# Patient Record
Sex: MALE | Race: WHITE | NOT HISPANIC OR LATINO | URBAN - METROPOLITAN AREA
[De-identification: names, ages, dates, MRNs, and addresses within clinical notes are randomized per-mention and may not be internally consistent; named-entity substitution may affect disease eponyms.]

---

## 2017-03-14 ENCOUNTER — INPATIENT HOSPITAL (AMBULATORY)
Dept: URBAN - METROPOLITAN AREA HOSPITAL 107 | Facility: HOSPITAL | Age: 57
End: 2017-03-14
Payer: COMMERCIAL

## 2017-03-14 DIAGNOSIS — R10.9 UNSPECIFIED ABDOMINAL PAIN: ICD-10-CM

## 2017-03-14 DIAGNOSIS — R19.7 DIARRHEA, UNSPECIFIED: ICD-10-CM

## 2017-03-14 DIAGNOSIS — K76.89 OTHER SPECIFIED DISEASES OF LIVER: ICD-10-CM

## 2017-03-14 DIAGNOSIS — R11.2 NAUSEA WITH VOMITING, UNSPECIFIED: ICD-10-CM

## 2017-03-14 DIAGNOSIS — K56.7 ILEUS, UNSPECIFIED: ICD-10-CM

## 2017-03-14 DIAGNOSIS — K52.9 NONINFECTIVE GASTROENTERITIS AND COLITIS, UNSPECIFIED: ICD-10-CM

## 2017-03-14 DIAGNOSIS — K57.30 DIVERTICULOSIS OF LARGE INTESTINE WITHOUT PERFORATION OR ABS: ICD-10-CM

## 2017-03-14 PROCEDURE — 99255 IP/OBS CONSLTJ NEW/EST HI 80: CPT | Performed by: INTERNAL MEDICINE

## 2017-05-08 ENCOUNTER — OFFICE (AMBULATORY)
Dept: URBAN - METROPOLITAN AREA CLINIC 75 | Facility: CLINIC | Age: 57
End: 2017-05-08
Payer: COMMERCIAL

## 2017-05-08 VITALS
HEIGHT: 70 IN | SYSTOLIC BLOOD PRESSURE: 120 MMHG | HEART RATE: 60 BPM | HEART RATE: 60 BPM | HEIGHT: 70 IN | WEIGHT: 125 LBS | DIASTOLIC BLOOD PRESSURE: 60 MMHG | HEART RATE: 60 BPM | HEIGHT: 70 IN | SYSTOLIC BLOOD PRESSURE: 120 MMHG | SYSTOLIC BLOOD PRESSURE: 120 MMHG | DIASTOLIC BLOOD PRESSURE: 60 MMHG | WEIGHT: 125 LBS | WEIGHT: 125 LBS | DIASTOLIC BLOOD PRESSURE: 60 MMHG

## 2017-05-08 DIAGNOSIS — K59.00 CONSTIPATION, UNSPECIFIED: ICD-10-CM

## 2017-05-08 DIAGNOSIS — R63.4 ABNORMAL WEIGHT LOSS: ICD-10-CM

## 2017-05-08 DIAGNOSIS — R13.10 DYSPHAGIA, UNSPECIFIED: ICD-10-CM

## 2017-05-08 DIAGNOSIS — R63.0 ANOREXIA: ICD-10-CM

## 2017-05-08 DIAGNOSIS — R10.84 GENERALIZED ABDOMINAL PAIN: ICD-10-CM

## 2017-05-08 DIAGNOSIS — R11.2 NAUSEA WITH VOMITING, UNSPECIFIED: ICD-10-CM

## 2017-05-08 PROCEDURE — 99205 OFFICE O/P NEW HI 60 MIN: CPT | Performed by: INTERNAL MEDICINE

## 2017-05-08 PROCEDURE — 99215 OFFICE O/P EST HI 40 MIN: CPT | Performed by: INTERNAL MEDICINE

## 2017-05-08 RX ORDER — LINACLOTIDE 145 UG/1
145 CAPSULE, GELATIN COATED ORAL
Qty: 30 | Refills: 0 | Status: COMPLETED
Start: 2017-05-08 | End: 2017-05-11

## 2017-05-08 NOTE — SERVICEHPINOTES
Thank you for allowing me to participate in the care of this patient. Patient presents with multiple GI complaints to include trouble swallowing and coughing food back up. Even water is hard to go down. He also has constipation where Magnesium Citrate is the only thing that helped. He feels like his food just "sits there" and doesn't digest or move through his system. His symptoms started 1 year ago and are worsening. He has lost 30lbs over the last several years. He has had multiple visits to the ED for this and his COPD with no definitive diagnosis. He had a laryngoscopy at [facility] which was normal. He had a Barium swallow last month at [facility] last month which he also reports is normal. He is a former smoker for 40 years and quit 3 months ago. No blood in stool.  He has had multiple CT of the A/P in March, April, and May which were largely normal. The earlier CTs showed some suggestion of enteritis with fluid and mild dilation of the small bowel but these seem resolved on more recent CTs of the A/P. He has also had CT of the chest and CXR which also were nonrevealing for upper GI complaints. He does have emphysema and diverticulosis found of these CTs.

## 2017-05-08 NOTE — SERVICEHPINOTES
Thank you for allowing me to participate in the care of this patient. Patient presents with multiple GI complaints to include trouble swallowing and coughing food back up. Even water is hard to go down. He also has constipation where Magnesium Citrate is the only thing that helped. He feels like his food just "sits there" and doesn't digest or move through his system. His symptoms started 1 year ago and are worsening. He has lost 30lbs over the last several years. He has had multiple visits to the ED for this and his COPD with no definitive diagnosis. He had a laryngoscopy at [facility] which was normal. He had a Barium swallow last month at [facility] last month which he also reports is normal. He is a former smoker for 40 years and quit 3 months ago. No blood in stool.  He has had multiple CT of the A/P in March, April, and May which were largely normal. The earlier CTs showed some suggestion of enteritis with fluid and mild dilation of the small bowel but these seem resolved on more recent CTs of the A/P. He has also had CT of the chest and CXR which also were nonrevealing for upper GI complaints. He does have emphysema and diverticulosis found of these CTs.

## 2017-05-08 NOTE — SERVICENOTES
The above note was scribed by Bree Lopez PA-C. Dr. Hennessy saw this patient and examined this patient while in the office.

## 2017-05-08 NOTE — SERVICEHPINOTES
Thank you for allowing me to participate in the care of this patient. Patient presents with multiple GI complaints to include trouble swallowing and coughing food back up. Even water is hard to go down. He also has constipation where Magnesium Citrate is the only thing that helped. He feels like his food just "sits there" and doesn't digest or move through his system. His symptoms started 1 year ago and are worsening. He has lost 30lbs over the last several years. He has had multiple visits to the ED for this and his COPD with no definitive diagnosis. He had a laryngoscopy at [facility] which was normal. He had a Barium swallow last month at [facility] last month which he also reports is normal. He is a former smoker for 40 years and quit 3 months ago. No blood in stool.  He has had multiple CT of the A/P in March, April, and May which were largely normal. The earlier CTs showed some suggestion of enteritis with fluid and mild dilation of the small bowel but these seem resolved on more recent CTs of the A/P. He has also had CT of the chest and CXR which also were nonrevealing for upper GI complaints. He does have emphysema and diverticulosis found of these CTs.

## 2017-05-10 VITALS
RESPIRATION RATE: 15 BRPM | TEMPERATURE: 96 F | SYSTOLIC BLOOD PRESSURE: 117 MMHG | SYSTOLIC BLOOD PRESSURE: 79 MMHG | HEART RATE: 50 BPM | DIASTOLIC BLOOD PRESSURE: 68 MMHG | DIASTOLIC BLOOD PRESSURE: 56 MMHG | HEIGHT: 70 IN | RESPIRATION RATE: 16 BRPM | SYSTOLIC BLOOD PRESSURE: 123 MMHG | HEART RATE: 56 BPM | RESPIRATION RATE: 13 BRPM | TEMPERATURE: 97.4 F | DIASTOLIC BLOOD PRESSURE: 51 MMHG | HEART RATE: 40 BPM | DIASTOLIC BLOOD PRESSURE: 48 MMHG | HEART RATE: 71 BPM | HEART RATE: 44 BPM | WEIGHT: 95 LBS | HEART RATE: 81 BPM | RESPIRATION RATE: 17 BRPM | SYSTOLIC BLOOD PRESSURE: 101 MMHG | SYSTOLIC BLOOD PRESSURE: 116 MMHG | DIASTOLIC BLOOD PRESSURE: 50 MMHG | RESPIRATION RATE: 18 BRPM | SYSTOLIC BLOOD PRESSURE: 72 MMHG | SYSTOLIC BLOOD PRESSURE: 96 MMHG | DIASTOLIC BLOOD PRESSURE: 78 MMHG | SYSTOLIC BLOOD PRESSURE: 107 MMHG | DIASTOLIC BLOOD PRESSURE: 35 MMHG | HEART RATE: 39 BPM | HEART RATE: 46 BPM | OXYGEN SATURATION: 100 % | DIASTOLIC BLOOD PRESSURE: 72 MMHG

## 2017-05-11 ENCOUNTER — OFFICE (AMBULATORY)
Dept: URBAN - METROPOLITAN AREA CLINIC 64 | Facility: CLINIC | Age: 57
End: 2017-05-11
Payer: COMMERCIAL

## 2017-05-11 ENCOUNTER — AMBULATORY SURGICAL CENTER (AMBULATORY)
Dept: URBAN - METROPOLITAN AREA SURGERY 17 | Facility: SURGERY | Age: 57
End: 2017-05-11
Payer: COMMERCIAL

## 2017-05-11 DIAGNOSIS — R11.2 NAUSEA WITH VOMITING, UNSPECIFIED: ICD-10-CM

## 2017-05-11 DIAGNOSIS — R63.0 ANOREXIA: ICD-10-CM

## 2017-05-11 DIAGNOSIS — R13.10 DYSPHAGIA, UNSPECIFIED: ICD-10-CM

## 2017-05-11 DIAGNOSIS — R10.84 GENERALIZED ABDOMINAL PAIN: ICD-10-CM

## 2017-05-11 DIAGNOSIS — R63.4 ABNORMAL WEIGHT LOSS: ICD-10-CM

## 2017-05-11 LAB
GI HISTOLOGY: A. UNSPECIFIED: (no result)
GI HISTOLOGY: PDF REPORT: (no result)

## 2017-05-11 PROCEDURE — 88305 TISSUE EXAM BY PATHOLOGIST: CPT | Performed by: INTERNAL MEDICINE

## 2017-05-11 PROCEDURE — 43239 EGD BIOPSY SINGLE/MULTIPLE: CPT | Performed by: INTERNAL MEDICINE

## 2017-05-11 PROCEDURE — 43450 DILATE ESOPHAGUS 1/MULT PASS: CPT | Performed by: INTERNAL MEDICINE

## 2017-05-11 RX ADMIN — PROPOFOL 50 MG: 10 INJECTION, EMULSION INTRAVENOUS at 09:15

## 2017-05-11 RX ADMIN — PROPOFOL 100 MG: 10 INJECTION, EMULSION INTRAVENOUS at 09:05

## 2017-05-11 RX ADMIN — LIDOCAINE HYDROCHLORIDE 25 MG: 10 INJECTION, SOLUTION EPIDURAL; INFILTRATION; INTRACAUDAL; PERINEURAL at 09:05

## 2017-05-11 RX ADMIN — PROPOFOL 50 MG: 10 INJECTION, EMULSION INTRAVENOUS at 09:08

## 2017-05-15 ENCOUNTER — HOSPITAL ENCOUNTER (EMERGENCY)
Facility: HOSPITAL | Age: 57
Discharge: LEFT BEFORE DISCHARGE | End: 2017-05-15
Attending: EMERGENCY MEDICINE | Admitting: EMERGENCY MEDICINE

## 2017-05-15 VITALS
DIASTOLIC BLOOD PRESSURE: 84 MMHG | SYSTOLIC BLOOD PRESSURE: 129 MMHG | OXYGEN SATURATION: 96 % | BODY MASS INDEX: 17.9 KG/M2 | TEMPERATURE: 99 F | RESPIRATION RATE: 18 BRPM | HEART RATE: 70 BPM | HEIGHT: 70 IN | WEIGHT: 125 LBS

## 2017-05-15 DIAGNOSIS — F32.A DEPRESSION, UNSPECIFIED DEPRESSION TYPE: Primary | ICD-10-CM

## 2017-05-15 LAB
ALBUMIN SERPL-MCNC: 4 G/DL (ref 3.5–5.2)
ALBUMIN/GLOB SERPL: 1.9 G/DL
ALP SERPL-CCNC: 60 U/L (ref 39–117)
ALT SERPL W P-5'-P-CCNC: 9 U/L (ref 1–41)
AMPHET+METHAMPHET UR QL: NEGATIVE
ANION GAP SERPL CALCULATED.3IONS-SCNC: 10.8 MMOL/L
AST SERPL-CCNC: 14 U/L (ref 1–40)
BACTERIA UR QL AUTO: ABNORMAL /HPF
BARBITURATES UR QL SCN: NEGATIVE
BASOPHILS # BLD AUTO: 0.11 10*3/MM3 (ref 0–0.2)
BASOPHILS NFR BLD AUTO: 1.5 % (ref 0–1.5)
BENZODIAZ UR QL SCN: NEGATIVE
BILIRUB SERPL-MCNC: 0.4 MG/DL (ref 0.1–1.2)
BILIRUB UR QL STRIP: NEGATIVE
BUN BLD-MCNC: 12 MG/DL (ref 6–20)
BUN/CREAT SERPL: 14.5 (ref 7–25)
CALCIUM SPEC-SCNC: 9.3 MG/DL (ref 8.6–10.5)
CANNABINOIDS SERPL QL: NEGATIVE
CHLORIDE SERPL-SCNC: 105 MMOL/L (ref 98–107)
CLARITY UR: ABNORMAL
CO2 SERPL-SCNC: 31.2 MMOL/L (ref 22–29)
COCAINE UR QL: NEGATIVE
COLOR UR: YELLOW
CREAT BLD-MCNC: 0.83 MG/DL (ref 0.76–1.27)
DEPRECATED RDW RBC AUTO: 46.8 FL (ref 37–54)
EOSINOPHIL # BLD AUTO: 0.41 10*3/MM3 (ref 0–0.7)
EOSINOPHIL NFR BLD AUTO: 5.6 % (ref 0.3–6.2)
ERYTHROCYTE [DISTWIDTH] IN BLOOD BY AUTOMATED COUNT: 13.6 % (ref 11.5–14.5)
ETHANOL BLD-MCNC: <10 MG/DL (ref 0–10)
ETHANOL UR QL: <0.01 %
GFR SERPL CREATININE-BSD FRML MDRD: 97 ML/MIN/1.73
GLOBULIN UR ELPH-MCNC: 2.1 GM/DL
GLUCOSE BLD-MCNC: 117 MG/DL (ref 65–99)
GLUCOSE UR STRIP-MCNC: NEGATIVE MG/DL
HCT VFR BLD AUTO: 39.3 % (ref 40.4–52.2)
HGB BLD-MCNC: 12.7 G/DL (ref 13.7–17.6)
HGB UR QL STRIP.AUTO: ABNORMAL
HOLD SPECIMEN: NORMAL
HOLD SPECIMEN: NORMAL
HYALINE CASTS UR QL AUTO: ABNORMAL /LPF
IMM GRANULOCYTES # BLD: 0 10*3/MM3 (ref 0–0.03)
IMM GRANULOCYTES NFR BLD: 0 % (ref 0–0.5)
KETONES UR QL STRIP: NEGATIVE
LEUKOCYTE ESTERASE UR QL STRIP.AUTO: NEGATIVE
LYMPHOCYTES # BLD AUTO: 2.71 10*3/MM3 (ref 0.9–4.8)
LYMPHOCYTES NFR BLD AUTO: 37.3 % (ref 19.6–45.3)
MAGNESIUM SERPL-MCNC: 2.5 MG/DL (ref 1.6–2.6)
MCH RBC QN AUTO: 30.6 PG (ref 27–32.7)
MCHC RBC AUTO-ENTMCNC: 32.3 G/DL (ref 32.6–36.4)
MCV RBC AUTO: 94.7 FL (ref 79.8–96.2)
METHADONE UR QL SCN: NEGATIVE
MONOCYTES # BLD AUTO: 0.72 10*3/MM3 (ref 0.2–1.2)
MONOCYTES NFR BLD AUTO: 9.9 % (ref 5–12)
NEUTROPHILS # BLD AUTO: 3.31 10*3/MM3 (ref 1.9–8.1)
NEUTROPHILS NFR BLD AUTO: 45.7 % (ref 42.7–76)
NITRITE UR QL STRIP: NEGATIVE
OPIATES UR QL: NEGATIVE
OXYCODONE UR QL SCN: POSITIVE
PH UR STRIP.AUTO: 7.5 [PH] (ref 5–8)
PLATELET # BLD AUTO: 180 10*3/MM3 (ref 140–500)
PMV BLD AUTO: 10.3 FL (ref 6–12)
POTASSIUM BLD-SCNC: 3.6 MMOL/L (ref 3.5–5.2)
PROT SERPL-MCNC: 6.1 G/DL (ref 6–8.5)
PROT UR QL STRIP: NEGATIVE
RBC # BLD AUTO: 4.15 10*6/MM3 (ref 4.6–6)
RBC # UR: ABNORMAL /HPF
REF LAB TEST METHOD: ABNORMAL
SODIUM BLD-SCNC: 147 MMOL/L (ref 136–145)
SP GR UR STRIP: 1.01 (ref 1–1.03)
SQUAMOUS #/AREA URNS HPF: ABNORMAL /HPF
UROBILINOGEN UR QL STRIP: ABNORMAL
WBC NRBC COR # BLD: 7.26 10*3/MM3 (ref 4.5–10.7)
WBC UR QL AUTO: ABNORMAL /HPF
WHOLE BLOOD HOLD SPECIMEN: NORMAL

## 2017-05-16 ENCOUNTER — APPOINTMENT (OUTPATIENT)
Dept: GENERAL RADIOLOGY | Facility: HOSPITAL | Age: 57
End: 2017-05-16

## 2017-05-16 ENCOUNTER — APPOINTMENT (OUTPATIENT)
Dept: CT IMAGING | Facility: HOSPITAL | Age: 57
End: 2017-05-16

## 2017-05-16 ENCOUNTER — HOSPITAL ENCOUNTER (INPATIENT)
Facility: HOSPITAL | Age: 57
LOS: 3 days | Discharge: PSYCHIATRIC OR SUBSTANCE USE FACILITY | End: 2017-05-19
Attending: SPECIALIST | Admitting: SPECIALIST

## 2017-05-16 ENCOUNTER — APPOINTMENT (OUTPATIENT)
Dept: MRI IMAGING | Facility: HOSPITAL | Age: 57
End: 2017-05-16
Attending: EMERGENCY MEDICINE

## 2017-05-16 ENCOUNTER — HOSPITAL ENCOUNTER (EMERGENCY)
Facility: HOSPITAL | Age: 57
End: 2017-05-16
Attending: EMERGENCY MEDICINE | Admitting: EMERGENCY MEDICINE

## 2017-05-16 VITALS
TEMPERATURE: 97.6 F | HEART RATE: 63 BPM | OXYGEN SATURATION: 98 % | DIASTOLIC BLOOD PRESSURE: 82 MMHG | SYSTOLIC BLOOD PRESSURE: 158 MMHG | RESPIRATION RATE: 15 BRPM
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
## 2017-05-16 DIAGNOSIS — F29 PSYCHOSIS, UNSPECIFIED PSYCHOSIS TYPE (HCC): ICD-10-CM

## 2017-05-16 DIAGNOSIS — Z76.5 MALINGERING: ICD-10-CM

## 2017-05-16 DIAGNOSIS — R29.898 LEG WEAKNESS, BILATERAL: ICD-10-CM

## 2017-05-16 LAB
T4 FREE SERPL-MCNC: 1.08 NG/DL (ref 0.93–1.7)
TSH SERPL DL<=0.05 MIU/L-ACNC: 0.79 MIU/ML (ref 0.27–4.2)

## 2017-05-16 PROCEDURE — 84443 ASSAY THYROID STIM HORMONE: CPT | Performed by: SPECIALIST

## 2017-05-16 PROCEDURE — 84439 ASSAY OF FREE THYROXINE: CPT | Performed by: SPECIALIST

## 2017-05-16 RX ORDER — LORAZEPAM 2 MG/ML
1 INJECTION INTRAMUSCULAR ONCE
Status: COMPLETED | OUTPATIENT
Start: 2017-05-16 | End: 2017-05-16

## 2017-05-16 RX ORDER — ZIPRASIDONE MESYLATE 20 MG/ML
20 INJECTION, POWDER, LYOPHILIZED, FOR SOLUTION INTRAMUSCULAR ONCE
Status: COMPLETED | OUTPATIENT
Start: 2017-05-16 | End: 2017-05-16

## 2017-05-16 RX ORDER — ACETAMINOPHEN 325 MG/1
650 TABLET ORAL EVERY 4 HOURS PRN
Status: DISCONTINUED | OUTPATIENT
Start: 2017-05-16 | End: 2017-05-19 | Stop reason: HOSPADM

## 2017-05-16 RX ORDER — MAGNESIUM HYDROXIDE/ALUMINUM HYDROXICE/SIMETHICONE 120; 1200; 1200 MG/30ML; MG/30ML; MG/30ML
30 SUSPENSION ORAL EVERY 6 HOURS PRN
Status: DISCONTINUED | OUTPATIENT
Start: 2017-05-16 | End: 2017-05-19 | Stop reason: HOSPADM

## 2017-05-16 RX ADMIN — LORAZEPAM 1 MG: 2 INJECTION, SOLUTION INTRAMUSCULAR; INTRAVENOUS at 14:25

## 2017-05-16 RX ADMIN — LORAZEPAM 1 MG: 2 INJECTION, SOLUTION INTRAMUSCULAR; INTRAVENOUS at 14:17

## 2017-05-16 RX ADMIN — ZIPRASIDONE MESYLATE 20 MG: 20 INJECTION, POWDER, LYOPHILIZED, FOR SOLUTION INTRAMUSCULAR at 14:18

## 2017-05-16 RX ADMIN — ACETAMINOPHEN 650 MG: 325 TABLET ORAL at 21:13

## 2017-05-17 PROCEDURE — 99254 IP/OBS CNSLTJ NEW/EST MOD 60: CPT | Performed by: ORTHOPAEDIC SURGERY

## 2017-05-17 RX ORDER — NAPROXEN 250 MG/1
250 TABLET ORAL 2 TIMES DAILY PRN
Status: DISCONTINUED | OUTPATIENT
Start: 2017-05-17 | End: 2017-05-19 | Stop reason: HOSPADM

## 2017-05-17 RX ORDER — ALBUTEROL SULFATE 2.5 MG/3ML
2.5 SOLUTION RESPIRATORY (INHALATION) EVERY 6 HOURS PRN
Status: DISCONTINUED | OUTPATIENT
Start: 2017-05-17 | End: 2017-05-19 | Stop reason: HOSPADM

## 2017-05-17 RX ORDER — CITALOPRAM 20 MG/1
20 TABLET ORAL DAILY
Status: DISCONTINUED | OUTPATIENT
Start: 2017-05-17 | End: 2017-05-19 | Stop reason: HOSPADM

## 2017-05-17 RX ADMIN — ACETAMINOPHEN 650 MG: 325 TABLET ORAL at 05:45

## 2017-05-17 RX ADMIN — CITALOPRAM 20 MG: 20 TABLET, FILM COATED ORAL at 10:28

## 2017-05-18 PROCEDURE — 99232 SBSQ HOSP IP/OBS MODERATE 35: CPT | Performed by: ORTHOPAEDIC SURGERY

## 2017-05-18 PROCEDURE — 76377 3D RENDER W/INTRP POSTPROCES: CPT

## 2017-05-18 PROCEDURE — 73700 CT LOWER EXTREMITY W/O DYE: CPT

## 2017-05-19 VITALS
WEIGHT: 121.6 LBS | OXYGEN SATURATION: 95 % | BODY MASS INDEX: 17.41 KG/M2 | HEIGHT: 70 IN | TEMPERATURE: 98.4 F | DIASTOLIC BLOOD PRESSURE: 64 MMHG | RESPIRATION RATE: 16 BRPM | SYSTOLIC BLOOD PRESSURE: 97 MMHG | HEART RATE: 66 BPM

## 2017-05-19 PROCEDURE — 99232 SBSQ HOSP IP/OBS MODERATE 35: CPT | Performed by: ORTHOPAEDIC SURGERY

## 2021-10-07 ENCOUNTER — OFFICE (AMBULATORY)
Dept: URBAN - METROPOLITAN AREA CLINIC 75 | Facility: CLINIC | Age: 61
End: 2021-10-07
Payer: COMMERCIAL

## 2021-10-07 VITALS
RESPIRATION RATE: 18 BRPM | DIASTOLIC BLOOD PRESSURE: 66 MMHG | WEIGHT: 132 LBS | OXYGEN SATURATION: 98 % | HEIGHT: 70 IN | SYSTOLIC BLOOD PRESSURE: 140 MMHG | HEART RATE: 74 BPM

## 2021-10-07 DIAGNOSIS — R13.10 DYSPHAGIA, UNSPECIFIED: ICD-10-CM

## 2021-10-07 DIAGNOSIS — K59.00 CONSTIPATION, UNSPECIFIED: ICD-10-CM

## 2021-10-07 DIAGNOSIS — R10.9 UNSPECIFIED ABDOMINAL PAIN: ICD-10-CM

## 2021-10-07 DIAGNOSIS — R11.2 NAUSEA WITH VOMITING, UNSPECIFIED: ICD-10-CM

## 2021-10-07 PROCEDURE — 99204 OFFICE O/P NEW MOD 45 MIN: CPT | Performed by: INTERNAL MEDICINE

## 2021-11-22 VITALS
HEART RATE: 65 BPM | OXYGEN SATURATION: 95 % | HEART RATE: 54 BPM | HEART RATE: 68 BPM | SYSTOLIC BLOOD PRESSURE: 106 MMHG | TEMPERATURE: 97.1 F | SYSTOLIC BLOOD PRESSURE: 116 MMHG | SYSTOLIC BLOOD PRESSURE: 101 MMHG | DIASTOLIC BLOOD PRESSURE: 79 MMHG | HEART RATE: 58 BPM | DIASTOLIC BLOOD PRESSURE: 87 MMHG | DIASTOLIC BLOOD PRESSURE: 62 MMHG | SYSTOLIC BLOOD PRESSURE: 144 MMHG | SYSTOLIC BLOOD PRESSURE: 103 MMHG | RESPIRATION RATE: 13 BRPM | RESPIRATION RATE: 19 BRPM | HEIGHT: 70 IN | HEART RATE: 67 BPM | RESPIRATION RATE: 16 BRPM | DIASTOLIC BLOOD PRESSURE: 101 MMHG | OXYGEN SATURATION: 100 % | RESPIRATION RATE: 8 BRPM | RESPIRATION RATE: 21 BRPM | SYSTOLIC BLOOD PRESSURE: 149 MMHG | WEIGHT: 124 LBS | SYSTOLIC BLOOD PRESSURE: 119 MMHG | SYSTOLIC BLOOD PRESSURE: 110 MMHG | HEART RATE: 75 BPM | DIASTOLIC BLOOD PRESSURE: 77 MMHG | RESPIRATION RATE: 18 BRPM | HEART RATE: 69 BPM | DIASTOLIC BLOOD PRESSURE: 74 MMHG | DIASTOLIC BLOOD PRESSURE: 108 MMHG | HEART RATE: 62 BPM | RESPIRATION RATE: 7 BRPM | SYSTOLIC BLOOD PRESSURE: 146 MMHG | DIASTOLIC BLOOD PRESSURE: 76 MMHG | DIASTOLIC BLOOD PRESSURE: 55 MMHG | HEART RATE: 63 BPM | OXYGEN SATURATION: 99 % | SYSTOLIC BLOOD PRESSURE: 81 MMHG | SYSTOLIC BLOOD PRESSURE: 88 MMHG | SYSTOLIC BLOOD PRESSURE: 95 MMHG | RESPIRATION RATE: 22 BRPM | SYSTOLIC BLOOD PRESSURE: 125 MMHG | DIASTOLIC BLOOD PRESSURE: 70 MMHG | HEART RATE: 64 BPM | DIASTOLIC BLOOD PRESSURE: 69 MMHG | TEMPERATURE: 97.2 F | DIASTOLIC BLOOD PRESSURE: 106 MMHG | DIASTOLIC BLOOD PRESSURE: 58 MMHG | RESPIRATION RATE: 5 BRPM | DIASTOLIC BLOOD PRESSURE: 46 MMHG | DIASTOLIC BLOOD PRESSURE: 48 MMHG | RESPIRATION RATE: 10 BRPM

## 2021-11-22 PROBLEM — Z12.11 SCREENING FOR COLONIC NEOPLASIA: Status: ACTIVE | Noted: 2021-11-23

## 2021-11-23 ENCOUNTER — OFFICE (AMBULATORY)
Dept: URBAN - METROPOLITAN AREA PATHOLOGY 4 | Facility: PATHOLOGY | Age: 61
End: 2021-11-23
Payer: COMMERCIAL

## 2021-11-23 ENCOUNTER — AMBULATORY SURGICAL CENTER (AMBULATORY)
Dept: URBAN - METROPOLITAN AREA SURGERY 17 | Facility: SURGERY | Age: 61
End: 2021-11-23
Payer: COMMERCIAL

## 2021-11-23 DIAGNOSIS — R63.0 ANOREXIA: ICD-10-CM

## 2021-11-23 DIAGNOSIS — K31.89 OTHER DISEASES OF STOMACH AND DUODENUM: ICD-10-CM

## 2021-11-23 DIAGNOSIS — Z12.11 ENCOUNTER FOR SCREENING FOR MALIGNANT NEOPLASM OF COLON: ICD-10-CM

## 2021-11-23 DIAGNOSIS — K57.30 DIVERTICULOSIS OF LARGE INTESTINE WITHOUT PERFORATION OR ABS: ICD-10-CM

## 2021-11-23 DIAGNOSIS — R13.10 DYSPHAGIA, UNSPECIFIED: ICD-10-CM

## 2021-11-23 DIAGNOSIS — D12.4 BENIGN NEOPLASM OF DESCENDING COLON: ICD-10-CM

## 2021-11-23 DIAGNOSIS — K64.8 OTHER HEMORRHOIDS: ICD-10-CM

## 2021-11-23 DIAGNOSIS — K29.70 GASTRITIS, UNSPECIFIED, WITHOUT BLEEDING: ICD-10-CM

## 2021-11-23 DIAGNOSIS — D12.5 BENIGN NEOPLASM OF SIGMOID COLON: ICD-10-CM

## 2021-11-23 DIAGNOSIS — K90.0 CELIAC DISEASE: ICD-10-CM

## 2021-11-23 DIAGNOSIS — K20.80 OTHER ESOPHAGITIS WITHOUT BLEEDING: ICD-10-CM

## 2021-11-23 DIAGNOSIS — R11.2 NAUSEA WITH VOMITING, UNSPECIFIED: ICD-10-CM

## 2021-11-23 DIAGNOSIS — B96.81 HELICOBACTER PYLORI [H. PYLORI] AS THE CAUSE OF DISEASES CLA: ICD-10-CM

## 2021-11-23 PROBLEM — K63.5 POLYP OF COLON: Status: ACTIVE | Noted: 2021-11-23

## 2021-11-23 PROBLEM — K22.89 OTHER SPECIFIED DISEASE OF ESOPHAGUS: Status: ACTIVE | Noted: 2021-11-23

## 2021-11-23 LAB
GI HISTOLOGY: A. SELECT: (no result)
GI HISTOLOGY: B. SELECT: (no result)
GI HISTOLOGY: C. SELECT: (no result)
GI HISTOLOGY: D. UNSPECIFIED: (no result)
GI HISTOLOGY: E. UNSPECIFIED: (no result)
GI HISTOLOGY: PDF REPORT: (no result)

## 2021-11-23 PROCEDURE — 88342 IMHCHEM/IMCYTCHM 1ST ANTB: CPT | Performed by: INTERNAL MEDICINE

## 2021-11-23 PROCEDURE — 88305 TISSUE EXAM BY PATHOLOGIST: CPT | Performed by: INTERNAL MEDICINE

## 2021-11-23 PROCEDURE — 45385 COLONOSCOPY W/LESION REMOVAL: CPT | Mod: 33 | Performed by: INTERNAL MEDICINE

## 2021-11-23 PROCEDURE — 43249 ESOPH EGD DILATION <30 MM: CPT | Performed by: INTERNAL MEDICINE

## 2021-11-23 PROCEDURE — 43239 EGD BIOPSY SINGLE/MULTIPLE: CPT | Mod: 59 | Performed by: INTERNAL MEDICINE

## 2021-11-23 NOTE — SERVICEHPINOTES
Mister Hammonds is being seen today due to complaints of nausea and vomiting as well as abdominal pain, he's had symptoms for a month or more.  He is thin and he says he has lost 8 pounds.  He also says he has trouble swallowing, when he swallows food or saliva it just "gurgles" and won't go down.  He has been to the ER, CT scan was unremarkable.  Lab work was unremarkable in terms of CBC or hepatic function panel.  There is no melena or hematemesis.  He quit smoking 6 months ago.  He is not a drinker.  He does not use nonsteroidals.  There is no history of ulcers.  Family history is negative.  Apparently had an upper endoscopy for years ago which reportedly was unremarkable.He also has constipation.  He has to strain.  He's having trouble going to the bathroom now on it started about a month ago as well.  He is never had a colonoscopy.  Again there is no blood in the bowels.  He had a negative colon were 2 years ago per the patient.  There is no family history of polyps colitis or colon cancer.  He is in no acute distress but does look chronically ill.

## 2022-02-21 ENCOUNTER — HOSPITAL ENCOUNTER (EMERGENCY)
Facility: HOSPITAL | Age: 62
Discharge: HOME | End: 2022-02-21
Attending: EMERGENCY MEDICINE | Admitting: EMERGENCY MEDICINE

## 2022-02-21 ENCOUNTER — APPOINTMENT (OUTPATIENT)
Dept: CT IMAGING | Facility: HOSPITAL | Age: 62
End: 2022-02-21

## 2022-02-21 ENCOUNTER — APPOINTMENT (OUTPATIENT)
Dept: GENERAL RADIOLOGY | Facility: HOSPITAL | Age: 62
End: 2022-02-21

## 2022-02-21 VITALS
SYSTOLIC BLOOD PRESSURE: 129 MMHG | OXYGEN SATURATION: 99 % | HEIGHT: 70 IN | HEART RATE: 63 BPM | WEIGHT: 121 LBS | TEMPERATURE: 98.3 F | BODY MASS INDEX: 17.32 KG/M2 | DIASTOLIC BLOOD PRESSURE: 82 MMHG | RESPIRATION RATE: 16 BRPM

## 2022-02-21 DIAGNOSIS — R11.2 NAUSEA AND VOMITING, INTRACTABILITY OF VOMITING NOT SPECIFIED, UNSPECIFIED VOMITING TYPE: ICD-10-CM

## 2022-02-21 DIAGNOSIS — J01.11 ACUTE RECURRENT FRONTAL SINUSITIS: Primary | ICD-10-CM

## 2022-02-21 LAB
ALBUMIN SERPL-MCNC: 4.1 G/DL (ref 3.5–5.2)
ALBUMIN/GLOB SERPL: 1.8 G/DL
ALP SERPL-CCNC: 65 U/L (ref 39–117)
ALT SERPL W P-5'-P-CCNC: 9 U/L (ref 1–41)
ANION GAP SERPL CALCULATED.3IONS-SCNC: 9 MMOL/L (ref 5–15)
AST SERPL-CCNC: 15 U/L (ref 1–40)
B PARAPERT DNA SPEC QL NAA+PROBE: NOT DETECTED
B PERT DNA SPEC QL NAA+PROBE: NOT DETECTED
BASOPHILS # BLD AUTO: 0.09 10*3/MM3 (ref 0–0.2)
BASOPHILS NFR BLD AUTO: 1 % (ref 0–1.5)
BILIRUB SERPL-MCNC: 0.4 MG/DL (ref 0–1.2)
BUN SERPL-MCNC: 20 MG/DL (ref 6–20)
BUN/CREAT SERPL: 18 (ref 7–25)
C PNEUM DNA NPH QL NAA+NON-PROBE: NOT DETECTED
CALCIUM SPEC-SCNC: 8.9 MG/DL (ref 8.6–10.5)
CHLORIDE SERPL-SCNC: 108 MMOL/L (ref 98–107)
CO2 SERPL-SCNC: 27 MMOL/L (ref 22–29)
CREAT SERPL-MCNC: 1.11 MG/DL (ref 0.76–1.27)
DEPRECATED RDW RBC AUTO: 46.1 FL (ref 37–54)
EOSINOPHIL # BLD AUTO: 0.09 10*3/MM3 (ref 0–0.4)
EOSINOPHIL NFR BLD AUTO: 1 % (ref 0.3–6.2)
ERYTHROCYTE [DISTWIDTH] IN BLOOD BY AUTOMATED COUNT: 13.6 % (ref 12.3–15.4)
FLUAV SUBTYP SPEC NAA+PROBE: NOT DETECTED
FLUBV RNA ISLT QL NAA+PROBE: NOT DETECTED
GFR SERPL CREATININE-BSD FRML MDRD: 68 ML/MIN/1.73
GLOBULIN UR ELPH-MCNC: 2.3 GM/DL
GLUCOSE SERPL-MCNC: 94 MG/DL (ref 65–99)
HADV DNA SPEC NAA+PROBE: NOT DETECTED
HCOV 229E RNA SPEC QL NAA+PROBE: NOT DETECTED
HCOV HKU1 RNA SPEC QL NAA+PROBE: NOT DETECTED
HCOV NL63 RNA SPEC QL NAA+PROBE: NOT DETECTED
HCOV OC43 RNA SPEC QL NAA+PROBE: NOT DETECTED
HCT VFR BLD AUTO: 40.7 % (ref 37.5–51)
HGB BLD-MCNC: 13.3 G/DL (ref 13–17.7)
HMPV RNA NPH QL NAA+NON-PROBE: NOT DETECTED
HPIV1 RNA ISLT QL NAA+PROBE: NOT DETECTED
HPIV2 RNA SPEC QL NAA+PROBE: NOT DETECTED
HPIV3 RNA NPH QL NAA+PROBE: NOT DETECTED
HPIV4 P GENE NPH QL NAA+PROBE: NOT DETECTED
IMM GRANULOCYTES # BLD AUTO: 0.04 10*3/MM3 (ref 0–0.05)
IMM GRANULOCYTES NFR BLD AUTO: 0.4 % (ref 0–0.5)
LIPASE SERPL-CCNC: 31 U/L (ref 13–60)
LYMPHOCYTES # BLD AUTO: 1.76 10*3/MM3 (ref 0.7–3.1)
LYMPHOCYTES NFR BLD AUTO: 18.9 % (ref 19.6–45.3)
M PNEUMO IGG SER IA-ACNC: NOT DETECTED
MCH RBC QN AUTO: 30.5 PG (ref 26.6–33)
MCHC RBC AUTO-ENTMCNC: 32.7 G/DL (ref 31.5–35.7)
MCV RBC AUTO: 93.3 FL (ref 79–97)
MONOCYTES # BLD AUTO: 0.55 10*3/MM3 (ref 0.1–0.9)
MONOCYTES NFR BLD AUTO: 5.9 % (ref 5–12)
NEUTROPHILS NFR BLD AUTO: 6.8 10*3/MM3 (ref 1.7–7)
NEUTROPHILS NFR BLD AUTO: 72.8 % (ref 42.7–76)
NRBC BLD AUTO-RTO: 0 /100 WBC (ref 0–0.2)
PLATELET # BLD AUTO: 241 10*3/MM3 (ref 140–450)
PMV BLD AUTO: 10 FL (ref 6–12)
POTASSIUM SERPL-SCNC: 4 MMOL/L (ref 3.5–5.2)
PROT SERPL-MCNC: 6.4 G/DL (ref 6–8.5)
RBC # BLD AUTO: 4.36 10*6/MM3 (ref 4.14–5.8)
RHINOVIRUS RNA SPEC NAA+PROBE: NOT DETECTED
RSV RNA NPH QL NAA+NON-PROBE: NOT DETECTED
SARS-COV-2 RNA NPH QL NAA+NON-PROBE: NOT DETECTED
SODIUM SERPL-SCNC: 144 MMOL/L (ref 136–145)
WBC NRBC COR # BLD: 9.33 10*3/MM3 (ref 3.4–10.8)

## 2022-02-21 PROCEDURE — 0202U NFCT DS 22 TRGT SARS-COV-2: CPT | Performed by: EMERGENCY MEDICINE

## 2022-02-21 PROCEDURE — 25010000002 MORPHINE PER 10 MG: Performed by: EMERGENCY MEDICINE

## 2022-02-21 PROCEDURE — 25010000002 ONDANSETRON PER 1 MG: Performed by: EMERGENCY MEDICINE

## 2022-02-21 PROCEDURE — 70450 CT HEAD/BRAIN W/O DYE: CPT

## 2022-02-21 PROCEDURE — 74176 CT ABD & PELVIS W/O CONTRAST: CPT

## 2022-02-21 PROCEDURE — 83690 ASSAY OF LIPASE: CPT | Performed by: EMERGENCY MEDICINE

## 2022-02-21 PROCEDURE — 96374 THER/PROPH/DIAG INJ IV PUSH: CPT

## 2022-02-21 PROCEDURE — 85025 COMPLETE CBC W/AUTO DIFF WBC: CPT | Performed by: EMERGENCY MEDICINE

## 2022-02-21 PROCEDURE — 99284 EMERGENCY DEPT VISIT MOD MDM: CPT

## 2022-02-21 PROCEDURE — 25010000002 HYDROMORPHONE PER 4 MG: Performed by: EMERGENCY MEDICINE

## 2022-02-21 PROCEDURE — 80053 COMPREHEN METABOLIC PANEL: CPT | Performed by: EMERGENCY MEDICINE

## 2022-02-21 PROCEDURE — 71045 X-RAY EXAM CHEST 1 VIEW: CPT

## 2022-02-21 PROCEDURE — 96375 TX/PRO/DX INJ NEW DRUG ADDON: CPT

## 2022-02-21 RX ORDER — HYDROMORPHONE HYDROCHLORIDE 1 MG/ML
0.5 INJECTION, SOLUTION INTRAMUSCULAR; INTRAVENOUS; SUBCUTANEOUS ONCE
Status: COMPLETED | OUTPATIENT
Start: 2022-02-21 | End: 2022-02-21

## 2022-02-21 RX ORDER — ONDANSETRON 2 MG/ML
4 INJECTION INTRAMUSCULAR; INTRAVENOUS ONCE
Status: COMPLETED | OUTPATIENT
Start: 2022-02-21 | End: 2022-02-21

## 2022-02-21 RX ORDER — ONDANSETRON 4 MG/1
4 TABLET, ORALLY DISINTEGRATING ORAL 4 TIMES DAILY PRN
Qty: 15 TABLET | Refills: 0 | Status: SHIPPED | OUTPATIENT
Start: 2022-02-21 | End: 2022-04-21

## 2022-02-21 RX ORDER — MORPHINE SULFATE 2 MG/ML
4 INJECTION, SOLUTION INTRAMUSCULAR; INTRAVENOUS ONCE
Status: COMPLETED | OUTPATIENT
Start: 2022-02-21 | End: 2022-02-21

## 2022-02-21 RX ORDER — HYDROCODONE BITARTRATE AND ACETAMINOPHEN 5; 325 MG/1; MG/1
1 TABLET ORAL EVERY 6 HOURS PRN
Qty: 10 TABLET | Refills: 0 | Status: SHIPPED | OUTPATIENT
Start: 2022-02-21 | End: 2022-04-21

## 2022-02-21 RX ORDER — AZITHROMYCIN 250 MG/1
250 TABLET, FILM COATED ORAL DAILY
Qty: 5 TABLET | Refills: 0 | Status: SHIPPED | OUTPATIENT
Start: 2022-02-21 | End: 2022-04-21

## 2022-02-21 RX ADMIN — SODIUM CHLORIDE 1000 ML: 9 INJECTION, SOLUTION INTRAVENOUS at 16:10

## 2022-02-21 RX ADMIN — ONDANSETRON 4 MG: 2 INJECTION INTRAMUSCULAR; INTRAVENOUS at 16:10

## 2022-02-21 RX ADMIN — HYDROMORPHONE HYDROCHLORIDE 0.5 MG: 1 INJECTION, SOLUTION INTRAMUSCULAR; INTRAVENOUS; SUBCUTANEOUS at 17:39

## 2022-02-21 RX ADMIN — MORPHINE SULFATE 4 MG: 2 INJECTION, SOLUTION INTRAMUSCULAR; INTRAVENOUS at 16:12

## 2022-02-21 NOTE — ED TRIAGE NOTES
Pt to ED via EMS from home. Pt is having nasal sinus pressure and severe pain behind eyes/in sinuses x 1 week. Pt also c/o sore throat and trouble swallowing x 1 day. Pt denies CP and denies SOA. Pt had negative COVID test 2-3 days. Pt alert and oriented.

## 2022-02-21 NOTE — ED PROVIDER NOTES
EMERGENCY DEPARTMENT ENCOUNTER    Room Number:  13/13  PCP: System, Provider Not In  Historian: Patient  History Limited By: Nothing      HPI  Chief Complaint: Headache sore throat  Context: Donny Mallory is a 61 y.o. male who presents to the ED c/o headache and sore throat.  Patient states symptoms started a couple days ago.  States pain is in the front of his head behind both eyes and into his teeth.  Patient is had sore throat.  Has had dry heaves.  Has had some constipation.  Has had some mild abdominal pain.  Patient states he had negative Covid test a week ago.  Patient states symptoms not getting better.  No posterior headache or neck pain.      Location: Periorbital  Radiation: Teeth  Character: Aching  Duration: 3 days  Severity: Moderate  Progression: Gradual in onset  Aggravating Factors: Nothing  Alleviating Factors: Nothing        MEDICAL RECORD REVIEW    Patient was diagnosed with sinusitis February 15.  Patient also diagnosed with constipation on the 18th.  Has been seen for the symptoms on multiple occasions          PAST MEDICAL HISTORY  Active Ambulatory Problems     Diagnosis Date Noted   • Psychosis ([facility]) 05/16/2017     Resolved Ambulatory Problems     Diagnosis Date Noted   • No Resolved Ambulatory Problems     Past Medical History:   Diagnosis Date   • Bell's palsy    • COPD (chronic obstructive pulmonary disease) ([facility])    • Dysphagia    • Hypertension          PAST SURGICAL HISTORY  Past Surgical History:   Procedure Laterality Date   • HERNIA REPAIR           FAMILY HISTORY  Family History   Family history unknown: Yes         SOCIAL HISTORY  Social History     Socioeconomic History   • Marital status: Single   Tobacco Use   • Smoking status: Former Smoker   Substance and Sexual Activity   • Alcohol use: No   • Drug use: No         ALLERGIES  Patient has no known allergies.        REVIEW OF SYSTEMS  Review of Systems   Constitutional: Negative for activity change, appetite change and fever.    HENT: Positive for congestion, sinus pressure and sore throat.    Eyes: Negative.    Respiratory: Negative for cough and shortness of breath.    Cardiovascular: Negative for chest pain and leg swelling.   Gastrointestinal: Positive for nausea and vomiting. Negative for abdominal pain and diarrhea.   Endocrine: Negative.    Genitourinary: Negative for decreased urine volume and dysuria.   Musculoskeletal: Negative for neck pain.   Skin: Negative for rash and wound.   Allergic/Immunologic: Negative.    Neurological: Positive for headaches. Negative for weakness and numbness.   Hematological: Negative.    Psychiatric/Behavioral: Negative.    All other systems reviewed and are negative.           PHYSICAL EXAM  ED Triage Vitals [02/21/22 1525]   Temp Heart Rate Resp BP SpO2   98.3 °F (36.8 °C) 88 16 134/80 100 %      Temp src Heart Rate Source Patient Position BP Location FiO2 (%)   Oral Monitor -- -- --       Physical Exam  Vitals and nursing note reviewed.   Constitutional:       General: He is not in acute distress.  HENT:      Head: Normocephalic and atraumatic.      Nose: Congestion present.      Mouth/Throat:      Mouth: Mucous membranes are moist.      Tonsils: No tonsillar exudate or tonsillar abscesses.   Eyes:      Pupils: Pupils are equal, round, and reactive to light.   Cardiovascular:      Rate and Rhythm: Normal rate and regular rhythm.      Heart sounds: Normal heart sounds.   Pulmonary:      Effort: Pulmonary effort is normal. No respiratory distress.      Breath sounds: Normal breath sounds.   Abdominal:      Palpations: Abdomen is soft.      Tenderness: There is no abdominal tenderness. There is no guarding or rebound.   Musculoskeletal:         General: Normal range of motion.      Cervical back: Normal range of motion and neck supple.   Skin:     General: Skin is warm and dry.   Neurological:      Mental Status: He is alert and oriented to person, place, and time.      Sensory: Sensation is intact.    Psychiatric:         Mood and Affect: Mood and affect normal.       Patient was wearing a face mask when I entered the room and they continued to wear a mask throughout their stay in the ED.  I wore PPE, including  gloves, face mask with shield or face mask with goggles whenever I was in the room with patient.       LAB RESULTS  Recent Results (from the past 24 hour(s))   Comprehensive Metabolic Panel    Collection Time: 02/21/22  4:14 PM    Specimen: Blood   Result Value Ref Range    Glucose 94 65 - 99 mg/dL    BUN 20 6 - 20 mg/dL    Creatinine 1.11 0.76 - 1.27 mg/dL    Sodium 144 136 - 145 mmol/L    Potassium 4.0 3.5 - 5.2 mmol/L    Chloride 108 (H) 98 - 107 mmol/L    CO2 27.0 22.0 - 29.0 mmol/L    Calcium 8.9 8.6 - 10.5 mg/dL    Total Protein 6.4 6.0 - 8.5 g/dL    Albumin 4.10 3.50 - 5.20 g/dL    ALT (SGPT) 9 1 - 41 U/L    AST (SGOT) 15 1 - 40 U/L    Alkaline Phosphatase 65 39 - 117 U/L    Total Bilirubin 0.4 0.0 - 1.2 mg/dL    eGFR Non African Amer 68 >60 mL/min/1.73    Globulin 2.3 gm/dL    A/G Ratio 1.8 g/dL    BUN/Creatinine Ratio 18.0 7.0 - 25.0    Anion Gap 9.0 5.0 - 15.0 mmol/L   Lipase    Collection Time: 02/21/22  4:14 PM    Specimen: Blood   Result Value Ref Range    Lipase 31 13 - 60 U/L   CBC Auto Differential    Collection Time: 02/21/22  4:14 PM    Specimen: Blood   Result Value Ref Range    WBC 9.33 3.40 - 10.80 10*3/mm3    RBC 4.36 4.14 - 5.80 10*6/mm3    Hemoglobin 13.3 13.0 - 17.7 g/dL    Hematocrit 40.7 37.5 - 51.0 %    MCV 93.3 79.0 - 97.0 fL    MCH 30.5 26.6 - 33.0 pg    MCHC 32.7 31.5 - 35.7 g/dL    RDW 13.6 12.3 - 15.4 %    RDW-SD 46.1 37.0 - 54.0 fl    MPV 10.0 6.0 - 12.0 fL    Platelets 241 140 - 450 10*3/mm3    Neutrophil % 72.8 42.7 - 76.0 %    Lymphocyte % 18.9 (L) 19.6 - 45.3 %    Monocyte % 5.9 5.0 - 12.0 %    Eosinophil % 1.0 0.3 - 6.2 %    Basophil % 1.0 0.0 - 1.5 %    Immature Grans % 0.4 0.0 - 0.5 %    Neutrophils, Absolute 6.80 1.70 - 7.00 10*3/mm3    Lymphocytes, Absolute  1.76 0.70 - 3.10 10*3/mm3    Monocytes, Absolute 0.55 0.10 - 0.90 10*3/mm3    Eosinophils, Absolute 0.09 0.00 - 0.40 10*3/mm3    Basophils, Absolute 0.09 0.00 - 0.20 10*3/mm3    Immature Grans, Absolute 0.04 0.00 - 0.05 10*3/mm3    nRBC 0.0 0.0 - 0.2 /100 WBC   Respiratory Panel PCR w/COVID-19(SARS-CoV-2) [facility]ESS/TAMY/HORTENCIA/PAD/COR/MAD/GRETEL In-House, NP Swab in UTM/VTM, 3-4 HR TAT - Swab, Nasopharynx    Collection Time: 02/21/22  4:27 PM    Specimen: Nasopharynx; Swab   Result Value Ref Range    ADENOVIRUS, PCR Not Detected Not Detected    Coronavirus 229E Not Detected Not Detected    Coronavirus HKU1 Not Detected Not Detected    Coronavirus NL63 Not Detected Not Detected    Coronavirus OC43 Not Detected Not Detected    COVID19 Not Detected Not Detected - Ref. Range    Human Metapneumovirus Not Detected Not Detected    Human Rhinovirus/Enterovirus Not Detected Not Detected    Influenza A PCR Not Detected Not Detected    Influenza B PCR Not Detected Not Detected    Parainfluenza Virus 1 Not Detected Not Detected    Parainfluenza Virus 2 Not Detected Not Detected    Parainfluenza Virus 3 Not Detected Not Detected    Parainfluenza Virus 4 Not Detected Not Detected    RSV, PCR Not Detected Not Detected    Bordetella pertussis pcr Not Detected Not Detected    Bordetella parapertussis PCR Not Detected Not Detected    Chlamydophila pneumoniae PCR Not Detected Not Detected    Mycoplasma pneumo by PCR Not Detected Not Detected       Ordered the above labs and reviewed the results.        RADIOLOGY  XR Chest 1 View   Final Result   No evidence for acute pulmonary process. Follow-up as   clinical indications persist.       This report was finalized on 2/21/2022 5:45 PM by Dr. Abdiaziz Kumar M.D.          CT Head Without Contrast   Final Result       No acute intracranial hemorrhage or hydrocephalus. If there is further   clinical concern, MRI could be considered for further evaluation.       This report was finalized on  2/21/2022 5:57 PM by Dr. Abdiaziz Kumar M.D.          CT Abdomen Pelvis Without Contrast   Preliminary Result   .    1. There is very little abdominal fat which crowds the abdominal   viscera.   2. Multiple low-density liver lesions compatible with hepatic cysts.   3. Minimal cholelithiasis.   4. Bilateral low-density renal lesions may all represent benign cysts   though one of these on the left measures up to 5.8 cm and appears   somewhat complex. These could be further evaluated with a renal mass   protocol CT scan of the abdomen with pre and postcontrast images if   clinically indicated.   5. Moderate amount of stool in the colon.   6. Mild prostate gland enlargement.       Radiation dose reduction techniques were utilized, including automated   exposure control and exposure modulation based on body size.                   Ordered the above noted radiological studies. Reviewed by me in PACS.  Discussed with Dr. Hughes (radiologist) regarding CT/MRI scan results.          PROCEDURES  Procedures            MEDICATIONS GIVEN IN ER  Medications   sodium chloride 0.9 % bolus 1,000 mL (0 mL Intravenous Stopped 2/21/22 1741)   ondansetron (ZOFRAN) injection 4 mg (4 mg Intravenous Given 2/21/22 1610)   morphine injection 4 mg (4 mg Intravenous Given 2/21/22 1612)   HYDROmorphone (DILAUDID) injection 0.5 mg (0.5 mg Intravenous Given 2/21/22 1739)             PROGRESS AND CONSULTS  ED Course as of 02/21/22 2140   Mon Feb 21, 2022   1907 19:07 EST  Patient has been seen multiple times for similar symptoms.  Patient states this started a few days ago however has been going on for several months.  Has seen ENT.  Patient CT head shows no evidence of obstruction.  He has no global headache to suggest subarachnoid hemorrhage.  Patient has chronic abdominal pain as well.  His CT scan is negative.  His Covid test is negative.  Patient's blood work is normal here.  Patient will be discharged home.  Instructed to follow-up  with ENT.  Will be given antibiotic as this is persistent in nature.  Will also be given small amount of pain medication. [SL]      ED Course User Index  [SL] Jermaine Montaño MD           MEDICAL DECISION MAKING      MDM  Number of Diagnoses or Management Options     Amount and/or Complexity of Data Reviewed  Clinical lab tests: reviewed and ordered (Respiratory viral panel negative)  Tests in the radiology section of CPT®: reviewed and ordered (CT head and abdomen negative)               DIAGNOSIS  Final diagnoses:   Acute recurrent frontal sinusitis   Nausea and vomiting, intractability of vomiting not specified, unspecified vomiting type           DISPOSITION  DISCHARGE    Patient discharged in stable condition.    Reviewed implications of results, diagnosis, meds, responsibility to follow up, warning signs and symptoms of possible worsening, potential complications and reasons to return to ER, including worsening symptoms.    Patient/Family voiced understanding of above instructions.    Discussed plan for discharge, as there is no emergent indication for admission. Patient referred to primary care provider for BP management due to today's BP. Pt/family is agreeable and understands need for follow up and repeat testing.  Pt is aware that discharge does not mean that nothing is wrong but it indicates no emergency is present that requires admission and they must continue care with follow-up as given below or physician of their choice.     FOLLOW-UP  Haider Trevino MD  [address] [address]  [phone]    Schedule an appointment as soon as possible for a visit           Medication List      New Prescriptions    azithromycin 250 MG tablet  Commonly known as: ZITHROMAX  Take 1 tablet by mouth Daily.     HYDROcodone-acetaminophen 5-325 MG per tablet  Commonly known as: NORCO  Take 1 tablet by mouth Every 6 (Six) Hours As Needed for Moderate Pain .     ondansetron ODT 4 MG  disintegrating tablet  Commonly known as: ZOFRAN-ODT  Place 1 tablet on the tongue 4 (Four) Times a Day As Needed for Nausea or Vomiting.           Where to Get Your Medications      You can get these medications from any pharmacy    Bring a paper prescription for each of these medications  · azithromycin 250 MG tablet  · HYDROcodone-acetaminophen 5-325 MG per tablet  · ondansetron ODT 4 MG disintegrating tablet             Latest Documented Vital Signs:  As of 21:40 EST  BP- 129/82 HR- 63 Temp- 98.3 °F (36.8 °C) (Oral) O2 sat- 99%                       Jermaine Montaño MD  02/21/22 8102

## 2022-02-22 NOTE — ED NOTES
This RN wore gloves, mask, eye protection and all other necessary PPE while performing pt care.       Supriya Reed, RN  02/21/22 1917

## 2022-04-21 ENCOUNTER — APPOINTMENT (OUTPATIENT)
Dept: GENERAL RADIOLOGY | Facility: HOSPITAL | Age: 62
End: 2022-04-21

## 2022-04-21 ENCOUNTER — APPOINTMENT (OUTPATIENT)
Dept: CT IMAGING | Facility: HOSPITAL | Age: 62
End: 2022-04-21

## 2022-04-21 ENCOUNTER — HOSPITAL ENCOUNTER (INPATIENT)
Facility: HOSPITAL | Age: 62
LOS: 9 days | Discharge: HOME | End: 2022-04-30
Attending: EMERGENCY MEDICINE | Admitting: INTERNAL MEDICINE

## 2022-04-21 DIAGNOSIS — R06.02 SHORTNESS OF BREATH: ICD-10-CM

## 2022-04-21 DIAGNOSIS — K85.10 ACUTE BILIARY PANCREATITIS WITHOUT INFECTION OR NECROSIS: ICD-10-CM

## 2022-04-21 DIAGNOSIS — K85.90 ACUTE PANCREATITIS, UNSPECIFIED COMPLICATION STATUS, UNSPECIFIED PANCREATITIS TYPE: Primary | ICD-10-CM

## 2022-04-21 LAB
ALBUMIN SERPL-MCNC: 4.5 G/DL (ref 3.5–5.2)
ALBUMIN/GLOB SERPL: 1.8 G/DL
ALP SERPL-CCNC: 69 U/L (ref 39–117)
ALT SERPL W P-5'-P-CCNC: 18 U/L (ref 1–41)
ANION GAP SERPL CALCULATED.3IONS-SCNC: 12.7 MMOL/L (ref 5–15)
AST SERPL-CCNC: 20 U/L (ref 1–40)
BACTERIA UR QL AUTO: ABNORMAL /HPF
BASOPHILS # BLD AUTO: 0.09 10*3/MM3 (ref 0–0.2)
BASOPHILS NFR BLD AUTO: 0.9 % (ref 0–1.5)
BILIRUB SERPL-MCNC: 0.4 MG/DL (ref 0–1.2)
BILIRUB UR QL STRIP: NEGATIVE
BUN SERPL-MCNC: 31 MG/DL (ref 6–20)
BUN/CREAT SERPL: 26.3 (ref 7–25)
CALCIUM SPEC-SCNC: 9.6 MG/DL (ref 8.6–10.5)
CHLORIDE SERPL-SCNC: 108 MMOL/L (ref 98–107)
CLARITY UR: CLEAR
CO2 SERPL-SCNC: 23.3 MMOL/L (ref 22–29)
COLOR UR: YELLOW
CREAT SERPL-MCNC: 1.18 MG/DL (ref 0.76–1.27)
DEPRECATED RDW RBC AUTO: 46.2 FL (ref 37–54)
EGFRCR SERPLBLD CKD-EPI 2021: 71.1 ML/MIN/1.73
EOSINOPHIL # BLD AUTO: 0.15 10*3/MM3 (ref 0–0.4)
EOSINOPHIL NFR BLD AUTO: 1.5 % (ref 0.3–6.2)
ERYTHROCYTE [DISTWIDTH] IN BLOOD BY AUTOMATED COUNT: 13.9 % (ref 12.3–15.4)
ETHANOL BLD-MCNC: <10 MG/DL (ref 0–10)
ETHANOL UR QL: <0.01 %
GLOBULIN UR ELPH-MCNC: 2.5 GM/DL
GLUCOSE SERPL-MCNC: 92 MG/DL (ref 65–99)
GLUCOSE UR STRIP-MCNC: NEGATIVE MG/DL
HCT VFR BLD AUTO: 40.2 % (ref 37.5–51)
HGB BLD-MCNC: 13.5 G/DL (ref 13–17.7)
HGB UR QL STRIP.AUTO: ABNORMAL
HYALINE CASTS UR QL AUTO: ABNORMAL /LPF
IMM GRANULOCYTES # BLD AUTO: 0.03 10*3/MM3 (ref 0–0.05)
IMM GRANULOCYTES NFR BLD AUTO: 0.3 % (ref 0–0.5)
KETONES UR QL STRIP: NEGATIVE
LEUKOCYTE ESTERASE UR QL STRIP.AUTO: NEGATIVE
LIPASE SERPL-CCNC: 425 U/L (ref 13–60)
LYMPHOCYTES # BLD AUTO: 2.61 10*3/MM3 (ref 0.7–3.1)
LYMPHOCYTES NFR BLD AUTO: 26.5 % (ref 19.6–45.3)
MCH RBC QN AUTO: 30.7 PG (ref 26.6–33)
MCHC RBC AUTO-ENTMCNC: 33.6 G/DL (ref 31.5–35.7)
MCV RBC AUTO: 91.4 FL (ref 79–97)
MONOCYTES # BLD AUTO: 0.86 10*3/MM3 (ref 0.1–0.9)
MONOCYTES NFR BLD AUTO: 8.7 % (ref 5–12)
NEUTROPHILS NFR BLD AUTO: 6.11 10*3/MM3 (ref 1.7–7)
NEUTROPHILS NFR BLD AUTO: 62.1 % (ref 42.7–76)
NITRITE UR QL STRIP: NEGATIVE
NRBC BLD AUTO-RTO: 0 /100 WBC (ref 0–0.2)
NT-PROBNP SERPL-MCNC: 186 PG/ML (ref 0–900)
PH UR STRIP.AUTO: 5.5 [PH] (ref 5–8)
PLATELET # BLD AUTO: 262 10*3/MM3 (ref 140–450)
PMV BLD AUTO: 9.5 FL (ref 6–12)
POTASSIUM SERPL-SCNC: 3.9 MMOL/L (ref 3.5–5.2)
PROT SERPL-MCNC: 7 G/DL (ref 6–8.5)
PROT UR QL STRIP: ABNORMAL
QT INTERVAL: 385 MS
RBC # BLD AUTO: 4.4 10*6/MM3 (ref 4.14–5.8)
RBC # UR STRIP: ABNORMAL /HPF
REF LAB TEST METHOD: ABNORMAL
SARS-COV-2 RNA RESP QL NAA+PROBE: NOT DETECTED
SODIUM SERPL-SCNC: 144 MMOL/L (ref 136–145)
SP GR UR STRIP: 1.02 (ref 1–1.03)
SQUAMOUS #/AREA URNS HPF: ABNORMAL /HPF
TROPONIN T SERPL-MCNC: <0.01 NG/ML (ref 0–0.03)
UROBILINOGEN UR QL STRIP: ABNORMAL
WBC # UR STRIP: ABNORMAL /HPF
WBC NRBC COR # BLD: 9.85 10*3/MM3 (ref 3.4–10.8)

## 2022-04-21 PROCEDURE — 25010000002 ONDANSETRON PER 1 MG: Performed by: INTERNAL MEDICINE

## 2022-04-21 PROCEDURE — 70450 CT HEAD/BRAIN W/O DYE: CPT

## 2022-04-21 PROCEDURE — 85025 COMPLETE CBC W/AUTO DIFF WBC: CPT | Performed by: EMERGENCY MEDICINE

## 2022-04-21 PROCEDURE — 81001 URINALYSIS AUTO W/SCOPE: CPT | Performed by: EMERGENCY MEDICINE

## 2022-04-21 PROCEDURE — 83880 ASSAY OF NATRIURETIC PEPTIDE: CPT | Performed by: EMERGENCY MEDICINE

## 2022-04-21 PROCEDURE — 80053 COMPREHEN METABOLIC PANEL: CPT | Performed by: EMERGENCY MEDICINE

## 2022-04-21 PROCEDURE — 82077 ASSAY SPEC XCP UR&BREATH IA: CPT | Performed by: EMERGENCY MEDICINE

## 2022-04-21 PROCEDURE — 71045 X-RAY EXAM CHEST 1 VIEW: CPT

## 2022-04-21 PROCEDURE — 25010000002 HYDROMORPHONE 1 MG/ML SOLUTION: Performed by: INTERNAL MEDICINE

## 2022-04-21 PROCEDURE — 93005 ELECTROCARDIOGRAM TRACING: CPT | Performed by: INTERNAL MEDICINE

## 2022-04-21 PROCEDURE — G0378 HOSPITAL OBSERVATION PER HR: HCPCS

## 2022-04-21 PROCEDURE — U0003 INFECTIOUS AGENT DETECTION BY NUCLEIC ACID (DNA OR RNA); SEVERE ACUTE RESPIRATORY SYNDROME CORONAVIRUS 2 (SARS-COV-2) (CORONAVIRUS DISEASE [COVID-19]), AMPLIFIED PROBE TECHNIQUE, MAKING USE OF HIGH THROUGHPUT TECHNOLOGIES AS DESCRIBED BY CMS-2020-01-R: HCPCS | Performed by: EMERGENCY MEDICINE

## 2022-04-21 PROCEDURE — 25010000002 ONDANSETRON PER 1 MG: Performed by: EMERGENCY MEDICINE

## 2022-04-21 PROCEDURE — 93005 ELECTROCARDIOGRAM TRACING: CPT | Performed by: EMERGENCY MEDICINE

## 2022-04-21 PROCEDURE — 71275 CT ANGIOGRAPHY CHEST: CPT

## 2022-04-21 PROCEDURE — 74177 CT ABD & PELVIS W/CONTRAST: CPT

## 2022-04-21 PROCEDURE — 93010 ELECTROCARDIOGRAM REPORT: CPT | Performed by: INTERNAL MEDICINE

## 2022-04-21 PROCEDURE — 25010000002 HYDROMORPHONE PER 4 MG: Performed by: SURGERY

## 2022-04-21 PROCEDURE — 83690 ASSAY OF LIPASE: CPT | Performed by: EMERGENCY MEDICINE

## 2022-04-21 PROCEDURE — 0 IOPAMIDOL PER 1 ML: Performed by: EMERGENCY MEDICINE

## 2022-04-21 PROCEDURE — 84484 ASSAY OF TROPONIN QUANT: CPT | Performed by: EMERGENCY MEDICINE

## 2022-04-21 PROCEDURE — 99284 EMERGENCY DEPT VISIT MOD MDM: CPT

## 2022-04-21 PROCEDURE — 25010000002 HYDROMORPHONE PER 4 MG: Performed by: EMERGENCY MEDICINE

## 2022-04-21 PROCEDURE — 25010000002 HYDROMORPHONE PER 4 MG: Performed by: INTERNAL MEDICINE

## 2022-04-21 RX ORDER — PANTOPRAZOLE SODIUM 40 MG/1
40 TABLET, DELAYED RELEASE ORAL EVERY MORNING
Status: DISCONTINUED | OUTPATIENT
Start: 2022-04-22 | End: 2022-04-30 | Stop reason: HOSPADM

## 2022-04-21 RX ORDER — SODIUM CHLORIDE 0.9 % (FLUSH) 0.9 %
10 SYRINGE (ML) INJECTION AS NEEDED
Status: DISCONTINUED | OUTPATIENT
Start: 2022-04-21 | End: 2022-04-30 | Stop reason: HOSPADM

## 2022-04-21 RX ORDER — OMEPRAZOLE 20 MG/1
20 CAPSULE, DELAYED RELEASE ORAL DAILY
COMMUNITY
End: 2022-06-11 | Stop reason: SDUPTHER

## 2022-04-21 RX ORDER — HYDROMORPHONE HYDROCHLORIDE 1 MG/ML
0.5 INJECTION, SOLUTION INTRAMUSCULAR; INTRAVENOUS; SUBCUTANEOUS ONCE
Status: COMPLETED | OUTPATIENT
Start: 2022-04-21 | End: 2022-04-21

## 2022-04-21 RX ORDER — UREA 10 %
3 LOTION (ML) TOPICAL NIGHTLY PRN
Status: DISCONTINUED | OUTPATIENT
Start: 2022-04-21 | End: 2022-04-30 | Stop reason: HOSPADM

## 2022-04-21 RX ORDER — NITROGLYCERIN 0.4 MG/1
0.4 TABLET SUBLINGUAL
Status: DISCONTINUED | OUTPATIENT
Start: 2022-04-21 | End: 2022-04-30 | Stop reason: HOSPADM

## 2022-04-21 RX ORDER — IPRATROPIUM BROMIDE AND ALBUTEROL SULFATE 2.5; .5 MG/3ML; MG/3ML
3 SOLUTION RESPIRATORY (INHALATION) EVERY 4 HOURS PRN
COMMUNITY

## 2022-04-21 RX ORDER — SODIUM CHLORIDE, SODIUM LACTATE, POTASSIUM CHLORIDE, CALCIUM CHLORIDE 600; 310; 30; 20 MG/100ML; MG/100ML; MG/100ML; MG/100ML
100 INJECTION, SOLUTION INTRAVENOUS CONTINUOUS
Status: DISCONTINUED | OUTPATIENT
Start: 2022-04-21 | End: 2022-04-28

## 2022-04-21 RX ORDER — LISINOPRIL 20 MG/1
20 TABLET ORAL DAILY
Status: DISCONTINUED | OUTPATIENT
Start: 2022-04-22 | End: 2022-04-30 | Stop reason: HOSPADM

## 2022-04-21 RX ORDER — IPRATROPIUM BROMIDE AND ALBUTEROL SULFATE 2.5; .5 MG/3ML; MG/3ML
3 SOLUTION RESPIRATORY (INHALATION) EVERY 4 HOURS PRN
Status: DISCONTINUED | OUTPATIENT
Start: 2022-04-21 | End: 2022-04-30 | Stop reason: HOSPADM

## 2022-04-21 RX ORDER — ONDANSETRON 2 MG/ML
4 INJECTION INTRAMUSCULAR; INTRAVENOUS ONCE
Status: COMPLETED | OUTPATIENT
Start: 2022-04-21 | End: 2022-04-21

## 2022-04-21 RX ORDER — ONDANSETRON 4 MG/1
4 TABLET, FILM COATED ORAL EVERY 6 HOURS PRN
Status: DISCONTINUED | OUTPATIENT
Start: 2022-04-21 | End: 2022-04-30 | Stop reason: HOSPADM

## 2022-04-21 RX ORDER — LISINOPRIL 20 MG/1
20 TABLET ORAL DAILY
COMMUNITY

## 2022-04-21 RX ORDER — BUDESONIDE AND FORMOTEROL FUMARATE DIHYDRATE 160; 4.5 UG/1; UG/1
2 AEROSOL RESPIRATORY (INHALATION)
Status: DISCONTINUED | OUTPATIENT
Start: 2022-04-21 | End: 2022-04-30 | Stop reason: HOSPADM

## 2022-04-21 RX ORDER — ACETAMINOPHEN 325 MG/1
650 TABLET ORAL EVERY 4 HOURS PRN
Status: DISCONTINUED | OUTPATIENT
Start: 2022-04-21 | End: 2022-04-30 | Stop reason: HOSPADM

## 2022-04-21 RX ORDER — HYDROMORPHONE HYDROCHLORIDE 1 MG/ML
0.5 INJECTION, SOLUTION INTRAMUSCULAR; INTRAVENOUS; SUBCUTANEOUS
Status: DISCONTINUED | OUTPATIENT
Start: 2022-04-21 | End: 2022-04-28

## 2022-04-21 RX ORDER — ONDANSETRON 2 MG/ML
4 INJECTION INTRAMUSCULAR; INTRAVENOUS EVERY 6 HOURS PRN
Status: DISCONTINUED | OUTPATIENT
Start: 2022-04-21 | End: 2022-04-30 | Stop reason: HOSPADM

## 2022-04-21 RX ADMIN — SODIUM CHLORIDE, POTASSIUM CHLORIDE, SODIUM LACTATE AND CALCIUM CHLORIDE 1000 ML: 600; 310; 30; 20 INJECTION, SOLUTION INTRAVENOUS at 10:12

## 2022-04-21 RX ADMIN — SODIUM CHLORIDE, POTASSIUM CHLORIDE, SODIUM LACTATE AND CALCIUM CHLORIDE 100 ML/HR: 600; 310; 30; 20 INJECTION, SOLUTION INTRAVENOUS at 22:40

## 2022-04-21 RX ADMIN — HYDROMORPHONE HYDROCHLORIDE 0.5 MG: 1 INJECTION, SOLUTION INTRAMUSCULAR; INTRAVENOUS; SUBCUTANEOUS at 18:41

## 2022-04-21 RX ADMIN — ONDANSETRON 4 MG: 2 INJECTION INTRAMUSCULAR; INTRAVENOUS at 10:12

## 2022-04-21 RX ADMIN — HYDROMORPHONE HYDROCHLORIDE 0.5 MG: 1 INJECTION, SOLUTION INTRAMUSCULAR; INTRAVENOUS; SUBCUTANEOUS at 12:26

## 2022-04-21 RX ADMIN — ONDANSETRON HYDROCHLORIDE 4 MG: 2 SOLUTION INTRAMUSCULAR; INTRAVENOUS at 15:18

## 2022-04-21 RX ADMIN — IOPAMIDOL 100 ML: 755 INJECTION, SOLUTION INTRAVENOUS at 11:05

## 2022-04-21 RX ADMIN — SODIUM CHLORIDE, POTASSIUM CHLORIDE, SODIUM LACTATE AND CALCIUM CHLORIDE 100 ML/HR: 600; 310; 30; 20 INJECTION, SOLUTION INTRAVENOUS at 18:41

## 2022-04-21 RX ADMIN — HYDROMORPHONE HYDROCHLORIDE 0.5 MG: 10 INJECTION INTRAMUSCULAR; INTRAVENOUS; SUBCUTANEOUS at 15:18

## 2022-04-21 RX ADMIN — HYDROMORPHONE HYDROCHLORIDE 0.5 MG: 1 INJECTION, SOLUTION INTRAMUSCULAR; INTRAVENOUS; SUBCUTANEOUS at 22:39

## 2022-04-22 ENCOUNTER — APPOINTMENT (OUTPATIENT)
Dept: GENERAL RADIOLOGY | Facility: HOSPITAL | Age: 62
End: 2022-04-22

## 2022-04-22 ENCOUNTER — APPOINTMENT (OUTPATIENT)
Dept: ULTRASOUND IMAGING | Facility: HOSPITAL | Age: 62
End: 2022-04-22

## 2022-04-22 LAB
ANION GAP SERPL CALCULATED.3IONS-SCNC: 6.3 MMOL/L (ref 5–15)
BUN SERPL-MCNC: 18 MG/DL (ref 6–20)
BUN/CREAT SERPL: 21.4 (ref 7–25)
CALCIUM SPEC-SCNC: 8.7 MG/DL (ref 8.6–10.5)
CHLORIDE SERPL-SCNC: 106 MMOL/L (ref 98–107)
CHOLEST SERPL-MCNC: 210 MG/DL (ref 0–200)
CO2 SERPL-SCNC: 27.7 MMOL/L (ref 22–29)
CREAT SERPL-MCNC: 0.84 MG/DL (ref 0.76–1.27)
DEPRECATED RDW RBC AUTO: 43.2 FL (ref 37–54)
EGFRCR SERPLBLD CKD-EPI 2021: 100.5 ML/MIN/1.73
ERYTHROCYTE [DISTWIDTH] IN BLOOD BY AUTOMATED COUNT: 13.6 % (ref 12.3–15.4)
GLUCOSE SERPL-MCNC: 88 MG/DL (ref 65–99)
HCT VFR BLD AUTO: 34.4 % (ref 37.5–51)
HDLC SERPL-MCNC: 53 MG/DL (ref 40–60)
HGB BLD-MCNC: 11.8 G/DL (ref 13–17.7)
LDLC SERPL CALC-MCNC: 139 MG/DL (ref 0–100)
LDLC/HDLC SERPL: 2.58 {RATIO}
LIPASE SERPL-CCNC: 80 U/L (ref 13–60)
MCH RBC QN AUTO: 30.6 PG (ref 26.6–33)
MCHC RBC AUTO-ENTMCNC: 34.3 G/DL (ref 31.5–35.7)
MCV RBC AUTO: 89.1 FL (ref 79–97)
PLATELET # BLD AUTO: 209 10*3/MM3 (ref 140–450)
PMV BLD AUTO: 9.8 FL (ref 6–12)
POTASSIUM SERPL-SCNC: 3.6 MMOL/L (ref 3.5–5.2)
QT INTERVAL: 449 MS
RBC # BLD AUTO: 3.86 10*6/MM3 (ref 4.14–5.8)
SODIUM SERPL-SCNC: 140 MMOL/L (ref 136–145)
TRIGL SERPL-MCNC: 100 MG/DL (ref 0–150)
TSH SERPL DL<=0.05 MIU/L-ACNC: 1.64 UIU/ML (ref 0.27–4.2)
VLDLC SERPL-MCNC: 18 MG/DL (ref 5–40)
WBC NRBC COR # BLD: 6.26 10*3/MM3 (ref 3.4–10.8)

## 2022-04-22 PROCEDURE — 94640 AIRWAY INHALATION TREATMENT: CPT

## 2022-04-22 PROCEDURE — 84443 ASSAY THYROID STIM HORMONE: CPT | Performed by: INTERNAL MEDICINE

## 2022-04-22 PROCEDURE — 94799 UNLISTED PULMONARY SVC/PX: CPT

## 2022-04-22 PROCEDURE — 94761 N-INVAS EAR/PLS OXIMETRY MLT: CPT

## 2022-04-22 PROCEDURE — 92610 EVALUATE SWALLOWING FUNCTION: CPT | Performed by: SPEECH-LANGUAGE PATHOLOGIST

## 2022-04-22 PROCEDURE — 99204 OFFICE O/P NEW MOD 45 MIN: CPT | Performed by: INTERNAL MEDICINE

## 2022-04-22 PROCEDURE — 83690 ASSAY OF LIPASE: CPT | Performed by: INTERNAL MEDICINE

## 2022-04-22 PROCEDURE — 76705 ECHO EXAM OF ABDOMEN: CPT

## 2022-04-22 PROCEDURE — 94664 DEMO&/EVAL PT USE INHALER: CPT

## 2022-04-22 PROCEDURE — 70220 X-RAY EXAM OF SINUSES: CPT

## 2022-04-22 PROCEDURE — 25010000002 ENOXAPARIN PER 10 MG: Performed by: STUDENT IN AN ORGANIZED HEALTH CARE EDUCATION/TRAINING PROGRAM

## 2022-04-22 PROCEDURE — 99254 IP/OBS CNSLTJ NEW/EST MOD 60: CPT | Performed by: INTERNAL MEDICINE

## 2022-04-22 PROCEDURE — 80061 LIPID PANEL: CPT | Performed by: INTERNAL MEDICINE

## 2022-04-22 PROCEDURE — G0378 HOSPITAL OBSERVATION PER HR: HCPCS

## 2022-04-22 PROCEDURE — 25010000002 HYDROMORPHONE PER 4 MG: Performed by: SURGERY

## 2022-04-22 PROCEDURE — 25010000002 HYDROMORPHONE PER 4 MG: Performed by: INTERNAL MEDICINE

## 2022-04-22 PROCEDURE — 80048 BASIC METABOLIC PNL TOTAL CA: CPT | Performed by: INTERNAL MEDICINE

## 2022-04-22 PROCEDURE — 85027 COMPLETE CBC AUTOMATED: CPT | Performed by: INTERNAL MEDICINE

## 2022-04-22 RX ORDER — LUBIPROSTONE 8 UG/1
8 CAPSULE ORAL 2 TIMES DAILY WITH MEALS
Status: DISCONTINUED | OUTPATIENT
Start: 2022-04-22 | End: 2022-04-23

## 2022-04-22 RX ORDER — POLYETHYLENE GLYCOL 3350 17 G/17G
17 POWDER, FOR SOLUTION ORAL DAILY
Status: DISCONTINUED | OUTPATIENT
Start: 2022-04-22 | End: 2022-04-27

## 2022-04-22 RX ORDER — OXYCODONE HYDROCHLORIDE AND ACETAMINOPHEN 5; 325 MG/1; MG/1
1 TABLET ORAL EVERY 6 HOURS PRN
Status: DISPENSED | OUTPATIENT
Start: 2022-04-22 | End: 2022-04-29

## 2022-04-22 RX ORDER — DOCUSATE SODIUM 100 MG/1
100 CAPSULE, LIQUID FILLED ORAL 2 TIMES DAILY
Status: DISCONTINUED | OUTPATIENT
Start: 2022-04-22 | End: 2022-04-30 | Stop reason: HOSPADM

## 2022-04-22 RX ADMIN — Medication 10 ML: at 08:22

## 2022-04-22 RX ADMIN — HYDROMORPHONE HYDROCHLORIDE 0.5 MG: 1 INJECTION, SOLUTION INTRAMUSCULAR; INTRAVENOUS; SUBCUTANEOUS at 04:58

## 2022-04-22 RX ADMIN — TIOTROPIUM BROMIDE INHALATION SPRAY 1 PUFF: 3.12 SPRAY, METERED RESPIRATORY (INHALATION) at 08:57

## 2022-04-22 RX ADMIN — OXYCODONE HYDROCHLORIDE AND ACETAMINOPHEN 1 TABLET: 5; 325 TABLET ORAL at 16:53

## 2022-04-22 RX ADMIN — Medication 3 MG: at 22:58

## 2022-04-22 RX ADMIN — LUBIPROSTONE 8 MCG: 8 CAPSULE, GELATIN COATED ORAL at 17:06

## 2022-04-22 RX ADMIN — LISINOPRIL 20 MG: 20 TABLET ORAL at 08:21

## 2022-04-22 RX ADMIN — ENOXAPARIN SODIUM 40 MG: 100 INJECTION SUBCUTANEOUS at 22:57

## 2022-04-22 RX ADMIN — BUDESONIDE AND FORMOTEROL FUMARATE DIHYDRATE 2 PUFF: 160; 4.5 AEROSOL RESPIRATORY (INHALATION) at 20:15

## 2022-04-22 RX ADMIN — SODIUM CHLORIDE, POTASSIUM CHLORIDE, SODIUM LACTATE AND CALCIUM CHLORIDE 100 ML/HR: 600; 310; 30; 20 INJECTION, SOLUTION INTRAVENOUS at 16:47

## 2022-04-22 RX ADMIN — POLYETHYLENE GLYCOL 3350 17 G: 17 POWDER, FOR SOLUTION ORAL at 16:53

## 2022-04-22 RX ADMIN — HYDROMORPHONE HYDROCHLORIDE 0.5 MG: 1 INJECTION, SOLUTION INTRAMUSCULAR; INTRAVENOUS; SUBCUTANEOUS at 14:30

## 2022-04-22 RX ADMIN — HYDROMORPHONE HYDROCHLORIDE 0.5 MG: 1 INJECTION, SOLUTION INTRAMUSCULAR; INTRAVENOUS; SUBCUTANEOUS at 10:03

## 2022-04-22 RX ADMIN — DOCUSATE SODIUM 100 MG: 100 CAPSULE, LIQUID FILLED ORAL at 22:58

## 2022-04-22 RX ADMIN — BUDESONIDE AND FORMOTEROL FUMARATE DIHYDRATE 2 PUFF: 160; 4.5 AEROSOL RESPIRATORY (INHALATION) at 08:58

## 2022-04-22 RX ADMIN — PANTOPRAZOLE SODIUM 40 MG: 40 TABLET, DELAYED RELEASE ORAL at 05:56

## 2022-04-22 RX ADMIN — OXYCODONE HYDROCHLORIDE AND ACETAMINOPHEN 1 TABLET: 5; 325 TABLET ORAL at 22:58

## 2022-04-23 ENCOUNTER — APPOINTMENT (OUTPATIENT)
Dept: CARDIOLOGY | Facility: HOSPITAL | Age: 62
End: 2022-04-23

## 2022-04-23 LAB
ALBUMIN SERPL-MCNC: 3.8 G/DL (ref 3.5–5.2)
ALBUMIN/GLOB SERPL: 1.8 G/DL
ALP SERPL-CCNC: 51 U/L (ref 39–117)
ALT SERPL W P-5'-P-CCNC: 10 U/L (ref 1–41)
ANION GAP SERPL CALCULATED.3IONS-SCNC: 7.9 MMOL/L (ref 5–15)
AST SERPL-CCNC: 14 U/L (ref 1–40)
BASOPHILS # BLD AUTO: 0.06 10*3/MM3 (ref 0–0.2)
BASOPHILS NFR BLD AUTO: 1 % (ref 0–1.5)
BH CV ECHO MEAS - ACS: 2.07 CM
BH CV ECHO MEAS - AO MAX PG: 9.3 MMHG
BH CV ECHO MEAS - AO MEAN PG: 5.8 MMHG
BH CV ECHO MEAS - AO ROOT DIAM: 2.8 CM
BH CV ECHO MEAS - AO V2 MAX: 152.3 CM/SEC
BH CV ECHO MEAS - AO V2 VTI: 32 CM
BH CV ECHO MEAS - AVA(I,D): 3.1 CM2
BH CV ECHO MEAS - EDV(CUBED): 102.6 ML
BH CV ECHO MEAS - EDV(MOD-SP2): 65 ML
BH CV ECHO MEAS - EDV(MOD-SP4): 69 ML
BH CV ECHO MEAS - EF(MOD-BP): 62.3 %
BH CV ECHO MEAS - EF(MOD-SP2): 63.1 %
BH CV ECHO MEAS - EF(MOD-SP4): 65.2 %
BH CV ECHO MEAS - ESV(CUBED): 26 ML
BH CV ECHO MEAS - ESV(MOD-SP2): 24 ML
BH CV ECHO MEAS - ESV(MOD-SP4): 24 ML
BH CV ECHO MEAS - FS: 36.7 %
BH CV ECHO MEAS - IVS/LVPW: 0.98 CM
BH CV ECHO MEAS - IVSD: 0.94 CM
BH CV ECHO MEAS - LA DIMENSION: 2.32 CM
BH CV ECHO MEAS - LAT PEAK E' VEL: 10.6 CM/SEC
BH CV ECHO MEAS - LV DIASTOLIC VOL/BSA (35-75): 40.2 CM2
BH CV ECHO MEAS - LV MASS(C)D: 151.7 GRAMS
BH CV ECHO MEAS - LV MAX PG: 5.4 MMHG
BH CV ECHO MEAS - LV MEAN PG: 2.8 MMHG
BH CV ECHO MEAS - LV SYSTOLIC VOL/BSA (12-30): 14 CM2
BH CV ECHO MEAS - LV V1 MAX: 115.7 CM/SEC
BH CV ECHO MEAS - LV V1 VTI: 23.6 CM
BH CV ECHO MEAS - LVIDD: 4.7 CM
BH CV ECHO MEAS - LVIDS: 3 CM
BH CV ECHO MEAS - LVOT AREA: 4.2 CM2
BH CV ECHO MEAS - LVOT DIAM: 2.32 CM
BH CV ECHO MEAS - LVPWD: 0.96 CM
BH CV ECHO MEAS - MED PEAK E' VEL: 9.2 CM/SEC
BH CV ECHO MEAS - MR MAX PG: 100.9 MMHG
BH CV ECHO MEAS - MR MAX VEL: 502.2 CM/SEC
BH CV ECHO MEAS - MV A MAX VEL: 95.1 CM/SEC
BH CV ECHO MEAS - MV DEC SLOPE: 355.8 CM/SEC2
BH CV ECHO MEAS - MV DEC TIME: 0.23 MSEC
BH CV ECHO MEAS - MV E MAX VEL: 82.7 CM/SEC
BH CV ECHO MEAS - MV E/A: 0.87
BH CV ECHO MEAS - MV MAX PG: 4.8 MMHG
BH CV ECHO MEAS - MV MEAN PG: 1.75 MMHG
BH CV ECHO MEAS - MV V2 VTI: 43.6 CM
BH CV ECHO MEAS - MVA(VTI): 2.29 CM2
BH CV ECHO MEAS - PA ACC TIME: 0.16 SEC
BH CV ECHO MEAS - PA PR(ACCEL): 5.3 MMHG
BH CV ECHO MEAS - PA V2 MAX: 97.7 CM/SEC
BH CV ECHO MEAS - PULM A REVS DUR: 0.22 SEC
BH CV ECHO MEAS - PULM A REVS VEL: 29.1 CM/SEC
BH CV ECHO MEAS - PULM DIAS VEL: 70.3 CM/SEC
BH CV ECHO MEAS - PULM SYS VEL: 75 CM/SEC
BH CV ECHO MEAS - RAP SYSTOLE: 3 MMHG
BH CV ECHO MEAS - RV MAX PG: 1.34 MMHG
BH CV ECHO MEAS - RV V1 MAX: 57.8 CM/SEC
BH CV ECHO MEAS - RV V1 VTI: 12.8 CM
BH CV ECHO MEAS - RVOT DIAM: 2.7 CM
BH CV ECHO MEAS - RVSP: 24 MMHG
BH CV ECHO MEAS - SI(MOD-SP2): 23.9 ML/M2
BH CV ECHO MEAS - SI(MOD-SP4): 26.2 ML/M2
BH CV ECHO MEAS - SV(LVOT): 99.9 ML
BH CV ECHO MEAS - SV(MOD-SP2): 41 ML
BH CV ECHO MEAS - SV(MOD-SP4): 45 ML
BH CV ECHO MEAS - SV(RVOT): 71.4 ML
BH CV ECHO MEAS - TAPSE (>1.6): 1.83 CM
BH CV ECHO MEAS - TR MAX PG: 21 MMHG
BH CV ECHO MEAS - TR MAX VEL: 229.2 CM/SEC
BH CV ECHO MEASUREMENTS AVERAGE E/E' RATIO: 8.35
BH CV XLRA - RV BASE: 2.7 CM
BH CV XLRA - RV LENGTH: 7.9 CM
BH CV XLRA - RV MID: 2.9 CM
BH CV XLRA - TDI S': 17.7 CM/SEC
BILIRUB SERPL-MCNC: 0.7 MG/DL (ref 0–1.2)
BUN SERPL-MCNC: 13 MG/DL (ref 6–20)
BUN/CREAT SERPL: 14.8 (ref 7–25)
CALCIUM SPEC-SCNC: 9.3 MG/DL (ref 8.6–10.5)
CHLORIDE SERPL-SCNC: 104 MMOL/L (ref 98–107)
CO2 SERPL-SCNC: 29.1 MMOL/L (ref 22–29)
CREAT SERPL-MCNC: 0.88 MG/DL (ref 0.76–1.27)
DEPRECATED RDW RBC AUTO: 43.8 FL (ref 37–54)
EGFRCR SERPLBLD CKD-EPI 2021: 99.1 ML/MIN/1.73
EOSINOPHIL # BLD AUTO: 0.23 10*3/MM3 (ref 0–0.4)
EOSINOPHIL NFR BLD AUTO: 4 % (ref 0.3–6.2)
ERYTHROCYTE [DISTWIDTH] IN BLOOD BY AUTOMATED COUNT: 13.4 % (ref 12.3–15.4)
GLOBULIN UR ELPH-MCNC: 2.1 GM/DL
GLUCOSE BLDC GLUCOMTR-MCNC: 85 MG/DL (ref 70–130)
GLUCOSE SERPL-MCNC: 81 MG/DL (ref 65–99)
HCT VFR BLD AUTO: 38.2 % (ref 37.5–51)
HGB BLD-MCNC: 13 G/DL (ref 13–17.7)
IMM GRANULOCYTES # BLD AUTO: 0.02 10*3/MM3 (ref 0–0.05)
IMM GRANULOCYTES NFR BLD AUTO: 0.3 % (ref 0–0.5)
LEFT ATRIUM VOLUME INDEX: 24.8 ML/M2
LYMPHOCYTES # BLD AUTO: 2.19 10*3/MM3 (ref 0.7–3.1)
LYMPHOCYTES NFR BLD AUTO: 37.9 % (ref 19.6–45.3)
MAGNESIUM SERPL-MCNC: 1.8 MG/DL (ref 1.6–2.6)
MAXIMAL PREDICTED HEART RATE: 161 BPM
MCH RBC QN AUTO: 30.7 PG (ref 26.6–33)
MCHC RBC AUTO-ENTMCNC: 34 G/DL (ref 31.5–35.7)
MCV RBC AUTO: 90.1 FL (ref 79–97)
MONOCYTES # BLD AUTO: 0.33 10*3/MM3 (ref 0.1–0.9)
MONOCYTES NFR BLD AUTO: 5.7 % (ref 5–12)
NEUTROPHILS NFR BLD AUTO: 2.95 10*3/MM3 (ref 1.7–7)
NEUTROPHILS NFR BLD AUTO: 51.1 % (ref 42.7–76)
NRBC BLD AUTO-RTO: 0 /100 WBC (ref 0–0.2)
PHOSPHATE SERPL-MCNC: 3.5 MG/DL (ref 2.5–4.5)
PLATELET # BLD AUTO: 212 10*3/MM3 (ref 140–450)
PMV BLD AUTO: 10.3 FL (ref 6–12)
POTASSIUM SERPL-SCNC: 3.8 MMOL/L (ref 3.5–5.2)
PROT SERPL-MCNC: 5.9 G/DL (ref 6–8.5)
RBC # BLD AUTO: 4.24 10*6/MM3 (ref 4.14–5.8)
SINUS: 2.6 CM
SODIUM SERPL-SCNC: 141 MMOL/L (ref 136–145)
STJ: 2.7 CM
STRESS TARGET HR: 137 BPM
WBC NRBC COR # BLD: 5.78 10*3/MM3 (ref 3.4–10.8)

## 2022-04-23 PROCEDURE — 99232 SBSQ HOSP IP/OBS MODERATE 35: CPT | Performed by: NURSE PRACTITIONER

## 2022-04-23 PROCEDURE — 84100 ASSAY OF PHOSPHORUS: CPT | Performed by: STUDENT IN AN ORGANIZED HEALTH CARE EDUCATION/TRAINING PROGRAM

## 2022-04-23 PROCEDURE — 25010000002 HYDROMORPHONE PER 4 MG: Performed by: INTERNAL MEDICINE

## 2022-04-23 PROCEDURE — G0378 HOSPITAL OBSERVATION PER HR: HCPCS

## 2022-04-23 PROCEDURE — 25010000002 LORAZEPAM PER 2 MG: Performed by: STUDENT IN AN ORGANIZED HEALTH CARE EDUCATION/TRAINING PROGRAM

## 2022-04-23 PROCEDURE — 82962 GLUCOSE BLOOD TEST: CPT

## 2022-04-23 PROCEDURE — 94799 UNLISTED PULMONARY SVC/PX: CPT

## 2022-04-23 PROCEDURE — 94761 N-INVAS EAR/PLS OXIMETRY MLT: CPT

## 2022-04-23 PROCEDURE — 85025 COMPLETE CBC W/AUTO DIFF WBC: CPT | Performed by: STUDENT IN AN ORGANIZED HEALTH CARE EDUCATION/TRAINING PROGRAM

## 2022-04-23 PROCEDURE — 99221 1ST HOSP IP/OBS SF/LOW 40: CPT | Performed by: PSYCHIATRY & NEUROLOGY

## 2022-04-23 PROCEDURE — 25010000002 HYDROMORPHONE PER 4 MG: Performed by: SURGERY

## 2022-04-23 PROCEDURE — 93306 TTE W/DOPPLER COMPLETE: CPT | Performed by: INTERNAL MEDICINE

## 2022-04-23 PROCEDURE — 99214 OFFICE O/P EST MOD 30 MIN: CPT | Performed by: NURSE PRACTITIONER

## 2022-04-23 PROCEDURE — 93306 TTE W/DOPPLER COMPLETE: CPT

## 2022-04-23 PROCEDURE — 25010000002 ENOXAPARIN PER 10 MG: Performed by: STUDENT IN AN ORGANIZED HEALTH CARE EDUCATION/TRAINING PROGRAM

## 2022-04-23 PROCEDURE — 83735 ASSAY OF MAGNESIUM: CPT | Performed by: STUDENT IN AN ORGANIZED HEALTH CARE EDUCATION/TRAINING PROGRAM

## 2022-04-23 PROCEDURE — 80053 COMPREHEN METABOLIC PANEL: CPT | Performed by: STUDENT IN AN ORGANIZED HEALTH CARE EDUCATION/TRAINING PROGRAM

## 2022-04-23 PROCEDURE — 99222 1ST HOSP IP/OBS MODERATE 55: CPT | Performed by: SURGERY

## 2022-04-23 RX ORDER — LUBIPROSTONE 24 UG/1
24 CAPSULE ORAL 2 TIMES DAILY WITH MEALS
Status: DISCONTINUED | OUTPATIENT
Start: 2022-04-23 | End: 2022-04-28

## 2022-04-23 RX ORDER — LORAZEPAM 2 MG/ML
1 INJECTION INTRAMUSCULAR ONCE
Status: DISCONTINUED | OUTPATIENT
Start: 2022-04-23 | End: 2022-04-23

## 2022-04-23 RX ORDER — LACTULOSE 10 G/15ML
20 SOLUTION ORAL 2 TIMES DAILY
Status: DISCONTINUED | OUTPATIENT
Start: 2022-04-23 | End: 2022-04-30 | Stop reason: HOSPADM

## 2022-04-23 RX ORDER — LORAZEPAM 1 MG/1
1 TABLET ORAL EVERY 6 HOURS PRN
Status: DISPENSED | OUTPATIENT
Start: 2022-04-23 | End: 2022-04-30

## 2022-04-23 RX ORDER — LORAZEPAM 2 MG/ML
1 INJECTION INTRAMUSCULAR ONCE
Status: COMPLETED | OUTPATIENT
Start: 2022-04-23 | End: 2022-04-23

## 2022-04-23 RX ORDER — AMOXICILLIN 250 MG
2 CAPSULE ORAL 2 TIMES DAILY
Status: DISCONTINUED | OUTPATIENT
Start: 2022-04-23 | End: 2022-04-30 | Stop reason: HOSPADM

## 2022-04-23 RX ADMIN — Medication 3 MG: at 20:49

## 2022-04-23 RX ADMIN — DOCUSATE SODIUM 50MG AND SENNOSIDES 8.6MG 2 TABLET: 8.6; 5 TABLET, FILM COATED ORAL at 20:53

## 2022-04-23 RX ADMIN — DOCUSATE SODIUM 100 MG: 100 CAPSULE, LIQUID FILLED ORAL at 20:49

## 2022-04-23 RX ADMIN — LORAZEPAM 1 MG: 1 TABLET ORAL at 20:49

## 2022-04-23 RX ADMIN — DOCUSATE SODIUM 50MG AND SENNOSIDES 8.6MG 2 TABLET: 8.6; 5 TABLET, FILM COATED ORAL at 15:40

## 2022-04-23 RX ADMIN — DOCUSATE SODIUM 100 MG: 100 CAPSULE, LIQUID FILLED ORAL at 08:27

## 2022-04-23 RX ADMIN — PANTOPRAZOLE SODIUM 40 MG: 40 TABLET, DELAYED RELEASE ORAL at 07:36

## 2022-04-23 RX ADMIN — LUBIPROSTONE 8 MCG: 8 CAPSULE, GELATIN COATED ORAL at 08:27

## 2022-04-23 RX ADMIN — HYDROMORPHONE HYDROCHLORIDE 0.5 MG: 1 INJECTION, SOLUTION INTRAMUSCULAR; INTRAVENOUS; SUBCUTANEOUS at 17:35

## 2022-04-23 RX ADMIN — OXYCODONE HYDROCHLORIDE AND ACETAMINOPHEN 1 TABLET: 5; 325 TABLET ORAL at 20:49

## 2022-04-23 RX ADMIN — BUDESONIDE AND FORMOTEROL FUMARATE DIHYDRATE 2 PUFF: 160; 4.5 AEROSOL RESPIRATORY (INHALATION) at 19:15

## 2022-04-23 RX ADMIN — OXYCODONE HYDROCHLORIDE AND ACETAMINOPHEN 1 TABLET: 5; 325 TABLET ORAL at 07:36

## 2022-04-23 RX ADMIN — OXYCODONE HYDROCHLORIDE AND ACETAMINOPHEN 1 TABLET: 5; 325 TABLET ORAL at 14:59

## 2022-04-23 RX ADMIN — LISINOPRIL 20 MG: 20 TABLET ORAL at 08:27

## 2022-04-23 RX ADMIN — BUDESONIDE AND FORMOTEROL FUMARATE DIHYDRATE 2 PUFF: 160; 4.5 AEROSOL RESPIRATORY (INHALATION) at 07:20

## 2022-04-23 RX ADMIN — SODIUM CHLORIDE, POTASSIUM CHLORIDE, SODIUM LACTATE AND CALCIUM CHLORIDE 100 ML/HR: 600; 310; 30; 20 INJECTION, SOLUTION INTRAVENOUS at 15:40

## 2022-04-23 RX ADMIN — TIOTROPIUM BROMIDE INHALATION SPRAY 1 PUFF: 3.12 SPRAY, METERED RESPIRATORY (INHALATION) at 07:21

## 2022-04-23 RX ADMIN — LUBIPROSTONE 24 MCG: 24 CAPSULE, GELATIN COATED ORAL at 17:31

## 2022-04-23 RX ADMIN — ENOXAPARIN SODIUM 40 MG: 100 INJECTION SUBCUTANEOUS at 20:50

## 2022-04-23 RX ADMIN — SODIUM CHLORIDE, POTASSIUM CHLORIDE, SODIUM LACTATE AND CALCIUM CHLORIDE 100 ML/HR: 600; 310; 30; 20 INJECTION, SOLUTION INTRAVENOUS at 02:38

## 2022-04-23 RX ADMIN — NICOTINE 1 PATCH: 7 PATCH, EXTENDED RELEASE TRANSDERMAL at 10:10

## 2022-04-23 RX ADMIN — LORAZEPAM 1 MG: 2 INJECTION, SOLUTION INTRAMUSCULAR; INTRAVENOUS at 09:09

## 2022-04-23 RX ADMIN — HYDROMORPHONE HYDROCHLORIDE 0.5 MG: 1 INJECTION, SOLUTION INTRAMUSCULAR; INTRAVENOUS; SUBCUTANEOUS at 10:10

## 2022-04-23 RX ADMIN — POLYETHYLENE GLYCOL 3350 17 G: 17 POWDER, FOR SOLUTION ORAL at 08:27

## 2022-04-23 RX ADMIN — HYDROMORPHONE HYDROCHLORIDE 0.5 MG: 1 INJECTION, SOLUTION INTRAMUSCULAR; INTRAVENOUS; SUBCUTANEOUS at 02:36

## 2022-04-23 RX ADMIN — LACTULOSE 20 G: 20 SOLUTION ORAL at 20:48

## 2022-04-24 LAB
BASOPHILS # BLD AUTO: 0.05 10*3/MM3 (ref 0–0.2)
BASOPHILS NFR BLD AUTO: 0.8 % (ref 0–1.5)
DEPRECATED RDW RBC AUTO: 47.7 FL (ref 37–54)
EOSINOPHIL # BLD AUTO: 0.29 10*3/MM3 (ref 0–0.4)
EOSINOPHIL NFR BLD AUTO: 4.7 % (ref 0.3–6.2)
ERYTHROCYTE [DISTWIDTH] IN BLOOD BY AUTOMATED COUNT: 13.8 % (ref 12.3–15.4)
HCT VFR BLD AUTO: 38.7 % (ref 37.5–51)
HGB BLD-MCNC: 12.6 G/DL (ref 13–17.7)
IMM GRANULOCYTES # BLD AUTO: 0.01 10*3/MM3 (ref 0–0.05)
IMM GRANULOCYTES NFR BLD AUTO: 0.2 % (ref 0–0.5)
LYMPHOCYTES # BLD AUTO: 1.87 10*3/MM3 (ref 0.7–3.1)
LYMPHOCYTES NFR BLD AUTO: 30.1 % (ref 19.6–45.3)
MAGNESIUM SERPL-MCNC: 1.8 MG/DL (ref 1.6–2.6)
MCH RBC QN AUTO: 30.7 PG (ref 26.6–33)
MCHC RBC AUTO-ENTMCNC: 32.6 G/DL (ref 31.5–35.7)
MCV RBC AUTO: 94.2 FL (ref 79–97)
MONOCYTES # BLD AUTO: 0.4 10*3/MM3 (ref 0.1–0.9)
MONOCYTES NFR BLD AUTO: 6.4 % (ref 5–12)
NEUTROPHILS NFR BLD AUTO: 3.6 10*3/MM3 (ref 1.7–7)
NEUTROPHILS NFR BLD AUTO: 57.8 % (ref 42.7–76)
NRBC BLD AUTO-RTO: 0 /100 WBC (ref 0–0.2)
PHOSPHATE SERPL-MCNC: 3.5 MG/DL (ref 2.5–4.5)
PLATELET # BLD AUTO: 237 10*3/MM3 (ref 140–450)
PMV BLD AUTO: 10 FL (ref 6–12)
RBC # BLD AUTO: 4.11 10*6/MM3 (ref 4.14–5.8)
SARS-COV-2 RNA RESP QL NAA+PROBE: NOT DETECTED
WBC NRBC COR # BLD: 6.22 10*3/MM3 (ref 3.4–10.8)

## 2022-04-24 PROCEDURE — 99232 SBSQ HOSP IP/OBS MODERATE 35: CPT | Performed by: SURGERY

## 2022-04-24 PROCEDURE — 99214 OFFICE O/P EST MOD 30 MIN: CPT | Performed by: INTERNAL MEDICINE

## 2022-04-24 PROCEDURE — 85025 COMPLETE CBC W/AUTO DIFF WBC: CPT | Performed by: STUDENT IN AN ORGANIZED HEALTH CARE EDUCATION/TRAINING PROGRAM

## 2022-04-24 PROCEDURE — 94799 UNLISTED PULMONARY SVC/PX: CPT

## 2022-04-24 PROCEDURE — G0378 HOSPITAL OBSERVATION PER HR: HCPCS

## 2022-04-24 PROCEDURE — U0003 INFECTIOUS AGENT DETECTION BY NUCLEIC ACID (DNA OR RNA); SEVERE ACUTE RESPIRATORY SYNDROME CORONAVIRUS 2 (SARS-COV-2) (CORONAVIRUS DISEASE [COVID-19]), AMPLIFIED PROBE TECHNIQUE, MAKING USE OF HIGH THROUGHPUT TECHNOLOGIES AS DESCRIBED BY CMS-2020-01-R: HCPCS | Performed by: SURGERY

## 2022-04-24 PROCEDURE — 25010000002 HYDROMORPHONE PER 4 MG: Performed by: SURGERY

## 2022-04-24 PROCEDURE — 99232 SBSQ HOSP IP/OBS MODERATE 35: CPT | Performed by: NURSE PRACTITIONER

## 2022-04-24 PROCEDURE — 25010000002 HYDROMORPHONE PER 4 MG: Performed by: INTERNAL MEDICINE

## 2022-04-24 PROCEDURE — 83735 ASSAY OF MAGNESIUM: CPT | Performed by: STUDENT IN AN ORGANIZED HEALTH CARE EDUCATION/TRAINING PROGRAM

## 2022-04-24 PROCEDURE — 94664 DEMO&/EVAL PT USE INHALER: CPT

## 2022-04-24 PROCEDURE — 84100 ASSAY OF PHOSPHORUS: CPT | Performed by: STUDENT IN AN ORGANIZED HEALTH CARE EDUCATION/TRAINING PROGRAM

## 2022-04-24 PROCEDURE — 25010000002 ENOXAPARIN PER 10 MG: Performed by: STUDENT IN AN ORGANIZED HEALTH CARE EDUCATION/TRAINING PROGRAM

## 2022-04-24 RX ORDER — MAGNESIUM CARB/ALUMINUM HYDROX 105-160MG
296 TABLET,CHEWABLE ORAL ONCE
Status: COMPLETED | OUTPATIENT
Start: 2022-04-24 | End: 2022-04-24

## 2022-04-24 RX ADMIN — DOCUSATE SODIUM 50MG AND SENNOSIDES 8.6MG 2 TABLET: 8.6; 5 TABLET, FILM COATED ORAL at 08:28

## 2022-04-24 RX ADMIN — NICOTINE 1 PATCH: 7 PATCH, EXTENDED RELEASE TRANSDERMAL at 08:28

## 2022-04-24 RX ADMIN — TIOTROPIUM BROMIDE INHALATION SPRAY 1 PUFF: 3.12 SPRAY, METERED RESPIRATORY (INHALATION) at 07:11

## 2022-04-24 RX ADMIN — DOCUSATE SODIUM 100 MG: 100 CAPSULE, LIQUID FILLED ORAL at 20:28

## 2022-04-24 RX ADMIN — LORAZEPAM 1 MG: 1 TABLET ORAL at 09:57

## 2022-04-24 RX ADMIN — PANTOPRAZOLE SODIUM 40 MG: 40 TABLET, DELAYED RELEASE ORAL at 06:56

## 2022-04-24 RX ADMIN — LORAZEPAM 1 MG: 1 TABLET ORAL at 16:18

## 2022-04-24 RX ADMIN — LACTULOSE 20 G: 20 SOLUTION ORAL at 20:28

## 2022-04-24 RX ADMIN — ACETAMINOPHEN 650 MG: 325 TABLET ORAL at 06:48

## 2022-04-24 RX ADMIN — LORAZEPAM 1 MG: 1 TABLET ORAL at 22:08

## 2022-04-24 RX ADMIN — LISINOPRIL 20 MG: 20 TABLET ORAL at 08:28

## 2022-04-24 RX ADMIN — POLYETHYLENE GLYCOL 3350 17 G: 17 POWDER, FOR SOLUTION ORAL at 08:29

## 2022-04-24 RX ADMIN — OXYCODONE HYDROCHLORIDE AND ACETAMINOPHEN 1 TABLET: 5; 325 TABLET ORAL at 16:18

## 2022-04-24 RX ADMIN — LUBIPROSTONE 24 MCG: 24 CAPSULE, GELATIN COATED ORAL at 18:12

## 2022-04-24 RX ADMIN — OXYCODONE HYDROCHLORIDE AND ACETAMINOPHEN 1 TABLET: 5; 325 TABLET ORAL at 22:08

## 2022-04-24 RX ADMIN — LACTULOSE 20 G: 20 SOLUTION ORAL at 08:29

## 2022-04-24 RX ADMIN — SODIUM CHLORIDE, POTASSIUM CHLORIDE, SODIUM LACTATE AND CALCIUM CHLORIDE 100 ML/HR: 600; 310; 30; 20 INJECTION, SOLUTION INTRAVENOUS at 12:54

## 2022-04-24 RX ADMIN — OXYCODONE HYDROCHLORIDE AND ACETAMINOPHEN 1 TABLET: 5; 325 TABLET ORAL at 09:57

## 2022-04-24 RX ADMIN — BUDESONIDE AND FORMOTEROL FUMARATE DIHYDRATE 2 PUFF: 160; 4.5 AEROSOL RESPIRATORY (INHALATION) at 19:23

## 2022-04-24 RX ADMIN — DOCUSATE SODIUM 50MG AND SENNOSIDES 8.6MG 2 TABLET: 8.6; 5 TABLET, FILM COATED ORAL at 20:27

## 2022-04-24 RX ADMIN — HYDROMORPHONE HYDROCHLORIDE 0.5 MG: 1 INJECTION, SOLUTION INTRAMUSCULAR; INTRAVENOUS; SUBCUTANEOUS at 00:38

## 2022-04-24 RX ADMIN — DOCUSATE SODIUM 100 MG: 100 CAPSULE, LIQUID FILLED ORAL at 08:28

## 2022-04-24 RX ADMIN — LORAZEPAM 1 MG: 1 TABLET ORAL at 03:09

## 2022-04-24 RX ADMIN — ENOXAPARIN SODIUM 40 MG: 100 INJECTION SUBCUTANEOUS at 20:28

## 2022-04-24 RX ADMIN — BUDESONIDE AND FORMOTEROL FUMARATE DIHYDRATE 2 PUFF: 160; 4.5 AEROSOL RESPIRATORY (INHALATION) at 07:10

## 2022-04-24 RX ADMIN — HYDROMORPHONE HYDROCHLORIDE 0.5 MG: 1 INJECTION, SOLUTION INTRAMUSCULAR; INTRAVENOUS; SUBCUTANEOUS at 12:54

## 2022-04-24 RX ADMIN — ACETAMINOPHEN,PHENIRAMINE MALEATE, PHENYLEPHRINE HYDROCHLORIDE 296 ML: 650; 20; 10 POWDER, FOR SUSPENSION ORAL at 16:19

## 2022-04-24 RX ADMIN — OXYCODONE HYDROCHLORIDE AND ACETAMINOPHEN 1 TABLET: 5; 325 TABLET ORAL at 03:09

## 2022-04-24 RX ADMIN — SODIUM CHLORIDE, POTASSIUM CHLORIDE, SODIUM LACTATE AND CALCIUM CHLORIDE 100 ML/HR: 600; 310; 30; 20 INJECTION, SOLUTION INTRAVENOUS at 00:41

## 2022-04-24 RX ADMIN — LUBIPROSTONE 24 MCG: 24 CAPSULE, GELATIN COATED ORAL at 08:28

## 2022-04-25 ENCOUNTER — APPOINTMENT (OUTPATIENT)
Dept: GENERAL RADIOLOGY | Facility: HOSPITAL | Age: 62
End: 2022-04-25

## 2022-04-25 ENCOUNTER — ANESTHESIA EVENT (OUTPATIENT)
Dept: PERIOP | Facility: HOSPITAL | Age: 62
End: 2022-04-25

## 2022-04-25 ENCOUNTER — ANESTHESIA (OUTPATIENT)
Dept: PERIOP | Facility: HOSPITAL | Age: 62
End: 2022-04-25

## 2022-04-25 PROBLEM — K80.20 CHOLELITHIASIS: Status: ACTIVE | Noted: 2022-04-25

## 2022-04-25 LAB
ALBUMIN SERPL-MCNC: 3.9 G/DL (ref 3.5–5.2)
ALBUMIN/GLOB SERPL: 2.1 G/DL
ALP SERPL-CCNC: 52 U/L (ref 39–117)
ALT SERPL W P-5'-P-CCNC: 14 U/L (ref 1–41)
ANION GAP SERPL CALCULATED.3IONS-SCNC: 9.2 MMOL/L (ref 5–15)
AST SERPL-CCNC: 17 U/L (ref 1–40)
BASOPHILS # BLD AUTO: 0.09 10*3/MM3 (ref 0–0.2)
BASOPHILS NFR BLD AUTO: 1.5 % (ref 0–1.5)
BILIRUB SERPL-MCNC: 0.4 MG/DL (ref 0–1.2)
BUN SERPL-MCNC: 11 MG/DL (ref 6–20)
BUN/CREAT SERPL: 11.1 (ref 7–25)
CALCIUM SPEC-SCNC: 9 MG/DL (ref 8.6–10.5)
CHLORIDE SERPL-SCNC: 106 MMOL/L (ref 98–107)
CO2 SERPL-SCNC: 25.8 MMOL/L (ref 22–29)
CREAT SERPL-MCNC: 0.99 MG/DL (ref 0.76–1.27)
DEPRECATED RDW RBC AUTO: 46 FL (ref 37–54)
EGFRCR SERPLBLD CKD-EPI 2021: 87.8 ML/MIN/1.73
EOSINOPHIL # BLD AUTO: 0.26 10*3/MM3 (ref 0–0.4)
EOSINOPHIL NFR BLD AUTO: 4.3 % (ref 0.3–6.2)
ERYTHROCYTE [DISTWIDTH] IN BLOOD BY AUTOMATED COUNT: 13.4 % (ref 12.3–15.4)
GLOBULIN UR ELPH-MCNC: 1.9 GM/DL
GLUCOSE SERPL-MCNC: 81 MG/DL (ref 65–99)
HCT VFR BLD AUTO: 36.7 % (ref 37.5–51)
HGB BLD-MCNC: 11.9 G/DL (ref 13–17.7)
IMM GRANULOCYTES # BLD AUTO: 0.02 10*3/MM3 (ref 0–0.05)
IMM GRANULOCYTES NFR BLD AUTO: 0.3 % (ref 0–0.5)
LYMPHOCYTES # BLD AUTO: 1.33 10*3/MM3 (ref 0.7–3.1)
LYMPHOCYTES NFR BLD AUTO: 22 % (ref 19.6–45.3)
MAGNESIUM SERPL-MCNC: 2 MG/DL (ref 1.6–2.6)
MCH RBC QN AUTO: 30.4 PG (ref 26.6–33)
MCHC RBC AUTO-ENTMCNC: 32.4 G/DL (ref 31.5–35.7)
MCV RBC AUTO: 93.9 FL (ref 79–97)
MONOCYTES # BLD AUTO: 0.46 10*3/MM3 (ref 0.1–0.9)
MONOCYTES NFR BLD AUTO: 7.6 % (ref 5–12)
NEUTROPHILS NFR BLD AUTO: 3.88 10*3/MM3 (ref 1.7–7)
NEUTROPHILS NFR BLD AUTO: 64.3 % (ref 42.7–76)
NRBC BLD AUTO-RTO: 0 /100 WBC (ref 0–0.2)
PHOSPHATE SERPL-MCNC: 2.6 MG/DL (ref 2.5–4.5)
PLATELET # BLD AUTO: 210 10*3/MM3 (ref 140–450)
PMV BLD AUTO: 10.1 FL (ref 6–12)
POTASSIUM SERPL-SCNC: 4.2 MMOL/L (ref 3.5–5.2)
PROT SERPL-MCNC: 5.8 G/DL (ref 6–8.5)
RBC # BLD AUTO: 3.91 10*6/MM3 (ref 4.14–5.8)
SODIUM SERPL-SCNC: 141 MMOL/L (ref 136–145)
WBC NRBC COR # BLD: 6.04 10*3/MM3 (ref 3.4–10.8)

## 2022-04-25 PROCEDURE — 25010000002 ONDANSETRON PER 1 MG: Performed by: SURGERY

## 2022-04-25 PROCEDURE — 84100 ASSAY OF PHOSPHORUS: CPT | Performed by: STUDENT IN AN ORGANIZED HEALTH CARE EDUCATION/TRAINING PROGRAM

## 2022-04-25 PROCEDURE — BF131ZZ FLUOROSCOPY OF GALLBLADDER AND BILE DUCTS USING LOW OSMOLAR CONTRAST: ICD-10-PCS | Performed by: SURGERY

## 2022-04-25 PROCEDURE — 25010000002 NEOSTIGMINE 5 MG/10ML SOLUTION: Performed by: NURSE ANESTHETIST, CERTIFIED REGISTERED

## 2022-04-25 PROCEDURE — 25010000002 HYDROMORPHONE PER 4 MG: Performed by: INTERNAL MEDICINE

## 2022-04-25 PROCEDURE — 85025 COMPLETE CBC W/AUTO DIFF WBC: CPT | Performed by: STUDENT IN AN ORGANIZED HEALTH CARE EDUCATION/TRAINING PROGRAM

## 2022-04-25 PROCEDURE — 94761 N-INVAS EAR/PLS OXIMETRY MLT: CPT

## 2022-04-25 PROCEDURE — 80053 COMPREHEN METABOLIC PANEL: CPT | Performed by: INTERNAL MEDICINE

## 2022-04-25 PROCEDURE — 94799 UNLISTED PULMONARY SVC/PX: CPT

## 2022-04-25 PROCEDURE — 25010000002 FENTANYL CITRATE (PF) 50 MCG/ML SOLUTION: Performed by: ANESTHESIOLOGY

## 2022-04-25 PROCEDURE — 0 IOTHALAMATE 60 % SOLUTION: Performed by: SURGERY

## 2022-04-25 PROCEDURE — C1889 IMPLANT/INSERT DEVICE, NOC: HCPCS | Performed by: SURGERY

## 2022-04-25 PROCEDURE — 25010000002 HYDROMORPHONE PER 4 MG: Performed by: NURSE ANESTHETIST, CERTIFIED REGISTERED

## 2022-04-25 PROCEDURE — 83735 ASSAY OF MAGNESIUM: CPT | Performed by: STUDENT IN AN ORGANIZED HEALTH CARE EDUCATION/TRAINING PROGRAM

## 2022-04-25 PROCEDURE — 74300 X-RAY BILE DUCTS/PANCREAS: CPT

## 2022-04-25 PROCEDURE — 99231 SBSQ HOSP IP/OBS SF/LOW 25: CPT | Performed by: NURSE PRACTITIONER

## 2022-04-25 PROCEDURE — 25010000002 FENTANYL CITRATE (PF) 50 MCG/ML SOLUTION: Performed by: NURSE ANESTHETIST, CERTIFIED REGISTERED

## 2022-04-25 PROCEDURE — 47563 LAPARO CHOLECYSTECTOMY/GRAPH: CPT | Performed by: SURGERY

## 2022-04-25 PROCEDURE — 0FT44ZZ RESECTION OF GALLBLADDER, PERCUTANEOUS ENDOSCOPIC APPROACH: ICD-10-PCS | Performed by: SURGERY

## 2022-04-25 PROCEDURE — 25010000002 CEFAZOLIN IN DEXTROSE 2-4 GM/100ML-% SOLUTION: Performed by: SURGERY

## 2022-04-25 PROCEDURE — 94664 DEMO&/EVAL PT USE INHALER: CPT

## 2022-04-25 PROCEDURE — 25010000002 PROPOFOL 10 MG/ML EMULSION: Performed by: NURSE ANESTHETIST, CERTIFIED REGISTERED

## 2022-04-25 PROCEDURE — 25010000002 HYDROMORPHONE PER 4 MG: Performed by: SURGERY

## 2022-04-25 PROCEDURE — 88304 TISSUE EXAM BY PATHOLOGIST: CPT | Performed by: SURGERY

## 2022-04-25 PROCEDURE — 25010000002 ONDANSETRON PER 1 MG: Performed by: NURSE ANESTHETIST, CERTIFIED REGISTERED

## 2022-04-25 PROCEDURE — 25010000002 MIDAZOLAM PER 1 MG: Performed by: ANESTHESIOLOGY

## 2022-04-25 DEVICE — LIGAMAX 5 MM ENDOSCOPIC MULTIPLE CLIP APPLIER
Type: IMPLANTABLE DEVICE | Site: ABDOMEN | Status: FUNCTIONAL
Brand: LIGAMAX

## 2022-04-25 RX ORDER — EPHEDRINE SULFATE 50 MG/ML
5 INJECTION, SOLUTION INTRAVENOUS ONCE AS NEEDED
Status: DISCONTINUED | OUTPATIENT
Start: 2022-04-25 | End: 2022-04-25 | Stop reason: HOSPADM

## 2022-04-25 RX ORDER — MIDAZOLAM HYDROCHLORIDE 1 MG/ML
1 INJECTION INTRAMUSCULAR; INTRAVENOUS
Status: DISCONTINUED | OUTPATIENT
Start: 2022-04-25 | End: 2022-04-25 | Stop reason: HOSPADM

## 2022-04-25 RX ORDER — FENTANYL CITRATE 50 UG/ML
50 INJECTION, SOLUTION INTRAMUSCULAR; INTRAVENOUS
Status: DISCONTINUED | OUTPATIENT
Start: 2022-04-25 | End: 2022-04-25 | Stop reason: HOSPADM

## 2022-04-25 RX ORDER — FAMOTIDINE 10 MG/ML
20 INJECTION, SOLUTION INTRAVENOUS ONCE
Status: COMPLETED | OUTPATIENT
Start: 2022-04-25 | End: 2022-04-25

## 2022-04-25 RX ORDER — NALOXONE HCL 0.4 MG/ML
0.2 VIAL (ML) INJECTION AS NEEDED
Status: DISCONTINUED | OUTPATIENT
Start: 2022-04-25 | End: 2022-04-25 | Stop reason: HOSPADM

## 2022-04-25 RX ORDER — ROCURONIUM BROMIDE 10 MG/ML
INJECTION, SOLUTION INTRAVENOUS AS NEEDED
Status: DISCONTINUED | OUTPATIENT
Start: 2022-04-25 | End: 2022-04-25 | Stop reason: SURG

## 2022-04-25 RX ORDER — LABETALOL HYDROCHLORIDE 5 MG/ML
5 INJECTION, SOLUTION INTRAVENOUS
Status: DISCONTINUED | OUTPATIENT
Start: 2022-04-25 | End: 2022-04-25 | Stop reason: HOSPADM

## 2022-04-25 RX ORDER — IBUPROFEN 600 MG/1
600 TABLET ORAL ONCE AS NEEDED
Status: DISCONTINUED | OUTPATIENT
Start: 2022-04-25 | End: 2022-04-25 | Stop reason: HOSPADM

## 2022-04-25 RX ORDER — CEFAZOLIN SODIUM 2 G/100ML
2 INJECTION, SOLUTION INTRAVENOUS ONCE
Status: COMPLETED | OUTPATIENT
Start: 2022-04-25 | End: 2022-04-25

## 2022-04-25 RX ORDER — PROMETHAZINE HYDROCHLORIDE 25 MG/1
25 TABLET ORAL ONCE AS NEEDED
Status: DISCONTINUED | OUTPATIENT
Start: 2022-04-25 | End: 2022-04-25 | Stop reason: HOSPADM

## 2022-04-25 RX ORDER — ONDANSETRON 2 MG/ML
4 INJECTION INTRAMUSCULAR; INTRAVENOUS ONCE AS NEEDED
Status: DISCONTINUED | OUTPATIENT
Start: 2022-04-25 | End: 2022-04-25 | Stop reason: HOSPADM

## 2022-04-25 RX ORDER — BUPIVACAINE HYDROCHLORIDE 2.5 MG/ML
INJECTION, SOLUTION EPIDURAL; INFILTRATION; INTRACAUDAL AS NEEDED
Status: DISCONTINUED | OUTPATIENT
Start: 2022-04-25 | End: 2022-04-25 | Stop reason: HOSPADM

## 2022-04-25 RX ORDER — SODIUM CHLORIDE, SODIUM LACTATE, POTASSIUM CHLORIDE, CALCIUM CHLORIDE 600; 310; 30; 20 MG/100ML; MG/100ML; MG/100ML; MG/100ML
9 INJECTION, SOLUTION INTRAVENOUS CONTINUOUS
Status: DISCONTINUED | OUTPATIENT
Start: 2022-04-25 | End: 2022-04-25

## 2022-04-25 RX ORDER — HYDROCODONE BITARTRATE AND ACETAMINOPHEN 7.5; 325 MG/1; MG/1
1 TABLET ORAL ONCE AS NEEDED
Status: DISCONTINUED | OUTPATIENT
Start: 2022-04-25 | End: 2022-04-25 | Stop reason: HOSPADM

## 2022-04-25 RX ORDER — PROMETHAZINE HYDROCHLORIDE 25 MG/1
25 SUPPOSITORY RECTAL ONCE AS NEEDED
Status: DISCONTINUED | OUTPATIENT
Start: 2022-04-25 | End: 2022-04-25 | Stop reason: HOSPADM

## 2022-04-25 RX ORDER — FLUMAZENIL 0.1 MG/ML
0.2 INJECTION INTRAVENOUS AS NEEDED
Status: DISCONTINUED | OUTPATIENT
Start: 2022-04-25 | End: 2022-04-25 | Stop reason: HOSPADM

## 2022-04-25 RX ORDER — GLYCOPYRROLATE 0.2 MG/ML
INJECTION INTRAMUSCULAR; INTRAVENOUS AS NEEDED
Status: DISCONTINUED | OUTPATIENT
Start: 2022-04-25 | End: 2022-04-25 | Stop reason: SURG

## 2022-04-25 RX ORDER — SODIUM CHLORIDE 0.9 % (FLUSH) 0.9 %
3-10 SYRINGE (ML) INJECTION AS NEEDED
Status: DISCONTINUED | OUTPATIENT
Start: 2022-04-25 | End: 2022-04-25 | Stop reason: HOSPADM

## 2022-04-25 RX ORDER — ONDANSETRON 2 MG/ML
INJECTION INTRAMUSCULAR; INTRAVENOUS AS NEEDED
Status: DISCONTINUED | OUTPATIENT
Start: 2022-04-25 | End: 2022-04-25 | Stop reason: SURG

## 2022-04-25 RX ORDER — FENTANYL CITRATE 50 UG/ML
INJECTION, SOLUTION INTRAMUSCULAR; INTRAVENOUS AS NEEDED
Status: DISCONTINUED | OUTPATIENT
Start: 2022-04-25 | End: 2022-04-25 | Stop reason: SURG

## 2022-04-25 RX ORDER — LIDOCAINE HYDROCHLORIDE 10 MG/ML
0.5 INJECTION, SOLUTION EPIDURAL; INFILTRATION; INTRACAUDAL; PERINEURAL ONCE AS NEEDED
Status: DISCONTINUED | OUTPATIENT
Start: 2022-04-25 | End: 2022-04-25 | Stop reason: HOSPADM

## 2022-04-25 RX ORDER — PROPOFOL 10 MG/ML
VIAL (ML) INTRAVENOUS AS NEEDED
Status: DISCONTINUED | OUTPATIENT
Start: 2022-04-25 | End: 2022-04-25 | Stop reason: SURG

## 2022-04-25 RX ORDER — HYDROMORPHONE HCL 110MG/55ML
PATIENT CONTROLLED ANALGESIA SYRINGE INTRAVENOUS AS NEEDED
Status: DISCONTINUED | OUTPATIENT
Start: 2022-04-25 | End: 2022-04-25 | Stop reason: SURG

## 2022-04-25 RX ORDER — HYDRALAZINE HYDROCHLORIDE 20 MG/ML
5 INJECTION INTRAMUSCULAR; INTRAVENOUS
Status: DISCONTINUED | OUTPATIENT
Start: 2022-04-25 | End: 2022-04-25 | Stop reason: HOSPADM

## 2022-04-25 RX ORDER — SODIUM CHLORIDE 0.9 % (FLUSH) 0.9 %
3 SYRINGE (ML) INJECTION EVERY 12 HOURS SCHEDULED
Status: DISCONTINUED | OUTPATIENT
Start: 2022-04-25 | End: 2022-04-25 | Stop reason: HOSPADM

## 2022-04-25 RX ORDER — LIDOCAINE HYDROCHLORIDE 20 MG/ML
INJECTION, SOLUTION INFILTRATION; PERINEURAL AS NEEDED
Status: DISCONTINUED | OUTPATIENT
Start: 2022-04-25 | End: 2022-04-25 | Stop reason: SURG

## 2022-04-25 RX ORDER — NEOSTIGMINE METHYLSULFATE 0.5 MG/ML
INJECTION, SOLUTION INTRAVENOUS AS NEEDED
Status: DISCONTINUED | OUTPATIENT
Start: 2022-04-25 | End: 2022-04-25 | Stop reason: SURG

## 2022-04-25 RX ORDER — MAGNESIUM HYDROXIDE 1200 MG/15ML
LIQUID ORAL AS NEEDED
Status: DISCONTINUED | OUTPATIENT
Start: 2022-04-25 | End: 2022-04-25 | Stop reason: HOSPADM

## 2022-04-25 RX ORDER — HYDROMORPHONE HYDROCHLORIDE 1 MG/ML
0.5 INJECTION, SOLUTION INTRAMUSCULAR; INTRAVENOUS; SUBCUTANEOUS
Status: DISCONTINUED | OUTPATIENT
Start: 2022-04-25 | End: 2022-04-25 | Stop reason: HOSPADM

## 2022-04-25 RX ORDER — HYDROCODONE BITARTRATE AND ACETAMINOPHEN 5; 325 MG/1; MG/1
1 TABLET ORAL EVERY 6 HOURS PRN
Status: DISCONTINUED | OUTPATIENT
Start: 2022-04-25 | End: 2022-04-30 | Stop reason: HOSPADM

## 2022-04-25 RX ORDER — DIPHENHYDRAMINE HYDROCHLORIDE 50 MG/ML
12.5 INJECTION INTRAMUSCULAR; INTRAVENOUS
Status: DISCONTINUED | OUTPATIENT
Start: 2022-04-25 | End: 2022-04-25 | Stop reason: HOSPADM

## 2022-04-25 RX ORDER — DIPHENHYDRAMINE HCL 25 MG
25 CAPSULE ORAL
Status: DISCONTINUED | OUTPATIENT
Start: 2022-04-25 | End: 2022-04-25 | Stop reason: HOSPADM

## 2022-04-25 RX ORDER — OXYCODONE AND ACETAMINOPHEN 7.5; 325 MG/1; MG/1
1 TABLET ORAL EVERY 4 HOURS PRN
Status: DISCONTINUED | OUTPATIENT
Start: 2022-04-25 | End: 2022-04-25 | Stop reason: HOSPADM

## 2022-04-25 RX ORDER — SODIUM CHLORIDE 9 MG/ML
INJECTION, SOLUTION INTRAVENOUS AS NEEDED
Status: DISCONTINUED | OUTPATIENT
Start: 2022-04-25 | End: 2022-04-25 | Stop reason: HOSPADM

## 2022-04-25 RX ADMIN — BUDESONIDE AND FORMOTEROL FUMARATE DIHYDRATE 2 PUFF: 160; 4.5 AEROSOL RESPIRATORY (INHALATION) at 07:35

## 2022-04-25 RX ADMIN — LACTULOSE 20 G: 20 SOLUTION ORAL at 21:19

## 2022-04-25 RX ADMIN — MIDAZOLAM 1 MG: 1 INJECTION INTRAMUSCULAR; INTRAVENOUS at 12:22

## 2022-04-25 RX ADMIN — DOCUSATE SODIUM 50MG AND SENNOSIDES 8.6MG 2 TABLET: 8.6; 5 TABLET, FILM COATED ORAL at 21:19

## 2022-04-25 RX ADMIN — HYDROMORPHONE HYDROCHLORIDE 0.5 MG: 1 INJECTION, SOLUTION INTRAMUSCULAR; INTRAVENOUS; SUBCUTANEOUS at 02:00

## 2022-04-25 RX ADMIN — SODIUM CHLORIDE, POTASSIUM CHLORIDE, SODIUM LACTATE AND CALCIUM CHLORIDE 100 ML/HR: 600; 310; 30; 20 INJECTION, SOLUTION INTRAVENOUS at 15:42

## 2022-04-25 RX ADMIN — ONDANSETRON 4 MG: 2 INJECTION INTRAMUSCULAR; INTRAVENOUS at 17:09

## 2022-04-25 RX ADMIN — SODIUM CHLORIDE, POTASSIUM CHLORIDE, SODIUM LACTATE AND CALCIUM CHLORIDE 9 ML/HR: 600; 310; 30; 20 INJECTION, SOLUTION INTRAVENOUS at 12:22

## 2022-04-25 RX ADMIN — FENTANYL CITRATE 50 MCG: 0.05 INJECTION, SOLUTION INTRAMUSCULAR; INTRAVENOUS at 12:36

## 2022-04-25 RX ADMIN — SODIUM CHLORIDE, POTASSIUM CHLORIDE, SODIUM LACTATE AND CALCIUM CHLORIDE 100 ML/HR: 600; 310; 30; 20 INJECTION, SOLUTION INTRAVENOUS at 00:17

## 2022-04-25 RX ADMIN — LORAZEPAM 1 MG: 1 TABLET ORAL at 21:19

## 2022-04-25 RX ADMIN — ROCURONIUM BROMIDE 50 MG: 50 INJECTION INTRAVENOUS at 12:36

## 2022-04-25 RX ADMIN — HYDROMORPHONE HYDROCHLORIDE 0.5 MG: 1 INJECTION, SOLUTION INTRAMUSCULAR; INTRAVENOUS; SUBCUTANEOUS at 14:36

## 2022-04-25 RX ADMIN — TIOTROPIUM BROMIDE INHALATION SPRAY 1 PUFF: 3.12 SPRAY, METERED RESPIRATORY (INHALATION) at 07:35

## 2022-04-25 RX ADMIN — FENTANYL CITRATE 50 MCG: 50 INJECTION, SOLUTION INTRAMUSCULAR; INTRAVENOUS at 14:17

## 2022-04-25 RX ADMIN — ONDANSETRON 4 MG: 2 INJECTION INTRAMUSCULAR; INTRAVENOUS at 13:35

## 2022-04-25 RX ADMIN — FENTANYL CITRATE 50 MCG: 50 INJECTION, SOLUTION INTRAMUSCULAR; INTRAVENOUS at 14:01

## 2022-04-25 RX ADMIN — HYDROMORPHONE HYDROCHLORIDE 0.5 MG: 1 INJECTION, SOLUTION INTRAMUSCULAR; INTRAVENOUS; SUBCUTANEOUS at 18:27

## 2022-04-25 RX ADMIN — PROPOFOL 150 MG: 10 INJECTION, EMULSION INTRAVENOUS at 12:36

## 2022-04-25 RX ADMIN — NICOTINE 1 PATCH: 7 PATCH, EXTENDED RELEASE TRANSDERMAL at 18:27

## 2022-04-25 RX ADMIN — FAMOTIDINE 20 MG: 10 INJECTION INTRAVENOUS at 12:22

## 2022-04-25 RX ADMIN — FENTANYL CITRATE 50 MCG: 0.05 INJECTION, SOLUTION INTRAMUSCULAR; INTRAVENOUS at 12:22

## 2022-04-25 RX ADMIN — LUBIPROSTONE 24 MCG: 24 CAPSULE, GELATIN COATED ORAL at 18:27

## 2022-04-25 RX ADMIN — LISINOPRIL 20 MG: 20 TABLET ORAL at 18:27

## 2022-04-25 RX ADMIN — DOCUSATE SODIUM 100 MG: 100 CAPSULE, LIQUID FILLED ORAL at 21:19

## 2022-04-25 RX ADMIN — GLYCOPYRROLATE 0.4 MG: 0.2 INJECTION INTRAMUSCULAR; INTRAVENOUS at 13:35

## 2022-04-25 RX ADMIN — LORAZEPAM 1 MG: 1 TABLET ORAL at 06:10

## 2022-04-25 RX ADMIN — BUDESONIDE AND FORMOTEROL FUMARATE DIHYDRATE 2 PUFF: 160; 4.5 AEROSOL RESPIRATORY (INHALATION) at 21:40

## 2022-04-25 RX ADMIN — CEFAZOLIN SODIUM 2 G: 2 INJECTION, SOLUTION INTRAVENOUS at 12:22

## 2022-04-25 RX ADMIN — HYDROMORPHONE HYDROCHLORIDE 0.5 MG: 2 INJECTION, SOLUTION INTRAMUSCULAR; INTRAVENOUS; SUBCUTANEOUS at 13:43

## 2022-04-25 RX ADMIN — LIDOCAINE HYDROCHLORIDE 60 MG: 20 INJECTION, SOLUTION INFILTRATION; PERINEURAL at 12:36

## 2022-04-25 RX ADMIN — OXYCODONE HYDROCHLORIDE AND ACETAMINOPHEN 1 TABLET: 5; 325 TABLET ORAL at 06:10

## 2022-04-25 RX ADMIN — NEOSTIGMINE METHYLSULFATE 3 MG: 0.5 INJECTION INTRAVENOUS at 13:35

## 2022-04-25 RX ADMIN — HYDROMORPHONE HYDROCHLORIDE 0.5 MG: 1 INJECTION, SOLUTION INTRAMUSCULAR; INTRAVENOUS; SUBCUTANEOUS at 21:19

## 2022-04-25 NOTE — ANESTHESIA POSTPROCEDURE EVALUATION
"Patient: Donny Mallory    Procedure Summary     Date: 04/25/22 Room / Location: [facility] OR  / [facility] MAIN OR    Anesthesia Start: 1228 Anesthesia Stop: 1353    Procedure: Laparoscopic cholecystectomy with intraoperative cholangiogram, possible open (N/A Abdomen) Diagnosis:       Acute biliary pancreatitis without infection or necrosis      (Acute biliary pancreatitis without infection or necrosis [K85.10])    Surgeons: Khari Schofield MD Provider: Krysten Hay MD    Anesthesia Type: general ASA Status: 3          Anesthesia Type: general    Vitals  Vitals Value Taken Time   /82 04/25/22 1456   Temp 36.4 °C (97.6 °F) 04/25/22 1354   Pulse 68 04/25/22 1506   Resp 16 04/25/22 1455   SpO2 95 % 04/25/22 1507   Vitals shown include unvalidated device data.        Post Anesthesia Care and Evaluation    Patient location during evaluation: PACU  Patient participation: complete - patient participated  Level of consciousness: awake and alert  Pain management: adequate  Airway patency: patent  Anesthetic complications: No anesthetic complications    Cardiovascular status: acceptable  Respiratory status: acceptable  Hydration status: acceptable    Comments: /85 (BP Location: Right arm, Patient Position: Lying)   Pulse 61   Temp 36.4 °C (97.6 °F) (Oral)   Resp 16   Ht 177.8 cm (70\")   Wt 59.5 kg (131 lb 2.8 oz)   SpO2 97%   BMI 18.82 kg/m²         "

## 2022-04-25 NOTE — ANESTHESIA PREPROCEDURE EVALUATION
Anesthesia Evaluation     Patient summary reviewed and Nursing notes reviewed                Airway   Mallampati: II  TM distance: >3 FB  Dental    (+) poor dentition        Pulmonary    (+) COPD,   Cardiovascular     ECG reviewed  Rhythm: irregular    (+) hypertension, dysrhythmias PAC, Bradycardia,       Neuro/Psych- negative ROS  GI/Hepatic/Renal/Endo - negative ROS     Musculoskeletal (-) negative ROS    Abdominal    Substance History - negative use     OB/GYN negative ob/gyn ROS         Other                        Anesthesia Plan    ASA 3     general   (I have reviewed the patient's history with the patient and the chart, including all pertinent laboratory results and imaging. I have explained the risks of anesthesia including but not limited to dental damage, corneal abrasion, nerve injury, MI, stroke, and death. Questions asked and answered. Anesthetic plan discussed with patient and team as indicated. Patient expressed understanding of the above.    Poor dentition  )  intravenous induction     Anesthetic plan, all risks, benefits, and alternatives have been provided, discussed and informed consent has been obtained with: patient.    Plan discussed with CRNA.        CODE STATUS:    Code Status (Patient has no pulse and is not breathing): CPR (Attempt to Resuscitate)  Medical Interventions (Patient has pulse or is breathing): Full

## 2022-04-25 NOTE — ANESTHESIA PROCEDURE NOTES
Airway  Urgency: elective    Date/Time: 4/25/2022 12:37 PM  Difficult airway    General Information and Staff    Patient location during procedure: OR  Anesthesiologist: Krysten Hay MD  CRNA: Ezequiel San CRNA    Indications and Patient Condition  Indications for airway management: airway protection    Preoxygenated: yes  MILS maintained throughout  Mask difficulty assessment: 2 - vent by mask + OA or adjuvant +/- NMBA    Final Airway Details  Final airway type: endotracheal airway      Successful airway: ETT  Cuffed: yes   Successful intubation technique: direct laryngoscopy  Facilitating devices/methods: anterior pressure/BURP and Bougie  Blade: Mehreen  Blade size: 4  ETT size (mm): 7.5  Cormack-Lehane Classification: grade IIb - view of arytenoids or posterior of glottis only  Placement verified by: chest auscultation and capnometry   Measured from: teeth  ETT/EBT  to teeth (cm): 22  Number of attempts at approach: 1  Assessment: lips, teeth, and gum same as pre-op and atraumatic intubation

## 2022-04-26 ENCOUNTER — APPOINTMENT (OUTPATIENT)
Dept: GENERAL RADIOLOGY | Facility: HOSPITAL | Age: 62
End: 2022-04-26

## 2022-04-26 LAB
ALBUMIN SERPL-MCNC: 4 G/DL (ref 3.5–5.2)
ALBUMIN/GLOB SERPL: 1.6 G/DL
ALP SERPL-CCNC: 66 U/L (ref 39–117)
ALT SERPL W P-5'-P-CCNC: 38 U/L (ref 1–41)
ANION GAP SERPL CALCULATED.3IONS-SCNC: 15.4 MMOL/L (ref 5–15)
AST SERPL-CCNC: 51 U/L (ref 1–40)
BASOPHILS # BLD AUTO: 0.07 10*3/MM3 (ref 0–0.2)
BASOPHILS NFR BLD AUTO: 0.6 % (ref 0–1.5)
BILIRUB SERPL-MCNC: 0.5 MG/DL (ref 0–1.2)
BUN SERPL-MCNC: 11 MG/DL (ref 6–20)
BUN/CREAT SERPL: 10 (ref 7–25)
CALCIUM SPEC-SCNC: 9.3 MG/DL (ref 8.6–10.5)
CHLORIDE SERPL-SCNC: 104 MMOL/L (ref 98–107)
CO2 SERPL-SCNC: 21.6 MMOL/L (ref 22–29)
CREAT SERPL-MCNC: 1.1 MG/DL (ref 0.76–1.27)
DEPRECATED RDW RBC AUTO: 45 FL (ref 37–54)
EGFRCR SERPLBLD CKD-EPI 2021: 77.3 ML/MIN/1.73
EOSINOPHIL # BLD AUTO: 0.18 10*3/MM3 (ref 0–0.4)
EOSINOPHIL NFR BLD AUTO: 1.6 % (ref 0.3–6.2)
ERYTHROCYTE [DISTWIDTH] IN BLOOD BY AUTOMATED COUNT: 13.2 % (ref 12.3–15.4)
GLOBULIN UR ELPH-MCNC: 2.5 GM/DL
GLUCOSE SERPL-MCNC: 91 MG/DL (ref 65–99)
HCT VFR BLD AUTO: 41.9 % (ref 37.5–51)
HGB BLD-MCNC: 13.7 G/DL (ref 13–17.7)
IMM GRANULOCYTES # BLD AUTO: 0.03 10*3/MM3 (ref 0–0.05)
IMM GRANULOCYTES NFR BLD AUTO: 0.3 % (ref 0–0.5)
LAB AP CASE REPORT: NORMAL
LYMPHOCYTES # BLD AUTO: 1.97 10*3/MM3 (ref 0.7–3.1)
LYMPHOCYTES NFR BLD AUTO: 17.9 % (ref 19.6–45.3)
MAGNESIUM SERPL-MCNC: 2.2 MG/DL (ref 1.6–2.6)
MCH RBC QN AUTO: 30.6 PG (ref 26.6–33)
MCHC RBC AUTO-ENTMCNC: 32.7 G/DL (ref 31.5–35.7)
MCV RBC AUTO: 93.7 FL (ref 79–97)
MONOCYTES # BLD AUTO: 0.86 10*3/MM3 (ref 0.1–0.9)
MONOCYTES NFR BLD AUTO: 7.8 % (ref 5–12)
NEUTROPHILS NFR BLD AUTO: 7.89 10*3/MM3 (ref 1.7–7)
NEUTROPHILS NFR BLD AUTO: 71.8 % (ref 42.7–76)
NRBC BLD AUTO-RTO: 0 /100 WBC (ref 0–0.2)
PATH REPORT.FINAL DX SPEC: NORMAL
PATH REPORT.GROSS SPEC: NORMAL
PHOSPHATE SERPL-MCNC: 3.2 MG/DL (ref 2.5–4.5)
PLATELET # BLD AUTO: 232 10*3/MM3 (ref 140–450)
PMV BLD AUTO: 10 FL (ref 6–12)
POTASSIUM SERPL-SCNC: 4 MMOL/L (ref 3.5–5.2)
PROT SERPL-MCNC: 6.5 G/DL (ref 6–8.5)
RBC # BLD AUTO: 4.47 10*6/MM3 (ref 4.14–5.8)
SODIUM SERPL-SCNC: 141 MMOL/L (ref 136–145)
WBC NRBC COR # BLD: 11 10*3/MM3 (ref 3.4–10.8)

## 2022-04-26 PROCEDURE — 92611 MOTION FLUOROSCOPY/SWALLOW: CPT

## 2022-04-26 PROCEDURE — 85025 COMPLETE CBC W/AUTO DIFF WBC: CPT | Performed by: SURGERY

## 2022-04-26 PROCEDURE — 94761 N-INVAS EAR/PLS OXIMETRY MLT: CPT

## 2022-04-26 PROCEDURE — 94799 UNLISTED PULMONARY SVC/PX: CPT

## 2022-04-26 PROCEDURE — 94664 DEMO&/EVAL PT USE INHALER: CPT

## 2022-04-26 PROCEDURE — 84100 ASSAY OF PHOSPHORUS: CPT | Performed by: SURGERY

## 2022-04-26 PROCEDURE — 74230 X-RAY XM SWLNG FUNCJ C+: CPT

## 2022-04-26 PROCEDURE — 80053 COMPREHEN METABOLIC PANEL: CPT | Performed by: INTERNAL MEDICINE

## 2022-04-26 PROCEDURE — 83735 ASSAY OF MAGNESIUM: CPT | Performed by: SURGERY

## 2022-04-26 PROCEDURE — 99024 POSTOP FOLLOW-UP VISIT: CPT | Performed by: SURGERY

## 2022-04-26 PROCEDURE — 25010000002 HYDROMORPHONE PER 4 MG: Performed by: SURGERY

## 2022-04-26 PROCEDURE — 25010000002 ONDANSETRON PER 1 MG: Performed by: SURGERY

## 2022-04-26 RX ADMIN — HYDROMORPHONE HYDROCHLORIDE 0.5 MG: 1 INJECTION, SOLUTION INTRAMUSCULAR; INTRAVENOUS; SUBCUTANEOUS at 01:57

## 2022-04-26 RX ADMIN — OXYCODONE HYDROCHLORIDE AND ACETAMINOPHEN 1 TABLET: 5; 325 TABLET ORAL at 12:30

## 2022-04-26 RX ADMIN — LORAZEPAM 1 MG: 1 TABLET ORAL at 08:02

## 2022-04-26 RX ADMIN — DOCUSATE SODIUM 100 MG: 100 CAPSULE, LIQUID FILLED ORAL at 07:46

## 2022-04-26 RX ADMIN — SODIUM CHLORIDE, POTASSIUM CHLORIDE, SODIUM LACTATE AND CALCIUM CHLORIDE 100 ML/HR: 600; 310; 30; 20 INJECTION, SOLUTION INTRAVENOUS at 01:57

## 2022-04-26 RX ADMIN — LUBIPROSTONE 24 MCG: 24 CAPSULE, GELATIN COATED ORAL at 18:30

## 2022-04-26 RX ADMIN — BARIUM SULFATE 183 ML: 960 POWDER, FOR SUSPENSION ORAL at 08:46

## 2022-04-26 RX ADMIN — BUDESONIDE AND FORMOTEROL FUMARATE DIHYDRATE 2 PUFF: 160; 4.5 AEROSOL RESPIRATORY (INHALATION) at 07:34

## 2022-04-26 RX ADMIN — BARIUM SULFATE 50 ML: 400 SUSPENSION ORAL at 08:46

## 2022-04-26 RX ADMIN — HYDROMORPHONE HYDROCHLORIDE 0.5 MG: 1 INJECTION, SOLUTION INTRAMUSCULAR; INTRAVENOUS; SUBCUTANEOUS at 08:02

## 2022-04-26 RX ADMIN — NICOTINE 1 PATCH: 7 PATCH, EXTENDED RELEASE TRANSDERMAL at 08:02

## 2022-04-26 RX ADMIN — POLYETHYLENE GLYCOL 3350 17 G: 17 POWDER, FOR SOLUTION ORAL at 07:47

## 2022-04-26 RX ADMIN — LISINOPRIL 20 MG: 20 TABLET ORAL at 07:46

## 2022-04-26 RX ADMIN — BUDESONIDE AND FORMOTEROL FUMARATE DIHYDRATE 2 PUFF: 160; 4.5 AEROSOL RESPIRATORY (INHALATION) at 20:51

## 2022-04-26 RX ADMIN — SODIUM CHLORIDE, POTASSIUM CHLORIDE, SODIUM LACTATE AND CALCIUM CHLORIDE 100 ML/HR: 600; 310; 30; 20 INJECTION, SOLUTION INTRAVENOUS at 12:32

## 2022-04-26 RX ADMIN — LACTULOSE 20 G: 20 SOLUTION ORAL at 20:53

## 2022-04-26 RX ADMIN — HYDROMORPHONE HYDROCHLORIDE 0.5 MG: 1 INJECTION, SOLUTION INTRAMUSCULAR; INTRAVENOUS; SUBCUTANEOUS at 15:58

## 2022-04-26 RX ADMIN — LACTULOSE 20 G: 20 SOLUTION ORAL at 07:46

## 2022-04-26 RX ADMIN — TIOTROPIUM BROMIDE INHALATION SPRAY 1 PUFF: 3.12 SPRAY, METERED RESPIRATORY (INHALATION) at 07:34

## 2022-04-26 RX ADMIN — DOCUSATE SODIUM 50MG AND SENNOSIDES 8.6MG 2 TABLET: 8.6; 5 TABLET, FILM COATED ORAL at 07:47

## 2022-04-26 RX ADMIN — OXYCODONE HYDROCHLORIDE AND ACETAMINOPHEN 1 TABLET: 5; 325 TABLET ORAL at 18:30

## 2022-04-26 RX ADMIN — HYDROMORPHONE HYDROCHLORIDE 0.5 MG: 1 INJECTION, SOLUTION INTRAMUSCULAR; INTRAVENOUS; SUBCUTANEOUS at 23:45

## 2022-04-26 RX ADMIN — OXYCODONE HYDROCHLORIDE AND ACETAMINOPHEN 1 TABLET: 5; 325 TABLET ORAL at 06:22

## 2022-04-26 RX ADMIN — DOCUSATE SODIUM 100 MG: 100 CAPSULE, LIQUID FILLED ORAL at 20:53

## 2022-04-26 RX ADMIN — ONDANSETRON 4 MG: 2 INJECTION INTRAMUSCULAR; INTRAVENOUS at 23:45

## 2022-04-26 RX ADMIN — LUBIPROSTONE 24 MCG: 24 CAPSULE, GELATIN COATED ORAL at 07:45

## 2022-04-26 RX ADMIN — LORAZEPAM 1 MG: 1 TABLET ORAL at 18:30

## 2022-04-26 RX ADMIN — PANTOPRAZOLE SODIUM 40 MG: 40 TABLET, DELAYED RELEASE ORAL at 06:21

## 2022-04-26 RX ADMIN — Medication 10 ML: at 20:53

## 2022-04-26 RX ADMIN — DOCUSATE SODIUM 50MG AND SENNOSIDES 8.6MG 2 TABLET: 8.6; 5 TABLET, FILM COATED ORAL at 20:53

## 2022-04-26 RX ADMIN — BARIUM SULFATE 1 TEASPOON(S): 0.6 CREAM ORAL at 08:46

## 2022-04-27 LAB
ALBUMIN SERPL-MCNC: 3.7 G/DL (ref 3.5–5.2)
ALBUMIN/GLOB SERPL: 1.5 G/DL
ALP SERPL-CCNC: 67 U/L (ref 39–117)
ALT SERPL W P-5'-P-CCNC: 36 U/L (ref 1–41)
ANION GAP SERPL CALCULATED.3IONS-SCNC: 10.3 MMOL/L (ref 5–15)
AST SERPL-CCNC: 41 U/L (ref 1–40)
BASOPHILS # BLD AUTO: 0.1 10*3/MM3 (ref 0–0.2)
BASOPHILS NFR BLD AUTO: 1.4 % (ref 0–1.5)
BILIRUB SERPL-MCNC: 0.3 MG/DL (ref 0–1.2)
BUN SERPL-MCNC: 9 MG/DL (ref 6–20)
BUN/CREAT SERPL: 9.9 (ref 7–25)
CALCIUM SPEC-SCNC: 9.3 MG/DL (ref 8.6–10.5)
CHLORIDE SERPL-SCNC: 104 MMOL/L (ref 98–107)
CO2 SERPL-SCNC: 24.7 MMOL/L (ref 22–29)
CREAT SERPL-MCNC: 0.91 MG/DL (ref 0.76–1.27)
DEPRECATED RDW RBC AUTO: 48.7 FL (ref 37–54)
EGFRCR SERPLBLD CKD-EPI 2021: 97.1 ML/MIN/1.73
EOSINOPHIL # BLD AUTO: 0.29 10*3/MM3 (ref 0–0.4)
EOSINOPHIL NFR BLD AUTO: 4 % (ref 0.3–6.2)
ERYTHROCYTE [DISTWIDTH] IN BLOOD BY AUTOMATED COUNT: 13.5 % (ref 12.3–15.4)
GLOBULIN UR ELPH-MCNC: 2.5 GM/DL
GLUCOSE SERPL-MCNC: 82 MG/DL (ref 65–99)
HCT VFR BLD AUTO: 39.4 % (ref 37.5–51)
HGB BLD-MCNC: 12.5 G/DL (ref 13–17.7)
IMM GRANULOCYTES # BLD AUTO: 0.02 10*3/MM3 (ref 0–0.05)
IMM GRANULOCYTES NFR BLD AUTO: 0.3 % (ref 0–0.5)
LYMPHOCYTES # BLD AUTO: 2.13 10*3/MM3 (ref 0.7–3.1)
LYMPHOCYTES NFR BLD AUTO: 29.3 % (ref 19.6–45.3)
MAGNESIUM SERPL-MCNC: 2 MG/DL (ref 1.6–2.6)
MCH RBC QN AUTO: 31.1 PG (ref 26.6–33)
MCHC RBC AUTO-ENTMCNC: 31.7 G/DL (ref 31.5–35.7)
MCV RBC AUTO: 98 FL (ref 79–97)
MONOCYTES # BLD AUTO: 0.61 10*3/MM3 (ref 0.1–0.9)
MONOCYTES NFR BLD AUTO: 8.4 % (ref 5–12)
NEUTROPHILS NFR BLD AUTO: 4.12 10*3/MM3 (ref 1.7–7)
NEUTROPHILS NFR BLD AUTO: 56.6 % (ref 42.7–76)
NRBC BLD AUTO-RTO: 0 /100 WBC (ref 0–0.2)
PHOSPHATE SERPL-MCNC: 3.3 MG/DL (ref 2.5–4.5)
PLATELET # BLD AUTO: 216 10*3/MM3 (ref 140–450)
PMV BLD AUTO: 10.1 FL (ref 6–12)
POTASSIUM SERPL-SCNC: 4.1 MMOL/L (ref 3.5–5.2)
PROT SERPL-MCNC: 6.2 G/DL (ref 6–8.5)
RBC # BLD AUTO: 4.02 10*6/MM3 (ref 4.14–5.8)
SODIUM SERPL-SCNC: 139 MMOL/L (ref 136–145)
WBC NRBC COR # BLD: 7.27 10*3/MM3 (ref 3.4–10.8)

## 2022-04-27 PROCEDURE — 80053 COMPREHEN METABOLIC PANEL: CPT | Performed by: INTERNAL MEDICINE

## 2022-04-27 PROCEDURE — 84100 ASSAY OF PHOSPHORUS: CPT | Performed by: SURGERY

## 2022-04-27 PROCEDURE — 94799 UNLISTED PULMONARY SVC/PX: CPT

## 2022-04-27 PROCEDURE — 25010000002 HYDROMORPHONE PER 4 MG: Performed by: SURGERY

## 2022-04-27 PROCEDURE — 83735 ASSAY OF MAGNESIUM: CPT | Performed by: SURGERY

## 2022-04-27 PROCEDURE — 94761 N-INVAS EAR/PLS OXIMETRY MLT: CPT

## 2022-04-27 PROCEDURE — 94664 DEMO&/EVAL PT USE INHALER: CPT

## 2022-04-27 PROCEDURE — 85025 COMPLETE CBC W/AUTO DIFF WBC: CPT | Performed by: SURGERY

## 2022-04-27 PROCEDURE — 99024 POSTOP FOLLOW-UP VISIT: CPT | Performed by: SURGERY

## 2022-04-27 RX ORDER — BISACODYL 10 MG
10 SUPPOSITORY, RECTAL RECTAL DAILY PRN
Status: DISCONTINUED | OUTPATIENT
Start: 2022-04-27 | End: 2022-04-30 | Stop reason: HOSPADM

## 2022-04-27 RX ORDER — POLYETHYLENE GLYCOL 3350 17 G/17G
17 POWDER, FOR SOLUTION ORAL 2 TIMES DAILY
Status: DISCONTINUED | OUTPATIENT
Start: 2022-04-27 | End: 2022-04-30 | Stop reason: HOSPADM

## 2022-04-27 RX ADMIN — HYDROCODONE BITARTRATE AND ACETAMINOPHEN 1 TABLET: 5; 325 TABLET ORAL at 11:02

## 2022-04-27 RX ADMIN — LUBIPROSTONE 24 MCG: 24 CAPSULE, GELATIN COATED ORAL at 18:09

## 2022-04-27 RX ADMIN — Medication 10 ML: at 22:05

## 2022-04-27 RX ADMIN — LORAZEPAM 1 MG: 1 TABLET ORAL at 15:36

## 2022-04-27 RX ADMIN — DOCUSATE SODIUM 100 MG: 100 CAPSULE, LIQUID FILLED ORAL at 22:05

## 2022-04-27 RX ADMIN — OXYCODONE HYDROCHLORIDE AND ACETAMINOPHEN 1 TABLET: 5; 325 TABLET ORAL at 22:04

## 2022-04-27 RX ADMIN — DOCUSATE SODIUM 50MG AND SENNOSIDES 8.6MG 2 TABLET: 8.6; 5 TABLET, FILM COATED ORAL at 08:49

## 2022-04-27 RX ADMIN — DOCUSATE SODIUM 100 MG: 100 CAPSULE, LIQUID FILLED ORAL at 08:49

## 2022-04-27 RX ADMIN — BUDESONIDE AND FORMOTEROL FUMARATE DIHYDRATE 2 PUFF: 160; 4.5 AEROSOL RESPIRATORY (INHALATION) at 10:39

## 2022-04-27 RX ADMIN — PANTOPRAZOLE SODIUM 40 MG: 40 TABLET, DELAYED RELEASE ORAL at 06:29

## 2022-04-27 RX ADMIN — BISACODYL 10 MG: 10 SUPPOSITORY RECTAL at 11:02

## 2022-04-27 RX ADMIN — LISINOPRIL 20 MG: 20 TABLET ORAL at 08:49

## 2022-04-27 RX ADMIN — LORAZEPAM 1 MG: 1 TABLET ORAL at 08:49

## 2022-04-27 RX ADMIN — BUDESONIDE AND FORMOTEROL FUMARATE DIHYDRATE 2 PUFF: 160; 4.5 AEROSOL RESPIRATORY (INHALATION) at 20:03

## 2022-04-27 RX ADMIN — POLYETHYLENE GLYCOL 3350 17 G: 17 POWDER, FOR SOLUTION ORAL at 22:05

## 2022-04-27 RX ADMIN — POLYETHYLENE GLYCOL 3350 17 G: 17 POWDER, FOR SOLUTION ORAL at 08:49

## 2022-04-27 RX ADMIN — SODIUM CHLORIDE, POTASSIUM CHLORIDE, SODIUM LACTATE AND CALCIUM CHLORIDE 100 ML/HR: 600; 310; 30; 20 INJECTION, SOLUTION INTRAVENOUS at 08:49

## 2022-04-27 RX ADMIN — OXYCODONE HYDROCHLORIDE AND ACETAMINOPHEN 1 TABLET: 5; 325 TABLET ORAL at 15:37

## 2022-04-27 RX ADMIN — TIOTROPIUM BROMIDE INHALATION SPRAY 1 PUFF: 3.12 SPRAY, METERED RESPIRATORY (INHALATION) at 10:39

## 2022-04-27 RX ADMIN — HYDROMORPHONE HYDROCHLORIDE 0.5 MG: 1 INJECTION, SOLUTION INTRAMUSCULAR; INTRAVENOUS; SUBCUTANEOUS at 08:48

## 2022-04-27 RX ADMIN — NICOTINE 1 PATCH: 7 PATCH, EXTENDED RELEASE TRANSDERMAL at 08:50

## 2022-04-27 RX ADMIN — LACTULOSE 20 G: 20 SOLUTION ORAL at 22:05

## 2022-04-27 RX ADMIN — LACTULOSE 20 G: 20 SOLUTION ORAL at 08:49

## 2022-04-27 RX ADMIN — DOCUSATE SODIUM 50MG AND SENNOSIDES 8.6MG 2 TABLET: 8.6; 5 TABLET, FILM COATED ORAL at 22:04

## 2022-04-27 RX ADMIN — LUBIPROSTONE 24 MCG: 24 CAPSULE, GELATIN COATED ORAL at 08:49

## 2022-04-28 ENCOUNTER — APPOINTMENT (OUTPATIENT)
Dept: CT IMAGING | Facility: HOSPITAL | Age: 62
End: 2022-04-28

## 2022-04-28 PROBLEM — E43 SEVERE MALNUTRITION (HCC): Status: ACTIVE | Noted: 2022-04-28

## 2022-04-28 LAB
ALBUMIN SERPL-MCNC: 3.7 G/DL (ref 3.5–5.2)
ALBUMIN/GLOB SERPL: 1.6 G/DL
ALP SERPL-CCNC: 59 U/L (ref 39–117)
ALT SERPL W P-5'-P-CCNC: 30 U/L (ref 1–41)
ANION GAP SERPL CALCULATED.3IONS-SCNC: 9.7 MMOL/L (ref 5–15)
AST SERPL-CCNC: 25 U/L (ref 1–40)
BASOPHILS # BLD AUTO: 0.11 10*3/MM3 (ref 0–0.2)
BASOPHILS NFR BLD AUTO: 1.3 % (ref 0–1.5)
BILIRUB SERPL-MCNC: 0.4 MG/DL (ref 0–1.2)
BUN SERPL-MCNC: 14 MG/DL (ref 6–20)
BUN/CREAT SERPL: 14.3 (ref 7–25)
CALCIUM SPEC-SCNC: 9.3 MG/DL (ref 8.6–10.5)
CHLORIDE SERPL-SCNC: 105 MMOL/L (ref 98–107)
CO2 SERPL-SCNC: 22.3 MMOL/L (ref 22–29)
CREAT SERPL-MCNC: 0.98 MG/DL (ref 0.76–1.27)
DEPRECATED RDW RBC AUTO: 47 FL (ref 37–54)
EGFRCR SERPLBLD CKD-EPI 2021: 88.8 ML/MIN/1.73
EOSINOPHIL # BLD AUTO: 0.27 10*3/MM3 (ref 0–0.4)
EOSINOPHIL NFR BLD AUTO: 3.3 % (ref 0.3–6.2)
ERYTHROCYTE [DISTWIDTH] IN BLOOD BY AUTOMATED COUNT: 13.8 % (ref 12.3–15.4)
GLOBULIN UR ELPH-MCNC: 2.3 GM/DL
GLUCOSE SERPL-MCNC: 84 MG/DL (ref 65–99)
HCT VFR BLD AUTO: 36.6 % (ref 37.5–51)
HGB BLD-MCNC: 12.4 G/DL (ref 13–17.7)
IMM GRANULOCYTES # BLD AUTO: 0.03 10*3/MM3 (ref 0–0.05)
IMM GRANULOCYTES NFR BLD AUTO: 0.4 % (ref 0–0.5)
LYMPHOCYTES # BLD AUTO: 1.94 10*3/MM3 (ref 0.7–3.1)
LYMPHOCYTES NFR BLD AUTO: 23.6 % (ref 19.6–45.3)
MAGNESIUM SERPL-MCNC: 2.1 MG/DL (ref 1.6–2.6)
MCH RBC QN AUTO: 31 PG (ref 26.6–33)
MCHC RBC AUTO-ENTMCNC: 33.9 G/DL (ref 31.5–35.7)
MCV RBC AUTO: 91.5 FL (ref 79–97)
MONOCYTES # BLD AUTO: 0.94 10*3/MM3 (ref 0.1–0.9)
MONOCYTES NFR BLD AUTO: 11.4 % (ref 5–12)
NEUTROPHILS NFR BLD AUTO: 4.93 10*3/MM3 (ref 1.7–7)
NEUTROPHILS NFR BLD AUTO: 60 % (ref 42.7–76)
NRBC BLD AUTO-RTO: 0.1 /100 WBC (ref 0–0.2)
PHOSPHATE SERPL-MCNC: 4 MG/DL (ref 2.5–4.5)
PLATELET # BLD AUTO: 204 10*3/MM3 (ref 140–450)
PMV BLD AUTO: 10.7 FL (ref 6–12)
POTASSIUM SERPL-SCNC: 4.3 MMOL/L (ref 3.5–5.2)
PROT SERPL-MCNC: 6 G/DL (ref 6–8.5)
RBC # BLD AUTO: 4 10*6/MM3 (ref 4.14–5.8)
SODIUM SERPL-SCNC: 137 MMOL/L (ref 136–145)
WBC NRBC COR # BLD: 8.22 10*3/MM3 (ref 3.4–10.8)

## 2022-04-28 PROCEDURE — 83735 ASSAY OF MAGNESIUM: CPT | Performed by: SURGERY

## 2022-04-28 PROCEDURE — 94799 UNLISTED PULMONARY SVC/PX: CPT

## 2022-04-28 PROCEDURE — 80053 COMPREHEN METABOLIC PANEL: CPT | Performed by: INTERNAL MEDICINE

## 2022-04-28 PROCEDURE — 84100 ASSAY OF PHOSPHORUS: CPT | Performed by: SURGERY

## 2022-04-28 PROCEDURE — 94761 N-INVAS EAR/PLS OXIMETRY MLT: CPT

## 2022-04-28 PROCEDURE — 99232 SBSQ HOSP IP/OBS MODERATE 35: CPT | Performed by: PHYSICIAN ASSISTANT

## 2022-04-28 PROCEDURE — 74176 CT ABD & PELVIS W/O CONTRAST: CPT

## 2022-04-28 PROCEDURE — 99024 POSTOP FOLLOW-UP VISIT: CPT | Performed by: SURGERY

## 2022-04-28 PROCEDURE — 85025 COMPLETE CBC W/AUTO DIFF WBC: CPT | Performed by: SURGERY

## 2022-04-28 PROCEDURE — 94664 DEMO&/EVAL PT USE INHALER: CPT

## 2022-04-28 PROCEDURE — 25010000002 ONDANSETRON PER 1 MG: Performed by: SURGERY

## 2022-04-28 RX ADMIN — BUDESONIDE AND FORMOTEROL FUMARATE DIHYDRATE 2 PUFF: 160; 4.5 AEROSOL RESPIRATORY (INHALATION) at 19:35

## 2022-04-28 RX ADMIN — ACETAMINOPHEN 650 MG: 325 TABLET ORAL at 02:28

## 2022-04-28 RX ADMIN — HYDROCODONE BITARTRATE AND ACETAMINOPHEN 1 TABLET: 5; 325 TABLET ORAL at 08:45

## 2022-04-28 RX ADMIN — POLYETHYLENE GLYCOL 3350 17 G: 17 POWDER, FOR SOLUTION ORAL at 22:14

## 2022-04-28 RX ADMIN — DOCUSATE SODIUM 50MG AND SENNOSIDES 8.6MG 2 TABLET: 8.6; 5 TABLET, FILM COATED ORAL at 22:14

## 2022-04-28 RX ADMIN — TIOTROPIUM BROMIDE INHALATION SPRAY 1 PUFF: 3.12 SPRAY, METERED RESPIRATORY (INHALATION) at 08:37

## 2022-04-28 RX ADMIN — BUDESONIDE AND FORMOTEROL FUMARATE DIHYDRATE 2 PUFF: 160; 4.5 AEROSOL RESPIRATORY (INHALATION) at 08:35

## 2022-04-28 RX ADMIN — DOCUSATE SODIUM 100 MG: 100 CAPSULE, LIQUID FILLED ORAL at 22:14

## 2022-04-28 RX ADMIN — LISINOPRIL 20 MG: 20 TABLET ORAL at 08:40

## 2022-04-28 RX ADMIN — LORAZEPAM 1 MG: 1 TABLET ORAL at 08:45

## 2022-04-28 RX ADMIN — LORAZEPAM 1 MG: 1 TABLET ORAL at 22:14

## 2022-04-28 RX ADMIN — LUBIPROSTONE 24 MCG: 24 CAPSULE, GELATIN COATED ORAL at 08:40

## 2022-04-28 RX ADMIN — ONDANSETRON 4 MG: 2 INJECTION INTRAMUSCULAR; INTRAVENOUS at 14:13

## 2022-04-28 RX ADMIN — DOCUSATE SODIUM 100 MG: 100 CAPSULE, LIQUID FILLED ORAL at 08:40

## 2022-04-28 RX ADMIN — NICOTINE 1 PATCH: 7 PATCH, EXTENDED RELEASE TRANSDERMAL at 08:45

## 2022-04-28 RX ADMIN — OXYCODONE HYDROCHLORIDE AND ACETAMINOPHEN 1 TABLET: 5; 325 TABLET ORAL at 14:13

## 2022-04-28 RX ADMIN — DOCUSATE SODIUM 50MG AND SENNOSIDES 8.6MG 2 TABLET: 8.6; 5 TABLET, FILM COATED ORAL at 08:40

## 2022-04-28 RX ADMIN — LACTULOSE 20 G: 20 SOLUTION ORAL at 08:40

## 2022-04-28 RX ADMIN — POLYETHYLENE GLYCOL 3350 17 G: 17 POWDER, FOR SOLUTION ORAL at 08:40

## 2022-04-28 RX ADMIN — NALOXEGOL OXALATE 25 MG: 25 TABLET, FILM COATED ORAL at 22:18

## 2022-04-28 RX ADMIN — LACTULOSE 20 G: 20 SOLUTION ORAL at 22:14

## 2022-04-28 RX ADMIN — HYDROCODONE BITARTRATE AND ACETAMINOPHEN 1 TABLET: 5; 325 TABLET ORAL at 18:30

## 2022-04-28 RX ADMIN — OXYCODONE HYDROCHLORIDE AND ACETAMINOPHEN 1 TABLET: 5; 325 TABLET ORAL at 04:04

## 2022-04-28 RX ADMIN — PANTOPRAZOLE SODIUM 40 MG: 40 TABLET, DELAYED RELEASE ORAL at 06:17

## 2022-04-29 LAB
ALBUMIN SERPL-MCNC: 3.8 G/DL (ref 3.5–5.2)
ALBUMIN/GLOB SERPL: 1.5 G/DL
ALP SERPL-CCNC: 64 U/L (ref 39–117)
ALT SERPL W P-5'-P-CCNC: 26 U/L (ref 1–41)
ANION GAP SERPL CALCULATED.3IONS-SCNC: 10.1 MMOL/L (ref 5–15)
AST SERPL-CCNC: 19 U/L (ref 1–40)
BASOPHILS # BLD AUTO: 0.06 10*3/MM3 (ref 0–0.2)
BASOPHILS NFR BLD AUTO: 0.6 % (ref 0–1.5)
BILIRUB SERPL-MCNC: 0.4 MG/DL (ref 0–1.2)
BUN SERPL-MCNC: 18 MG/DL (ref 6–20)
BUN/CREAT SERPL: 16.4 (ref 7–25)
CALCIUM SPEC-SCNC: 9.7 MG/DL (ref 8.6–10.5)
CHLORIDE SERPL-SCNC: 104 MMOL/L (ref 98–107)
CO2 SERPL-SCNC: 24.9 MMOL/L (ref 22–29)
CREAT SERPL-MCNC: 1.1 MG/DL (ref 0.76–1.27)
DEPRECATED RDW RBC AUTO: 44.6 FL (ref 37–54)
EGFRCR SERPLBLD CKD-EPI 2021: 77.3 ML/MIN/1.73
EOSINOPHIL # BLD AUTO: 0.35 10*3/MM3 (ref 0–0.4)
EOSINOPHIL NFR BLD AUTO: 3.7 % (ref 0.3–6.2)
ERYTHROCYTE [DISTWIDTH] IN BLOOD BY AUTOMATED COUNT: 13.5 % (ref 12.3–15.4)
GLOBULIN UR ELPH-MCNC: 2.6 GM/DL
GLUCOSE SERPL-MCNC: 108 MG/DL (ref 65–99)
HCT VFR BLD AUTO: 39.1 % (ref 37.5–51)
HGB BLD-MCNC: 13.3 G/DL (ref 13–17.7)
IMM GRANULOCYTES # BLD AUTO: 0.04 10*3/MM3 (ref 0–0.05)
IMM GRANULOCYTES NFR BLD AUTO: 0.4 % (ref 0–0.5)
LYMPHOCYTES # BLD AUTO: 2.15 10*3/MM3 (ref 0.7–3.1)
LYMPHOCYTES NFR BLD AUTO: 22.6 % (ref 19.6–45.3)
MAGNESIUM SERPL-MCNC: 1.9 MG/DL (ref 1.6–2.6)
MCH RBC QN AUTO: 31.1 PG (ref 26.6–33)
MCHC RBC AUTO-ENTMCNC: 34 G/DL (ref 31.5–35.7)
MCV RBC AUTO: 91.6 FL (ref 79–97)
MONOCYTES # BLD AUTO: 1 10*3/MM3 (ref 0.1–0.9)
MONOCYTES NFR BLD AUTO: 10.5 % (ref 5–12)
NEUTROPHILS NFR BLD AUTO: 5.91 10*3/MM3 (ref 1.7–7)
NEUTROPHILS NFR BLD AUTO: 62.2 % (ref 42.7–76)
NRBC BLD AUTO-RTO: 0 /100 WBC (ref 0–0.2)
PHOSPHATE SERPL-MCNC: 3.9 MG/DL (ref 2.5–4.5)
PLATELET # BLD AUTO: 241 10*3/MM3 (ref 140–450)
PMV BLD AUTO: 9.8 FL (ref 6–12)
POTASSIUM SERPL-SCNC: 4.3 MMOL/L (ref 3.5–5.2)
PROT SERPL-MCNC: 6.4 G/DL (ref 6–8.5)
RBC # BLD AUTO: 4.27 10*6/MM3 (ref 4.14–5.8)
SODIUM SERPL-SCNC: 139 MMOL/L (ref 136–145)
WBC NRBC COR # BLD: 9.51 10*3/MM3 (ref 3.4–10.8)

## 2022-04-29 PROCEDURE — 99024 POSTOP FOLLOW-UP VISIT: CPT | Performed by: SURGERY

## 2022-04-29 PROCEDURE — 85025 COMPLETE CBC W/AUTO DIFF WBC: CPT | Performed by: SURGERY

## 2022-04-29 PROCEDURE — 94799 UNLISTED PULMONARY SVC/PX: CPT

## 2022-04-29 PROCEDURE — 80053 COMPREHEN METABOLIC PANEL: CPT | Performed by: INTERNAL MEDICINE

## 2022-04-29 PROCEDURE — 94761 N-INVAS EAR/PLS OXIMETRY MLT: CPT

## 2022-04-29 PROCEDURE — 83735 ASSAY OF MAGNESIUM: CPT | Performed by: SURGERY

## 2022-04-29 PROCEDURE — 99232 SBSQ HOSP IP/OBS MODERATE 35: CPT | Performed by: PHYSICIAN ASSISTANT

## 2022-04-29 PROCEDURE — 84100 ASSAY OF PHOSPHORUS: CPT | Performed by: SURGERY

## 2022-04-29 RX ADMIN — PANTOPRAZOLE SODIUM 40 MG: 40 TABLET, DELAYED RELEASE ORAL at 06:10

## 2022-04-29 RX ADMIN — HYDROCODONE BITARTRATE AND ACETAMINOPHEN 1 TABLET: 5; 325 TABLET ORAL at 20:44

## 2022-04-29 RX ADMIN — HYDROCODONE BITARTRATE AND ACETAMINOPHEN 1 TABLET: 5; 325 TABLET ORAL at 08:39

## 2022-04-29 RX ADMIN — LISINOPRIL 20 MG: 20 TABLET ORAL at 08:40

## 2022-04-29 RX ADMIN — LORAZEPAM 1 MG: 1 TABLET ORAL at 22:28

## 2022-04-29 RX ADMIN — LORAZEPAM 1 MG: 1 TABLET ORAL at 08:39

## 2022-04-29 RX ADMIN — Medication 10 ML: at 08:43

## 2022-04-29 RX ADMIN — POLYETHYLENE GLYCOL 3350 17 G: 17 POWDER, FOR SOLUTION ORAL at 08:43

## 2022-04-29 RX ADMIN — TIOTROPIUM BROMIDE INHALATION SPRAY 1 PUFF: 3.12 SPRAY, METERED RESPIRATORY (INHALATION) at 08:06

## 2022-04-29 RX ADMIN — OXYCODONE HYDROCHLORIDE AND ACETAMINOPHEN 1 TABLET: 5; 325 TABLET ORAL at 03:03

## 2022-04-29 RX ADMIN — HYDROCODONE BITARTRATE AND ACETAMINOPHEN 1 TABLET: 5; 325 TABLET ORAL at 14:16

## 2022-04-29 RX ADMIN — Medication 3 MG: at 20:44

## 2022-04-29 RX ADMIN — DOCUSATE SODIUM 50MG AND SENNOSIDES 8.6MG 2 TABLET: 8.6; 5 TABLET, FILM COATED ORAL at 08:39

## 2022-04-29 RX ADMIN — LORAZEPAM 1 MG: 1 TABLET ORAL at 16:24

## 2022-04-29 RX ADMIN — BUDESONIDE AND FORMOTEROL FUMARATE DIHYDRATE 2 PUFF: 160; 4.5 AEROSOL RESPIRATORY (INHALATION) at 20:40

## 2022-04-29 RX ADMIN — LACTULOSE 20 G: 20 SOLUTION ORAL at 20:37

## 2022-04-29 RX ADMIN — DOCUSATE SODIUM 50MG AND SENNOSIDES 8.6MG 2 TABLET: 8.6; 5 TABLET, FILM COATED ORAL at 20:37

## 2022-04-29 RX ADMIN — BUDESONIDE AND FORMOTEROL FUMARATE DIHYDRATE 2 PUFF: 160; 4.5 AEROSOL RESPIRATORY (INHALATION) at 08:06

## 2022-04-29 RX ADMIN — DOCUSATE SODIUM 100 MG: 100 CAPSULE, LIQUID FILLED ORAL at 08:40

## 2022-04-29 RX ADMIN — LACTULOSE 20 G: 20 SOLUTION ORAL at 08:39

## 2022-04-29 RX ADMIN — NALOXEGOL OXALATE 25 MG: 25 TABLET, FILM COATED ORAL at 06:10

## 2022-04-29 RX ADMIN — NICOTINE 1 PATCH: 7 PATCH, EXTENDED RELEASE TRANSDERMAL at 08:41

## 2022-04-29 RX ADMIN — DOCUSATE SODIUM 100 MG: 100 CAPSULE, LIQUID FILLED ORAL at 20:37

## 2022-04-29 RX ADMIN — POLYETHYLENE GLYCOL 3350 17 G: 17 POWDER, FOR SOLUTION ORAL at 20:37

## 2022-04-30 ENCOUNTER — READMISSION MANAGEMENT (OUTPATIENT)
Dept: CALL CENTER | Facility: HOSPITAL | Age: 62
End: 2022-04-30

## 2022-04-30 ENCOUNTER — APPOINTMENT (OUTPATIENT)
Dept: CARDIOLOGY | Facility: HOSPITAL | Age: 62
End: 2022-04-30

## 2022-04-30 VITALS
TEMPERATURE: 97.6 F | BODY MASS INDEX: 17.61 KG/M2 | RESPIRATION RATE: 18 BRPM | WEIGHT: 123 LBS | HEART RATE: 70 BPM | DIASTOLIC BLOOD PRESSURE: 90 MMHG | SYSTOLIC BLOOD PRESSURE: 117 MMHG | HEIGHT: 70 IN | OXYGEN SATURATION: 100 %

## 2022-04-30 LAB
ALBUMIN SERPL-MCNC: 4 G/DL (ref 3.5–5.2)
ALBUMIN/GLOB SERPL: 1.5 G/DL
ALP SERPL-CCNC: 60 U/L (ref 39–117)
ALT SERPL W P-5'-P-CCNC: 27 U/L (ref 1–41)
ANION GAP SERPL CALCULATED.3IONS-SCNC: 12.3 MMOL/L (ref 5–15)
AST SERPL-CCNC: 19 U/L (ref 1–40)
BASOPHILS # BLD AUTO: 0.07 10*3/MM3 (ref 0–0.2)
BASOPHILS NFR BLD AUTO: 0.9 % (ref 0–1.5)
BILIRUB SERPL-MCNC: 0.3 MG/DL (ref 0–1.2)
BUN SERPL-MCNC: 24 MG/DL (ref 6–20)
BUN/CREAT SERPL: 20.2 (ref 7–25)
CALCIUM SPEC-SCNC: 9.5 MG/DL (ref 8.6–10.5)
CHLORIDE SERPL-SCNC: 102 MMOL/L (ref 98–107)
CO2 SERPL-SCNC: 22.7 MMOL/L (ref 22–29)
CREAT SERPL-MCNC: 1.19 MG/DL (ref 0.76–1.27)
DEPRECATED RDW RBC AUTO: 44.1 FL (ref 37–54)
EGFRCR SERPLBLD CKD-EPI 2021: 70.4 ML/MIN/1.73
EOSINOPHIL # BLD AUTO: 0.38 10*3/MM3 (ref 0–0.4)
EOSINOPHIL NFR BLD AUTO: 4.7 % (ref 0.3–6.2)
ERYTHROCYTE [DISTWIDTH] IN BLOOD BY AUTOMATED COUNT: 13.4 % (ref 12.3–15.4)
GLOBULIN UR ELPH-MCNC: 2.7 GM/DL
GLUCOSE SERPL-MCNC: 99 MG/DL (ref 65–99)
HCT VFR BLD AUTO: 39.8 % (ref 37.5–51)
HGB BLD-MCNC: 13.7 G/DL (ref 13–17.7)
IMM GRANULOCYTES # BLD AUTO: 0.02 10*3/MM3 (ref 0–0.05)
IMM GRANULOCYTES NFR BLD AUTO: 0.2 % (ref 0–0.5)
LYMPHOCYTES # BLD AUTO: 2.25 10*3/MM3 (ref 0.7–3.1)
LYMPHOCYTES NFR BLD AUTO: 27.8 % (ref 19.6–45.3)
MAGNESIUM SERPL-MCNC: 2.1 MG/DL (ref 1.6–2.6)
MAXIMAL PREDICTED HEART RATE: 161 BPM
MCH RBC QN AUTO: 31.1 PG (ref 26.6–33)
MCHC RBC AUTO-ENTMCNC: 34.4 G/DL (ref 31.5–35.7)
MCV RBC AUTO: 90.5 FL (ref 79–97)
MONOCYTES # BLD AUTO: 0.96 10*3/MM3 (ref 0.1–0.9)
MONOCYTES NFR BLD AUTO: 11.9 % (ref 5–12)
NEUTROPHILS NFR BLD AUTO: 4.41 10*3/MM3 (ref 1.7–7)
NEUTROPHILS NFR BLD AUTO: 54.5 % (ref 42.7–76)
NRBC BLD AUTO-RTO: 0 /100 WBC (ref 0–0.2)
PHOSPHATE SERPL-MCNC: 4.1 MG/DL (ref 2.5–4.5)
PLATELET # BLD AUTO: 240 10*3/MM3 (ref 140–450)
PMV BLD AUTO: 9.5 FL (ref 6–12)
POTASSIUM SERPL-SCNC: 4.4 MMOL/L (ref 3.5–5.2)
PROT SERPL-MCNC: 6.7 G/DL (ref 6–8.5)
RBC # BLD AUTO: 4.4 10*6/MM3 (ref 4.14–5.8)
SODIUM SERPL-SCNC: 137 MMOL/L (ref 136–145)
STRESS TARGET HR: 137 BPM
WBC NRBC COR # BLD: 8.09 10*3/MM3 (ref 3.4–10.8)

## 2022-04-30 PROCEDURE — 99232 SBSQ HOSP IP/OBS MODERATE 35: CPT | Performed by: INTERNAL MEDICINE

## 2022-04-30 PROCEDURE — 80053 COMPREHEN METABOLIC PANEL: CPT | Performed by: INTERNAL MEDICINE

## 2022-04-30 PROCEDURE — 94761 N-INVAS EAR/PLS OXIMETRY MLT: CPT

## 2022-04-30 PROCEDURE — 94799 UNLISTED PULMONARY SVC/PX: CPT

## 2022-04-30 PROCEDURE — 84100 ASSAY OF PHOSPHORUS: CPT | Performed by: SURGERY

## 2022-04-30 PROCEDURE — 83735 ASSAY OF MAGNESIUM: CPT | Performed by: SURGERY

## 2022-04-30 PROCEDURE — 85025 COMPLETE CBC W/AUTO DIFF WBC: CPT | Performed by: SURGERY

## 2022-04-30 PROCEDURE — 94664 DEMO&/EVAL PT USE INHALER: CPT

## 2022-04-30 PROCEDURE — 93246 EXT ECG>7D<15D RECORDING: CPT

## 2022-04-30 RX ORDER — PSEUDOEPHEDRINE HCL 30 MG
100 TABLET ORAL 2 TIMES DAILY
Qty: 60 CAPSULE | Refills: 0 | Status: SHIPPED | OUTPATIENT
Start: 2022-04-30

## 2022-04-30 RX ADMIN — LISINOPRIL 20 MG: 20 TABLET ORAL at 08:01

## 2022-04-30 RX ADMIN — POLYETHYLENE GLYCOL 3350 17 G: 17 POWDER, FOR SOLUTION ORAL at 08:01

## 2022-04-30 RX ADMIN — DOCUSATE SODIUM 100 MG: 100 CAPSULE, LIQUID FILLED ORAL at 08:01

## 2022-04-30 RX ADMIN — BUDESONIDE AND FORMOTEROL FUMARATE DIHYDRATE 2 PUFF: 160; 4.5 AEROSOL RESPIRATORY (INHALATION) at 07:46

## 2022-04-30 RX ADMIN — HYDROCODONE BITARTRATE AND ACETAMINOPHEN 1 TABLET: 5; 325 TABLET ORAL at 06:57

## 2022-04-30 RX ADMIN — LORAZEPAM 1 MG: 1 TABLET ORAL at 08:01

## 2022-04-30 RX ADMIN — NICOTINE 1 PATCH: 7 PATCH, EXTENDED RELEASE TRANSDERMAL at 08:01

## 2022-04-30 RX ADMIN — PANTOPRAZOLE SODIUM 40 MG: 40 TABLET, DELAYED RELEASE ORAL at 06:57

## 2022-04-30 RX ADMIN — LACTULOSE 20 G: 20 SOLUTION ORAL at 08:01

## 2022-04-30 RX ADMIN — TIOTROPIUM BROMIDE INHALATION SPRAY 1 PUFF: 3.12 SPRAY, METERED RESPIRATORY (INHALATION) at 07:46

## 2022-04-30 RX ADMIN — NALOXEGOL OXALATE 25 MG: 25 TABLET, FILM COATED ORAL at 06:57

## 2022-04-30 RX ADMIN — DOCUSATE SODIUM 50MG AND SENNOSIDES 8.6MG 2 TABLET: 8.6; 5 TABLET, FILM COATED ORAL at 08:01

## 2022-05-02 ENCOUNTER — APPOINTMENT (OUTPATIENT)
Dept: CT IMAGING | Facility: HOSPITAL | Age: 62
End: 2022-05-02

## 2022-05-02 ENCOUNTER — HOSPITAL ENCOUNTER (EMERGENCY)
Facility: HOSPITAL | Age: 62
Discharge: HOME | End: 2022-05-02
Attending: EMERGENCY MEDICINE | Admitting: EMERGENCY MEDICINE

## 2022-05-02 ENCOUNTER — APPOINTMENT (OUTPATIENT)
Dept: GENERAL RADIOLOGY | Facility: HOSPITAL | Age: 62
End: 2022-05-02

## 2022-05-02 VITALS
DIASTOLIC BLOOD PRESSURE: 96 MMHG | WEIGHT: 123 LBS | SYSTOLIC BLOOD PRESSURE: 156 MMHG | HEIGHT: 70 IN | BODY MASS INDEX: 17.61 KG/M2 | RESPIRATION RATE: 16 BRPM | TEMPERATURE: 99.2 F | OXYGEN SATURATION: 99 % | HEART RATE: 65 BPM

## 2022-05-02 DIAGNOSIS — R06.02 SHORTNESS OF BREATH: ICD-10-CM

## 2022-05-02 DIAGNOSIS — R10.9 POSTOPERATIVE ABDOMINAL PAIN: Primary | ICD-10-CM

## 2022-05-02 DIAGNOSIS — J44.9 CHRONIC OBSTRUCTIVE PULMONARY DISEASE, UNSPECIFIED COPD TYPE: ICD-10-CM

## 2022-05-02 DIAGNOSIS — G89.18 POSTOPERATIVE ABDOMINAL PAIN: Primary | ICD-10-CM

## 2022-05-02 LAB
ALBUMIN SERPL-MCNC: 4.1 G/DL (ref 3.5–5.2)
ALBUMIN/GLOB SERPL: 1.6 G/DL
ALP SERPL-CCNC: 61 U/L (ref 39–117)
ALT SERPL W P-5'-P-CCNC: 15 U/L (ref 1–41)
ANION GAP SERPL CALCULATED.3IONS-SCNC: 10.6 MMOL/L (ref 5–15)
AST SERPL-CCNC: 14 U/L (ref 1–40)
BASOPHILS # BLD AUTO: 0.09 10*3/MM3 (ref 0–0.2)
BASOPHILS NFR BLD AUTO: 1 % (ref 0–1.5)
BILIRUB SERPL-MCNC: 0.4 MG/DL (ref 0–1.2)
BUN SERPL-MCNC: 25 MG/DL (ref 6–20)
BUN/CREAT SERPL: 20 (ref 7–25)
CALCIUM SPEC-SCNC: 9.1 MG/DL (ref 8.6–10.5)
CHLORIDE SERPL-SCNC: 108 MMOL/L (ref 98–107)
CO2 SERPL-SCNC: 23.4 MMOL/L (ref 22–29)
CREAT SERPL-MCNC: 1.25 MG/DL (ref 0.76–1.27)
D DIMER PPP FEU-MCNC: 0.99 MCGFEU/ML (ref 0–0.49)
D-LACTATE SERPL-SCNC: 0.8 MMOL/L (ref 0.5–2)
DEPRECATED RDW RBC AUTO: 44.3 FL (ref 37–54)
EGFRCR SERPLBLD CKD-EPI 2021: 66.3 ML/MIN/1.73
EOSINOPHIL # BLD AUTO: 0.2 10*3/MM3 (ref 0–0.4)
EOSINOPHIL NFR BLD AUTO: 2.3 % (ref 0.3–6.2)
ERYTHROCYTE [DISTWIDTH] IN BLOOD BY AUTOMATED COUNT: 13.3 % (ref 12.3–15.4)
GLOBULIN UR ELPH-MCNC: 2.5 GM/DL
GLUCOSE SERPL-MCNC: 105 MG/DL (ref 65–99)
HCT VFR BLD AUTO: 39.1 % (ref 37.5–51)
HGB BLD-MCNC: 13.3 G/DL (ref 13–17.7)
IMM GRANULOCYTES # BLD AUTO: 0.02 10*3/MM3 (ref 0–0.05)
IMM GRANULOCYTES NFR BLD AUTO: 0.2 % (ref 0–0.5)
LIPASE SERPL-CCNC: 23 U/L (ref 13–60)
LYMPHOCYTES # BLD AUTO: 1.74 10*3/MM3 (ref 0.7–3.1)
LYMPHOCYTES NFR BLD AUTO: 19.7 % (ref 19.6–45.3)
MCH RBC QN AUTO: 30.9 PG (ref 26.6–33)
MCHC RBC AUTO-ENTMCNC: 34 G/DL (ref 31.5–35.7)
MCV RBC AUTO: 90.7 FL (ref 79–97)
MONOCYTES # BLD AUTO: 0.76 10*3/MM3 (ref 0.1–0.9)
MONOCYTES NFR BLD AUTO: 8.6 % (ref 5–12)
NEUTROPHILS NFR BLD AUTO: 6.02 10*3/MM3 (ref 1.7–7)
NEUTROPHILS NFR BLD AUTO: 68.2 % (ref 42.7–76)
NRBC BLD AUTO-RTO: 0 /100 WBC (ref 0–0.2)
NT-PROBNP SERPL-MCNC: 49 PG/ML (ref 0–900)
PLATELET # BLD AUTO: 251 10*3/MM3 (ref 140–450)
PMV BLD AUTO: 9.8 FL (ref 6–12)
POTASSIUM SERPL-SCNC: 4.1 MMOL/L (ref 3.5–5.2)
PROCALCITONIN SERPL-MCNC: 0.11 NG/ML (ref 0–0.25)
PROT SERPL-MCNC: 6.6 G/DL (ref 6–8.5)
RBC # BLD AUTO: 4.31 10*6/MM3 (ref 4.14–5.8)
SODIUM SERPL-SCNC: 142 MMOL/L (ref 136–145)
TROPONIN T SERPL-MCNC: <0.01 NG/ML (ref 0–0.03)
WBC NRBC COR # BLD: 8.83 10*3/MM3 (ref 3.4–10.8)

## 2022-05-02 PROCEDURE — 96375 TX/PRO/DX INJ NEW DRUG ADDON: CPT

## 2022-05-02 PROCEDURE — 36415 COLL VENOUS BLD VENIPUNCTURE: CPT

## 2022-05-02 PROCEDURE — 83880 ASSAY OF NATRIURETIC PEPTIDE: CPT | Performed by: EMERGENCY MEDICINE

## 2022-05-02 PROCEDURE — 93005 ELECTROCARDIOGRAM TRACING: CPT | Performed by: EMERGENCY MEDICINE

## 2022-05-02 PROCEDURE — 85379 FIBRIN DEGRADATION QUANT: CPT | Performed by: EMERGENCY MEDICINE

## 2022-05-02 PROCEDURE — 85025 COMPLETE CBC W/AUTO DIFF WBC: CPT | Performed by: EMERGENCY MEDICINE

## 2022-05-02 PROCEDURE — 25010000002 DROPERIDOL PER 5 MG: Performed by: EMERGENCY MEDICINE

## 2022-05-02 PROCEDURE — 80053 COMPREHEN METABOLIC PANEL: CPT | Performed by: EMERGENCY MEDICINE

## 2022-05-02 PROCEDURE — 83690 ASSAY OF LIPASE: CPT | Performed by: EMERGENCY MEDICINE

## 2022-05-02 PROCEDURE — 25010000002 HYDROMORPHONE PER 4 MG: Performed by: EMERGENCY MEDICINE

## 2022-05-02 PROCEDURE — 87040 BLOOD CULTURE FOR BACTERIA: CPT | Performed by: EMERGENCY MEDICINE

## 2022-05-02 PROCEDURE — 0 IOPAMIDOL PER 1 ML: Performed by: EMERGENCY MEDICINE

## 2022-05-02 PROCEDURE — 84145 PROCALCITONIN (PCT): CPT | Performed by: EMERGENCY MEDICINE

## 2022-05-02 PROCEDURE — 84484 ASSAY OF TROPONIN QUANT: CPT | Performed by: EMERGENCY MEDICINE

## 2022-05-02 PROCEDURE — 71275 CT ANGIOGRAPHY CHEST: CPT

## 2022-05-02 PROCEDURE — 93010 ELECTROCARDIOGRAM REPORT: CPT | Performed by: INTERNAL MEDICINE

## 2022-05-02 PROCEDURE — 71045 X-RAY EXAM CHEST 1 VIEW: CPT

## 2022-05-02 PROCEDURE — 96374 THER/PROPH/DIAG INJ IV PUSH: CPT

## 2022-05-02 PROCEDURE — 93005 ELECTROCARDIOGRAM TRACING: CPT

## 2022-05-02 PROCEDURE — 99283 EMERGENCY DEPT VISIT LOW MDM: CPT

## 2022-05-02 PROCEDURE — 83605 ASSAY OF LACTIC ACID: CPT | Performed by: EMERGENCY MEDICINE

## 2022-05-02 PROCEDURE — 74177 CT ABD & PELVIS W/CONTRAST: CPT

## 2022-05-02 RX ORDER — HYDROMORPHONE HYDROCHLORIDE 1 MG/ML
0.5 INJECTION, SOLUTION INTRAMUSCULAR; INTRAVENOUS; SUBCUTANEOUS ONCE
Status: COMPLETED | OUTPATIENT
Start: 2022-05-02 | End: 2022-05-02

## 2022-05-02 RX ORDER — DROPERIDOL 2.5 MG/ML
2.5 INJECTION, SOLUTION INTRAMUSCULAR; INTRAVENOUS ONCE
Status: COMPLETED | OUTPATIENT
Start: 2022-05-02 | End: 2022-05-02

## 2022-05-02 RX ORDER — PROMETHAZINE HYDROCHLORIDE 25 MG/1
25 TABLET ORAL EVERY 6 HOURS PRN
Qty: 10 TABLET | Refills: 0 | Status: SHIPPED | OUTPATIENT
Start: 2022-05-02

## 2022-05-02 RX ADMIN — IOPAMIDOL 95 ML: 755 INJECTION, SOLUTION INTRAVENOUS at 21:54

## 2022-05-02 RX ADMIN — SODIUM CHLORIDE 500 ML: 9 INJECTION, SOLUTION INTRAVENOUS at 17:13

## 2022-05-02 RX ADMIN — HYDROMORPHONE HYDROCHLORIDE 0.5 MG: 1 INJECTION, SOLUTION INTRAMUSCULAR; INTRAVENOUS; SUBCUTANEOUS at 16:56

## 2022-05-02 RX ADMIN — DROPERIDOL 2.5 MG: 2.5 INJECTION, SOLUTION INTRAMUSCULAR; INTRAVENOUS at 16:56

## 2022-05-02 NOTE — PAYOR COMM NOTE
"Meron Mallory (61 y.o. Male)     PLEASE SEE ATTACHED DC SUMMARY    REF#[id number]    THANK YOU    SAJAN SPRAGUE LPN CCP            Date of Birth   1960    Social Security Number       Address   [address] [place], [place] [address]    Home Phone   [phone]    MRN   [id number]       Orthodoxy   None    Marital Status   Single                            Admission Date   4/21/22    Admission Type   Emergency    Admitting Provider   Mary Jama MD    Attending Provider       Department, Room/Bed   [address], S521/1       Discharge Date   4/30/2022    Discharge Disposition   Home or Self Care    Discharge Destination                               Attending Provider: (none)   Allergies: Prochlorperazine    Isolation: None   Infection: None   Code Status: Prior   Advance Care Planning Activity    Ht: 177.8 cm (70\")   Wt: 55.8 kg (123 lb)    Admission Cmt: None   Principal Problem: None                Active Insurance as of 4/21/2022     Primary Coverage     Payor Plan Insurance Group Employer/Plan Group    [facility] BY BERNY [id number]     Payor Plan Address Payor Plan Phone Number Payor Plan Fax Number Effective Dates    [address]   1/1/2021 - None Entered    [place] [phone]       Subscriber Name Subscriber Birth Date Member ID       MERON MALLORY 1960 [id number]                 Emergency Contacts      (Rel.) Home Phone Work Phone Mobile Phone    Tali Mallory (Sister) [phone] -- [phone]               Discharge Summary      Salvatore Rae MD at 04/30/22 [address] [place] [address] HOSPITALIST               [facility]OC[facility]    Date of Discharge:  4/30/2022    PCP: Janelle Lara MD    Discharge Diagnosis:   Active Hospital Problems    Diagnosis  POA   • Severe malnutrition (HCC) [E43]  Yes   • Cholelithiasis [K80.20]  Yes   • COPD (chronic obstructive pulmonary disease) (HCC) [J44.9]  " Unknown   • Hypertension [I10]  Unknown   • Dysphagia [R13.10]  Unknown   • Constipation [K59.00]  Unknown   • Acute pancreatitis [K85.90]  Yes      Resolved Hospital Problems   No resolved problems to display.     Procedure(s):  Laparoscopic cholecystectomy with intraoperative cholangiogram, possible open     Consults     Date and Time Order Name Status Description    4/28/2022 10:29 AM Inpatient Gastroenterology Consult      4/23/2022  8:49 AM Inpatient Neurology Consult General Completed     4/23/2022  7:51 AM Inpatient General Surgery Consult Completed     4/21/2022  9:11 PM Inpatient Cardiology Consult Completed     4/21/2022  8:57 PM Inpatient Gastroenterology Consult Completed     4/21/2022 12:42 PM [facility] (on-call MD unless specified) Details          Hospital Course  61 y.o. male with past medical history significant for hypertension, moderate protein calorie malnutrition, seen in the hospital, he was diagnosed to have acute pancreatitis.   Ultrasound showed gallstones.  General surgery was consulted, abdominal pain likely multifactorial as patient also has significant constipation on aggressive bowel regimen.  Patient is status post cholecystectomy, he is doing well postop.  He did have constipation, aggressive bowel regimen was used, he has finally had bowel movement yesterday and today.  He otherwise is appearing clinically stable at the time of discharge.  I have discussed with nursing staff, patient is clinically stable to be discharged.  Patient does have history of syncope, appears to be vasovagal, Zio patch recommended at the time of discharge.  Patient also has history of hypertension, he is on lisinopril, blood pressure under fair control.  Patient does have moderate protein calorie malnutrition, he would be encouraged nutritional intake when possible.  I have seen and examined patient at bedside, total time spent on discharge management is more than 30 minutes.      Condition on Discharge: Improved.      Temp:  [97.6 °F (36.4 °C)-98.5 °F (36.9 °C)] 97.6 °F (36.4 °C)  Heart Rate:  [62-84] 70  Resp:  [16-18] 18  BP: ()/(64-90) 117/90  Body mass index is 17.65 kg/m².    Physical Exam     General, awake and alert.  Head and ENT, normocephalic and atraumatic.  Lungs, symmetric expansion, equal air entry bilaterally.  Heart, regular rate and rhythm.  Abdomen, soft, generalized tenderness noted  Extremities, no clubbing or cyanosis.  Neuro, no focal deficits.  Skin: Warm and no rash.  Psych, normal mood and affect.  Musculoskeletal, joint examination is grossly normal.    Disposition: Home or Self Care       Discharge Medications      New Medications      Instructions Start Date   docusate sodium 100 MG capsule   100 mg, Oral, 2 Times Daily         Continue These Medications      Instructions Start Date   ipratropium-albuterol 0.5-2.5 mg/3 ml nebulizer  Commonly known as: DUO-NEB   3 mL, Nebulization, Every 4 Hours PRN      lisinopril 20 MG tablet  Commonly known as: PRINIVIL,ZESTRIL   20 mg, Oral, Daily      omeprazole 20 MG capsule  Commonly known as: priLOSEC   20 mg, Oral, Daily      Trelegy Ellipta 100-62.5-25 MCG/INH inhaler  Generic drug: Fluticasone-Umeclidin-Vilant   1 puff, Inhalation, Daily - RT              Additional Instructions for the Follow-ups that You Need to Schedule     Discharge Follow-up with PCP   As directed       Currently Documented PCP:    Janelle Lara MD    PCP Phone Number:    [phone]     Follow Up Details: Follow-up with PCP in 7 days.         Discharge Follow-up with Specialty: Follow-up with gastroenterology in 2 to 3 weeks.   As directed      Specialty: Follow-up with gastroenterology in 2 to 3 weeks.            Follow-up Information     Janelle Lara MD .    Specialty: Family Medicine  Why: Follow-up with PCP in 7 days.  Contact information:  [address] [facility] [address]  [phone]                           Salvatore Rae MD  04/30/22  11:25  EDT    Discharge time spent greater than 30 minutes.    Electronically signed by Salvatore Alvarez MD at 04/30/22 1127       Discharge Order (From admission, onward)     Start     Ordered    04/30/22 1125  Discharge patient  Once        Expected Discharge Date: 04/30/22    Discharge Disposition: Home or Self Care    Physician of Record for Attribution - Please select from Treatment Team: SALVATORE ALVAREZ [[id number]]    Review needed by CMO to determine Physician of Record: No       Question Answer Comment   Physician of Record for Attribution - Please select from Treatment Team SALVATORE ALVAREZ    Review needed by CMO to determine Physician of Record No        04/30/22 1124

## 2022-05-02 NOTE — ED NOTES
This tech was attempting to obtain second set of blood cultures and told to hold off per Dr Valera

## 2022-05-02 NOTE — ED PROVIDER NOTES
EMERGENCY DEPARTMENT ENCOUNTER    Room Number:  37/37  Date of encounter:  5/2/2022  PCP: Janelle Lara MD  Historian: Patient     I used full protective equipment while examining this patient.  This includes face mask, gloves and protective eyewear.  I washed my hands before entering the room and immediately upon leaving the room      HPI:  Chief Complaint: Nausea and vomiting, abdominal pain  A complete HPI/ROS/PMH/PSH/SH/FH are unobtainable due to: None    Context: Donny Mallory is a 61 y.o. male who presents to the ED c/o, nausea and vomiting and abdominal pain.  Patient was recently seen here and discharged several days ago with pancreatitis.  He did undergo laparoscopic cholecystectomy.  Over the last several days he has had recurrence of abdominal pain with nausea and vomiting.  Abdominal pain is generalized and is achy.  Is worsened by nothing, improved by nothing.  It is severe at its worst.  He has had worsening vomiting over the last several days.  Does report low-grade fever of around 100.  Does report shortness of breath.  Denies chest pain.  Denies dysuria.  Denies blood in stool.      MEDICAL RECORD REVIEW  I reviewed prior medical records note the patient was recently hospitalized here with acute pancreatitis.  He was discharged on 4/30.  Patient's pancreatitis was felt to be exacerbated by constipation.  He was also found to have moderate to severe malnutrition.  She did undergo laparoscopic cholecystectomy.    PAST MEDICAL HISTORY  Active Ambulatory Problems     Diagnosis Date Noted   • Psychosis ([facility]) 05/16/2017   • Acute pancreatitis 04/21/2022   • COPD (chronic obstructive pulmonary disease) ([facility])    • Hypertension    • Dysphagia    • Constipation    • Cholelithiasis 04/25/2022   • Severe malnutrition ([facility]) 04/28/2022     Resolved Ambulatory Problems     Diagnosis Date Noted   • No Resolved Ambulatory Problems     Past Medical History:   Diagnosis Date   • Bell's palsy          PAST  SURGICAL HISTORY  Past Surgical History:   Procedure Laterality Date   • CHOLECYSTECTOMY     • CHOLECYSTECTOMY WITH INTRAOPERATIVE CHOLANGIOGRAM N/A 04/25/2022    Procedure: Laparoscopic cholecystectomy with intraoperative cholangiogram, possible open;  Surgeon: Khari Schofield MD;  Location: [facility] OR;  Service: General;  Laterality: N/A;   • HERNIA REPAIR           FAMILY HISTORY  Family History   Family history unknown: Yes         SOCIAL HISTORY  Social History     Socioeconomic History   • Marital status: Single   Tobacco Use   • Smoking status: Current Some Day Smoker   • Smokeless tobacco: Never Used   Substance and Sexual Activity   • Alcohol use: No   • Drug use: No         ALLERGIES  Prochlorperazine       REVIEW OF SYSTEMS  Review of Systems   Constitutional: Positive for fatigue and fever.   HENT: Negative.  Negative for sore throat.    Eyes: Negative.    Respiratory: Positive for shortness of breath. Negative for cough.    Cardiovascular: Negative.  Negative for chest pain.   Gastrointestinal: Positive for abdominal pain, nausea and vomiting.   Genitourinary: Negative.  Negative for dysuria.   Musculoskeletal: Negative.  Negative for back pain.   Skin: Negative.  Negative for rash.   Neurological: Negative.  Negative for headaches.   All other systems reviewed and are negative.          PHYSICAL EXAM    I have reviewed the triage vital signs and nursing notes.    ED Triage Vitals   Temp Heart Rate Resp BP SpO2   05/02/22 1612 05/02/22 1612 05/02/22 1612 05/02/22 1612 05/02/22 1616   100.3 °F (37.9 °C) 101 20 132/98 99 %      Temp src Heart Rate Source Patient Position BP Location FiO2 (%)   05/02/22 1612 05/02/22 1612 -- -- --   Oral Monitor          Physical Exam  GENERAL: Alert male in mild distress.  Triage temperature 100.3.  Heart rate 107.  O2 sats 99% on room air  HENT: nares patent  EYES: no scleral icterus  CV: regular rhythm, regular rate-no murmur  RESPIRATORY: Slightly decreased  breath sounds bilaterally with mild expiratory wheezes.  O2 saturations upper 90s on room air  ABDOMEN: soft, mild diffuse tenderness palpation without rebound or guarding  MUSCULOSKELETAL: no deformity-no segment swelling or tenderness to palpation  NEURO: Strength sensation and coordination are grossly intact.  Speech and mentation are unremarkable  SKIN: warm, dry-no unusual rashes are noted      LAB RESULTS  Recent Results (from the past 24 hour(s))   ECG 12 Lead    Collection Time: 05/02/22  4:21 PM   Result Value Ref Range    QT Interval 333 ms   Comprehensive Metabolic Panel    Collection Time: 05/02/22  5:06 PM    Specimen: Blood   Result Value Ref Range    Glucose 105 (H) 65 - 99 mg/dL    BUN 25 (H) 6 - 20 mg/dL    Creatinine 1.25 0.76 - 1.27 mg/dL    Sodium 142 136 - 145 mmol/L    Potassium 4.1 3.5 - 5.2 mmol/L    Chloride 108 (H) 98 - 107 mmol/L    CO2 23.4 22.0 - 29.0 mmol/L    Calcium 9.1 8.6 - 10.5 mg/dL    Total Protein 6.6 6.0 - 8.5 g/dL    Albumin 4.10 3.50 - 5.20 g/dL    ALT (SGPT) 15 1 - 41 U/L    AST (SGOT) 14 1 - 40 U/L    Alkaline Phosphatase 61 39 - 117 U/L    Total Bilirubin 0.4 0.0 - 1.2 mg/dL    Globulin 2.5 gm/dL    A/G Ratio 1.6 g/dL    BUN/Creatinine Ratio 20.0 7.0 - 25.0    Anion Gap 10.6 5.0 - 15.0 mmol/L    eGFR 66.3 >60.0 mL/min/1.73   Lipase    Collection Time: 05/02/22  5:06 PM    Specimen: Blood   Result Value Ref Range    Lipase 23 13 - 60 U/L   D-dimer, Quantitative    Collection Time: 05/02/22  5:06 PM    Specimen: Blood   Result Value Ref Range    D-Dimer, Quantitative 0.99 (H) 0.00 - 0.49 MCGFEU/mL   Troponin    Collection Time: 05/02/22  5:06 PM    Specimen: Blood   Result Value Ref Range    Troponin T <0.010 0.000 - 0.030 ng/mL   BNP    Collection Time: 05/02/22  5:06 PM    Specimen: Blood   Result Value Ref Range    proBNP 49.0 0.0 - 900.0 pg/mL   CBC Auto Differential    Collection Time: 05/02/22  5:06 PM    Specimen: Blood   Result Value Ref Range    WBC 8.83 3.40 -  10.80 10*3/mm3    RBC 4.31 4.14 - 5.80 10*6/mm3    Hemoglobin 13.3 13.0 - 17.7 g/dL    Hematocrit 39.1 37.5 - 51.0 %    MCV 90.7 79.0 - 97.0 fL    MCH 30.9 26.6 - 33.0 pg    MCHC 34.0 31.5 - 35.7 g/dL    RDW 13.3 12.3 - 15.4 %    RDW-SD 44.3 37.0 - 54.0 fl    MPV 9.8 6.0 - 12.0 fL    Platelets 251 140 - 450 10*3/mm3    Neutrophil % 68.2 42.7 - 76.0 %    Lymphocyte % 19.7 19.6 - 45.3 %    Monocyte % 8.6 5.0 - 12.0 %    Eosinophil % 2.3 0.3 - 6.2 %    Basophil % 1.0 0.0 - 1.5 %    Immature Grans % 0.2 0.0 - 0.5 %    Neutrophils, Absolute 6.02 1.70 - 7.00 10*3/mm3    Lymphocytes, Absolute 1.74 0.70 - 3.10 10*3/mm3    Monocytes, Absolute 0.76 0.10 - 0.90 10*3/mm3    Eosinophils, Absolute 0.20 0.00 - 0.40 10*3/mm3    Basophils, Absolute 0.09 0.00 - 0.20 10*3/mm3    Immature Grans, Absolute 0.02 0.00 - 0.05 10*3/mm3    nRBC 0.0 0.0 - 0.2 /100 WBC   Lactic Acid, Plasma    Collection Time: 05/02/22  5:06 PM    Specimen: Blood   Result Value Ref Range    Lactate 0.8 0.5 - 2.0 mmol/L   Procalcitonin    Collection Time: 05/02/22  5:06 PM    Specimen: Blood   Result Value Ref Range    Procalcitonin 0.11 0.00 - 0.25 ng/mL       Ordered the above labs and independently reviewed the results.      RADIOLOGY  XR Chest 1 View    Result Date: 5/2/2022  XR CHEST 1 VW-  05/02/2022  HISTORY: Shortness of breath.  Heart size is within normal limits. Lungs are mildly hyperexpanded but appear clear. Small-caliber electronic device overlies the left hemithorax. Bones and soft tissues are otherwise unremarkable.      1. No acute process. Lungs are slightly hyperinflated.  This report was finalized on 5/2/2022 5:29 PM by Dr. Kaiser Hughes M.D.      CT Angiogram Chest, CT Abdomen Pelvis With Contrast    Result Date: 5/2/2022  CT ANGIOGRAM OF CHEST; CT OF THE ABDOMEN AND PELVIS WITH CONTRAST  HISTORY: Fever and shortness of breath following surgery  COMPARISON: 04/21/2022 and other prior imaging  TECHNIQUE: Axial CT imaging was obtained  through the thorax. IV contrast was administered. Three-D reformatted images were obtained. Axial CT imaging was obtained through the abdomen and pelvis. IV contrast was administered.  FINDINGS: CT CHEST: Images are significantly degraded by motion artifact. The central pulmonary thromboembolus is not seen, although more peripheral vessels are poorly assessed. The thoracic aorta is normal in caliber. No obvious dissection is identified. The thyroid gland, trachea, and esophagus appear unremarkable. There are advanced background emphysematous changes. There is biapical scarring. No acute infiltrates are seen. No acute osseous abnormalities are seen. There are stable compression deformities which are noted at L1, T5, and T4.  CT OF THE ABDOMEN AND PELVIS: Again, images are severely degraded by motion artifact. There are hepatic cysts. Adrenal glands and spleen appear normal. Gallbladder is surgically absent. No intra or extrahepatic biliary dilatation is seen. I don't see any definite fluid collections within the gallbladder fossa. Previously identified pneumoperitoneum has significantly decreased, although a small amount may still be present. The pancreas is grossly unremarkable on these very limited images. The kidneys appear to enhance symmetrically. There is no hydronephrosis. There are multiple bilateral renal cysts, some of which appear complex. Full assessment of these lesions on a nonemergent basis with renal protocol CT or MRI is recommended. There is extensive atherosclerotic involvement of the abdominal aorta. There is no definite evidence of bowel obstruction. Urinary bladder is mildly distended. Prostate gland is enlarged. There is diverticulosis. The appendix is normal. No acute osseous abnormalities are seen. Again, there is stable compression deformity at L1.       1. This examination is severely compromised by motion artifact. 2. No obvious pulmonary thromboembolus seen. 3. Changes of cholecystectomy.  No definite fluid collection within the gallbladder fossa is not seen, although a small collection would be impossible to exclude on the basis of this study. Pancreas is grossly unremarkable on these images.  Radiation dose reduction techniques were utilized, including automated exposure control and exposure modulation based on body size.  This report was finalized on 5/2/2022 10:44 PM by Dr. Kathya Trent M.D.        I ordered the above noted radiological studies. Reviewed by me and discussed with radiologist.  See dictation for official radiology interpretation.      PROCEDURES  Procedures      MEDICATIONS GIVEN IN ER    Medications   sodium chloride 0.9 % bolus 500 mL (0 mL Intravenous Stopped 5/2/22 2255)   droperidol (INAPSINE) injection 2.5 mg (2.5 mg Intravenous Given 5/2/22 1656)   HYDROmorphone (DILAUDID) injection 0.5 mg (0.5 mg Intravenous Given 5/2/22 1656)   iopamidol (ISOVUE-370) 76 % injection 100 mL (95 mL Intravenous Given 5/2/22 3354)         PROGRESS, DATA ANALYSIS, CONSULTS, AND MEDICAL DECISION MAKING    All labs have been independently reviewed by me.  All radiology studies have been reviewed by me and discussed with radiologist dictating the report.   EKG's independently viewed and interpreted by me.  Discussion below represents my analysis of pertinent findings related to patient's condition, differential diagnosis, treatment plan and final disposition.      ED Course as of 05/02/22 2313   Mon May 02, 2022   1635 EKG          EKG time: 1621  Rhythm/Rate: Sinus 95  P waves and MD: Normal P waves, normal MD interval  QRS, axis: Normal axis, normal QRS  ST and T waves: Unremarkable ST and T wave    Interpreted Contemporaneously by me, independently viewed  Heart rate is faster when compared to 4/21/2022   [DB]   1636 UIA-62-ywbu-old male with recent admission for pancreatitis and cholelithiasis presents with nausea vomiting abdominal pain.  He does have low-grade fever.  On exam he has mild  diffuse tenderness palpation.  He is short of breath but has benign O2 saturations.    We will go ahead and give some IV fluids as well as IV Inapsine and Dilaudid to help with pain and nausea.    Differential diagnosis would include but is not limited to the following:    Postoperative infection  Pancreatitis  Gastroenteritis  Gastritis  Pneumonia  Postoperative ileus [DB]   1736 Chest x-ray reviewed with reading radiologist shows no active disease within the chest. [DB]   1736 CBC is unremarkable with normal white blood count and red blood count. [DB]   1736 BNP falls within normal limits and would go against congestive heart failure. [DB]   1736 There is a very mildly elevated D-dimer of 0.99.  I suspect this is elevated related to likely recent surgery.  I have a low index of suspicion for pulmonary embolism.  Patient is not having pleuritic chest pain and is having benign O2 saturations.  His predominant complaint is nausea and vomiting with abdominal pain. [DB]   1753 Lactic acid and procalcitonin fall within normal limits which would go against significant infection.  Lipase is normal which would go against acute pancreatitis which patient just had.  At this point we are still waiting on chemistries.  We will likely want a get CT scan of the abdomen. [DB]   1800 Chemistries are reviewed and are fairly unremarkable.  We will go ahead and get CT scan of the abdomen and CT of the chest for further assessment. [DB]   2029 I did review patient's prior visits as he has had numerous vague complaints while here in the ED including trouble swallowing, shortness of breath, abdominal pain, headache, etc.  Patient has had multiple ED visits with frequent vague complaints.  While he certainly could have some renal pathology there may be a degree of somatization. [DB]   2041 CT scan has been delayed as patient's IV is infiltrated and we have had difficulty obtaining a new IV access. [DB]   2249 CT scan of the chest with IV  contrast reviewed with reading radiologist shows no obvious pulmonary thromboembolism.  No obvious cardiac or pulmonary pathology noted on the CT of the chest.    CT scan of the abdomen does show postoperative gallbladder changes but no obvious infection or obstruction.  CT exam is fairly unremarkable.    At this point labs are benign with normal white count, procalcitonin and lactic acid.  Chemistries are benign.  Vitals are also reassuring with exception of low-grade temp on arrival of 100.3.  Will recheck temperature and likely discharge home.  We will give as needed antiemetics and have patient follow-up with general surgery if symptoms persist. [DB]   2250 On repeat exam patient has benign abdomen and is really had no significant vomiting while here in the ED. [DB]      ED Course User Index  [DB] Sean Valera MD       AS OF 23:13 EDT VITALS:    BP - 156/96  HR - 65  TEMP - 99.2 °F (37.3 °C) (Oral)  O2 SATS - 99%      DIAGNOSIS  Final diagnoses:   Postoperative abdominal pain   Shortness of breath   Chronic obstructive pulmonary disease, unspecified COPD type (HCC)         DISPOSITION  DISCHARGE    Patient discharged in stable condition.    Reviewed implications of results, diagnosis, meds, responsibility to follow up, warning signs and symptoms of possible worsening, potential complications and reasons to return to ER, including increased pain, fever greater than 102 or as needed.    Patient/Family voiced understanding of above instructions.    Discussed plan for discharge, as there is no emergent indication for admission. Patient referred to primary care provider for BP management due to today's BP. Pt/family is agreeable and understands need for follow up and repeat testing.  Pt is aware that discharge does not mean that nothing is wrong but it indicates no emergency is present that requires admission and they must continue care with follow-up as given below or physician of their choice.      FOLLOW-UP  Khari Schofield MD  [address]  [facility] [address]  [place] [id number] [phone]    In 2 days  If Not Better         Medication List      New Prescriptions    promethazine 25 MG tablet  Commonly known as: PHENERGAN  Take 1 tablet by mouth Every 6 (Six) Hours As Needed for Nausea or Vomiting.           Where to Get Your Medications      These medications were sent to [facility] - [place], [place] - [address] AT [address] - [phone] [facility] [phone]   [address], [facility] [address]    Phone: [phone]   · promethazine 25 MG tablet                Sean Valera MD  05/02/22 7486

## 2022-05-03 LAB — QT INTERVAL: 333 MS

## 2022-05-03 NOTE — DISCHARGE INSTRUCTIONS
OrCAT scans of the chest and abdomen were unremarkable without evidence of blood clot or infection.

## 2022-05-04 ENCOUNTER — READMISSION MANAGEMENT (OUTPATIENT)
Dept: CALL CENTER | Facility: HOSPITAL | Age: 62
End: 2022-05-04

## 2022-05-04 NOTE — OUTREACH NOTE
General Surgery Week 1 Survey    Flowsheet Row Responses   [facility] patient discharged from? [place]   Does the patient have one of the following disease processes/diagnoses(primary or secondary)? General Surgery   Week 1 attempt successful? Yes   Call start time 1202   Call end time 1205   Discharge diagnosis Laparoscopic cholecystectomy with intraoperative cholangiogram, possible openCholelithiasis   Meds reviewed with patient/caregiver? Yes   Is the patient having any side effects they believe may be caused by any medication additions or changes? No   Does the patient have all medications related to this admission filled (includes all antibiotics, pain medications, etc.) Yes   Is the patient taking all medications as directed (includes completed medication regime)? Yes   Does the patient have a follow up appointment scheduled with their surgeon? Yes   Has the patient kept scheduled appointments due by today? N/A   Comments Has a followup on Monday with surgeon 5/9/2022   Psychosocial issues? No   Did the patient receive a copy of their discharge instructions? Yes   Nursing interventions Reviewed instructions with patient   What is the patient's perception of their health status since discharge? Improving   Nursing interventions Nurse provided patient education   Is the patient /caregiver able to teach back basic post-op care? Keep incision areas clean,dry and protected   Is the patient/caregiver able to teach back signs and symptoms of incisional infection? Fever, Increased redness, swelling or pain at the incisonal site   Is the patient/caregiver able to teach back steps to recovery at home? Set small, achievable goals for return to baseline health, Make a list of questions for surgeon's appointment   If the patient is a current smoker, are they able to teach back resources for cessation? Smoking cessation medications   Is the patient/caregiver able to teach back the hierarchy of who to call/visit for  symptoms/problems? PCP, Specialist, Home health nurse, Urgent Care, ED, 911 Yes   Week 1 call completed? Yes   Wrap up additional comments Patient reports he is doing well. No needs or questions.           REBECCA DUFF - Registered Nurse

## 2022-05-07 LAB
BACTERIA SPEC AEROBE CULT: NORMAL
BACTERIA SPEC AEROBE CULT: NORMAL

## 2022-05-12 ENCOUNTER — READMISSION MANAGEMENT (OUTPATIENT)
Dept: CALL CENTER | Facility: HOSPITAL | Age: 62
End: 2022-05-12

## 2022-05-12 NOTE — OUTREACH NOTE
General Surgery Week 2 Survey    Flowsheet Row Responses   [facility] patient discharged from? [place]   Does the patient have one of the following disease processes/diagnoses(primary or secondary)? General Surgery   Week 2 attempt successful? No   Unsuccessful attempts Attempt 1          CHERISE López Registered Nurse

## 2022-05-16 ENCOUNTER — APPOINTMENT (OUTPATIENT)
Dept: GENERAL RADIOLOGY | Facility: HOSPITAL | Age: 62
End: 2022-05-16

## 2022-05-16 ENCOUNTER — HOSPITAL ENCOUNTER (EMERGENCY)
Facility: HOSPITAL | Age: 62
Discharge: HOME | End: 2022-05-16
Attending: EMERGENCY MEDICINE | Admitting: EMERGENCY MEDICINE

## 2022-05-16 ENCOUNTER — APPOINTMENT (OUTPATIENT)
Dept: CT IMAGING | Facility: HOSPITAL | Age: 62
End: 2022-05-16

## 2022-05-16 VITALS
OXYGEN SATURATION: 99 % | HEART RATE: 52 BPM | SYSTOLIC BLOOD PRESSURE: 127 MMHG | DIASTOLIC BLOOD PRESSURE: 69 MMHG | RESPIRATION RATE: 17 BRPM | TEMPERATURE: 99.7 F

## 2022-05-16 DIAGNOSIS — E86.0 MILD DEHYDRATION: ICD-10-CM

## 2022-05-16 DIAGNOSIS — R52 BODY ACHES: ICD-10-CM

## 2022-05-16 DIAGNOSIS — R11.2 NAUSEA AND VOMITING, UNSPECIFIED VOMITING TYPE: ICD-10-CM

## 2022-05-16 DIAGNOSIS — R05.9 COUGH: ICD-10-CM

## 2022-05-16 DIAGNOSIS — R09.81 NASAL CONGESTION: ICD-10-CM

## 2022-05-16 DIAGNOSIS — B34.9 VIRAL SYNDROME: Primary | ICD-10-CM

## 2022-05-16 LAB
ALBUMIN SERPL-MCNC: 4.3 G/DL (ref 3.5–5.2)
ALBUMIN/GLOB SERPL: 2.3 G/DL
ALP SERPL-CCNC: 56 U/L (ref 39–117)
ALT SERPL W P-5'-P-CCNC: 11 U/L (ref 1–41)
ANION GAP SERPL CALCULATED.3IONS-SCNC: 14.5 MMOL/L (ref 5–15)
AST SERPL-CCNC: 18 U/L (ref 1–40)
BACTERIA UR QL AUTO: ABNORMAL /HPF
BASOPHILS # BLD AUTO: 0.08 10*3/MM3 (ref 0–0.2)
BASOPHILS NFR BLD AUTO: 1 % (ref 0–1.5)
BILIRUB SERPL-MCNC: 0.6 MG/DL (ref 0–1.2)
BILIRUB UR QL STRIP: NEGATIVE
BUN SERPL-MCNC: 19 MG/DL (ref 6–20)
BUN/CREAT SERPL: 17.4 (ref 7–25)
CALCIUM SPEC-SCNC: 9 MG/DL (ref 8.6–10.5)
CHLORIDE SERPL-SCNC: 109 MMOL/L (ref 98–107)
CLARITY UR: CLEAR
CO2 SERPL-SCNC: 21.5 MMOL/L (ref 22–29)
COLOR UR: YELLOW
CREAT SERPL-MCNC: 1.09 MG/DL (ref 0.76–1.27)
DEPRECATED RDW RBC AUTO: 42.1 FL (ref 37–54)
EGFRCR SERPLBLD CKD-EPI 2021: 78.2 ML/MIN/1.73
EOSINOPHIL # BLD AUTO: 0.15 10*3/MM3 (ref 0–0.4)
EOSINOPHIL NFR BLD AUTO: 1.8 % (ref 0.3–6.2)
ERYTHROCYTE [DISTWIDTH] IN BLOOD BY AUTOMATED COUNT: 12.8 % (ref 12.3–15.4)
FLUAV SUBTYP SPEC NAA+PROBE: NOT DETECTED
FLUBV RNA ISLT QL NAA+PROBE: NOT DETECTED
GLOBULIN UR ELPH-MCNC: 1.9 GM/DL
GLUCOSE SERPL-MCNC: 93 MG/DL (ref 65–99)
GLUCOSE UR STRIP-MCNC: NEGATIVE MG/DL
HCT VFR BLD AUTO: 38.7 % (ref 37.5–51)
HGB BLD-MCNC: 13.5 G/DL (ref 13–17.7)
HGB UR QL STRIP.AUTO: ABNORMAL
HYALINE CASTS UR QL AUTO: ABNORMAL /LPF
IMM GRANULOCYTES # BLD AUTO: 0.01 10*3/MM3 (ref 0–0.05)
IMM GRANULOCYTES NFR BLD AUTO: 0.1 % (ref 0–0.5)
KETONES UR QL STRIP: ABNORMAL
LEUKOCYTE ESTERASE UR QL STRIP.AUTO: NEGATIVE
LIPASE SERPL-CCNC: 25 U/L (ref 13–60)
LYMPHOCYTES # BLD AUTO: 1.26 10*3/MM3 (ref 0.7–3.1)
LYMPHOCYTES NFR BLD AUTO: 15.4 % (ref 19.6–45.3)
MAGNESIUM SERPL-MCNC: 1.9 MG/DL (ref 1.6–2.6)
MCH RBC QN AUTO: 31.8 PG (ref 26.6–33)
MCHC RBC AUTO-ENTMCNC: 34.9 G/DL (ref 31.5–35.7)
MCV RBC AUTO: 91.1 FL (ref 79–97)
MONOCYTES # BLD AUTO: 0.41 10*3/MM3 (ref 0.1–0.9)
MONOCYTES NFR BLD AUTO: 5 % (ref 5–12)
NEUTROPHILS NFR BLD AUTO: 6.28 10*3/MM3 (ref 1.7–7)
NEUTROPHILS NFR BLD AUTO: 76.7 % (ref 42.7–76)
NITRITE UR QL STRIP: NEGATIVE
NRBC BLD AUTO-RTO: 0 /100 WBC (ref 0–0.2)
NT-PROBNP SERPL-MCNC: 61.8 PG/ML (ref 0–900)
PH UR STRIP.AUTO: 6 [PH] (ref 5–8)
PLATELET # BLD AUTO: 250 10*3/MM3 (ref 140–450)
PMV BLD AUTO: 9.8 FL (ref 6–12)
POTASSIUM SERPL-SCNC: 3.9 MMOL/L (ref 3.5–5.2)
PROCALCITONIN SERPL-MCNC: 0.04 NG/ML (ref 0–0.25)
PROT SERPL-MCNC: 6.2 G/DL (ref 6–8.5)
PROT UR QL STRIP: ABNORMAL
QT INTERVAL: 382 MS
RBC # BLD AUTO: 4.25 10*6/MM3 (ref 4.14–5.8)
RBC # UR STRIP: ABNORMAL /HPF
REF LAB TEST METHOD: ABNORMAL
SARS-COV-2 RNA PNL SPEC NAA+PROBE: NOT DETECTED
SODIUM SERPL-SCNC: 145 MMOL/L (ref 136–145)
SP GR UR STRIP: >=1.03 (ref 1–1.03)
SQUAMOUS #/AREA URNS HPF: ABNORMAL /HPF
TROPONIN T SERPL-MCNC: <0.01 NG/ML (ref 0–0.03)
UROBILINOGEN UR QL STRIP: ABNORMAL
WBC # UR STRIP: ABNORMAL /HPF
WBC NRBC COR # BLD: 8.19 10*3/MM3 (ref 3.4–10.8)

## 2022-05-16 PROCEDURE — 99284 EMERGENCY DEPT VISIT MOD MDM: CPT

## 2022-05-16 PROCEDURE — 83880 ASSAY OF NATRIURETIC PEPTIDE: CPT | Performed by: EMERGENCY MEDICINE

## 2022-05-16 PROCEDURE — 25010000002 MORPHINE PER 10 MG: Performed by: EMERGENCY MEDICINE

## 2022-05-16 PROCEDURE — 96374 THER/PROPH/DIAG INJ IV PUSH: CPT

## 2022-05-16 PROCEDURE — 36415 COLL VENOUS BLD VENIPUNCTURE: CPT

## 2022-05-16 PROCEDURE — 80053 COMPREHEN METABOLIC PANEL: CPT | Performed by: EMERGENCY MEDICINE

## 2022-05-16 PROCEDURE — 83735 ASSAY OF MAGNESIUM: CPT | Performed by: EMERGENCY MEDICINE

## 2022-05-16 PROCEDURE — 84484 ASSAY OF TROPONIN QUANT: CPT | Performed by: EMERGENCY MEDICINE

## 2022-05-16 PROCEDURE — 93005 ELECTROCARDIOGRAM TRACING: CPT

## 2022-05-16 PROCEDURE — 85025 COMPLETE CBC W/AUTO DIFF WBC: CPT | Performed by: EMERGENCY MEDICINE

## 2022-05-16 PROCEDURE — 96375 TX/PRO/DX INJ NEW DRUG ADDON: CPT

## 2022-05-16 PROCEDURE — 93005 ELECTROCARDIOGRAM TRACING: CPT | Performed by: EMERGENCY MEDICINE

## 2022-05-16 PROCEDURE — 25010000002 IOPAMIDOL 61 % SOLUTION: Performed by: EMERGENCY MEDICINE

## 2022-05-16 PROCEDURE — 93010 ELECTROCARDIOGRAM REPORT: CPT | Performed by: INTERNAL MEDICINE

## 2022-05-16 PROCEDURE — 71045 X-RAY EXAM CHEST 1 VIEW: CPT

## 2022-05-16 PROCEDURE — 81001 URINALYSIS AUTO W/SCOPE: CPT | Performed by: EMERGENCY MEDICINE

## 2022-05-16 PROCEDURE — 83690 ASSAY OF LIPASE: CPT | Performed by: EMERGENCY MEDICINE

## 2022-05-16 PROCEDURE — 87636 SARSCOV2 & INF A&B AMP PRB: CPT | Performed by: EMERGENCY MEDICINE

## 2022-05-16 PROCEDURE — 25010000002 ONDANSETRON PER 1 MG: Performed by: EMERGENCY MEDICINE

## 2022-05-16 PROCEDURE — 74177 CT ABD & PELVIS W/CONTRAST: CPT

## 2022-05-16 PROCEDURE — 84145 PROCALCITONIN (PCT): CPT | Performed by: EMERGENCY MEDICINE

## 2022-05-16 RX ORDER — ONDANSETRON 4 MG/1
4 TABLET, ORALLY DISINTEGRATING ORAL EVERY 8 HOURS PRN
Qty: 12 TABLET | Refills: 0 | Status: SHIPPED | OUTPATIENT
Start: 2022-05-16 | End: 2022-05-19

## 2022-05-16 RX ORDER — MORPHINE SULFATE 2 MG/ML
4 INJECTION, SOLUTION INTRAMUSCULAR; INTRAVENOUS ONCE
Status: COMPLETED | OUTPATIENT
Start: 2022-05-16 | End: 2022-05-16

## 2022-05-16 RX ORDER — ONDANSETRON 2 MG/ML
4 INJECTION INTRAMUSCULAR; INTRAVENOUS ONCE
Status: COMPLETED | OUTPATIENT
Start: 2022-05-16 | End: 2022-05-16

## 2022-05-16 RX ADMIN — IOPAMIDOL 95 ML: 612 INJECTION, SOLUTION INTRAVENOUS at 12:56

## 2022-05-16 RX ADMIN — ONDANSETRON 4 MG: 2 INJECTION INTRAMUSCULAR; INTRAVENOUS at 12:18

## 2022-05-16 RX ADMIN — SODIUM CHLORIDE 1000 ML: 9 INJECTION, SOLUTION INTRAVENOUS at 12:16

## 2022-05-16 RX ADMIN — MORPHINE SULFATE 4 MG: 2 INJECTION, SOLUTION INTRAMUSCULAR; INTRAVENOUS at 12:18

## 2022-05-18 ENCOUNTER — READMISSION MANAGEMENT (OUTPATIENT)
Dept: CALL CENTER | Facility: HOSPITAL | Age: 62
End: 2022-05-18

## 2022-05-18 NOTE — OUTREACH NOTE
General Surgery Week 3 Survey    Flowsheet Row Responses   [facility] patient discharged from? [place]   Does the patient have one of the following disease processes/diagnoses(primary or secondary)? General Surgery   Week 3 attempt successful? No   Unsuccessful attempts Attempt 1   Discharge diagnosis Laparoscopic cholecystectomy with intraoperative cholangiogram, possible openCholelithiasis          ELIECER T - Registered Nurse

## 2022-06-03 ENCOUNTER — HOSPITAL ENCOUNTER (EMERGENCY)
Facility: HOSPITAL | Age: 62
Discharge: HOME | End: 2022-06-03
Attending: EMERGENCY MEDICINE | Admitting: EMERGENCY MEDICINE

## 2022-06-03 ENCOUNTER — APPOINTMENT (OUTPATIENT)
Dept: GENERAL RADIOLOGY | Facility: HOSPITAL | Age: 62
End: 2022-06-03

## 2022-06-03 ENCOUNTER — APPOINTMENT (OUTPATIENT)
Dept: CT IMAGING | Facility: HOSPITAL | Age: 62
End: 2022-06-03

## 2022-06-03 VITALS
OXYGEN SATURATION: 97 % | BODY MASS INDEX: 17.18 KG/M2 | HEART RATE: 64 BPM | SYSTOLIC BLOOD PRESSURE: 143 MMHG | WEIGHT: 120 LBS | DIASTOLIC BLOOD PRESSURE: 89 MMHG | HEIGHT: 70 IN | RESPIRATION RATE: 16 BRPM | TEMPERATURE: 98.4 F

## 2022-06-03 DIAGNOSIS — R51.9 FACE PAIN: ICD-10-CM

## 2022-06-03 DIAGNOSIS — R11.2 NAUSEA AND VOMITING, UNSPECIFIED VOMITING TYPE: Primary | ICD-10-CM

## 2022-06-03 LAB
ALBUMIN SERPL-MCNC: 4 G/DL (ref 3.5–5.2)
ALBUMIN/GLOB SERPL: 1.7 G/DL
ALP SERPL-CCNC: 58 U/L (ref 39–117)
ALT SERPL W P-5'-P-CCNC: 14 U/L (ref 1–41)
ANION GAP SERPL CALCULATED.3IONS-SCNC: 12.4 MMOL/L (ref 5–15)
AST SERPL-CCNC: 24 U/L (ref 1–40)
BACTERIA UR QL AUTO: ABNORMAL /HPF
BASOPHILS # BLD AUTO: 0.1 10*3/MM3 (ref 0–0.2)
BASOPHILS NFR BLD AUTO: 1.4 % (ref 0–1.5)
BILIRUB SERPL-MCNC: 0.6 MG/DL (ref 0–1.2)
BILIRUB UR QL STRIP: NEGATIVE
BUN SERPL-MCNC: 20 MG/DL (ref 6–20)
BUN/CREAT SERPL: 19.6 (ref 7–25)
CALCIUM SPEC-SCNC: 8.8 MG/DL (ref 8.6–10.5)
CHLORIDE SERPL-SCNC: 110 MMOL/L (ref 98–107)
CLARITY UR: CLEAR
CO2 SERPL-SCNC: 22.6 MMOL/L (ref 22–29)
COLOR UR: YELLOW
CREAT SERPL-MCNC: 1.02 MG/DL (ref 0.76–1.27)
DEPRECATED RDW RBC AUTO: 41.7 FL (ref 37–54)
EGFRCR SERPLBLD CKD-EPI 2021: 84.7 ML/MIN/1.73
EOSINOPHIL # BLD AUTO: 0.17 10*3/MM3 (ref 0–0.4)
EOSINOPHIL NFR BLD AUTO: 2.4 % (ref 0.3–6.2)
ERYTHROCYTE [DISTWIDTH] IN BLOOD BY AUTOMATED COUNT: 12.7 % (ref 12.3–15.4)
GLOBULIN UR ELPH-MCNC: 2.3 GM/DL
GLUCOSE SERPL-MCNC: 139 MG/DL (ref 65–99)
GLUCOSE UR STRIP-MCNC: NEGATIVE MG/DL
HCT VFR BLD AUTO: 39.7 % (ref 37.5–51)
HGB BLD-MCNC: 13.4 G/DL (ref 13–17.7)
HGB UR QL STRIP.AUTO: ABNORMAL
HYALINE CASTS UR QL AUTO: ABNORMAL /LPF
IMM GRANULOCYTES # BLD AUTO: 0.02 10*3/MM3 (ref 0–0.05)
IMM GRANULOCYTES NFR BLD AUTO: 0.3 % (ref 0–0.5)
KETONES UR QL STRIP: ABNORMAL
LEUKOCYTE ESTERASE UR QL STRIP.AUTO: NEGATIVE
LIPASE SERPL-CCNC: 26 U/L (ref 13–60)
LYMPHOCYTES # BLD AUTO: 1.54 10*3/MM3 (ref 0.7–3.1)
LYMPHOCYTES NFR BLD AUTO: 21.9 % (ref 19.6–45.3)
MCH RBC QN AUTO: 30.7 PG (ref 26.6–33)
MCHC RBC AUTO-ENTMCNC: 33.8 G/DL (ref 31.5–35.7)
MCV RBC AUTO: 91.1 FL (ref 79–97)
MONOCYTES # BLD AUTO: 0.4 10*3/MM3 (ref 0.1–0.9)
MONOCYTES NFR BLD AUTO: 5.7 % (ref 5–12)
MUCOUS THREADS URNS QL MICRO: ABNORMAL /HPF
NEUTROPHILS NFR BLD AUTO: 4.81 10*3/MM3 (ref 1.7–7)
NEUTROPHILS NFR BLD AUTO: 68.3 % (ref 42.7–76)
NITRITE UR QL STRIP: NEGATIVE
NRBC BLD AUTO-RTO: 0 /100 WBC (ref 0–0.2)
PH UR STRIP.AUTO: 6 [PH] (ref 5–8)
PLATELET # BLD AUTO: 227 10*3/MM3 (ref 140–450)
PMV BLD AUTO: 9.9 FL (ref 6–12)
POTASSIUM SERPL-SCNC: 3.9 MMOL/L (ref 3.5–5.2)
PROT SERPL-MCNC: 6.3 G/DL (ref 6–8.5)
PROT UR QL STRIP: ABNORMAL
QT INTERVAL: 405 MS
RBC # BLD AUTO: 4.36 10*6/MM3 (ref 4.14–5.8)
RBC # UR STRIP: ABNORMAL /HPF
REF LAB TEST METHOD: ABNORMAL
SARS-COV-2 RNA RESP QL NAA+PROBE: NOT DETECTED
SODIUM SERPL-SCNC: 145 MMOL/L (ref 136–145)
SP GR UR STRIP: 1.02 (ref 1–1.03)
SQUAMOUS #/AREA URNS HPF: ABNORMAL /HPF
TROPONIN T SERPL-MCNC: <0.01 NG/ML (ref 0–0.03)
UROBILINOGEN UR QL STRIP: ABNORMAL
WBC # UR STRIP: ABNORMAL /HPF
WBC NRBC COR # BLD: 7.04 10*3/MM3 (ref 3.4–10.8)

## 2022-06-03 PROCEDURE — 83690 ASSAY OF LIPASE: CPT | Performed by: EMERGENCY MEDICINE

## 2022-06-03 PROCEDURE — 99284 EMERGENCY DEPT VISIT MOD MDM: CPT

## 2022-06-03 PROCEDURE — 96375 TX/PRO/DX INJ NEW DRUG ADDON: CPT

## 2022-06-03 PROCEDURE — 84484 ASSAY OF TROPONIN QUANT: CPT | Performed by: EMERGENCY MEDICINE

## 2022-06-03 PROCEDURE — 81001 URINALYSIS AUTO W/SCOPE: CPT | Performed by: EMERGENCY MEDICINE

## 2022-06-03 PROCEDURE — 93010 ELECTROCARDIOGRAM REPORT: CPT | Performed by: INTERNAL MEDICINE

## 2022-06-03 PROCEDURE — 93005 ELECTROCARDIOGRAM TRACING: CPT | Performed by: EMERGENCY MEDICINE

## 2022-06-03 PROCEDURE — U0003 INFECTIOUS AGENT DETECTION BY NUCLEIC ACID (DNA OR RNA); SEVERE ACUTE RESPIRATORY SYNDROME CORONAVIRUS 2 (SARS-COV-2) (CORONAVIRUS DISEASE [COVID-19]), AMPLIFIED PROBE TECHNIQUE, MAKING USE OF HIGH THROUGHPUT TECHNOLOGIES AS DESCRIBED BY CMS-2020-01-R: HCPCS | Performed by: EMERGENCY MEDICINE

## 2022-06-03 PROCEDURE — 71045 X-RAY EXAM CHEST 1 VIEW: CPT

## 2022-06-03 PROCEDURE — 25010000002 HYDROMORPHONE PER 4 MG: Performed by: EMERGENCY MEDICINE

## 2022-06-03 PROCEDURE — 25010000002 ONDANSETRON PER 1 MG: Performed by: EMERGENCY MEDICINE

## 2022-06-03 PROCEDURE — 96374 THER/PROPH/DIAG INJ IV PUSH: CPT

## 2022-06-03 PROCEDURE — 85025 COMPLETE CBC W/AUTO DIFF WBC: CPT | Performed by: EMERGENCY MEDICINE

## 2022-06-03 PROCEDURE — 80053 COMPREHEN METABOLIC PANEL: CPT | Performed by: EMERGENCY MEDICINE

## 2022-06-03 PROCEDURE — 74176 CT ABD & PELVIS W/O CONTRAST: CPT

## 2022-06-03 PROCEDURE — 70486 CT MAXILLOFACIAL W/O DYE: CPT

## 2022-06-03 RX ORDER — ONDANSETRON 4 MG/1
4 TABLET, ORALLY DISINTEGRATING ORAL 4 TIMES DAILY PRN
Qty: 15 TABLET | Refills: 0 | Status: SHIPPED | OUTPATIENT
Start: 2022-06-03

## 2022-06-03 RX ORDER — HYDROMORPHONE HYDROCHLORIDE 1 MG/ML
0.5 INJECTION, SOLUTION INTRAMUSCULAR; INTRAVENOUS; SUBCUTANEOUS ONCE
Status: COMPLETED | OUTPATIENT
Start: 2022-06-03 | End: 2022-06-03

## 2022-06-03 RX ORDER — AMOXICILLIN AND CLAVULANATE POTASSIUM 875; 125 MG/1; MG/1
1 TABLET, FILM COATED ORAL 2 TIMES DAILY
Qty: 14 TABLET | Refills: 0 | OUTPATIENT
Start: 2022-06-03 | End: 2022-06-11

## 2022-06-03 RX ORDER — ONDANSETRON 2 MG/ML
4 INJECTION INTRAMUSCULAR; INTRAVENOUS ONCE
Status: COMPLETED | OUTPATIENT
Start: 2022-06-03 | End: 2022-06-03

## 2022-06-03 RX ADMIN — ONDANSETRON 4 MG: 2 INJECTION INTRAMUSCULAR; INTRAVENOUS at 10:04

## 2022-06-03 RX ADMIN — HYDROMORPHONE HYDROCHLORIDE 0.5 MG: 1 INJECTION, SOLUTION INTRAMUSCULAR; INTRAVENOUS; SUBCUTANEOUS at 12:44

## 2022-06-03 RX ADMIN — SODIUM CHLORIDE 1000 ML: 9 INJECTION, SOLUTION INTRAVENOUS at 10:04

## 2022-06-03 NOTE — ED TRIAGE NOTES
Pt arrived via ems from home with complaints of abd pain, NV that started about 3 days ago.     Pt was wearing a mask during assessment.  This RN wore appropriate PPE

## 2022-06-03 NOTE — ED PROVIDER NOTES
EMERGENCY DEPARTMENT ENCOUNTER    Room Number:  36/36  PCP: Janelle Lara MD  Historian: Patient  History Limited By: Nothing      HPI  Chief Complaint: Nausea and vomiting  Context: Donny Mallory is a 61 y.o. male who presents to the ED c/o nausea and vomiting.  Patient presents with multiple complaints.  Was recently seen at outside hospital on May 26 with similar symptoms.  Patient states he has been having face pain nasal congestion.  Cough.  States now he has nausea and vomiting.  States he is unable to keep down any food or fluid.  Patient is had no diarrhea.  Has had increasing abdominal pain.  Patient had recent gallbladder removed April 25.  Patient was seen at outside hospital had CT of his chest and abdomen that were negative.  Patient states the symptoms persist.  Patient denies recent smoking.  States abdominal pain is diffuse and started 2 days ago      Location: Generalized  Radiation: None  Character: Aching  Duration: Several days  Severity: Moderate  Progression: Waxes and wanes  Aggravating Factors: Nothing  Alleviating Factors: Nothing        MEDICAL RECORD REVIEW    Patient seen at outside hospital May 26 for similar symptoms          PAST MEDICAL HISTORY  Active Ambulatory Problems     Diagnosis Date Noted   • Psychosis (HCC) 05/16/2017   • Acute pancreatitis 04/21/2022   • COPD (chronic obstructive pulmonary disease) ([facility])    • Hypertension    • Dysphagia    • Constipation    • Cholelithiasis 04/25/2022   • Severe malnutrition (HCC) 04/28/2022     Resolved Ambulatory Problems     Diagnosis Date Noted   • No Resolved Ambulatory Problems     Past Medical History:   Diagnosis Date   • Bell's palsy          PAST SURGICAL HISTORY  Past Surgical History:   Procedure Laterality Date   • CHOLECYSTECTOMY     • CHOLECYSTECTOMY WITH INTRAOPERATIVE CHOLANGIOGRAM N/A 04/25/2022    Procedure: Laparoscopic cholecystectomy with intraoperative cholangiogram, possible open;  Surgeon: Khari Schofield,  MD;  Location: [facility] MAIN OR;  Service: General;  Laterality: N/A;   • HERNIA REPAIR           FAMILY HISTORY  Family History   Family history unknown: Yes         SOCIAL HISTORY  Social History     Socioeconomic History   • Marital status: Single   Tobacco Use   • Smoking status: Current Some Day Smoker   • Smokeless tobacco: Never Used   Substance and Sexual Activity   • Alcohol use: No   • Drug use: No         ALLERGIES  Prochlorperazine        REVIEW OF SYSTEMS  Review of Systems   Constitutional: Negative for activity change, appetite change and fever.   HENT: Positive for congestion and sore throat.    Eyes: Negative.    Respiratory: Negative for cough and shortness of breath.    Cardiovascular: Negative for chest pain and leg swelling.   Gastrointestinal: Positive for abdominal pain, nausea and vomiting. Negative for diarrhea.   Endocrine: Negative.    Genitourinary: Negative for decreased urine volume and dysuria.   Musculoskeletal: Negative for neck pain.   Skin: Negative for rash and wound.   Allergic/Immunologic: Negative.    Neurological: Negative for weakness, numbness and headaches.   Hematological: Negative.    Psychiatric/Behavioral: Negative.    All other systems reviewed and are negative.           PHYSICAL EXAM  ED Triage Vitals   Temp Heart Rate Resp BP SpO2   06/03/22 0935 06/03/22 0935 06/03/22 0936 06/03/22 0935 06/03/22 0935   98.4 °F (36.9 °C) 100 16 155/100 100 %      Temp src Heart Rate Source Patient Position BP Location FiO2 (%)   06/03/22 0935 -- -- -- --   Tympanic           Physical Exam  Vitals and nursing note reviewed.   Constitutional:       General: He is not in acute distress.     Comments: Cachectic   HENT:      Head: Normocephalic and atraumatic.   Eyes:      Pupils: Pupils are equal, round, and reactive to light.   Cardiovascular:      Rate and Rhythm: Normal rate and regular rhythm.      Heart sounds: Normal heart sounds.   Pulmonary:      Effort: Pulmonary effort is  normal. No respiratory distress.      Breath sounds: Normal breath sounds.   Abdominal:      Palpations: Abdomen is soft.      Tenderness: There is generalized abdominal tenderness. There is no guarding or rebound.   Musculoskeletal:         General: Normal range of motion.      Cervical back: Normal range of motion and neck supple.   Skin:     General: Skin is warm and dry.   Neurological:      Mental Status: He is alert and oriented to person, place, and time.      Sensory: Sensation is intact.      Motor: No weakness.   Psychiatric:         Mood and Affect: Mood and affect normal.       Patient was wearing a face mask when I entered the room and they continued to wear a mask throughout their stay in the ED.  I wore PPE, including  gloves, face mask with shield or face mask with goggles whenever I was in the room with patient.       LAB RESULTS  Recent Results (from the past 24 hour(s))   Comprehensive Metabolic Panel    Collection Time: 06/03/22 10:04 AM    Specimen: Blood   Result Value Ref Range    Glucose 139 (H) 65 - 99 mg/dL    BUN 20 6 - 20 mg/dL    Creatinine 1.02 0.76 - 1.27 mg/dL    Sodium 145 136 - 145 mmol/L    Potassium 3.9 3.5 - 5.2 mmol/L    Chloride 110 (H) 98 - 107 mmol/L    CO2 22.6 22.0 - 29.0 mmol/L    Calcium 8.8 8.6 - 10.5 mg/dL    Total Protein 6.3 6.0 - 8.5 g/dL    Albumin 4.00 3.50 - 5.20 g/dL    ALT (SGPT) 14 1 - 41 U/L    AST (SGOT) 24 1 - 40 U/L    Alkaline Phosphatase 58 39 - 117 U/L    Total Bilirubin 0.6 0.0 - 1.2 mg/dL    Globulin 2.3 gm/dL    A/G Ratio 1.7 g/dL    BUN/Creatinine Ratio 19.6 7.0 - 25.0    Anion Gap 12.4 5.0 - 15.0 mmol/L    eGFR 84.7 >60.0 mL/min/1.73   Lipase    Collection Time: 06/03/22 10:04 AM    Specimen: Blood   Result Value Ref Range    Lipase 26 13 - 60 U/L   Troponin    Collection Time: 06/03/22 10:04 AM    Specimen: Blood   Result Value Ref Range    Troponin T <0.010 0.000 - 0.030 ng/mL   CBC Auto Differential    Collection Time: 06/03/22 10:04 AM     Specimen: Blood   Result Value Ref Range    WBC 7.04 3.40 - 10.80 10*3/mm3    RBC 4.36 4.14 - 5.80 10*6/mm3    Hemoglobin 13.4 13.0 - 17.7 g/dL    Hematocrit 39.7 37.5 - 51.0 %    MCV 91.1 79.0 - 97.0 fL    MCH 30.7 26.6 - 33.0 pg    MCHC 33.8 31.5 - 35.7 g/dL    RDW 12.7 12.3 - 15.4 %    RDW-SD 41.7 37.0 - 54.0 fl    MPV 9.9 6.0 - 12.0 fL    Platelets 227 140 - 450 10*3/mm3    Neutrophil % 68.3 42.7 - 76.0 %    Lymphocyte % 21.9 19.6 - 45.3 %    Monocyte % 5.7 5.0 - 12.0 %    Eosinophil % 2.4 0.3 - 6.2 %    Basophil % 1.4 0.0 - 1.5 %    Immature Grans % 0.3 0.0 - 0.5 %    Neutrophils, Absolute 4.81 1.70 - 7.00 10*3/mm3    Lymphocytes, Absolute 1.54 0.70 - 3.10 10*3/mm3    Monocytes, Absolute 0.40 0.10 - 0.90 10*3/mm3    Eosinophils, Absolute 0.17 0.00 - 0.40 10*3/mm3    Basophils, Absolute 0.10 0.00 - 0.20 10*3/mm3    Immature Grans, Absolute 0.02 0.00 - 0.05 10*3/mm3    nRBC 0.0 0.0 - 0.2 /100 WBC   COVID-19, [facility]SE CEPHEID/RICKEY NP SWAB IN TRANSPORT MEDIA 8-12 HR TAT - Swab, Nasopharynx    Collection Time: 06/03/22 10:06 AM    Specimen: Nasopharynx; Swab   Result Value Ref Range    COVID19 Not Detected Not Detected - Ref. Range   ECG 12 Lead    Collection Time: 06/03/22 10:21 AM   Result Value Ref Range    QT Interval 405 ms   Urinalysis With Microscopic If Indicated (No Culture) - Urine, Clean Catch    Collection Time: 06/03/22 11:44 AM    Specimen: Urine, Clean Catch   Result Value Ref Range    Color, UA Yellow Yellow, Straw    Appearance, UA Clear Clear    pH, UA 6.0 5.0 - 8.0    Specific Gravity, UA 1.022 1.005 - 1.030    Glucose, UA Negative Negative    Ketones, UA Trace (A) Negative    Bilirubin, UA Negative Negative    Blood, UA Moderate (2+) (A) Negative    Protein,  mg/dL (2+) (A) Negative    Leuk Esterase, UA Negative Negative    Nitrite, UA Negative Negative    Urobilinogen, UA 1.0 E.U./dL 0.2 - 1.0 E.U./dL   Urinalysis, Microscopic Only - Urine, Clean Catch    Collection Time: 06/03/22  11:44 AM    Specimen: Urine, Clean Catch   Result Value Ref Range    RBC, UA 13-20 (A) None Seen, 0-2 /HPF    WBC, UA None Seen None Seen, 0-2 /HPF    Bacteria, UA Trace (A) None Seen /HPF    Squamous Epithelial Cells, UA 0-2 None Seen, 0-2 /HPF    Hyaline Casts, UA 7-12 None Seen /LPF    Mucus, UA Small/1+ (A) None Seen, Trace /HPF    Methodology Manual Light Microscopy        Ordered the above labs and reviewed the results.        RADIOLOGY  CT Sinus Without Contrast   Preliminary Result   Paranasal sinus disease as described with no evidence of   acute sinusitis.               Radiation dose reduction techniques were utilized, including automated   exposure control and exposure modulation based on body size.              CT Abdomen Pelvis Without Contrast   Preliminary Result   The exam is limited in the absence of contrast.   1. There is trace amount of fluid within the pelvis or stranding   present. Suspected gastritis.   2. Bilateral renal lesions that are unchanged from prior, however, not   appropriately characterize by this limited study.   3. Additional findings as above.       Radiation dose reduction techniques were utilized, including automated   exposure control and exposure modulation based on body size.              XR Chest 1 View   Final Result   1. No acute cardiopulmonary process.   2. Mild biapical pleural thickening. Please see additional dictation for   the CT of the chest from 04/21/2022.       This report was finalized on 6/3/2022 10:38 AM by Dr. Kaiser Hughes M.D.               Ordered the above noted radiological studies. Reviewed by me in PACS.        PROCEDURES  Procedures      EKG:          EKG time: 1021  Rhythm/Rate: Normal sinus rhythm 64  P waves and MO: Normal P waves  QRS, axis: Normal QRS  ST and T waves: Normal ST-T waves    Interpreted Contemporaneously by me, independently viewed  Unchanged compared to prior 5/16/2022        MEDICATIONS GIVEN IN ER  Medications   sodium  chloride 0.9 % bolus 1,000 mL (0 mL Intravenous Stopped 6/3/22 1142)   ondansetron (ZOFRAN) injection 4 mg (4 mg Intravenous Given 6/3/22 1004)   HYDROmorphone (DILAUDID) injection 0.5 mg (0.5 mg Intravenous Given 6/3/22 1244)             PROGRESS AND CONSULTS  ED Course as of 06/03/22 1452   Fri Jun 03, 2022   1325 13:25 EDT  Patient here for multiple complaints.  Patient here for nausea and vomiting.  Does not appear significantly dehydrated CT scan is negative.  Patient has been given fluids and nausea medicine here.  Patient also complaining of face pain.  CT of the sinuses does show sinusitis so we will give antibiotics for this.  Patient instructed to follow-up with primary doctor and return here for worsening symptoms. [SL]      ED Course User Index  [SL] Jermaine Montaño MD           MEDICAL DECISION MAKING      MDM  Number of Diagnoses or Management Options     Amount and/or Complexity of Data Reviewed  Clinical lab tests: reviewed and ordered (White blood cell count 7)  Tests in the radiology section of CPT®: reviewed and ordered (CT abdomen negative, CT sinuses with sinusitis)  Tests in the medicine section of CPT®: reviewed               DIAGNOSIS  Final diagnoses:   Nausea and vomiting, unspecified vomiting type   Face pain           DISPOSITION  DISCHARGE    Patient discharged in stable condition.    Reviewed implications of results, diagnosis, meds, responsibility to follow up, warning signs and symptoms of possible worsening, potential complications and reasons to return to ER, including worsening symptoms.    Patient/Family voiced understanding of above instructions.    Discussed plan for discharge, as there is no emergent indication for admission. Patient referred to primary care provider for BP management due to today's BP. Pt/family is agreeable and understands need for follow up and repeat testing.  Pt is aware that discharge does not mean that nothing is wrong but it indicates no emergency is  present that requires admission and they must continue care with follow-up as given below or physician of their choice.     FOLLOW-UP  Janelle Lara MD  [address] [place] [id number] [phone]    Schedule an appointment as soon as possible for a visit            Medication List      New Prescriptions    amoxicillin-clavulanate 875-125 MG per tablet  Commonly known as: AUGMENTIN  Take 1 tablet by mouth 2 (Two) Times a Day.     ondansetron ODT 4 MG disintegrating tablet  Commonly known as: ZOFRAN-ODT  Place 1 tablet on the tongue 4 (Four) Times a Day As Needed for Nausea or Vomiting.           Where to Get Your Medications      [facility] [place], [place] - [address] - [phone] [facility] [phone]   [address] [place] [id number]    Phone: [phone]   · amoxicillin-clavulanate 875-125 MG per tablet  · ondansetron ODT 4 MG disintegrating tablet             Latest Documented Vital Signs:  As of 14:52 EDT  BP- 143/89 HR- 64 Temp- 98.4 °F (36.9 °C) (Tympanic) O2 sat- 97%                         Jermaine Montaño MD  06/03/22 6400

## 2022-06-11 ENCOUNTER — HOSPITAL ENCOUNTER (EMERGENCY)
Facility: HOSPITAL | Age: 62
Discharge: HOME | End: 2022-06-11
Attending: EMERGENCY MEDICINE | Admitting: EMERGENCY MEDICINE

## 2022-06-11 ENCOUNTER — APPOINTMENT (OUTPATIENT)
Dept: GENERAL RADIOLOGY | Facility: HOSPITAL | Age: 62
End: 2022-06-11

## 2022-06-11 VITALS
DIASTOLIC BLOOD PRESSURE: 103 MMHG | TEMPERATURE: 98.6 F | SYSTOLIC BLOOD PRESSURE: 157 MMHG | OXYGEN SATURATION: 98 % | HEART RATE: 62 BPM | RESPIRATION RATE: 18 BRPM

## 2022-06-11 DIAGNOSIS — R10.84 GENERALIZED ABDOMINAL PAIN: ICD-10-CM

## 2022-06-11 DIAGNOSIS — I10 PRIMARY HYPERTENSION: ICD-10-CM

## 2022-06-11 DIAGNOSIS — E86.0 DEHYDRATION: ICD-10-CM

## 2022-06-11 DIAGNOSIS — K29.00 ACUTE GASTRITIS WITHOUT HEMORRHAGE, UNSPECIFIED GASTRITIS TYPE: Primary | ICD-10-CM

## 2022-06-11 LAB
ALBUMIN SERPL-MCNC: 4.2 G/DL (ref 3.5–5.2)
ALBUMIN/GLOB SERPL: 1.6 G/DL
ALP SERPL-CCNC: 68 U/L (ref 39–117)
ALT SERPL W P-5'-P-CCNC: 13 U/L (ref 1–41)
AMPHET+METHAMPHET UR QL: NEGATIVE
ANION GAP SERPL CALCULATED.3IONS-SCNC: 13.6 MMOL/L (ref 5–15)
AST SERPL-CCNC: 15 U/L (ref 1–40)
BACTERIA UR QL AUTO: ABNORMAL /HPF
BARBITURATES UR QL SCN: NEGATIVE
BASOPHILS # BLD AUTO: 0.11 10*3/MM3 (ref 0–0.2)
BASOPHILS NFR BLD AUTO: 1.6 % (ref 0–1.5)
BENZODIAZ UR QL SCN: NEGATIVE
BILIRUB SERPL-MCNC: 0.5 MG/DL (ref 0–1.2)
BILIRUB UR QL STRIP: NEGATIVE
BUN SERPL-MCNC: 16 MG/DL (ref 6–20)
BUN/CREAT SERPL: 15 (ref 7–25)
CALCIUM SPEC-SCNC: 9.2 MG/DL (ref 8.6–10.5)
CANNABINOIDS SERPL QL: POSITIVE
CHLORIDE SERPL-SCNC: 110 MMOL/L (ref 98–107)
CLARITY UR: CLEAR
CO2 SERPL-SCNC: 18.4 MMOL/L (ref 22–29)
COCAINE UR QL: NEGATIVE
COLOR UR: YELLOW
CREAT SERPL-MCNC: 1.07 MG/DL (ref 0.76–1.27)
DEPRECATED RDW RBC AUTO: 42.7 FL (ref 37–54)
EGFRCR SERPLBLD CKD-EPI 2021: 79.9 ML/MIN/1.73
EOSINOPHIL # BLD AUTO: 0.11 10*3/MM3 (ref 0–0.4)
EOSINOPHIL NFR BLD AUTO: 1.6 % (ref 0.3–6.2)
ERYTHROCYTE [DISTWIDTH] IN BLOOD BY AUTOMATED COUNT: 12.8 % (ref 12.3–15.4)
ETHANOL BLD-MCNC: <10 MG/DL (ref 0–10)
ETHANOL UR QL: <0.01 %
GLOBULIN UR ELPH-MCNC: 2.6 GM/DL
GLUCOSE SERPL-MCNC: 106 MG/DL (ref 65–99)
GLUCOSE UR STRIP-MCNC: NEGATIVE MG/DL
HCT VFR BLD AUTO: 38.6 % (ref 37.5–51)
HGB BLD-MCNC: 13 G/DL (ref 13–17.7)
HGB UR QL STRIP.AUTO: ABNORMAL
HOLD SPECIMEN: NORMAL
HOLD SPECIMEN: NORMAL
HYALINE CASTS UR QL AUTO: ABNORMAL /LPF
IMM GRANULOCYTES # BLD AUTO: 0.02 10*3/MM3 (ref 0–0.05)
IMM GRANULOCYTES NFR BLD AUTO: 0.3 % (ref 0–0.5)
KETONES UR QL STRIP: ABNORMAL
LEUKOCYTE ESTERASE UR QL STRIP.AUTO: NEGATIVE
LIPASE SERPL-CCNC: 31 U/L (ref 13–60)
LYMPHOCYTES # BLD AUTO: 1.58 10*3/MM3 (ref 0.7–3.1)
LYMPHOCYTES NFR BLD AUTO: 23.1 % (ref 19.6–45.3)
MCH RBC QN AUTO: 31.1 PG (ref 26.6–33)
MCHC RBC AUTO-ENTMCNC: 33.7 G/DL (ref 31.5–35.7)
MCV RBC AUTO: 92.3 FL (ref 79–97)
METHADONE UR QL SCN: NEGATIVE
MONOCYTES # BLD AUTO: 0.49 10*3/MM3 (ref 0.1–0.9)
MONOCYTES NFR BLD AUTO: 7.2 % (ref 5–12)
NEUTROPHILS NFR BLD AUTO: 4.52 10*3/MM3 (ref 1.7–7)
NEUTROPHILS NFR BLD AUTO: 66.2 % (ref 42.7–76)
NITRITE UR QL STRIP: NEGATIVE
NRBC BLD AUTO-RTO: 0.1 /100 WBC (ref 0–0.2)
OPIATES UR QL: NEGATIVE
OXYCODONE UR QL SCN: NEGATIVE
PH UR STRIP.AUTO: 5.5 [PH] (ref 5–8)
PLATELET # BLD AUTO: 222 10*3/MM3 (ref 140–450)
PMV BLD AUTO: 10.1 FL (ref 6–12)
POTASSIUM SERPL-SCNC: 4.5 MMOL/L (ref 3.5–5.2)
PROT SERPL-MCNC: 6.8 G/DL (ref 6–8.5)
PROT UR QL STRIP: ABNORMAL
RBC # BLD AUTO: 4.18 10*6/MM3 (ref 4.14–5.8)
RBC # UR STRIP: ABNORMAL /HPF
REF LAB TEST METHOD: ABNORMAL
SODIUM SERPL-SCNC: 142 MMOL/L (ref 136–145)
SP GR UR STRIP: 1.02 (ref 1–1.03)
SQUAMOUS #/AREA URNS HPF: ABNORMAL /HPF
UROBILINOGEN UR QL STRIP: ABNORMAL
WBC # UR STRIP: ABNORMAL /HPF
WBC NRBC COR # BLD: 6.83 10*3/MM3 (ref 3.4–10.8)
WHOLE BLOOD HOLD COAG: NORMAL
WHOLE BLOOD HOLD SPECIMEN: NORMAL

## 2022-06-11 PROCEDURE — 80307 DRUG TEST PRSMV CHEM ANLYZR: CPT | Performed by: EMERGENCY MEDICINE

## 2022-06-11 PROCEDURE — 85025 COMPLETE CBC W/AUTO DIFF WBC: CPT | Performed by: EMERGENCY MEDICINE

## 2022-06-11 PROCEDURE — 81001 URINALYSIS AUTO W/SCOPE: CPT | Performed by: EMERGENCY MEDICINE

## 2022-06-11 PROCEDURE — 71045 X-RAY EXAM CHEST 1 VIEW: CPT

## 2022-06-11 PROCEDURE — 25010000002 DROPERIDOL PER 5 MG: Performed by: EMERGENCY MEDICINE

## 2022-06-11 PROCEDURE — 96374 THER/PROPH/DIAG INJ IV PUSH: CPT

## 2022-06-11 PROCEDURE — 25010000002 ONDANSETRON PER 1 MG: Performed by: EMERGENCY MEDICINE

## 2022-06-11 PROCEDURE — 80053 COMPREHEN METABOLIC PANEL: CPT | Performed by: EMERGENCY MEDICINE

## 2022-06-11 PROCEDURE — 96375 TX/PRO/DX INJ NEW DRUG ADDON: CPT

## 2022-06-11 PROCEDURE — 83690 ASSAY OF LIPASE: CPT | Performed by: EMERGENCY MEDICINE

## 2022-06-11 PROCEDURE — 82077 ASSAY SPEC XCP UR&BREATH IA: CPT | Performed by: EMERGENCY MEDICINE

## 2022-06-11 PROCEDURE — 99284 EMERGENCY DEPT VISIT MOD MDM: CPT

## 2022-06-11 RX ORDER — DROPERIDOL 2.5 MG/ML
2.5 INJECTION, SOLUTION INTRAMUSCULAR; INTRAVENOUS ONCE
Status: COMPLETED | OUTPATIENT
Start: 2022-06-11 | End: 2022-06-11

## 2022-06-11 RX ORDER — LIDOCAINE HYDROCHLORIDE 20 MG/ML
15 SOLUTION OROPHARYNGEAL ONCE
Status: COMPLETED | OUTPATIENT
Start: 2022-06-11 | End: 2022-06-11

## 2022-06-11 RX ORDER — SODIUM CHLORIDE 0.9 % (FLUSH) 0.9 %
10 SYRINGE (ML) INJECTION AS NEEDED
Status: DISCONTINUED | OUTPATIENT
Start: 2022-06-11 | End: 2022-06-11 | Stop reason: HOSPADM

## 2022-06-11 RX ORDER — ONDANSETRON 2 MG/ML
4 INJECTION INTRAMUSCULAR; INTRAVENOUS ONCE
Status: COMPLETED | OUTPATIENT
Start: 2022-06-11 | End: 2022-06-11

## 2022-06-11 RX ORDER — ALUMINA, MAGNESIA, AND SIMETHICONE 2400; 2400; 240 MG/30ML; MG/30ML; MG/30ML
15 SUSPENSION ORAL ONCE
Status: COMPLETED | OUTPATIENT
Start: 2022-06-11 | End: 2022-06-11

## 2022-06-11 RX ORDER — OMEPRAZOLE 20 MG/1
20 CAPSULE, DELAYED RELEASE ORAL DAILY
Qty: 30 CAPSULE | Refills: 0 | Status: SHIPPED | OUTPATIENT
Start: 2022-06-11

## 2022-06-11 RX ORDER — SUCRALFATE 1 G/1
1 TABLET ORAL 4 TIMES DAILY
Qty: 40 TABLET | Refills: 0 | Status: SHIPPED | OUTPATIENT
Start: 2022-06-11

## 2022-06-11 RX ADMIN — LIDOCAINE HYDROCHLORIDE 15 ML: 20 SOLUTION ORAL; TOPICAL at 14:01

## 2022-06-11 RX ADMIN — ALUMINUM HYDROXIDE, MAGNESIUM HYDROXIDE, AND DIMETHICONE 15 ML: 400; 400; 40 SUSPENSION ORAL at 14:01

## 2022-06-11 RX ADMIN — SODIUM CHLORIDE 1000 ML: 9 INJECTION, SOLUTION INTRAVENOUS at 13:03

## 2022-06-11 RX ADMIN — DROPERIDOL 2.5 MG: 2.5 INJECTION, SOLUTION INTRAMUSCULAR; INTRAVENOUS at 13:01

## 2022-06-11 RX ADMIN — ONDANSETRON 4 MG: 2 INJECTION INTRAMUSCULAR; INTRAVENOUS at 14:48

## 2022-06-11 NOTE — ED PROVIDER NOTES
EMERGENCY DEPARTMENT ENCOUNTER  I wore full protective equipment throughout this patient encounter including a N95 mask, eye shield, gown and gloves. Hand hygiene was performed before donning protective equipment and after removal when leaving the room.    Room Number:  35/35  Date of encounter:  6/11/2022  PCP: Janelle Lara MD    HPI:  Context: Donny Mallory is a 61 y.o. male who presents to the ED c/o chief complaint of days of periumbilical pain with associated nausea.  He describes the pain as a sharp pain and is worse with eating and movement.  He is also noted a cough with brown sputum and he has had sinus congestion with brown drainage for the past 4 to 5 days.  He states he went to an urgent care and they started him on Augmentin 4 days ago.  Patient denies fever but has had chills and a headache.  Patient states has been immunized x2 against SARS-CoV-2.  He also complains of dysuria but denies frequency or urgency.  His GI doctor is with [facility].    MEDICAL HISTORY REVIEW  Reviewed in [facility].  Patient had a CT of the abdomen pelvis on May 16, 2022 when he came to the emergency department with abdominal pain.  It showed moderate colonic stool burden but no acute disease.  His SARS-CoV-2 assay on Klaudia 3, 2022 was negative.  Patient had another CT of the abdomen pelvis on May 26, 2022 in the [facility] system that showed no acute disease.    PAST MEDICAL HISTORY  Active Ambulatory Problems     Diagnosis Date Noted   • Psychosis ([facility]) 05/16/2017   • Acute pancreatitis 04/21/2022   • COPD (chronic obstructive pulmonary disease) ([facility])    • Hypertension    • Dysphagia    • Constipation    • Cholelithiasis 04/25/2022   • Severe malnutrition ([facility]) 04/28/2022     Resolved Ambulatory Problems     Diagnosis Date Noted   • No Resolved Ambulatory Problems     Past Medical History:   Diagnosis Date   • Bell's palsy        PAST SURGICAL HISTORY  Past Surgical History:   Procedure Laterality  Date   • CHOLECYSTECTOMY     • CHOLECYSTECTOMY WITH INTRAOPERATIVE CHOLANGIOGRAM N/A 04/25/2022    Procedure: Laparoscopic cholecystectomy with intraoperative cholangiogram, possible open;  Surgeon: Khari Schofield MD;  Location: [facility] MAIN OR;  Service: General;  Laterality: N/A;   • HERNIA REPAIR         FAMILY HISTORY  Family History   Family history unknown: Yes       SOCIAL HISTORY  Social History     Socioeconomic History   • Marital status: Single   Tobacco Use   • Smoking status: Current Some Day Smoker   • Smokeless tobacco: Never Used   Substance and Sexual Activity   • Alcohol use: No   • Drug use: No       ALLERGIES  Prochlorperazine    The patient's allergies have been reviewed    REVIEW OF SYSTEMS  All systems reviewed and negative except for those discussed in HPI.     PHYSICAL EXAM  I have reviewed the triage vital signs and nursing notes.  ED Triage Vitals [06/11/22 1200]   Temp Heart Rate Resp BP SpO2   98.6 °F (37 °C) 92 18 156/98 95 %      Temp src Heart Rate Source Patient Position BP Location FiO2 (%)   -- -- -- -- --       General: Mild distress and shaking.  HENT: NCAT, PERRL, Nares patent.  Eyes: no scleral icterus.  Neck: trachea midline, no ROM limitations.  CV: regular rhythm, regular rate.  Respiratory: normal effort, CTAB.  Abdomen: Active bowel sounds, soft, mild epigastric and periumbilical tenderness.  Musculoskeletal: no deformity.  Neuro: alert, moves all extremities, follows commands.  Skin: warm, dry.    LAB RESULTS  Recent Results (from the past 24 hour(s))   Comprehensive Metabolic Panel    Collection Time: 06/11/22 12:13 PM    Specimen: Blood   Result Value Ref Range    Glucose 106 (H) 65 - 99 mg/dL    BUN 16 6 - 20 mg/dL    Creatinine 1.07 0.76 - 1.27 mg/dL    Sodium 142 136 - 145 mmol/L    Potassium 4.5 3.5 - 5.2 mmol/L    Chloride 110 (H) 98 - 107 mmol/L    CO2 18.4 (L) 22.0 - 29.0 mmol/L    Calcium 9.2 8.6 - 10.5 mg/dL    Total Protein 6.8 6.0 - 8.5 g/dL    Albumin  4.20 3.50 - 5.20 g/dL    ALT (SGPT) 13 1 - 41 U/L    AST (SGOT) 15 1 - 40 U/L    Alkaline Phosphatase 68 39 - 117 U/L    Total Bilirubin 0.5 0.0 - 1.2 mg/dL    Globulin 2.6 gm/dL    A/G Ratio 1.6 g/dL    BUN/Creatinine Ratio 15.0 7.0 - 25.0    Anion Gap 13.6 5.0 - 15.0 mmol/L    eGFR 79.9 >60.0 mL/min/1.73   Lipase    Collection Time: 06/11/22 12:13 PM    Specimen: Blood   Result Value Ref Range    Lipase 31 13 - 60 U/L   Green Top (Gel)    Collection Time: 06/11/22 12:13 PM   Result Value Ref Range    Extra Tube Hold for add-ons.    Gold Top - SST    Collection Time: 06/11/22 12:13 PM   Result Value Ref Range    Extra Tube Hold for add-ons.    Light Blue Top    Collection Time: 06/11/22 12:13 PM   Result Value Ref Range    Extra Tube Hold for add-ons.    Ethanol    Collection Time: 06/11/22 12:13 PM    Specimen: Blood   Result Value Ref Range    Ethanol <10 0 - 10 mg/dL    Ethanol % <0.010 %   Urinalysis With Microscopic If Indicated (No Culture) - Urine, Clean Catch    Collection Time: 06/11/22  1:01 PM    Specimen: Urine, Clean Catch   Result Value Ref Range    Color, UA Yellow Yellow, Straw    Appearance, UA Clear Clear    pH, UA 5.5 5.0 - 8.0    Specific Gravity, UA 1.024 1.005 - 1.030    Glucose, UA Negative Negative    Ketones, UA Trace (A) Negative    Bilirubin, UA Negative Negative    Blood, UA Moderate (2+) (A) Negative    Protein,  mg/dL (2+) (A) Negative    Leuk Esterase, UA Negative Negative    Nitrite, UA Negative Negative    Urobilinogen, UA 0.2 E.U./dL 0.2 - 1.0 E.U./dL   Lavender Top    Collection Time: 06/11/22  1:01 PM   Result Value Ref Range    Extra Tube hold for add-on    CBC Auto Differential    Collection Time: 06/11/22  1:01 PM    Specimen: Blood   Result Value Ref Range    WBC 6.83 3.40 - 10.80 10*3/mm3    RBC 4.18 4.14 - 5.80 10*6/mm3    Hemoglobin 13.0 13.0 - 17.7 g/dL    Hematocrit 38.6 37.5 - 51.0 %    MCV 92.3 79.0 - 97.0 fL    MCH 31.1 26.6 - 33.0 pg    MCHC 33.7 31.5 - 35.7  g/dL    RDW 12.8 12.3 - 15.4 %    RDW-SD 42.7 37.0 - 54.0 fl    MPV 10.1 6.0 - 12.0 fL    Platelets 222 140 - 450 10*3/mm3    Neutrophil % 66.2 42.7 - 76.0 %    Lymphocyte % 23.1 19.6 - 45.3 %    Monocyte % 7.2 5.0 - 12.0 %    Eosinophil % 1.6 0.3 - 6.2 %    Basophil % 1.6 (H) 0.0 - 1.5 %    Immature Grans % 0.3 0.0 - 0.5 %    Neutrophils, Absolute 4.52 1.70 - 7.00 10*3/mm3    Lymphocytes, Absolute 1.58 0.70 - 3.10 10*3/mm3    Monocytes, Absolute 0.49 0.10 - 0.90 10*3/mm3    Eosinophils, Absolute 0.11 0.00 - 0.40 10*3/mm3    Basophils, Absolute 0.11 0.00 - 0.20 10*3/mm3    Immature Grans, Absolute 0.02 0.00 - 0.05 10*3/mm3    nRBC 0.1 0.0 - 0.2 /100 WBC   Urine Drug Screen - Urine, Clean Catch    Collection Time: 06/11/22  1:01 PM    Specimen: Urine, Clean Catch   Result Value Ref Range    Amphet/Methamphet, Screen Negative Negative    Barbiturates Screen, Urine Negative Negative    Benzodiazepine Screen, Urine Negative Negative    Cocaine Screen, Urine Negative Negative    Opiate Screen Negative Negative    THC, Screen, Urine Positive (A) Negative    Methadone Screen, Urine Negative Negative    Oxycodone Screen, Urine Negative Negative   Urinalysis, Microscopic Only - Urine, Clean Catch    Collection Time: 06/11/22  1:01 PM    Specimen: Urine, Clean Catch   Result Value Ref Range    RBC, UA 6-12 (A) None Seen, 0-2 /HPF    WBC, UA 0-2 None Seen, 0-2 /HPF    Bacteria, UA None Seen None Seen /HPF    Squamous Epithelial Cells, UA 0-2 None Seen, 0-2 /HPF    Hyaline Casts, UA 0-2 None Seen /LPF    Methodology Automated Microscopy        I ordered the above labs and reviewed the results.    RADIOLOGY  XR Chest 1 View    Result Date: 6/11/2022  Portable chest radiograph  HISTORY:Cough  TECHNIQUE: Single AP portable radiograph of the chest  COMPARISON:Chest radiograph 06/03/2022      FINDINGS AND IMPRESSION: Biapical pleural-parenchymal scarring is present, best seen on prior CT 05/02/2022 and seen on prior radiographs. No  new pulmonary consolidation, pleural effusion or pneumothorax is seen. Cardiac silhouette is within normal limits for size. There are right upper quadrant surgical clips.  This report was finalized on 6/11/2022 2:18 PM by Dr. Stephen Camacho M.D.        I ordered the above noted radiological studies. I reviewed the images and results. I agree with the radiologist interpretation.    PROCEDURES  Procedures    MEDICATIONS GIVEN IN ER  Medications   droperidol (INAPSINE) injection 2.5 mg (2.5 mg Intravenous Given 6/11/22 1301)   sodium chloride 0.9 % bolus 1,000 mL (0 mL Intravenous Stopped 6/11/22 1455)   aluminum-magnesium hydroxide-simethicone (MAALOX MAX) 400-400-40 MG/5ML suspension 15 mL (15 mL Oral Given 6/11/22 1401)   Lidocaine Viscous HCl (XYLOCAINE) 2 % solution 15 mL (15 mL Mouth/Throat Given 6/11/22 1401)   ondansetron (ZOFRAN) injection 4 mg (4 mg Intravenous Given 6/11/22 1448)       PROGRESS, DATA ANALYSIS, CONSULTS, AND MEDICAL DECISION MAKING  A complete history and physical exam have been performed.  All available laboratory and imaging results have been reviewed by myself prior to disposition.    MDM  After the initial H&P, I discussed pertinent information from history and physical exam with patient/family.  Discussed differential diagnosis.  Discussed plan for ED evaluation/workup/treatment.  All questions answered.  Patient/family is agreeable with plan.  ED Course as of 06/11/22 1747   Sat Jun 11, 2022   1251 Patient presents with nausea, periumbilical pain for the past several days.  Reviewing the old records shows that he has had multiple visits for abdominal pain.  He has had 2 CAT scans of his belly within the past 4 weeks for same.  I have advised patient that I will not be repeating a CT of the abdomen pelvis unless he has significant abnormalities in his blood work.  We will give Inapsine and IV fluids to treat his symptoms. [DE]   1321 CO2(!): 18.4 [DE]   1321 Glucose(!): 106 [DE]   1321  Lipase: 31 [DE]   1354 RBC, UA(!): 6-12 [DE]   1441 I have updated patient and notified him that his work-up today is normal.  I went over his last CT of the abdomen pelvis which suggested gastritis.  Patient does not have CT evidence of renal stones.  I notified patient that he needs to follow-up with his gastroenterologist. [DE]   1442 I discussed the case with chemistries.  The line that runs the ethanol and urine drug screen is down at this time.  We will discharge patient home. [DE]      ED Course User Index  [DE] Charly Hermosillo MD       AS OF 17:47 EDT VITALS:    BP - (!) 157/103  HR - 62  TEMP - 98.6 °F (37 °C)  O2 SATS - 98%    DIAGNOSIS  Final diagnoses:   Acute gastritis without hemorrhage, unspecified gastritis type   Primary hypertension   Dehydration   Generalized abdominal pain         DISPOSITION    DISCHARGE    Patient discharged in stable condition.    Reviewed implications of results, diagnosis, meds, responsibility to follow up, warning signs and symptoms of possible worsening, potential complications and reasons to return to ER.    Patient/Family voiced understanding of above instructions.    Discussed plan for discharge, as there is no emergent indication for admission. Patient referred to primary care provider for BP management due to today's BP. Pt/family is agreeable and understands need for follow up and repeat testing.  Pt is aware that discharge does not mean that nothing is wrong but it indicates no emergency is present that requires admission and they must continue care with follow-up as given below or physician of their choice.     FOLLOW-UP  Janelle Lara MD  [address] [facility] [address] [phone]    Schedule an appointment as soon as possible for a visit            Medication List      New Prescriptions    sucralfate 1 g tablet  Commonly known as: CARAFATE  Take 1 tablet by mouth 4 (Four) Times a Day.        Stop    amoxicillin-clavulanate 875-125 MG per  tablet  Commonly known as: AUGMENTIN           Where to Get Your Medications      These medications were sent to [facility] - [place], [place] - [address] - [phone]  - [phone]   [address], [facility] [address]    Phone: [phone]   · omeprazole 20 MG capsule  · sucralfate 1 g tablet          Charly Hermosillo MD  06/11/22 1455

## 2022-06-11 NOTE — ED TRIAGE NOTES
Pt reports generalized abd. Pain with nausea for weeks. Pt has been seen in ER for this 6/3/22 has appointment with GI MD in 1 month. Pt from home.

## 2022-06-11 NOTE — DISCHARGE INSTRUCTIONS
Take medications as directed and follow-up with your primary doctor.  Your 2 CAT scans over the past month showed no acute disease.  You need to follow-up with your primary doctor or gastroenterologist.

## 2022-06-23 ENCOUNTER — HOSPITAL ENCOUNTER (EMERGENCY)
Facility: HOSPITAL | Age: 62
Discharge: HOME | End: 2022-06-23
Attending: EMERGENCY MEDICINE | Admitting: EMERGENCY MEDICINE

## 2022-06-23 ENCOUNTER — APPOINTMENT (OUTPATIENT)
Dept: CT IMAGING | Facility: HOSPITAL | Age: 62
End: 2022-06-23

## 2022-06-23 VITALS
RESPIRATION RATE: 16 BRPM | DIASTOLIC BLOOD PRESSURE: 86 MMHG | HEART RATE: 56 BPM | SYSTOLIC BLOOD PRESSURE: 137 MMHG | TEMPERATURE: 98.4 F | OXYGEN SATURATION: 99 %

## 2022-06-23 DIAGNOSIS — R10.84 GENERALIZED ABDOMINAL PAIN: Primary | ICD-10-CM

## 2022-06-23 DIAGNOSIS — K59.00 CONSTIPATION, UNSPECIFIED CONSTIPATION TYPE: ICD-10-CM

## 2022-06-23 LAB
ALBUMIN SERPL-MCNC: 4.5 G/DL (ref 3.5–5.2)
ALBUMIN/GLOB SERPL: 2.1 G/DL
ALP SERPL-CCNC: 64 U/L (ref 39–117)
ALT SERPL W P-5'-P-CCNC: 13 U/L (ref 1–41)
ANION GAP SERPL CALCULATED.3IONS-SCNC: 12 MMOL/L (ref 5–15)
AST SERPL-CCNC: 20 U/L (ref 1–40)
BACTERIA UR QL AUTO: ABNORMAL /HPF
BASOPHILS # BLD AUTO: 0.1 10*3/MM3 (ref 0–0.2)
BASOPHILS NFR BLD AUTO: 1.4 % (ref 0–1.5)
BILIRUB SERPL-MCNC: 0.3 MG/DL (ref 0–1.2)
BILIRUB UR QL STRIP: NEGATIVE
BUN SERPL-MCNC: 15 MG/DL (ref 6–20)
BUN/CREAT SERPL: 13 (ref 7–25)
CALCIUM SPEC-SCNC: 9.4 MG/DL (ref 8.6–10.5)
CHLORIDE SERPL-SCNC: 108 MMOL/L (ref 98–107)
CLARITY UR: CLEAR
CO2 SERPL-SCNC: 25 MMOL/L (ref 22–29)
COLOR UR: YELLOW
CREAT SERPL-MCNC: 1.15 MG/DL (ref 0.76–1.27)
DEPRECATED RDW RBC AUTO: 40.5 FL (ref 37–54)
EGFRCR SERPLBLD CKD-EPI 2021: 73.3 ML/MIN/1.73
EOSINOPHIL # BLD AUTO: 0.23 10*3/MM3 (ref 0–0.4)
EOSINOPHIL NFR BLD AUTO: 3.2 % (ref 0.3–6.2)
ERYTHROCYTE [DISTWIDTH] IN BLOOD BY AUTOMATED COUNT: 12.6 % (ref 12.3–15.4)
GLOBULIN UR ELPH-MCNC: 2.1 GM/DL
GLUCOSE SERPL-MCNC: 93 MG/DL (ref 65–99)
GLUCOSE UR STRIP-MCNC: NEGATIVE MG/DL
HCT VFR BLD AUTO: 36.7 % (ref 37.5–51)
HGB BLD-MCNC: 12.9 G/DL (ref 13–17.7)
HGB UR QL STRIP.AUTO: ABNORMAL
HOLD SPECIMEN: NORMAL
HOLD SPECIMEN: NORMAL
HYALINE CASTS UR QL AUTO: ABNORMAL /LPF
IMM GRANULOCYTES # BLD AUTO: 0.02 10*3/MM3 (ref 0–0.05)
IMM GRANULOCYTES NFR BLD AUTO: 0.3 % (ref 0–0.5)
KETONES UR QL STRIP: NEGATIVE
LEUKOCYTE ESTERASE UR QL STRIP.AUTO: NEGATIVE
LIPASE SERPL-CCNC: 33 U/L (ref 13–60)
LYMPHOCYTES # BLD AUTO: 1.98 10*3/MM3 (ref 0.7–3.1)
LYMPHOCYTES NFR BLD AUTO: 27.5 % (ref 19.6–45.3)
MCH RBC QN AUTO: 31.5 PG (ref 26.6–33)
MCHC RBC AUTO-ENTMCNC: 35.1 G/DL (ref 31.5–35.7)
MCV RBC AUTO: 89.7 FL (ref 79–97)
MONOCYTES # BLD AUTO: 0.54 10*3/MM3 (ref 0.1–0.9)
MONOCYTES NFR BLD AUTO: 7.5 % (ref 5–12)
NEUTROPHILS NFR BLD AUTO: 4.32 10*3/MM3 (ref 1.7–7)
NEUTROPHILS NFR BLD AUTO: 60.1 % (ref 42.7–76)
NITRITE UR QL STRIP: NEGATIVE
NRBC BLD AUTO-RTO: 0 /100 WBC (ref 0–0.2)
PH UR STRIP.AUTO: 6 [PH] (ref 5–8)
PLATELET # BLD AUTO: 240 10*3/MM3 (ref 140–450)
PMV BLD AUTO: 9.8 FL (ref 6–12)
POTASSIUM SERPL-SCNC: 3.7 MMOL/L (ref 3.5–5.2)
PROT SERPL-MCNC: 6.6 G/DL (ref 6–8.5)
PROT UR QL STRIP: ABNORMAL
RBC # BLD AUTO: 4.09 10*6/MM3 (ref 4.14–5.8)
RBC # UR STRIP: ABNORMAL /HPF
REF LAB TEST METHOD: ABNORMAL
SODIUM SERPL-SCNC: 145 MMOL/L (ref 136–145)
SP GR UR STRIP: >=1.03 (ref 1–1.03)
SQUAMOUS #/AREA URNS HPF: ABNORMAL /HPF
UROBILINOGEN UR QL STRIP: ABNORMAL
WBC # UR STRIP: ABNORMAL /HPF
WBC NRBC COR # BLD: 7.19 10*3/MM3 (ref 3.4–10.8)
WHOLE BLOOD HOLD COAG: NORMAL
WHOLE BLOOD HOLD SPECIMEN: NORMAL

## 2022-06-23 PROCEDURE — 36415 COLL VENOUS BLD VENIPUNCTURE: CPT

## 2022-06-23 PROCEDURE — 25010000002 MORPHINE PER 10 MG: Performed by: EMERGENCY MEDICINE

## 2022-06-23 PROCEDURE — 25010000002 ONDANSETRON PER 1 MG: Performed by: EMERGENCY MEDICINE

## 2022-06-23 PROCEDURE — 80053 COMPREHEN METABOLIC PANEL: CPT | Performed by: EMERGENCY MEDICINE

## 2022-06-23 PROCEDURE — 85025 COMPLETE CBC W/AUTO DIFF WBC: CPT

## 2022-06-23 PROCEDURE — 81001 URINALYSIS AUTO W/SCOPE: CPT | Performed by: EMERGENCY MEDICINE

## 2022-06-23 PROCEDURE — 25010000002 IOPAMIDOL 61 % SOLUTION: Performed by: EMERGENCY MEDICINE

## 2022-06-23 PROCEDURE — 96374 THER/PROPH/DIAG INJ IV PUSH: CPT

## 2022-06-23 PROCEDURE — 83690 ASSAY OF LIPASE: CPT | Performed by: EMERGENCY MEDICINE

## 2022-06-23 PROCEDURE — 99284 EMERGENCY DEPT VISIT MOD MDM: CPT

## 2022-06-23 PROCEDURE — 96375 TX/PRO/DX INJ NEW DRUG ADDON: CPT

## 2022-06-23 PROCEDURE — 74177 CT ABD & PELVIS W/CONTRAST: CPT

## 2022-06-23 RX ORDER — SODIUM CHLORIDE 0.9 % (FLUSH) 0.9 %
10 SYRINGE (ML) INJECTION AS NEEDED
Status: DISCONTINUED | OUTPATIENT
Start: 2022-06-23 | End: 2022-06-24 | Stop reason: HOSPADM

## 2022-06-23 RX ORDER — MORPHINE SULFATE 2 MG/ML
4 INJECTION, SOLUTION INTRAMUSCULAR; INTRAVENOUS ONCE
Status: COMPLETED | OUTPATIENT
Start: 2022-06-23 | End: 2022-06-23

## 2022-06-23 RX ORDER — ONDANSETRON 2 MG/ML
4 INJECTION INTRAMUSCULAR; INTRAVENOUS ONCE
Status: COMPLETED | OUTPATIENT
Start: 2022-06-23 | End: 2022-06-23

## 2022-06-23 RX ADMIN — SODIUM CHLORIDE 1000 ML: 9 INJECTION, SOLUTION INTRAVENOUS at 19:31

## 2022-06-23 RX ADMIN — ONDANSETRON 4 MG: 2 INJECTION INTRAMUSCULAR; INTRAVENOUS at 19:32

## 2022-06-23 RX ADMIN — IOPAMIDOL 75 ML: 612 INJECTION, SOLUTION INTRAVENOUS at 19:42

## 2022-06-23 RX ADMIN — MORPHINE SULFATE 4 MG: 2 INJECTION, SOLUTION INTRAMUSCULAR; INTRAVENOUS at 19:33

## 2022-06-23 NOTE — ED TRIAGE NOTES
abd pain and constipation.  He has meds for constipation that aren't working.  Tremors at baseline    Patient was placed in face mask during first look triage.  Patient was wearing a face mask throughout encounter.  I wore personal protective equipment throughout the encounter.  Hand hygiene was performed before and after patient encounter.

## 2022-06-23 NOTE — ED PROVIDER NOTES
EMERGENCY DEPARTMENT ENCOUNTER    CHIEF COMPLAINT  Chief Complaint: Abdominal pain  History given by: Patient  History limited by: None  Room Number: 17/17  PMD: Janelle Lara MD      HPI:  Pt is a 61 y.o. male who presents complaining of abdominal discomfort that has been steadily worsening for the past 1 week.  The patient reports that he has associated constipation and has been unable to have a bowel movement throughout this whole time.  He denies any associated nausea/vomiting, fever/chills, chest pain, back pain, shortness of breath.  He does report a previous history of multiple abdominal surgeries.  The patient reports that he has been taking magnesium citrate as well as stool softeners to treat his symptoms without any relief thus far.    Duration: Ongoing for the past 1 week  Onset: Gradual  Location: Generalized abdomen  Radiation: None  Quality: Dull/aching  Intensity/Severity: Moderate  Progression: Worsening  Associated Symptoms: Constipation  Aggravating Factors: None  Alleviating Factors: None  Previous Episodes: Yes, history of previous bouts of constipation    PAST MEDICAL HISTORY  Active Ambulatory Problems     Diagnosis Date Noted   • Psychosis ([facility]) 05/16/2017   • Acute pancreatitis 04/21/2022   • COPD (chronic obstructive pulmonary disease) ([facility])    • Hypertension    • Dysphagia    • Constipation    • Cholelithiasis 04/25/2022   • Severe malnutrition ([facility]) 04/28/2022     Resolved Ambulatory Problems     Diagnosis Date Noted   • No Resolved Ambulatory Problems     Past Medical History:   Diagnosis Date   • Bell's palsy        PAST SURGICAL HISTORY  Past Surgical History:   Procedure Laterality Date   • CHOLECYSTECTOMY     • CHOLECYSTECTOMY WITH INTRAOPERATIVE CHOLANGIOGRAM N/A 04/25/2022    Procedure: Laparoscopic cholecystectomy with intraoperative cholangiogram, possible open;  Surgeon: Khari Schofield MD;  Location: [facility];  Service: General;  Laterality: N/A;   • HERNIA  REPAIR         FAMILY HISTORY  Family History   Family history unknown: Yes       SOCIAL HISTORY  Social History     Socioeconomic History   • Marital status: Single   Tobacco Use   • Smoking status: Current Some Day Smoker   • Smokeless tobacco: Never Used   Substance and Sexual Activity   • Alcohol use: No   • Drug use: No       ALLERGIES  Prochlorperazine    REVIEW OF SYSTEMS  Review of Systems   Constitutional: Negative for activity change, appetite change and fever.   HENT: Negative for congestion and sore throat.    Eyes: Negative.    Respiratory: Negative for cough and shortness of breath.    Cardiovascular: Negative for chest pain and leg swelling.   Gastrointestinal: Positive for abdominal pain and constipation. Negative for diarrhea and vomiting.   Endocrine: Negative.    Genitourinary: Negative for decreased urine volume and dysuria.   Musculoskeletal: Negative for neck pain.   Skin: Negative for rash and wound.   Allergic/Immunologic: Negative.    Neurological: Negative for weakness, numbness and headaches.   Hematological: Negative.    Psychiatric/Behavioral: Negative.    All other systems reviewed and are negative.      PHYSICAL EXAM  ED Triage Vitals [06/23/22 1735]   Temp Heart Rate Resp BP SpO2   98.4 °F (36.9 °C) 80 16 140/80 98 %      Temp src Heart Rate Source Patient Position BP Location FiO2 (%)   Tympanic Monitor -- -- --       Physical Exam  Vitals and nursing note reviewed.   Constitutional:       General: He is not in acute distress.  HENT:      Head: Normocephalic and atraumatic.   Eyes:      Pupils: Pupils are equal, round, and reactive to light.   Cardiovascular:      Rate and Rhythm: Normal rate and regular rhythm.      Heart sounds: Normal heart sounds.   Pulmonary:      Effort: Pulmonary effort is normal. No respiratory distress.      Breath sounds: Normal breath sounds.   Abdominal:      Palpations: Abdomen is soft.      Tenderness: There is generalized abdominal tenderness. There is  no guarding or rebound.   Musculoskeletal:         General: Normal range of motion.      Cervical back: Normal range of motion and neck supple.   Skin:     General: Skin is warm and dry.   Neurological:      Mental Status: He is alert and oriented to person, place, and time.      Sensory: Sensation is intact.   Psychiatric:         Mood and Affect: Mood and affect normal.         LAB RESULTS  Lab Results (last 24 hours)     Procedure Component Value Units Date/Time    CBC & Differential [[id number]]  (Abnormal) Collected: 06/23/22 1837    Specimen: Blood Updated: 06/23/22 1846    Narrative:      The following orders were created for panel order CBC & Differential.  Procedure                               Abnormality         Status                     ---------                               -----------         ------                     CBC Auto Differential[[id number]]        Abnormal            Final result                 Please view results for these tests on the individual orders.    Comprehensive Metabolic Panel [[id number]]  (Abnormal) Collected: 06/23/22 1837    Specimen: Blood Updated: 06/23/22 1907     Glucose 93 mg/dL      BUN 15 mg/dL      Creatinine 1.15 mg/dL      Sodium 145 mmol/L      Potassium 3.7 mmol/L      Chloride 108 mmol/L      CO2 25.0 mmol/L      Calcium 9.4 mg/dL      Total Protein 6.6 g/dL      Albumin 4.50 g/dL      ALT (SGPT) 13 U/L      AST (SGOT) 20 U/L      Alkaline Phosphatase 64 U/L      Total Bilirubin 0.3 mg/dL      Globulin 2.1 gm/dL      A/G Ratio 2.1 g/dL      BUN/Creatinine Ratio 13.0     Anion Gap 12.0 mmol/L      eGFR 73.3 mL/min/1.73      Comment: National Kidney Foundation and American Society of Nephrology (ASN) Task Force recommended calculation based on the Chronic Kidney Disease Epidemiology Collaboration (CKD-EPI) equation refit without adjustment for race.       Narrative:      GFR Normal >60  Chronic Kidney Disease <60  Kidney Failure <15      Lipase [[id number]]   (Normal) Collected: 06/23/22 1837    Specimen: Blood Updated: 06/23/22 1907     Lipase 33 U/L     CBC Auto Differential [[id number]]  (Abnormal) Collected: 06/23/22 1837    Specimen: Blood Updated: 06/23/22 1846     WBC 7.19 10*3/mm3      RBC 4.09 10*6/mm3      Hemoglobin 12.9 g/dL      Hematocrit 36.7 %      MCV 89.7 fL      MCH 31.5 pg      MCHC 35.1 g/dL      RDW 12.6 %      RDW-SD 40.5 fl      MPV 9.8 fL      Platelets 240 10*3/mm3      Neutrophil % 60.1 %      Lymphocyte % 27.5 %      Monocyte % 7.5 %      Eosinophil % 3.2 %      Basophil % 1.4 %      Immature Grans % 0.3 %      Neutrophils, Absolute 4.32 10*3/mm3      Lymphocytes, Absolute 1.98 10*3/mm3      Monocytes, Absolute 0.54 10*3/mm3      Eosinophils, Absolute 0.23 10*3/mm3      Basophils, Absolute 0.10 10*3/mm3      Immature Grans, Absolute 0.02 10*3/mm3      nRBC 0.0 /100 WBC     Urinalysis With Microscopic If Indicated (No Culture) - Urine, Clean Catch [[id number]]  (Abnormal) Collected: 06/23/22 2100    Specimen: Urine, Clean Catch Updated: 06/23/22 2135     Color, UA Yellow     Appearance, UA Clear     pH, UA 6.0     Specific Gravity, UA >=1.030     Glucose, UA Negative     Ketones, UA Negative     Bilirubin, UA Negative     Blood, UA Small (1+)     Protein,  mg/dL (2+)     Leuk Esterase, UA Negative     Nitrite, UA Negative     Urobilinogen, UA 0.2 E.U./dL    Urinalysis, Microscopic Only - Urine, Clean Catch [[id number]]  (Abnormal) Collected: 06/23/22 2100    Specimen: Urine, Clean Catch Updated: 06/23/22 2135     RBC, UA 6-12 /HPF      WBC, UA 0-2 /HPF      Bacteria, UA None Seen /HPF      Squamous Epithelial Cells, UA 0-2 /HPF      Hyaline Casts, UA 0-2 /LPF      Methodology Automated Microscopy          I ordered the above labs and reviewed the results    RADIOLOGY  CT Abdomen Pelvis With Contrast   Final Result   Hepatic and bilateral renal cysts as described. Mildly   enlarged prostate gland. Otherwise unremarkable CT scan of the abdomen    and pelvis. No acute process is identified.       This report was finalized on 6/23/2022 8:06 PM by Dr. Sean Sage M.D.               I ordered the above noted radiological studies. Interpreted by radiologist.  Reviewed by me in PACS.       PROCEDURES  Procedures      PROGRESS AND CONSULTS     The patient was wearing a facemask upon entrance into the room and remained in such throughout their visit.  I was wearing PPE including a facemask, eye protection, as well as gloves at any point entering the room and throughout the visit    2145  On reevaluation, the patient is resting comfortably with no acute complaints.  I informed him that his work-up in the ED is unremarkable including labs as well as a CT scan of the abdomen and pelvis.  I did offer an enema here in the emergency room to which the patient declined and states that he would like to go home and continue the mag citrate as needed.  All questions been answered and the patient is stable for discharge.      MEDICAL DECISION MAKING  Results were reviewed/discussed with the patient and they were also made aware of online access. Pt also made aware that some labs, such as cultures, will not be resulted during ER visit and follow up with PMD is necessary.     MDM  Number of Diagnoses or Management Options     Amount and/or Complexity of Data Reviewed  Clinical lab tests: ordered and reviewed  Tests in the radiology section of CPT®: ordered and reviewed  Tests in the medicine section of CPT®: ordered and reviewed  Review and summarize past medical records: yes (Upon medical records review, the patient was last seen and evaluated on 6/18/2022 secondary to abdominal pain with constipation.  CT scan of the abdomen and pelvis on that visit was unremarkable)  Independent visualization of images, tracings, or specimens: yes (No acute abnormality seen on CT scan of the abdomen and pelvis)           DIAGNOSIS  Final diagnoses:   Generalized abdominal pain    Constipation, unspecified constipation type       DISPOSITION  DISCHARGE    Patient discharged in stable condition.    Reviewed implications of results, diagnosis, meds, responsibility to follow up, warning signs and symptoms of possible worsening, potential complications and reasons to return to ER.    Patient/Family voiced understanding of above instructions.    Discussed plan for discharge, as there is no emergent indication for admission. Patient referred to primary care provider for BP management due to today's BP. Pt/family is agreeable and understands need for follow up and repeat testing.  Pt is aware that discharge does not mean that nothing is wrong but it indicates no emergency is present that requires admission and they must continue care with follow-up as given below or physician of their choice.     FOLLOW-UP  Janelle Lara MD  [address] [facility] [id number] [phone]    Schedule an appointment as soon as possible for a visit       Jason Hopkins MD  [address]  [facility] [address]  [facility] [id number] [phone]    Schedule an appointment as soon as possible for a visit            Medication List      No changes were made to your prescriptions during this visit.           Latest Documented Vital Signs:  As of 01:52 EDT  BP- 137/86 HR- 56 Temp- 98.4 °F (36.9 °C) (Tympanic) O2 sat- 99%         Benedicto Moore MD  06/24/22 0152

## 2023-10-29 ENCOUNTER — APPOINTMENT (OUTPATIENT)
Dept: GENERAL RADIOLOGY | Facility: HOSPITAL | Age: 63
End: 2023-10-29
Payer: COMMERCIAL

## 2023-10-29 ENCOUNTER — HOSPITAL ENCOUNTER (INPATIENT)
Facility: HOSPITAL | Age: 63
LOS: 3 days | Discharge: HOME | End: 2023-11-02
Attending: EMERGENCY MEDICINE | Admitting: STUDENT IN AN ORGANIZED HEALTH CARE EDUCATION/TRAINING PROGRAM
Payer: COMMERCIAL

## 2023-10-29 ENCOUNTER — APPOINTMENT (OUTPATIENT)
Dept: CT IMAGING | Facility: HOSPITAL | Age: 63
End: 2023-10-29
Payer: COMMERCIAL

## 2023-10-29 DIAGNOSIS — E87.0 HYPERNATREMIA: ICD-10-CM

## 2023-10-29 DIAGNOSIS — K56.7 ILEUS: Primary | ICD-10-CM

## 2023-10-29 PROBLEM — K21.9 GERD WITHOUT ESOPHAGITIS: Status: ACTIVE | Noted: 2023-10-29

## 2023-10-29 LAB
ALBUMIN SERPL-MCNC: 4.2 G/DL (ref 3.5–5.2)
ALBUMIN/GLOB SERPL: 1.8 G/DL
ALP SERPL-CCNC: 77 U/L (ref 39–117)
ALT SERPL W P-5'-P-CCNC: 14 U/L (ref 1–41)
ANION GAP SERPL CALCULATED.3IONS-SCNC: 13.7 MMOL/L (ref 5–15)
APTT PPP: 29.8 SECONDS (ref 22.7–35.4)
AST SERPL-CCNC: 24 U/L (ref 1–40)
B PARAPERT DNA SPEC QL NAA+PROBE: NOT DETECTED
B PERT DNA SPEC QL NAA+PROBE: NOT DETECTED
BACTERIA UR QL AUTO: ABNORMAL /HPF
BASOPHILS # BLD AUTO: 0.1 10*3/MM3 (ref 0–0.2)
BASOPHILS NFR BLD AUTO: 1.2 % (ref 0–1.5)
BILIRUB SERPL-MCNC: 0.5 MG/DL (ref 0–1.2)
BILIRUB UR QL STRIP: NEGATIVE
BUN SERPL-MCNC: 15 MG/DL (ref 8–23)
BUN/CREAT SERPL: 16.1 (ref 7–25)
C PNEUM DNA NPH QL NAA+NON-PROBE: NOT DETECTED
CALCIUM SPEC-SCNC: 9.3 MG/DL (ref 8.6–10.5)
CHLORIDE SERPL-SCNC: 116 MMOL/L (ref 98–107)
CLARITY UR: CLEAR
CO2 SERPL-SCNC: 24.3 MMOL/L (ref 22–29)
COLOR UR: YELLOW
CREAT SERPL-MCNC: 0.93 MG/DL (ref 0.76–1.27)
D-LACTATE SERPL-SCNC: 0.9 MMOL/L (ref 0.5–2)
DEPRECATED RDW RBC AUTO: 44.5 FL (ref 37–54)
EGFRCR SERPLBLD CKD-EPI 2021: 93.4 ML/MIN/1.73
EOSINOPHIL # BLD AUTO: 0.33 10*3/MM3 (ref 0–0.4)
EOSINOPHIL NFR BLD AUTO: 3.9 % (ref 0.3–6.2)
ERYTHROCYTE [DISTWIDTH] IN BLOOD BY AUTOMATED COUNT: 13.1 % (ref 12.3–15.4)
FLUAV SUBTYP SPEC NAA+PROBE: NOT DETECTED
FLUBV RNA ISLT QL NAA+PROBE: NOT DETECTED
GLOBULIN UR ELPH-MCNC: 2.3 GM/DL
GLUCOSE SERPL-MCNC: 88 MG/DL (ref 65–99)
GLUCOSE UR STRIP-MCNC: NEGATIVE MG/DL
HADV DNA SPEC NAA+PROBE: NOT DETECTED
HCOV 229E RNA SPEC QL NAA+PROBE: NOT DETECTED
HCOV HKU1 RNA SPEC QL NAA+PROBE: NOT DETECTED
HCOV NL63 RNA SPEC QL NAA+PROBE: NOT DETECTED
HCOV OC43 RNA SPEC QL NAA+PROBE: NOT DETECTED
HCT VFR BLD AUTO: 40.4 % (ref 37.5–51)
HGB BLD-MCNC: 13.3 G/DL (ref 13–17.7)
HGB UR QL STRIP.AUTO: ABNORMAL
HMPV RNA NPH QL NAA+NON-PROBE: NOT DETECTED
HPIV1 RNA ISLT QL NAA+PROBE: NOT DETECTED
HPIV2 RNA SPEC QL NAA+PROBE: NOT DETECTED
HPIV3 RNA NPH QL NAA+PROBE: NOT DETECTED
HPIV4 P GENE NPH QL NAA+PROBE: NOT DETECTED
HYALINE CASTS UR QL AUTO: ABNORMAL /LPF
IMM GRANULOCYTES # BLD AUTO: 0.01 10*3/MM3 (ref 0–0.05)
IMM GRANULOCYTES NFR BLD AUTO: 0.1 % (ref 0–0.5)
INR PPP: 0.96 (ref 0.9–1.1)
KETONES UR QL STRIP: NEGATIVE
LEUKOCYTE ESTERASE UR QL STRIP.AUTO: NEGATIVE
LIPASE SERPL-CCNC: 31 U/L (ref 13–60)
LYMPHOCYTES # BLD AUTO: 2.32 10*3/MM3 (ref 0.7–3.1)
LYMPHOCYTES NFR BLD AUTO: 27.8 % (ref 19.6–45.3)
M PNEUMO IGG SER IA-ACNC: NOT DETECTED
MCH RBC QN AUTO: 30.8 PG (ref 26.6–33)
MCHC RBC AUTO-ENTMCNC: 32.9 G/DL (ref 31.5–35.7)
MCV RBC AUTO: 93.5 FL (ref 79–97)
MONOCYTES # BLD AUTO: 0.55 10*3/MM3 (ref 0.1–0.9)
MONOCYTES NFR BLD AUTO: 6.6 % (ref 5–12)
NEUTROPHILS NFR BLD AUTO: 5.05 10*3/MM3 (ref 1.7–7)
NEUTROPHILS NFR BLD AUTO: 60.4 % (ref 42.7–76)
NITRITE UR QL STRIP: NEGATIVE
NRBC BLD AUTO-RTO: 0 /100 WBC (ref 0–0.2)
PH UR STRIP.AUTO: 6 [PH] (ref 5–8)
PLATELET # BLD AUTO: 246 10*3/MM3 (ref 140–450)
PMV BLD AUTO: 10 FL (ref 6–12)
POTASSIUM SERPL-SCNC: 4.5 MMOL/L (ref 3.5–5.2)
PROCALCITONIN SERPL-MCNC: 0.05 NG/ML (ref 0–0.25)
PROT SERPL-MCNC: 6.5 G/DL (ref 6–8.5)
PROT UR QL STRIP: ABNORMAL
PROTHROMBIN TIME: 12.9 SECONDS (ref 11.7–14.2)
PSA SERPL-MCNC: 3.02 NG/ML (ref 0–4)
RBC # BLD AUTO: 4.32 10*6/MM3 (ref 4.14–5.8)
RBC # UR STRIP: ABNORMAL /HPF
REF LAB TEST METHOD: ABNORMAL
RHINOVIRUS RNA SPEC NAA+PROBE: NOT DETECTED
RSV RNA NPH QL NAA+NON-PROBE: NOT DETECTED
SARS-COV-2 RNA NPH QL NAA+NON-PROBE: NOT DETECTED
SODIUM SERPL-SCNC: 154 MMOL/L (ref 136–145)
SP GR UR STRIP: >=1.03 (ref 1–1.03)
SQUAMOUS #/AREA URNS HPF: ABNORMAL /HPF
TROPONIN T SERPL HS-MCNC: 20 NG/L
UROBILINOGEN UR QL STRIP: ABNORMAL
WBC # UR STRIP: ABNORMAL /HPF
WBC NRBC COR # BLD: 8.36 10*3/MM3 (ref 3.4–10.8)

## 2023-10-29 PROCEDURE — 83690 ASSAY OF LIPASE: CPT | Performed by: EMERGENCY MEDICINE

## 2023-10-29 PROCEDURE — 94799 UNLISTED PULMONARY SVC/PX: CPT

## 2023-10-29 PROCEDURE — G0378 HOSPITAL OBSERVATION PER HR: HCPCS

## 2023-10-29 PROCEDURE — 25510000001 IOPAMIDOL 61 % SOLUTION: Performed by: EMERGENCY MEDICINE

## 2023-10-29 PROCEDURE — 0202U NFCT DS 22 TRGT SARS-COV-2: CPT | Performed by: EMERGENCY MEDICINE

## 2023-10-29 PROCEDURE — 85730 THROMBOPLASTIN TIME PARTIAL: CPT | Performed by: EMERGENCY MEDICINE

## 2023-10-29 PROCEDURE — 25010000002 ONDANSETRON PER 1 MG: Performed by: STUDENT IN AN ORGANIZED HEALTH CARE EDUCATION/TRAINING PROGRAM

## 2023-10-29 PROCEDURE — 25010000002 KETOROLAC TROMETHAMINE PER 15 MG: Performed by: STUDENT IN AN ORGANIZED HEALTH CARE EDUCATION/TRAINING PROGRAM

## 2023-10-29 PROCEDURE — 71045 X-RAY EXAM CHEST 1 VIEW: CPT

## 2023-10-29 PROCEDURE — 74177 CT ABD & PELVIS W/CONTRAST: CPT

## 2023-10-29 PROCEDURE — 83605 ASSAY OF LACTIC ACID: CPT | Performed by: EMERGENCY MEDICINE

## 2023-10-29 PROCEDURE — 93010 ELECTROCARDIOGRAM REPORT: CPT | Performed by: INTERNAL MEDICINE

## 2023-10-29 PROCEDURE — 99285 EMERGENCY DEPT VISIT HI MDM: CPT

## 2023-10-29 PROCEDURE — 84145 PROCALCITONIN (PCT): CPT | Performed by: EMERGENCY MEDICINE

## 2023-10-29 PROCEDURE — 85610 PROTHROMBIN TIME: CPT | Performed by: EMERGENCY MEDICINE

## 2023-10-29 PROCEDURE — 81001 URINALYSIS AUTO W/SCOPE: CPT | Performed by: EMERGENCY MEDICINE

## 2023-10-29 PROCEDURE — 25010000002 HYDROMORPHONE PER 4 MG: Performed by: EMERGENCY MEDICINE

## 2023-10-29 PROCEDURE — 25010000002 ENOXAPARIN PER 10 MG: Performed by: STUDENT IN AN ORGANIZED HEALTH CARE EDUCATION/TRAINING PROGRAM

## 2023-10-29 PROCEDURE — 93005 ELECTROCARDIOGRAM TRACING: CPT | Performed by: EMERGENCY MEDICINE

## 2023-10-29 PROCEDURE — 25810000003 LACTATED RINGERS SOLUTION: Performed by: EMERGENCY MEDICINE

## 2023-10-29 PROCEDURE — 0 DEXTROSE 5 % SOLUTION: Performed by: STUDENT IN AN ORGANIZED HEALTH CARE EDUCATION/TRAINING PROGRAM

## 2023-10-29 PROCEDURE — 84153 ASSAY OF PSA TOTAL: CPT | Performed by: STUDENT IN AN ORGANIZED HEALTH CARE EDUCATION/TRAINING PROGRAM

## 2023-10-29 PROCEDURE — 80053 COMPREHEN METABOLIC PANEL: CPT | Performed by: EMERGENCY MEDICINE

## 2023-10-29 PROCEDURE — 84484 ASSAY OF TROPONIN QUANT: CPT | Performed by: EMERGENCY MEDICINE

## 2023-10-29 PROCEDURE — 94640 AIRWAY INHALATION TREATMENT: CPT

## 2023-10-29 PROCEDURE — 85025 COMPLETE CBC W/AUTO DIFF WBC: CPT | Performed by: EMERGENCY MEDICINE

## 2023-10-29 PROCEDURE — 25010000002 LEVOFLOXACIN PER 250 MG: Performed by: STUDENT IN AN ORGANIZED HEALTH CARE EDUCATION/TRAINING PROGRAM

## 2023-10-29 RX ORDER — UREA 10 %
3 LOTION (ML) TOPICAL NIGHTLY PRN
Status: DISCONTINUED | OUTPATIENT
Start: 2023-10-29 | End: 2023-11-02 | Stop reason: HOSPADM

## 2023-10-29 RX ORDER — HYDROMORPHONE HYDROCHLORIDE 1 MG/ML
0.5 INJECTION, SOLUTION INTRAMUSCULAR; INTRAVENOUS; SUBCUTANEOUS ONCE
Status: COMPLETED | OUTPATIENT
Start: 2023-10-29 | End: 2023-10-29

## 2023-10-29 RX ORDER — PANTOPRAZOLE SODIUM 40 MG/10ML
40 INJECTION, POWDER, LYOPHILIZED, FOR SOLUTION INTRAVENOUS
Status: DISCONTINUED | OUTPATIENT
Start: 2023-10-30 | End: 2023-11-02 | Stop reason: HOSPADM

## 2023-10-29 RX ORDER — ENOXAPARIN SODIUM 100 MG/ML
40 INJECTION SUBCUTANEOUS EVERY 24 HOURS
Status: DISCONTINUED | OUTPATIENT
Start: 2023-10-29 | End: 2023-10-30

## 2023-10-29 RX ORDER — ONDANSETRON 2 MG/ML
4 INJECTION INTRAMUSCULAR; INTRAVENOUS EVERY 6 HOURS PRN
Status: DISCONTINUED | OUTPATIENT
Start: 2023-10-29 | End: 2023-11-02 | Stop reason: HOSPADM

## 2023-10-29 RX ORDER — ONDANSETRON 4 MG/1
4 TABLET, FILM COATED ORAL EVERY 6 HOURS PRN
Status: DISCONTINUED | OUTPATIENT
Start: 2023-10-29 | End: 2023-11-02 | Stop reason: HOSPADM

## 2023-10-29 RX ORDER — IBUPROFEN 200 MG
200 TABLET ORAL EVERY 6 HOURS PRN
COMMUNITY

## 2023-10-29 RX ORDER — DEXTROSE MONOHYDRATE 50 MG/ML
100 INJECTION, SOLUTION INTRAVENOUS CONTINUOUS
Status: DISCONTINUED | OUTPATIENT
Start: 2023-10-29 | End: 2023-10-30

## 2023-10-29 RX ORDER — SODIUM CHLORIDE 0.9 % (FLUSH) 0.9 %
10 SYRINGE (ML) INJECTION AS NEEDED
Status: DISCONTINUED | OUTPATIENT
Start: 2023-10-29 | End: 2023-11-02 | Stop reason: HOSPADM

## 2023-10-29 RX ORDER — LEVOFLOXACIN 5 MG/ML
750 INJECTION, SOLUTION INTRAVENOUS EVERY 24 HOURS
Status: DISCONTINUED | OUTPATIENT
Start: 2023-10-29 | End: 2023-11-02 | Stop reason: HOSPADM

## 2023-10-29 RX ORDER — IPRATROPIUM BROMIDE AND ALBUTEROL SULFATE 2.5; .5 MG/3ML; MG/3ML
3 SOLUTION RESPIRATORY (INHALATION) EVERY 4 HOURS PRN
Status: DISCONTINUED | OUTPATIENT
Start: 2023-10-29 | End: 2023-11-02 | Stop reason: HOSPADM

## 2023-10-29 RX ORDER — ACETAMINOPHEN 500 MG
1000 TABLET ORAL EVERY 4 HOURS PRN
COMMUNITY

## 2023-10-29 RX ORDER — ACETAMINOPHEN 325 MG/1
650 TABLET ORAL EVERY 4 HOURS PRN
Status: DISCONTINUED | OUTPATIENT
Start: 2023-10-29 | End: 2023-11-02 | Stop reason: HOSPADM

## 2023-10-29 RX ORDER — BUDESONIDE AND FORMOTEROL FUMARATE DIHYDRATE 160; 4.5 UG/1; UG/1
2 AEROSOL RESPIRATORY (INHALATION)
Status: DISCONTINUED | OUTPATIENT
Start: 2023-10-29 | End: 2023-11-02 | Stop reason: HOSPADM

## 2023-10-29 RX ORDER — KETOROLAC TROMETHAMINE 15 MG/ML
15 INJECTION, SOLUTION INTRAMUSCULAR; INTRAVENOUS EVERY 6 HOURS PRN
Status: DISCONTINUED | OUTPATIENT
Start: 2023-10-29 | End: 2023-10-31

## 2023-10-29 RX ADMIN — ONDANSETRON 4 MG: 2 INJECTION INTRAMUSCULAR; INTRAVENOUS at 22:17

## 2023-10-29 RX ADMIN — DEXTROSE MONOHYDRATE 100 ML/HR: 50 INJECTION, SOLUTION INTRAVENOUS at 20:04

## 2023-10-29 RX ADMIN — ENOXAPARIN SODIUM 40 MG: 100 INJECTION SUBCUTANEOUS at 22:16

## 2023-10-29 RX ADMIN — KETOROLAC TROMETHAMINE 15 MG: 15 INJECTION, SOLUTION INTRAMUSCULAR; INTRAVENOUS at 22:16

## 2023-10-29 RX ADMIN — HYDROMORPHONE HYDROCHLORIDE 0.5 MG: 1 INJECTION, SOLUTION INTRAMUSCULAR; INTRAVENOUS; SUBCUTANEOUS at 19:26

## 2023-10-29 RX ADMIN — BUDESONIDE AND FORMOTEROL FUMARATE DIHYDRATE 2 PUFF: 160; 4.5 AEROSOL RESPIRATORY (INHALATION) at 23:39

## 2023-10-29 RX ADMIN — HYDROMORPHONE HYDROCHLORIDE 0.5 MG: 1 INJECTION, SOLUTION INTRAMUSCULAR; INTRAVENOUS; SUBCUTANEOUS at 21:20

## 2023-10-29 RX ADMIN — HYDROMORPHONE HYDROCHLORIDE 0.5 MG: 1 INJECTION, SOLUTION INTRAMUSCULAR; INTRAVENOUS; SUBCUTANEOUS at 17:50

## 2023-10-29 RX ADMIN — SODIUM CHLORIDE, POTASSIUM CHLORIDE, SODIUM LACTATE AND CALCIUM CHLORIDE 1000 ML: 600; 310; 30; 20 INJECTION, SOLUTION INTRAVENOUS at 16:32

## 2023-10-29 RX ADMIN — IOPAMIDOL 95 ML: 612 INJECTION, SOLUTION INTRAVENOUS at 18:01

## 2023-10-29 RX ADMIN — LEVOFLOXACIN 750 MG: 5 INJECTION, SOLUTION INTRAVENOUS at 22:16

## 2023-10-29 NOTE — ED TRIAGE NOTES
Pt has been constipated x 3 days.  He has taken senokot, linzess, motegrity.  He reports soa, has pain in his eyes, ears and jaw.  He is gagging in triage.  He is lightheaded and seeing stars

## 2023-10-29 NOTE — H&P
Patient Name:  Donny Mallory  YOB: 1960  MRN:  [id number]  Admit Date:  10/29/2023  Patient Care Team:  Janelle Lara MD as PCP - General (Family Medicine)      Subjective   History Present Illness     Chief Complaint   Patient presents with    Abdominal Pain       Mr. Mallory is a 63 y.o. man with copd, hypertension, gerd who presents with constipation and abdominal pain for 3 days. He is passing flatus. He also complains of cough, nausea, and gagging following a covid exposure. He reports that he was recently treated with a 10 day course of amoxicillin for a sinus infection, but he has persistent facial/sinus pain, sore throat, and chills. He complains of polyuria and thirst.    History of Present Illness  Review of Systems   Constitutional:  Positive for chills and diaphoresis. Negative for fever.   HENT:  Positive for ear pain, postnasal drip, rhinorrhea, sinus pressure, sinus pain and sore throat.    Eyes: Negative.    Respiratory:  Positive for cough. Negative for shortness of breath.    Cardiovascular:  Negative for chest pain and palpitations.   Gastrointestinal:  Positive for abdominal pain, constipation and nausea.   Endocrine: Negative.    Genitourinary:  Negative for dysuria, flank pain, frequency and urgency.   Musculoskeletal:  Positive for myalgias. Negative for arthralgias.   Skin:  Negative for rash and wound.   Allergic/Immunologic: Negative.    Neurological:  Positive for headaches. Negative for light-headedness.   Hematological: Negative.    Psychiatric/Behavioral:  Negative for confusion and decreased concentration.         Personal History     Past Medical History:   Diagnosis Date    Bell's palsy     COPD (chronic obstructive pulmonary disease)     Dysphagia     previous workup at [facility]    Hypertension      Past Surgical History:   Procedure Laterality Date    CHOLECYSTECTOMY      CHOLECYSTECTOMY WITH INTRAOPERATIVE CHOLANGIOGRAM N/A 04/25/2022    Procedure:  Laparoscopic cholecystectomy with intraoperative cholangiogram, possible open;  Surgeon: Khari Schofield MD;  Location: [facility] MAIN OR;  Service: General;  Laterality: N/A;    HERNIA REPAIR       Family History   Family history unknown: Yes     Social History     Tobacco Use    Smoking status: Some Days    Smokeless tobacco: Never   Substance Use Topics    Alcohol use: No    Drug use: No     No current facility-administered medications on file prior to encounter.     Current Outpatient Medications on File Prior to Encounter   Medication Sig Dispense Refill    docusate sodium 100 MG capsule Take 1 capsule by mouth 2 (Two) Times a Day. 60 capsule 0    Fluticasone-Umeclidin-Vilant (Trelegy Ellipta) 100-62.5-25 MCG/INH inhaler Inhale 1 puff Daily.      ipratropium-albuterol (DUO-NEB) 0.5-2.5 mg/3 ml nebulizer Take 3 mL by nebulization Every 4 (Four) Hours As Needed for Wheezing.      lisinopril (PRINIVIL,ZESTRIL) 20 MG tablet Take 20 mg by mouth Daily.      omeprazole (priLOSEC) 20 MG capsule Take 1 capsule by mouth Daily. 30 capsule 0    ondansetron ODT (ZOFRAN-ODT) 4 MG disintegrating tablet Place 1 tablet on the tongue 4 (Four) Times a Day As Needed for Nausea or Vomiting. 15 tablet 0    promethazine (PHENERGAN) 25 MG tablet Take 1 tablet by mouth Every 6 (Six) Hours As Needed for Nausea or Vomiting. 10 tablet 0    sucralfate (CARAFATE) 1 g tablet Take 1 tablet by mouth 4 (Four) Times a Day. 40 tablet 0     Allergies   Allergen Reactions    Prochlorperazine Anxiety       Objective    Objective     Vital Signs  Temp:  [98 °F (36.7 °C)] 98 °F (36.7 °C)  Heart Rate:  [] 64  Resp:  [18] 18  BP: (134-144)/(87-99) 140/87  SpO2:  [91 %-97 %] 97 %  on   ;   Device (Oxygen Therapy): room air  Body mass index is 17.22 kg/m².    Physical Exam  Vitals and nursing note reviewed.   Constitutional:       General: He is not in acute distress.     Appearance: He is normal weight. He is ill-appearing. He is not  toxic-appearing.   HENT:      Head: Normocephalic and atraumatic.      Nose: Congestion and rhinorrhea present.      Mouth/Throat:      Mouth: Mucous membranes are moist.      Pharynx: Oropharynx is clear.   Eyes:      Extraocular Movements: Extraocular movements intact.      Conjunctiva/sclera: Conjunctivae normal.      Pupils: Pupils are equal, round, and reactive to light.   Cardiovascular:      Rate and Rhythm: Normal rate and regular rhythm.      Heart sounds: Normal heart sounds.   Pulmonary:      Effort: Pulmonary effort is normal. No respiratory distress.      Breath sounds: Normal breath sounds. No wheezing, rhonchi or rales.   Abdominal:      General: Bowel sounds are normal. There is distension.      Palpations: Abdomen is soft.      Tenderness: There is abdominal tenderness. There is no guarding or rebound.   Musculoskeletal:         General: No tenderness.      Cervical back: Normal range of motion and neck supple.      Right lower leg: No edema.      Left lower leg: No edema.   Skin:     General: Skin is warm and dry.      Findings: No erythema or rash.   Neurological:      General: No focal deficit present.      Mental Status: He is alert and oriented to person, place, and time.   Psychiatric:         Mood and Affect: Mood normal.         Behavior: Behavior normal.         Results Review:  I reviewed the patient's new clinical results.  I reviewed the patient's new imaging results and agree with the interpretation.  I reviewed the patient's other test results and agree with the interpretation  I personally viewed and interpreted the patient's EKG/Telemetry data  Discussed with ED provider.    Lab Results (last 24 hours)       Procedure Component Value Units Date/Time    CBC & Differential [[id number]]  (Normal) Collected: 10/29/23 1629    Specimen: Blood Updated: 10/29/23 1643    Narrative:      The following orders were created for panel order CBC & Differential.  Procedure                                Abnormality         Status                     ---------                               -----------         ------                     CBC Auto Differential[[id number]]        Normal              Final result                 Please view results for these tests on the individual orders.    Comprehensive Metabolic Panel [[id number]]  (Abnormal) Collected: 10/29/23 1629    Specimen: Blood Updated: 10/29/23 1711     Glucose 88 mg/dL      BUN 15 mg/dL      Creatinine 0.93 mg/dL      Sodium 154 mmol/L      Potassium 4.5 mmol/L      Comment: Slight hemolysis detected by analyzer. Results may be affected.        Chloride 116 mmol/L      CO2 24.3 mmol/L      Calcium 9.3 mg/dL      Total Protein 6.5 g/dL      Albumin 4.2 g/dL      ALT (SGPT) 14 U/L      AST (SGOT) 24 U/L      Comment: Slight hemolysis detected by analyzer. Results may be affected.        Alkaline Phosphatase 77 U/L      Total Bilirubin 0.5 mg/dL      Globulin 2.3 gm/dL      A/G Ratio 1.8 g/dL      BUN/Creatinine Ratio 16.1     Anion Gap 13.7 mmol/L      eGFR 93.4 mL/min/1.73     Narrative:      GFR Normal >60  Chronic Kidney Disease <60  Kidney Failure <15      Lipase [[id number]]  (Normal) Collected: 10/29/23 1629    Specimen: Blood Updated: 10/29/23 1701     Lipase 31 U/L     Procalcitonin [[id number]]  (Normal) Collected: 10/29/23 1629    Specimen: Blood Updated: 10/29/23 1708     Procalcitonin 0.05 ng/mL     Narrative:      As a Marker for Sepsis (Non-Neonates):    1. <0.5 ng/mL represents a low risk of severe sepsis and/or septic shock.  2. >2 ng/mL represents a high risk of severe sepsis and/or septic shock.    As a Marker for Lower Respiratory Tract Infections that require antibiotic therapy:    PCT on Admission    Antibiotic Therapy       6-12 Hrs later    >0.5                Strongly Recommended  >0.25 - <0.5        Recommended   0.1 - 0.25          Discouraged              Remeasure/reassess PCT  <0.1                Strongly Discouraged      "Remeasure/reassess PCT    As 28 day mortality risk marker: \"Change in Procalcitonin Result\" (>80% or <=80%) if Day 0 (or Day 1) and Day 4 values are available. Refer to http://www.[facility]-pct-calculator.com    Change in PCT <=80%  A decrease of PCT levels below or equal to 80% defines a positive change in PCT test result representing a higher risk for 28-day all-cause mortality of patients diagnosed with severe sepsis for septic shock.    Change in PCT >80%  A decrease of PCT levels of more than 80% defines a negative change in PCT result representing a lower risk for 28-day all-cause mortality of patients diagnosed with severe sepsis or septic shock.       Lactic Acid, Plasma [[id number]]  (Normal) Collected: 10/29/23 1629    Specimen: Blood Updated: 10/29/23 1700     Lactate 0.9 mmol/L     CBC Auto Differential [[id number]]  (Normal) Collected: 10/29/23 1629    Specimen: Blood Updated: 10/29/23 1643     WBC 8.36 10*3/mm3      RBC 4.32 10*6/mm3      Hemoglobin 13.3 g/dL      Hematocrit 40.4 %      MCV 93.5 fL      MCH 30.8 pg      MCHC 32.9 g/dL      RDW 13.1 %      RDW-SD 44.5 fl      MPV 10.0 fL      Platelets 246 10*3/mm3      Neutrophil % 60.4 %      Lymphocyte % 27.8 %      Monocyte % 6.6 %      Eosinophil % 3.9 %      Basophil % 1.2 %      Immature Grans % 0.1 %      Neutrophils, Absolute 5.05 10*3/mm3      Lymphocytes, Absolute 2.32 10*3/mm3      Monocytes, Absolute 0.55 10*3/mm3      Eosinophils, Absolute 0.33 10*3/mm3      Basophils, Absolute 0.10 10*3/mm3      Immature Grans, Absolute 0.01 10*3/mm3      nRBC 0.0 /100 WBC     aPTT [[id number]]  (Normal) Collected: 10/29/23 1629    Specimen: Blood Updated: 10/29/23 1724     PTT 29.8 seconds     Protime-INR [[id number]]  (Normal) Collected: 10/29/23 1629    Specimen: Blood Updated: 10/29/23 1724     Protime 12.9 Seconds      INR 0.96    Single High Sensitivity Troponin T [[id number]]  (Abnormal) Collected: 10/29/23 1629    Specimen: Blood Updated: 10/29/23 1708    "  HS Troponin T 20 ng/L     Narrative:      High Sensitive Troponin T Reference Range:  <10.0 ng/L- Negative Female for AMI  <15.0 ng/L- Negative Male for AMI  >=10 - Abnormal Female indicating possible myocardial injury.  >=15 - Abnormal Male indicating possible myocardial injury.   Clinicians would have to utilize clinical acumen, EKG, Troponin, and serial changes to determine if it is an Acute Myocardial Infarction or myocardial injury due to an underlying chronic condition.         Respiratory Panel PCR w/COVID-19(SARS-CoV-2) PRINCESS/TAMY/HORTENCIA/PAD/COR/MAD/GRETEL In-House, NP Swab in UTM/VTM, 3-4 HR TAT - Swab, Nasopharynx [[id number]]  (Normal) Collected: 10/29/23 1630    Specimen: Swab from Nasopharynx Updated: 10/29/23 1726     ADENOVIRUS, PCR Not Detected     Coronavirus 229E Not Detected     Coronavirus HKU1 Not Detected     Coronavirus NL63 Not Detected     Coronavirus OC43 Not Detected     COVID19 Not Detected     Human Metapneumovirus Not Detected     Human Rhinovirus/Enterovirus Not Detected     Influenza A PCR Not Detected     Influenza B PCR Not Detected     Parainfluenza Virus 1 Not Detected     Parainfluenza Virus 2 Not Detected     Parainfluenza Virus 3 Not Detected     Parainfluenza Virus 4 Not Detected     RSV, PCR Not Detected     Bordetella pertussis pcr Not Detected     Bordetella parapertussis PCR Not Detected     Chlamydophila pneumoniae PCR Not Detected     Mycoplasma pneumo by PCR Not Detected    Narrative:      In the setting of a positive respiratory panel with a viral infection PLUS a negative procalcitonin without other underlying concern for bacterial infection, consider observing off antibiotics or discontinuation of antibiotics and continue supportive care. If the respiratory panel is positive for atypical bacterial infection (Bordetella pertussis, Chlamydophila pneumoniae, or Mycoplasma pneumoniae), consider antibiotic de-escalation to target atypical bacterial infection.            Imaging  Results (Last 24 Hours)       Procedure Component Value Units Date/Time    CT Abdomen Pelvis With Contrast [[id number]] Collected: 10/29/23 1848     Updated: 10/29/23 1848    Narrative:      CT ABDOMEN AND PELVIS WITH IV CONTRAST     HISTORY: 63-year-old male with abdominal pain. Cholecystectomy in the  past.     TECHNIQUE: Radiation dose reduction techniques were utilized, including  automated exposure control and exposure modulation based on body size.   3 mm images were obtained through the abdomen and pelvis after the  administration of IV contrast. Compared compared with previous CT  06/26/2023.        FINDINGS:  1. Small hepatic cysts appear stable and there is pneumobilia, and there  is no new liver abnormality. The spleen, adrenals, and kidneys appear  unremarkable other than several renal cysts. The ectasia of the  pancreatic duct to the ampulla appears stable, measuring up to 5 mm in  diameter.     2. There is mild-moderate dilatation of small bowel loops within the  pelvis which also have air-fluid levels. There is no definitive small  bowel thickening. This likely represents a small bowel ileus. There is  no convincing evidence for bowel obstruction. Caliber of the colon is  within normal limits. There is moderately extensive sigmoid  diverticulosis without evidence for diverticulitis. The appendix appears  within normal limits. Urinary bladder is collapsed. The prostate is  enlarged measuring 5.7 cm in diameter. There are moderately extensive  abdominal aortic atherosclerotic changes without aneurysmal dilatation.       Impression:      1. There is likely an ileus of the distal small bowel. There is no  convincing evidence for bowel obstruction. There is sigmoid  diverticulosis, but no evidence for diverticulitis.  2. Stable pancreatic ductal dilatation.  3. Prostatomegaly. Follow-up with PSA is recommended.       XR Chest 1 View [[id number]] Collected: 10/29/23 1637     Updated: 10/29/23 1642     Narrative:      PORTABLE CHEST X-RAY     HISTORY: Cough and abdomen pain.     Portable chest x-ray consisting of 2 images is provided. Correlation:  Multiple prior chest x-rays as recent as June 11, 2022 and as remote as  February 21, 2022. Chest CT April 21, 2022 was also reviewed.     FINDINGS: The cardiomediastinal silhouette is normal. There is prominent  emphysematous change in the upper lung zones and some parenchymal  distortion bilaterally which appears similar as on the previous x-rays  and the chest CT. The costophrenic sulci are dry and the bones appear  normal. There is no pneumothorax.       Impression:      Emphysematous change in the lungs. No acute cardiopulmonary  abnormality is identified.     This report was finalized on 10/29/2023 4:39 PM by Dr. Anson Ashraf M.D on Workstation: [id number]               Results for orders placed during the hospital encounter of 04/21/22    Adult Transthoracic Echo Complete W/ Cont if Necessary Per Protocol    Interpretation Summary  · Calculated left ventricular EF = 62.3% Estimated left ventricular EF was in agreement with the calculated left ventricular EF. Left ventricular systolic function is normal.  · Left ventricular diastolic function was normal.  · There is calcification of the aortic valve.  · Mild mitral valve regurgitation is present.  · Mild tricuspid valve regurgitation is present.  · Estimated right ventricular systolic pressure from tricuspid regurgitation is normal (<35 mmHg).      ECG 12 Lead Other; dyspnea, epigastric pain   Preliminary Result   HEART RATE= 77  bpm   RR Interval= 779  ms   CO Interval= 124  ms   P Horizontal Axis= -2  deg   P Front Axis= 84  deg   QRSD Interval= 94  ms   QT Interval= 374  ms   QTcB= 424  ms   QRS Axis= 82  deg   T Wave Axis= 88  deg   - ABNORMAL ECG -   Sinus rhythm   Biatrial enlargement   Borderline right axis deviation   Probable left ventricular hypertrophy   Electronically Signed By:    Date and Time of  Study: 2023-10-29 16:22:26           Assessment/Plan     Active Hospital Problems    Diagnosis  POA    **Ileus [K56.7]  Yes    GERD without esophagitis [K21.9]  Yes    Hypernatremia [E87.0]  Yes    COPD (chronic obstructive pulmonary disease) [J44.9]  Yes    Hypertension [I10]  Yes      Resolved Hospital Problems   No resolved problems to display.       Mr. Mallory is a 63 y.o. man with copd, hypertension, gerd who presents with a persistent sinus infection despite 10 days of amoxicillin as well as polyuria and hypernatremia and an ileus.     Ileus-npo, ivf. Correct electrolytes. Ask gi to consult. Prn ketorolac for pain control   Hypernatremia-3.3L free water deficit. Will start d5w at 100 cc/hr. Follow up sodium in the morning and adjust accordingly. I wonder if there is a component of post obstructive diuresis vs. DI given his prostatomegaly and reported polyuria  Persistent sinus infection despite 10 days of amoxicillin therapy-will treat with 5 days of levaquin  Prostatomegaly-check a psa  Hypertension-hold antihypertensives for the moment  GERD-iv ppi  COPD-restart home inhalers  Chronic pancreatic ductal dilation  I discussed the patient's findings and my recommendations with patient, nursing staff, and ED provider.    VTE Prophylaxis - Lovenox 40 mg SC daily.  Code Status - DNR.       Oliver Camara MD  [facility]  10/29/23  19:24 EDT

## 2023-10-29 NOTE — PROGRESS NOTES
"[facility] Clinical Pharmacy Services: Enoxaparin Consult    Donny Mallory has a pharmacy consult to dose prophylactic enoxaparin per Dr. Camara's request.     Indication: VTE Prophylaxis  Home Anticoagulation: none per pta med list     Relevant clinical data and objective history reviewed:  63 y.o. male 177.8 cm (70\") 54.4 kg (120 lb)   Body mass index is 17.22 kg/m².   Results from last 7 days   Lab Units 10/29/23  1629   PLATELETS 10*3/mm3 246     Estimated Creatinine Clearance: 64.2 mL/min (by C-G formula based on SCr of 0.93 mg/dL).    Assessment/Plan    Will start patient on 40mg subcutaneous every 24 hours, adjusted for renal function. Consult order will be discontinued but pharmacy will continue to follow.     Jacy Kumar, Pharm.D., [facility]  Clinical Pharmacist    "

## 2023-10-29 NOTE — ED PROVIDER NOTES
" EMERGENCY DEPARTMENT ENCOUNTER    Room Number:  14/14  Date seen:  10/29/2023  PCP: Janelle Lara MD  Historian(s): patient      HPI:  Chief Complaint: abd pain, congestion, nausea, constipated  A complete HPI/ROS/PMH/PSH/SH/FH are unobtainable / limited due to: none  Context: Donny Mallory is a 63 y.o. male who presents to the ED c/o multiple symptoms that have been present for several days this week.  He says that he had a COVID exposure to a friend of a family member recently.  He has had some sinus symptoms for several days and was taking an antibiotic.  However for the past 3 days he has felt very constipated and complains of some abdominal pain that radiates upwards towards his chest.  He has had some coughing and gagging with nausea as well.  Today he felt very chilled and took a hot shower because he felt cold \"all the way down to his feet.\"      PAST MEDICAL HISTORY  Active Ambulatory Problems     Diagnosis Date Noted    Psychosis 05/16/2017    Acute pancreatitis 04/21/2022    COPD (chronic obstructive pulmonary disease)     Hypertension     Dysphagia     Constipation     Cholelithiasis 04/25/2022    Severe malnutrition 04/28/2022     Resolved Ambulatory Problems     Diagnosis Date Noted    No Resolved Ambulatory Problems     Past Medical History:   Diagnosis Date    Bell's palsy          PAST SURGICAL HISTORY  Past Surgical History:   Procedure Laterality Date    CHOLECYSTECTOMY      CHOLECYSTECTOMY WITH INTRAOPERATIVE CHOLANGIOGRAM N/A 04/25/2022    Procedure: Laparoscopic cholecystectomy with intraoperative cholangiogram, possible open;  Surgeon: Khari Schofield MD;  Location: [facility];  Service: General;  Laterality: N/A;    HERNIA REPAIR           FAMILY HISTORY  Family History   Family history unknown: Yes         SOCIAL HISTORY  Social History     Socioeconomic History    Marital status: Single   Tobacco Use    Smoking status: Some Days    Smokeless tobacco: Never   Substance and " Sexual Activity    Alcohol use: No    Drug use: No         ALLERGIES  Prochlorperazine        REVIEW OF SYSTEMS  Review of Systems   Constitutional:  Positive for activity change, chills and fever.   HENT:  Positive for congestion and sinus pressure.    Eyes:  Negative for pain and visual disturbance.   Respiratory:  Positive for cough. Negative for shortness of breath.    Cardiovascular:  Negative for chest pain.   Gastrointestinal:  Positive for abdominal pain, constipation and nausea.   Genitourinary:  Negative for dysuria.   Musculoskeletal:  Positive for myalgias.   Skin:  Negative for color change.   Neurological:  Positive for weakness. Negative for syncope and headaches.   All other systems reviewed and are negative.           PHYSICAL EXAM  ED Triage Vitals [10/29/23 1538]   Temp Heart Rate Resp BP SpO2   98 °F (36.7 °C) 116 18 -- 91 %      Temp src Heart Rate Source Patient Position BP Location FiO2 (%)   Tympanic Monitor -- -- --       Physical Exam      GENERAL: Appears ill and uncomfortable.  No diaphoresis  HENT: nares patent, normocephalic and atraumatic, mucous membranes somewhat dry.  Patient making some gagging noises occasionally  EYES: no scleral icterus, EOMI, normal conjunctivae  CV: regular rhythm, elevated heart rate, normal distal pulses, no murmurs  RESPIRATORY: normal effort, no stridor, diminished at the bases with just a few scattered rhonchi.  No wheezes.  No coughing during my evaluation.  ABDOMEN: soft moderate diffuse tenderness in all 4 quadrants equally.  Thin.  No masses palpable.  MUSCULOSKELETAL: no deformity, no asymmetry  NEURO: alert, moves all extremities, follows commands, no focal motor deficits apparent.  PSYCH:  calm, cooperative  SKIN: warm, dry    Vital signs and nursing notes reviewed.        LAB RESULTS  Recent Results (from the past 24 hour(s))   ECG 12 Lead Other; dyspnea, epigastric pain    Collection Time: 10/29/23  4:22 PM   Result Value Ref Range    QT Interval  374 ms    QTC Interval 424 ms   Comprehensive Metabolic Panel    Collection Time: 10/29/23  4:29 PM    Specimen: Blood   Result Value Ref Range    Glucose 88 65 - 99 mg/dL    BUN 15 8 - 23 mg/dL    Creatinine 0.93 0.76 - 1.27 mg/dL    Sodium 154 (H) 136 - 145 mmol/L    Potassium 4.5 3.5 - 5.2 mmol/L    Chloride 116 (H) 98 - 107 mmol/L    CO2 24.3 22.0 - 29.0 mmol/L    Calcium 9.3 8.6 - 10.5 mg/dL    Total Protein 6.5 6.0 - 8.5 g/dL    Albumin 4.2 3.5 - 5.2 g/dL    ALT (SGPT) 14 1 - 41 U/L    AST (SGOT) 24 1 - 40 U/L    Alkaline Phosphatase 77 39 - 117 U/L    Total Bilirubin 0.5 0.0 - 1.2 mg/dL    Globulin 2.3 gm/dL    A/G Ratio 1.8 g/dL    BUN/Creatinine Ratio 16.1 7.0 - 25.0    Anion Gap 13.7 5.0 - 15.0 mmol/L    eGFR 93.4 >60.0 mL/min/1.73   Lipase    Collection Time: 10/29/23  4:29 PM    Specimen: Blood   Result Value Ref Range    Lipase 31 13 - 60 U/L   Procalcitonin    Collection Time: 10/29/23  4:29 PM    Specimen: Blood   Result Value Ref Range    Procalcitonin 0.05 0.00 - 0.25 ng/mL   Lactic Acid, Plasma    Collection Time: 10/29/23  4:29 PM    Specimen: Blood   Result Value Ref Range    Lactate 0.9 0.5 - 2.0 mmol/L   CBC Auto Differential    Collection Time: 10/29/23  4:29 PM    Specimen: Blood   Result Value Ref Range    WBC 8.36 3.40 - 10.80 10*3/mm3    RBC 4.32 4.14 - 5.80 10*6/mm3    Hemoglobin 13.3 13.0 - 17.7 g/dL    Hematocrit 40.4 37.5 - 51.0 %    MCV 93.5 79.0 - 97.0 fL    MCH 30.8 26.6 - 33.0 pg    MCHC 32.9 31.5 - 35.7 g/dL    RDW 13.1 12.3 - 15.4 %    RDW-SD 44.5 37.0 - 54.0 fl    MPV 10.0 6.0 - 12.0 fL    Platelets 246 140 - 450 10*3/mm3    Neutrophil % 60.4 42.7 - 76.0 %    Lymphocyte % 27.8 19.6 - 45.3 %    Monocyte % 6.6 5.0 - 12.0 %    Eosinophil % 3.9 0.3 - 6.2 %    Basophil % 1.2 0.0 - 1.5 %    Immature Grans % 0.1 0.0 - 0.5 %    Neutrophils, Absolute 5.05 1.70 - 7.00 10*3/mm3    Lymphocytes, Absolute 2.32 0.70 - 3.10 10*3/mm3    Monocytes, Absolute 0.55 0.10 - 0.90 10*3/mm3     Eosinophils, Absolute 0.33 0.00 - 0.40 10*3/mm3    Basophils, Absolute 0.10 0.00 - 0.20 10*3/mm3    Immature Grans, Absolute 0.01 0.00 - 0.05 10*3/mm3    nRBC 0.0 0.0 - 0.2 /100 WBC   aPTT    Collection Time: 10/29/23  4:29 PM    Specimen: Blood   Result Value Ref Range    PTT 29.8 22.7 - 35.4 seconds   Protime-INR    Collection Time: 10/29/23  4:29 PM    Specimen: Blood   Result Value Ref Range    Protime 12.9 11.7 - 14.2 Seconds    INR 0.96 0.90 - 1.10   Single High Sensitivity Troponin T    Collection Time: 10/29/23  4:29 PM    Specimen: Blood   Result Value Ref Range    HS Troponin T 20 (H) <15 ng/L   Respiratory Panel PCR w/COVID-19(SARS-CoV-2) [facility]/HORTENCIA/PAD/[facility]/MAD/GRETEL In-House, NP Swab in UTM/VTM, 3-4 HR TAT - Swab, Nasopharynx    Collection Time: 10/29/23  4:30 PM    Specimen: Nasopharynx; Swab   Result Value Ref Range    ADENOVIRUS, PCR Not Detected Not Detected    Coronavirus 229E Not Detected Not Detected    Coronavirus HKU1 Not Detected Not Detected    Coronavirus NL63 Not Detected Not Detected    Coronavirus OC43 Not Detected Not Detected    COVID19 Not Detected Not Detected - Ref. Range    Human Metapneumovirus Not Detected Not Detected    Human Rhinovirus/Enterovirus Not Detected Not Detected    Influenza A PCR Not Detected Not Detected    Influenza B PCR Not Detected Not Detected    Parainfluenza Virus 1 Not Detected Not Detected    Parainfluenza Virus 2 Not Detected Not Detected    Parainfluenza Virus 3 Not Detected Not Detected    Parainfluenza Virus 4 Not Detected Not Detected    RSV, PCR Not Detected Not Detected    Bordetella pertussis pcr Not Detected Not Detected    Bordetella parapertussis PCR Not Detected Not Detected    Chlamydophila pneumoniae PCR Not Detected Not Detected    Mycoplasma pneumo by PCR Not Detected Not Detected       Ordered the above labs and reviewed the results.        RADIOLOGY  CT Abdomen Pelvis With Contrast    Result Date: 10/29/2023  CT ABDOMEN AND PELVIS WITH IV  CONTRAST  HISTORY: 63-year-old male with abdominal pain. Cholecystectomy in the past.  TECHNIQUE: Radiation dose reduction techniques were utilized, including automated exposure control and exposure modulation based on body size. 3 mm images were obtained through the abdomen and pelvis after the administration of IV contrast. Compared compared with previous CT 06/26/2023.   FINDINGS: 1. Small hepatic cysts appear stable and there is pneumobilia, and there is no new liver abnormality. The spleen, adrenals, and kidneys appear unremarkable other than several renal cysts. The ectasia of the pancreatic duct to the ampulla appears stable, measuring up to 5 mm in diameter.  2. There is mild-moderate dilatation of small bowel loops within the pelvis which also have air-fluid levels. There is no definitive small bowel thickening. This likely represents a small bowel ileus. There is no convincing evidence for bowel obstruction. Caliber of the colon is within normal limits. There is moderately extensive sigmoid diverticulosis without evidence for diverticulitis. The appendix appears within normal limits. Urinary bladder is collapsed. The prostate is enlarged measuring 5.7 cm in diameter. There are moderately extensive abdominal aortic atherosclerotic changes without aneurysmal dilatation.      1. There is likely an ileus of the distal small bowel. There is no convincing evidence for bowel obstruction. There is sigmoid diverticulosis, but no evidence for diverticulitis. 2. Stable pancreatic ductal dilatation. 3. Prostatomegaly. Follow-up with PSA is recommended.      XR Chest 1 View    Result Date: 10/29/2023  PORTABLE CHEST X-RAY  HISTORY: Cough and abdomen pain.  Portable chest x-ray consisting of 2 images is provided. Correlation: Multiple prior chest x-rays as recent as June 11, 2022 and as remote as February 21, 2022. Chest CT April 21, 2022 was also reviewed.  FINDINGS: The cardiomediastinal silhouette is normal. There is  prominent emphysematous change in the upper lung zones and some parenchymal distortion bilaterally which appears similar as on the previous x-rays and the chest CT. The costophrenic sulci are dry and the bones appear normal. There is no pneumothorax.      Emphysematous change in the lungs. No acute cardiopulmonary abnormality is identified.  This report was finalized on 10/29/2023 4:39 PM by Dr. Anson Ashraf M.D on Workstation: [id number]       Ordered the above noted radiological studies. Reviewed by me in PACS.          PROCEDURES  Procedures        MEDICATIONS GIVEN IN ER  Medications   sodium chloride 0.9 % flush 10 mL (has no administration in time range)   HYDROmorphone (DILAUDID) injection 0.5 mg (has no administration in time range)   lactated ringers bolus 1,000 mL (0 mL Intravenous Stopped 10/29/23 1750)   HYDROmorphone (DILAUDID) injection 0.5 mg (0.5 mg Intravenous Given 10/29/23 1750)   iopamidol (ISOVUE-300) 61 % injection 100 mL (95 mL Intravenous Given by Other 10/29/23 1801)           MEDICAL DECISION MAKING, PROGRESS, and CONSULTS    All labs have been independently reviewed by me.  All radiology studies have been reviewed by me and I have also reviewed the radiology report.   EKG's independently viewed and interpreted by me.  Discussion below represents my analysis of pertinent findings related to patient's condition, differential diagnosis, treatment plan and final disposition.      Additional sources:    - External (non-ED) record review: I reviewed the GI office progress note from April 30, 2022 when he had assessment for constipation.  Additional note of acute biliary pancreatitis status postcholecystectomy is made.  Abdominal pain listed during that visit was identified as multifactorial        Orders placed during this visit:  Orders Placed This Encounter   Procedures    Respiratory Panel PCR w/COVID-19(SARS-CoV-2) [facility]/HORTENCIA/PAD/[facility]/MAD/GRETEL In-House, NP Swab in UTM/VTM, 3-4 HR TAT -  Swab, Nasopharynx    CT Abdomen Pelvis With Contrast    XR Chest 1 View    Comprehensive Metabolic Panel    Lipase    Urinalysis With Culture If Indicated - Urine, Clean Catch    Procalcitonin    Lactic Acid, Plasma    CBC Auto Differential    aPTT    Protime-INR    Single High Sensitivity Troponin T    Monitor Blood Pressure    Pulse Oximetry, Continuous    [facility] (on-call MD unless specified) Details    Discontinue Patient Isolation    ECG 12 Lead Other; dyspnea, epigastric pain    Insert Peripheral IV    Initiate Observation Status    CBC & Differential           Differential diagnosis includes but is not limited to:    Bowel obstruction, abdominal aortic aneurysm, COVID-19, pneumonia, diverticulitis, appendicitis      Independent interpretation of labs, radiology studies, and discussions with consultants:  ED Course as of 10/29/23 1917   Sun Oct 29, 2023   1616 Patient does appear somewhat ill here.  I suspect he may have COVID-19 or another infectious etiology causing exacerbation of his symptoms.  He has had some chronic abdominal pain and constipation complaints in the past.  Nevertheless given his nausea, and diffuse tenderness, I will proceed with typical abdominal pain work-up checking CT scan of the abdomen and pelvis with contrast. [AGNIESZKA]   1628 EKG           EKG time/Interp time: 1622/1626  Rhythm/Rate: Sinus rhythm, 77 bpm  P waves and VA: Present, 124 milliseconds  QRS, axis: 94 ms, borderline right axis deviation  ST and T waves: No ST segment elevation present    Independently interpreted by me contemporaneously with treatment     [AGNIESZKA]   1726 I independently interpreted the Chest X-ray and my findings are: Lungs are hyperinflated bilaterally, no Pneumothorax, No Effusion, No Infiltrate   [AGNIESZKA]   1727 Sodium(!): 154 [AGNIESZKA]   1727 Chloride(!): 116 [AGNIESZKA]   1754 RVP is unremarkable. [AGNIESZKA]   1914 I just discussed with Dr. Camara from [facility] about this patient.  He agrees to accept him for further medical management  on the hospitalist service today. [AGNIESZKA]   1915 I independently interpreted the CT scan of the abdomen pelvis and my findings are: No free air, there are dilated loops of small bowel in the lower abdomen/pelvis region. [AGNIESZKA]      ED Course User Index  [AGNIESZKA] Fred Jon MD         DIAGNOSIS  Final diagnoses:   Ileus   Hypernatremia         DISPOSITION  Observation to [facility], telemetry        Latest Documented Vital Signs:  As of 19:17 EDT  BP- 140/87 HR- 64 Temp- 98 °F (36.7 °C) (Tympanic) O2 sat- 97%              --    Please note that portions of this were completed with a voice recognition program.       Note Disclaimer: At [facility], we believe that sharing information builds trust and better relationships. You are receiving this note because you are receiving care at [facility] or recently visited. It is possible you will see health information before a provider has talked with you about it. This kind of information can be easy to misunderstand. To help you fully understand what it means for your health, we urge you to discuss this note with your provider.             Fred Jon MD  10/29/23 1916       Fred Jon MD  10/29/23 1917

## 2023-10-30 LAB
ANION GAP SERPL CALCULATED.3IONS-SCNC: 7 MMOL/L (ref 5–15)
BUN SERPL-MCNC: 13 MG/DL (ref 8–23)
BUN/CREAT SERPL: 14.4 (ref 7–25)
CALCIUM SPEC-SCNC: 8.8 MG/DL (ref 8.6–10.5)
CHLORIDE SERPL-SCNC: 107 MMOL/L (ref 98–107)
CO2 SERPL-SCNC: 27 MMOL/L (ref 22–29)
CREAT SERPL-MCNC: 0.9 MG/DL (ref 0.76–1.27)
DEPRECATED RDW RBC AUTO: 42.8 FL (ref 37–54)
EGFRCR SERPLBLD CKD-EPI 2021: 97.2 ML/MIN/1.73
ERYTHROCYTE [DISTWIDTH] IN BLOOD BY AUTOMATED COUNT: 12.9 % (ref 12.3–15.4)
GLUCOSE SERPL-MCNC: 88 MG/DL (ref 65–99)
HCT VFR BLD AUTO: 33.9 % (ref 37.5–51)
HGB BLD-MCNC: 11.3 G/DL (ref 13–17.7)
MAGNESIUM SERPL-MCNC: 1.7 MG/DL (ref 1.6–2.4)
MCH RBC QN AUTO: 30.5 PG (ref 26.6–33)
MCHC RBC AUTO-ENTMCNC: 33.3 G/DL (ref 31.5–35.7)
MCV RBC AUTO: 91.4 FL (ref 79–97)
PHOSPHATE SERPL-MCNC: 3.5 MG/DL (ref 2.5–4.5)
PLATELET # BLD AUTO: 214 10*3/MM3 (ref 140–450)
PMV BLD AUTO: 9.7 FL (ref 6–12)
POTASSIUM SERPL-SCNC: 3.6 MMOL/L (ref 3.5–5.2)
QT INTERVAL: 374 MS
QTC INTERVAL: 424 MS
RBC # BLD AUTO: 3.71 10*6/MM3 (ref 4.14–5.8)
SODIUM SERPL-SCNC: 141 MMOL/L (ref 136–145)
SODIUM SERPL-SCNC: 142 MMOL/L (ref 136–145)
WBC NRBC COR # BLD: 5.97 10*3/MM3 (ref 3.4–10.8)

## 2023-10-30 PROCEDURE — 25010000002 ONDANSETRON PER 1 MG: Performed by: STUDENT IN AN ORGANIZED HEALTH CARE EDUCATION/TRAINING PROGRAM

## 2023-10-30 PROCEDURE — 36415 COLL VENOUS BLD VENIPUNCTURE: CPT | Performed by: STUDENT IN AN ORGANIZED HEALTH CARE EDUCATION/TRAINING PROGRAM

## 2023-10-30 PROCEDURE — 99254 IP/OBS CNSLTJ NEW/EST MOD 60: CPT | Performed by: PHYSICIAN ASSISTANT

## 2023-10-30 PROCEDURE — 94761 N-INVAS EAR/PLS OXIMETRY MLT: CPT

## 2023-10-30 PROCEDURE — 94799 UNLISTED PULMONARY SVC/PX: CPT

## 2023-10-30 PROCEDURE — 83735 ASSAY OF MAGNESIUM: CPT | Performed by: STUDENT IN AN ORGANIZED HEALTH CARE EDUCATION/TRAINING PROGRAM

## 2023-10-30 PROCEDURE — 84295 ASSAY OF SERUM SODIUM: CPT | Performed by: STUDENT IN AN ORGANIZED HEALTH CARE EDUCATION/TRAINING PROGRAM

## 2023-10-30 PROCEDURE — 94664 DEMO&/EVAL PT USE INHALER: CPT

## 2023-10-30 PROCEDURE — 85027 COMPLETE CBC AUTOMATED: CPT | Performed by: STUDENT IN AN ORGANIZED HEALTH CARE EDUCATION/TRAINING PROGRAM

## 2023-10-30 PROCEDURE — 94760 N-INVAS EAR/PLS OXIMETRY 1: CPT

## 2023-10-30 PROCEDURE — 25010000002 ENOXAPARIN PER 10 MG: Performed by: STUDENT IN AN ORGANIZED HEALTH CARE EDUCATION/TRAINING PROGRAM

## 2023-10-30 PROCEDURE — 84100 ASSAY OF PHOSPHORUS: CPT | Performed by: STUDENT IN AN ORGANIZED HEALTH CARE EDUCATION/TRAINING PROGRAM

## 2023-10-30 PROCEDURE — 25010000002 LEVOFLOXACIN PER 250 MG: Performed by: STUDENT IN AN ORGANIZED HEALTH CARE EDUCATION/TRAINING PROGRAM

## 2023-10-30 PROCEDURE — 80048 BASIC METABOLIC PNL TOTAL CA: CPT | Performed by: STUDENT IN AN ORGANIZED HEALTH CARE EDUCATION/TRAINING PROGRAM

## 2023-10-30 PROCEDURE — 25810000003 DEXTROSE 5% IN LACTATED RINGERS PER 1000 ML: Performed by: STUDENT IN AN ORGANIZED HEALTH CARE EDUCATION/TRAINING PROGRAM

## 2023-10-30 PROCEDURE — 25010000002 KETOROLAC TROMETHAMINE PER 15 MG: Performed by: STUDENT IN AN ORGANIZED HEALTH CARE EDUCATION/TRAINING PROGRAM

## 2023-10-30 RX ORDER — DEXTROSE AND SODIUM CHLORIDE 5; .45 G/100ML; G/100ML
75 INJECTION, SOLUTION INTRAVENOUS CONTINUOUS
Status: DISCONTINUED | OUTPATIENT
Start: 2023-10-30 | End: 2023-10-30

## 2023-10-30 RX ORDER — SENNOSIDES A AND B 8.6 MG/1
1 TABLET, FILM COATED ORAL 2 TIMES DAILY
Status: DISCONTINUED | OUTPATIENT
Start: 2023-10-30 | End: 2023-11-02 | Stop reason: HOSPADM

## 2023-10-30 RX ORDER — BISACODYL 10 MG
10 SUPPOSITORY, RECTAL RECTAL DAILY
Status: DISCONTINUED | OUTPATIENT
Start: 2023-10-30 | End: 2023-11-02 | Stop reason: HOSPADM

## 2023-10-30 RX ORDER — DEXTROSE, SODIUM CHLORIDE, SODIUM LACTATE, POTASSIUM CHLORIDE, AND CALCIUM CHLORIDE 5; .6; .31; .03; .02 G/100ML; G/100ML; G/100ML; G/100ML; G/100ML
100 INJECTION, SOLUTION INTRAVENOUS CONTINUOUS
Status: DISCONTINUED | OUTPATIENT
Start: 2023-10-30 | End: 2023-11-02

## 2023-10-30 RX ORDER — ENOXAPARIN SODIUM 100 MG/ML
30 INJECTION SUBCUTANEOUS EVERY 24 HOURS
Status: DISCONTINUED | OUTPATIENT
Start: 2023-10-30 | End: 2023-11-02 | Stop reason: HOSPADM

## 2023-10-30 RX ADMIN — LEVOFLOXACIN 750 MG: 5 INJECTION, SOLUTION INTRAVENOUS at 19:58

## 2023-10-30 RX ADMIN — ONDANSETRON 4 MG: 2 INJECTION INTRAMUSCULAR; INTRAVENOUS at 16:14

## 2023-10-30 RX ADMIN — ENOXAPARIN SODIUM 30 MG: 100 INJECTION SUBCUTANEOUS at 19:58

## 2023-10-30 RX ADMIN — BUDESONIDE AND FORMOTEROL FUMARATE DIHYDRATE 2 PUFF: 160; 4.5 AEROSOL RESPIRATORY (INHALATION) at 20:25

## 2023-10-30 RX ADMIN — SODIUM CHLORIDE, SODIUM LACTATE, POTASSIUM CHLORIDE, CALCIUM CHLORIDE AND DEXTROSE MONOHYDRATE 100 ML/HR: 5; 600; 310; 30; 20 INJECTION, SOLUTION INTRAVENOUS at 10:16

## 2023-10-30 RX ADMIN — BUDESONIDE AND FORMOTEROL FUMARATE DIHYDRATE 2 PUFF: 160; 4.5 AEROSOL RESPIRATORY (INHALATION) at 07:00

## 2023-10-30 RX ADMIN — SENNOSIDES 1 TABLET: 8.6 TABLET, FILM COATED ORAL at 19:58

## 2023-10-30 RX ADMIN — KETOROLAC TROMETHAMINE 15 MG: 15 INJECTION, SOLUTION INTRAMUSCULAR; INTRAVENOUS at 09:46

## 2023-10-30 RX ADMIN — Medication 3 MG: at 01:12

## 2023-10-30 RX ADMIN — ONDANSETRON 4 MG: 2 INJECTION INTRAMUSCULAR; INTRAVENOUS at 03:57

## 2023-10-30 RX ADMIN — PANTOPRAZOLE SODIUM 40 MG: 40 INJECTION, POWDER, FOR SOLUTION INTRAVENOUS at 04:09

## 2023-10-30 RX ADMIN — TIOTROPIUM BROMIDE INHALATION SPRAY 2 PUFF: 3.12 SPRAY, METERED RESPIRATORY (INHALATION) at 07:01

## 2023-10-30 RX ADMIN — BISACODYL 10 MG: 10 SUPPOSITORY RECTAL at 16:13

## 2023-10-30 RX ADMIN — KETOROLAC TROMETHAMINE 15 MG: 15 INJECTION, SOLUTION INTRAMUSCULAR; INTRAVENOUS at 23:14

## 2023-10-30 RX ADMIN — KETOROLAC TROMETHAMINE 15 MG: 15 INJECTION, SOLUTION INTRAMUSCULAR; INTRAVENOUS at 16:14

## 2023-10-30 RX ADMIN — ONDANSETRON 4 MG: 2 INJECTION INTRAMUSCULAR; INTRAVENOUS at 23:14

## 2023-10-30 RX ADMIN — KETOROLAC TROMETHAMINE 15 MG: 15 INJECTION, SOLUTION INTRAMUSCULAR; INTRAVENOUS at 03:57

## 2023-10-30 RX ADMIN — ONDANSETRON 4 MG: 2 INJECTION INTRAMUSCULAR; INTRAVENOUS at 09:52

## 2023-10-30 RX ADMIN — SODIUM CHLORIDE, SODIUM LACTATE, POTASSIUM CHLORIDE, CALCIUM CHLORIDE AND DEXTROSE MONOHYDRATE 100 ML/HR: 5; 600; 310; 30; 20 INJECTION, SOLUTION INTRAVENOUS at 23:15

## 2023-10-30 RX ADMIN — Medication 3 MG: at 23:14

## 2023-10-30 NOTE — PAYOR COMM NOTE
"Meron Mallory (63 y.o. Male)     PLEASE SEE ATTACHED FOR INPT AUTH     PT WAS OBSERVATION ON 10/29  CHANGED TO INPT ON 10/29    PT ID      [id number]       PLEASE CALL FLOR NUGENT RN/ DEPT @ [phone]  OR FAX  DEPARTMENT @  [phone]    THANK YOU   FLOR NUGENT RN  [facility]           Date of Birth   1960    Social Security Number       Address   [address] [place], [place] [address]    Home Phone   [phone]    MRN   [id number]       [place]   None    Marital Status   Single                            Admission Date   10/29/23    Admission Type   Emergency    Admitting Provider   Oliver Camara MD    Attending Provider   Fernando Weber MD    Department, Room/Bed   [address], E551/1       Discharge Date       Discharge Disposition       Discharge Destination                                 Attending Provider: Fernando Weber MD    Allergies: Prochlorperazine    Isolation: None   Infection: None   Code Status: No CPR    Ht: 177.8 cm (70\")   Wt: 53.4 kg (117 lb 11.6 oz)    Admission Cmt: None   Principal Problem: Ileus [K56.7]                   Active Insurance as of 10/29/2023       Primary Coverage       Payor Plan Insurance Group Employer/Plan Group    [facility] BY BERNY [facility] BY BERNY [id number]       Payor Plan Address Payor Plan Phone Number Payor Plan Fax Number Effective Dates    [address] [address]   1/1/2021 - None Entered    [facility] [id number]         Subscriber Name Subscriber Birth Date Member ID       MERON MALLORY 1960 [id number]                     Emergency Contacts        (Rel.) Home Phone Work Phone Mobile Phone    Tali Mallory (Sister) [phone] -- [phone]              [place]: NPI [id number]  Tax ID [id number]     History & Physical        Oliver Camara MD at 10/29/23 1924              Patient Name:  Meron Mallory  YOB: 1960  MRN:  [id number]  Admit Date:  " 10/29/2023  Patient Care Team:  Janelle Lara MD as PCP - General (Family Medicine)      Subjective  History Present Illness     Chief Complaint   Patient presents with    Abdominal Pain       Mr. Mallory is a 63 y.o. man with copd, hypertension, gerd who presents with constipation and abdominal pain for 3 days. He is passing flatus. He also complains of cough, nausea, and gagging following a covid exposure. He reports that he was recently treated with a 10 day course of amoxicillin for a sinus infection, but he has persistent facial/sinus pain, sore throat, and chills. He complains of polyuria and thirst.    History of Present Illness  Review of Systems   Constitutional:  Positive for chills and diaphoresis. Negative for fever.   HENT:  Positive for ear pain, postnasal drip, rhinorrhea, sinus pressure, sinus pain and sore throat.    Eyes: Negative.    Respiratory:  Positive for cough. Negative for shortness of breath.    Cardiovascular:  Negative for chest pain and palpitations.   Gastrointestinal:  Positive for abdominal pain, constipation and nausea.   Endocrine: Negative.    Genitourinary:  Negative for dysuria, flank pain, frequency and urgency.   Musculoskeletal:  Positive for myalgias. Negative for arthralgias.   Skin:  Negative for rash and wound.   Allergic/Immunologic: Negative.    Neurological:  Positive for headaches. Negative for light-headedness.   Hematological: Negative.    Psychiatric/Behavioral:  Negative for confusion and decreased concentration.         Personal History     Past Medical History:   Diagnosis Date    Bell's palsy     COPD (chronic obstructive pulmonary disease)     Dysphagia     previous workup at [facility]    Hypertension      Past Surgical History:   Procedure Laterality Date    CHOLECYSTECTOMY      CHOLECYSTECTOMY WITH INTRAOPERATIVE CHOLANGIOGRAM N/A 04/25/2022    Procedure: Laparoscopic cholecystectomy with intraoperative cholangiogram, possible open;  Surgeon:  Khari Schofield MD;  Location: [facility] MAIN OR;  Service: General;  Laterality: N/A;    HERNIA REPAIR       Family History   Family history unknown: Yes     Social History     Tobacco Use    Smoking status: Some Days    Smokeless tobacco: Never   Substance Use Topics    Alcohol use: No    Drug use: No     No current facility-administered medications on file prior to encounter.     Current Outpatient Medications on File Prior to Encounter   Medication Sig Dispense Refill    docusate sodium 100 MG capsule Take 1 capsule by mouth 2 (Two) Times a Day. 60 capsule 0    Fluticasone-Umeclidin-Vilant (Trelegy Ellipta) 100-62.5-25 MCG/INH inhaler Inhale 1 puff Daily.      ipratropium-albuterol (DUO-NEB) 0.5-2.5 mg/3 ml nebulizer Take 3 mL by nebulization Every 4 (Four) Hours As Needed for Wheezing.      lisinopril (PRINIVIL,ZESTRIL) 20 MG tablet Take 20 mg by mouth Daily.      omeprazole (priLOSEC) 20 MG capsule Take 1 capsule by mouth Daily. 30 capsule 0    ondansetron ODT (ZOFRAN-ODT) 4 MG disintegrating tablet Place 1 tablet on the tongue 4 (Four) Times a Day As Needed for Nausea or Vomiting. 15 tablet 0    promethazine (PHENERGAN) 25 MG tablet Take 1 tablet by mouth Every 6 (Six) Hours As Needed for Nausea or Vomiting. 10 tablet 0    sucralfate (CARAFATE) 1 g tablet Take 1 tablet by mouth 4 (Four) Times a Day. 40 tablet 0     Allergies   Allergen Reactions    Prochlorperazine Anxiety       Objective   Objective     Vital Signs  Temp:  [98 °F (36.7 °C)] 98 °F (36.7 °C)  Heart Rate:  [] 64  Resp:  [18] 18  BP: (134-144)/(87-99) 140/87  SpO2:  [91 %-97 %] 97 %  on   ;   Device (Oxygen Therapy): room air  Body mass index is 17.22 kg/m².    Physical Exam  Vitals and nursing note reviewed.   Constitutional:       General: He is not in acute distress.     Appearance: He is normal weight. He is ill-appearing. He is not toxic-appearing.   HENT:      Head: Normocephalic and atraumatic.      Nose: Congestion and rhinorrhea  present.      Mouth/Throat:      Mouth: Mucous membranes are moist.      Pharynx: Oropharynx is clear.   Eyes:      Extraocular Movements: Extraocular movements intact.      Conjunctiva/sclera: Conjunctivae normal.      Pupils: Pupils are equal, round, and reactive to light.   Cardiovascular:      Rate and Rhythm: Normal rate and regular rhythm.      Heart sounds: Normal heart sounds.   Pulmonary:      Effort: Pulmonary effort is normal. No respiratory distress.      Breath sounds: Normal breath sounds. No wheezing, rhonchi or rales.   Abdominal:      General: Bowel sounds are normal. There is distension.      Palpations: Abdomen is soft.      Tenderness: There is abdominal tenderness. There is no guarding or rebound.   Musculoskeletal:         General: No tenderness.      Cervical back: Normal range of motion and neck supple.      Right lower leg: No edema.      Left lower leg: No edema.   Skin:     General: Skin is warm and dry.      Findings: No erythema or rash.   Neurological:      General: No focal deficit present.      Mental Status: He is alert and oriented to person, place, and time.   Psychiatric:         Mood and Affect: Mood normal.         Behavior: Behavior normal.         Results Review:  I reviewed the patient's new clinical results.  I reviewed the patient's new imaging results and agree with the interpretation.  I reviewed the patient's other test results and agree with the interpretation  I personally viewed and interpreted the patient's EKG/Telemetry data  Discussed with ED provider.    Lab Results (last 24 hours)       Procedure Component Value Units Date/Time    CBC & Differential [[id number]]  (Normal) Collected: 10/29/23 1629    Specimen: Blood Updated: 10/29/23 1643    Narrative:      The following orders were created for panel order CBC & Differential.  Procedure                               Abnormality         Status                     ---------                               -----------    "      ------                     CBC Auto Differential[[id number]]        Normal              Final result                 Please view results for these tests on the individual orders.    Comprehensive Metabolic Panel [[id number]]  (Abnormal) Collected: 10/29/23 1629    Specimen: Blood Updated: 10/29/23 1711     Glucose 88 mg/dL      BUN 15 mg/dL      Creatinine 0.93 mg/dL      Sodium 154 mmol/L      Potassium 4.5 mmol/L      Comment: Slight hemolysis detected by analyzer. Results may be affected.        Chloride 116 mmol/L      CO2 24.3 mmol/L      Calcium 9.3 mg/dL      Total Protein 6.5 g/dL      Albumin 4.2 g/dL      ALT (SGPT) 14 U/L      AST (SGOT) 24 U/L      Comment: Slight hemolysis detected by analyzer. Results may be affected.        Alkaline Phosphatase 77 U/L      Total Bilirubin 0.5 mg/dL      Globulin 2.3 gm/dL      A/G Ratio 1.8 g/dL      BUN/Creatinine Ratio 16.1     Anion Gap 13.7 mmol/L      eGFR 93.4 mL/min/1.73     Narrative:      GFR Normal >60  Chronic Kidney Disease <60  Kidney Failure <15      Lipase [[id number]]  (Normal) Collected: 10/29/23 1629    Specimen: Blood Updated: 10/29/23 1701     Lipase 31 U/L     Procalcitonin [[id number]]  (Normal) Collected: 10/29/23 1629    Specimen: Blood Updated: 10/29/23 1708     Procalcitonin 0.05 ng/mL     Narrative:      As a Marker for Sepsis (Non-Neonates):    1. <0.5 ng/mL represents a low risk of severe sepsis and/or septic shock.  2. >2 ng/mL represents a high risk of severe sepsis and/or septic shock.    As a Marker for Lower Respiratory Tract Infections that require antibiotic therapy:    PCT on Admission    Antibiotic Therapy       6-12 Hrs later    >0.5                Strongly Recommended  >0.25 - <0.5        Recommended   0.1 - 0.25          Discouraged              Remeasure/reassess PCT  <0.1                Strongly Discouraged     Remeasure/reassess PCT    As 28 day mortality risk marker: \"Change in Procalcitonin Result\" (>80% or <=80%) if Day " 0 (or Day 1) and Day 4 values are available. Refer to http://www.[facility]-pct-calculator.com    Change in PCT <=80%  A decrease of PCT levels below or equal to 80% defines a positive change in PCT test result representing a higher risk for 28-day all-cause mortality of patients diagnosed with severe sepsis for septic shock.    Change in PCT >80%  A decrease of PCT levels of more than 80% defines a negative change in PCT result representing a lower risk for 28-day all-cause mortality of patients diagnosed with severe sepsis or septic shock.       Lactic Acid, Plasma [[id number]]  (Normal) Collected: 10/29/23 1629    Specimen: Blood Updated: 10/29/23 1700     Lactate 0.9 mmol/L     CBC Auto Differential [[id number]]  (Normal) Collected: 10/29/23 1629    Specimen: Blood Updated: 10/29/23 1643     WBC 8.36 10*3/mm3      RBC 4.32 10*6/mm3      Hemoglobin 13.3 g/dL      Hematocrit 40.4 %      MCV 93.5 fL      MCH 30.8 pg      MCHC 32.9 g/dL      RDW 13.1 %      RDW-SD 44.5 fl      MPV 10.0 fL      Platelets 246 10*3/mm3      Neutrophil % 60.4 %      Lymphocyte % 27.8 %      Monocyte % 6.6 %      Eosinophil % 3.9 %      Basophil % 1.2 %      Immature Grans % 0.1 %      Neutrophils, Absolute 5.05 10*3/mm3      Lymphocytes, Absolute 2.32 10*3/mm3      Monocytes, Absolute 0.55 10*3/mm3      Eosinophils, Absolute 0.33 10*3/mm3      Basophils, Absolute 0.10 10*3/mm3      Immature Grans, Absolute 0.01 10*3/mm3      nRBC 0.0 /100 WBC     aPTT [[id number]]  (Normal) Collected: 10/29/23 1629    Specimen: Blood Updated: 10/29/23 1724     PTT 29.8 seconds     Protime-INR [[id number]]  (Normal) Collected: 10/29/23 1629    Specimen: Blood Updated: 10/29/23 1724     Protime 12.9 Seconds      INR 0.96    Single High Sensitivity Troponin T [[id number]]  (Abnormal) Collected: 10/29/23 1629    Specimen: Blood Updated: 10/29/23 1708     HS Troponin T 20 ng/L     Narrative:      High Sensitive Troponin T Reference Range:  <10.0 ng/L- Negative Female  for AMI  <15.0 ng/L- Negative Male for AMI  >=10 - Abnormal Female indicating possible myocardial injury.  >=15 - Abnormal Male indicating possible myocardial injury.   Clinicians would have to utilize clinical acumen, EKG, Troponin, and serial changes to determine if it is an Acute Myocardial Infarction or myocardial injury due to an underlying chronic condition.         Respiratory Panel PCR w/COVID-19(SARS-CoV-2) PRINCESS/TAMY/HORTENCIA/PAD/COR/MAD/GRETEL In-House, NP Swab in UTM/VTM, 3-4 HR TAT - Swab, Nasopharynx [[id number]]  (Normal) Collected: 10/29/23 1630    Specimen: Swab from Nasopharynx Updated: 10/29/23 1726     ADENOVIRUS, PCR Not Detected     Coronavirus 229E Not Detected     Coronavirus HKU1 Not Detected     Coronavirus NL63 Not Detected     Coronavirus OC43 Not Detected     COVID19 Not Detected     Human Metapneumovirus Not Detected     Human Rhinovirus/Enterovirus Not Detected     Influenza A PCR Not Detected     Influenza B PCR Not Detected     Parainfluenza Virus 1 Not Detected     Parainfluenza Virus 2 Not Detected     Parainfluenza Virus 3 Not Detected     Parainfluenza Virus 4 Not Detected     RSV, PCR Not Detected     Bordetella pertussis pcr Not Detected     Bordetella parapertussis PCR Not Detected     Chlamydophila pneumoniae PCR Not Detected     Mycoplasma pneumo by PCR Not Detected    Narrative:      In the setting of a positive respiratory panel with a viral infection PLUS a negative procalcitonin without other underlying concern for bacterial infection, consider observing off antibiotics or discontinuation of antibiotics and continue supportive care. If the respiratory panel is positive for atypical bacterial infection (Bordetella pertussis, Chlamydophila pneumoniae, or Mycoplasma pneumoniae), consider antibiotic de-escalation to target atypical bacterial infection.            Imaging Results (Last 24 Hours)       Procedure Component Value Units Date/Time    CT Abdomen Pelvis With Contrast  [[id number]] Collected: 10/29/23 1848     Updated: 10/29/23 1848    Narrative:      CT ABDOMEN AND PELVIS WITH IV CONTRAST     HISTORY: 63-year-old male with abdominal pain. Cholecystectomy in the  past.     TECHNIQUE: Radiation dose reduction techniques were utilized, including  automated exposure control and exposure modulation based on body size.   3 mm images were obtained through the abdomen and pelvis after the  administration of IV contrast. Compared compared with previous CT  06/26/2023.        FINDINGS:  1. Small hepatic cysts appear stable and there is pneumobilia, and there  is no new liver abnormality. The spleen, adrenals, and kidneys appear  unremarkable other than several renal cysts. The ectasia of the  pancreatic duct to the ampulla appears stable, measuring up to 5 mm in  diameter.     2. There is mild-moderate dilatation of small bowel loops within the  pelvis which also have air-fluid levels. There is no definitive small  bowel thickening. This likely represents a small bowel ileus. There is  no convincing evidence for bowel obstruction. Caliber of the colon is  within normal limits. There is moderately extensive sigmoid  diverticulosis without evidence for diverticulitis. The appendix appears  within normal limits. Urinary bladder is collapsed. The prostate is  enlarged measuring 5.7 cm in diameter. There are moderately extensive  abdominal aortic atherosclerotic changes without aneurysmal dilatation.       Impression:      1. There is likely an ileus of the distal small bowel. There is no  convincing evidence for bowel obstruction. There is sigmoid  diverticulosis, but no evidence for diverticulitis.  2. Stable pancreatic ductal dilatation.  3. Prostatomegaly. Follow-up with PSA is recommended.       XR Chest 1 View [[id number]] Collected: 10/29/23 1637     Updated: 10/29/23 1642    Narrative:      PORTABLE CHEST X-RAY     HISTORY: Cough and abdomen pain.     Portable chest x-ray consisting of 2  images is provided. Correlation:  Multiple prior chest x-rays as recent as June 11, 2022 and as remote as  February 21, 2022. Chest CT April 21, 2022 was also reviewed.     FINDINGS: The cardiomediastinal silhouette is normal. There is prominent  emphysematous change in the upper lung zones and some parenchymal  distortion bilaterally which appears similar as on the previous x-rays  and the chest CT. The costophrenic sulci are dry and the bones appear  normal. There is no pneumothorax.       Impression:      Emphysematous change in the lungs. No acute cardiopulmonary  abnormality is identified.     This report was finalized on 10/29/2023 4:39 PM by Dr. Anson Ashraf M.D on Workstation: [id number]               Results for orders placed during the hospital encounter of 04/21/22    Adult Transthoracic Echo Complete W/ Cont if Necessary Per Protocol    Interpretation Summary  · Calculated left ventricular EF = 62.3% Estimated left ventricular EF was in agreement with the calculated left ventricular EF. Left ventricular systolic function is normal.  · Left ventricular diastolic function was normal.  · There is calcification of the aortic valve.  · Mild mitral valve regurgitation is present.  · Mild tricuspid valve regurgitation is present.  · Estimated right ventricular systolic pressure from tricuspid regurgitation is normal (<35 mmHg).      ECG 12 Lead Other; dyspnea, epigastric pain   Preliminary Result   HEART RATE= 77  bpm   RR Interval= 779  ms   VA Interval= 124  ms   P Horizontal Axis= -2  deg   P Front Axis= 84  deg   QRSD Interval= 94  ms   QT Interval= 374  ms   QTcB= 424  ms   QRS Axis= 82  deg   T Wave Axis= 88  deg   - ABNORMAL ECG -   Sinus rhythm   Biatrial enlargement   Borderline right axis deviation   Probable left ventricular hypertrophy   Electronically Signed By:    Date and Time of Study: 2023-10-29 16:22:26           Assessment/Plan     Active Hospital Problems    Diagnosis  POA    **Ileus  [K56.7]  Yes    GERD without esophagitis [K21.9]  Yes    Hypernatremia [E87.0]  Yes    COPD (chronic obstructive pulmonary disease) [J44.9]  Yes    Hypertension [I10]  Yes      Resolved Hospital Problems   No resolved problems to display.       Mr. Mallory is a 63 y.o. man with copd, hypertension, gerd who presents with a persistent sinus infection despite 10 days of amoxicillin as well as polyuria and hypernatremia and an ileus.     Ileus-npo, ivf. Correct electrolytes. Ask gi to consult. Prn ketorolac for pain control   Hypernatremia-3.3L free water deficit. Will start d5w at 100 cc/hr. Follow up sodium in the morning and adjust accordingly. I wonder if there is a component of post obstructive diuresis vs. DI given his prostatomegaly and reported polyuria  Persistent sinus infection despite 10 days of amoxicillin therapy-will treat with 5 days of levaquin  Prostatomegaly-check a psa  Hypertension-hold antihypertensives for the moment  GERD-iv ppi  COPD-restart home inhalers  Chronic pancreatic ductal dilation  I discussed the patient's findings and my recommendations with patient, nursing staff, and ED provider.    VTE Prophylaxis - Lovenox 40 mg SC daily.  Code Status - DNR.       Oliver Camara MD  [facility]  10/29/23  19:24 EDT     Electronically signed by Oliver Camara MD at 10/29/23 2151          Emergency Department Notes        Su Deras, RN at 10/29/23 2031          Nursing report ED to floor  Donny Mallory  63 y.o.  male    HPI :   Chief Complaint   Patient presents with    Abdominal Pain       Admitting doctor:   Oliver Camara MD    Admitting diagnosis:   The primary encounter diagnosis was Ileus. A diagnosis of Hypernatremia was also pertinent to this visit.    Code status:   Current Code Status       Date Active Code Status Order ID Comments User Context       10/29/2023 1958 No CPR (Do Not Attempt to Resuscitate) [id number]  Oliver Camara MD ED        Question Answer    Code  Status (Patient has no pulse and is not breathing) No CPR (Do Not Attempt to Resuscitate)    Medical Interventions (Patient has pulse or is breathing) Full Support    Level Of Support Discussed With Patient                    Allergies:   Prochlorperazine    Isolation:   No active isolations    Intake and Output    Intake/Output Summary (Last 24 hours) at 10/29/2023 2031  Last data filed at 10/29/2023 1750  Gross per 24 hour   Intake 1000 ml   Output --   Net 1000 ml       Weight:       10/29/23  1636   Weight: 54.4 kg (120 lb)       Most recent vitals:   Vitals:    10/29/23 1816 10/29/23 1846 10/29/23 1945 10/29/23 1946   BP: 144/92 140/87  158/79   Pulse: 65 64     Resp:       Temp:       TempSrc:       SpO2: 97% 97% 94%    Weight:       Height:           Active LDAs/IV Access:   Lines, Drains & Airways       Active LDAs       Name Placement date Placement time Site Days    Peripheral IV 10/29/23 1726 Anterior;Left Forearm 10/29/23  1726  Forearm  less than 1                    Labs (abnormal labs have a star):   Labs Reviewed   COMPREHENSIVE METABOLIC PANEL - Abnormal; Notable for the following components:       Result Value    Sodium 154 (*)     Chloride 116 (*)     All other components within normal limits    Narrative:     GFR Normal >60  Chronic Kidney Disease <60  Kidney Failure <15     URINALYSIS W/ CULTURE IF INDICATED - Abnormal; Notable for the following components:    Blood, UA Small (1+) (*)     Protein,  mg/dL (2+) (*)     All other components within normal limits    Narrative:     In absence of clinical symptoms, the presence of pyuria, bacteria, and/or nitrites on the urinalysis result does not correlate with infection.   SINGLE HSTROPONIN T - Abnormal; Notable for the following components:    HS Troponin T 20 (*)     All other components within normal limits    Narrative:     High Sensitive Troponin T Reference Range:  <10.0 ng/L- Negative Female for AMI  <15.0 ng/L- Negative Male for  "AMI  >=10 - Abnormal Female indicating possible myocardial injury.  >=15 - Abnormal Male indicating possible myocardial injury.   Clinicians would have to utilize clinical acumen, EKG, Troponin, and serial changes to determine if it is an Acute Myocardial Infarction or myocardial injury due to an underlying chronic condition.        RESPIRATORY PANEL PCR W/ COVID-19 (SARS-COV-2), NP SWAB IN UTM/VTP, 3-4 HR TAT - Normal    Narrative:     In the setting of a positive respiratory panel with a viral infection PLUS a negative procalcitonin without other underlying concern for bacterial infection, consider observing off antibiotics or discontinuation of antibiotics and continue supportive care. If the respiratory panel is positive for atypical bacterial infection (Bordetella pertussis, Chlamydophila pneumoniae, or Mycoplasma pneumoniae), consider antibiotic de-escalation to target atypical bacterial infection.   LIPASE - Normal   PROCALCITONIN - Normal    Narrative:     As a Marker for Sepsis (Non-Neonates):    1. <0.5 ng/mL represents a low risk of severe sepsis and/or septic shock.  2. >2 ng/mL represents a high risk of severe sepsis and/or septic shock.    As a Marker for Lower Respiratory Tract Infections that require antibiotic therapy:    PCT on Admission    Antibiotic Therapy       6-12 Hrs later    >0.5                Strongly Recommended  >0.25 - <0.5        Recommended   0.1 - 0.25          Discouraged              Remeasure/reassess PCT  <0.1                Strongly Discouraged     Remeasure/reassess PCT    As 28 day mortality risk marker: \"Change in Procalcitonin Result\" (>80% or <=80%) if Day 0 (or Day 1) and Day 4 values are available. Refer to http://www.[employer]-pct-calculator.com    Change in PCT <=80%  A decrease of PCT levels below or equal to 80% defines a positive change in PCT test result representing a higher risk for 28-day all-cause mortality of patients diagnosed with severe sepsis for septic " shock.    Change in PCT >80%  A decrease of PCT levels of more than 80% defines a negative change in PCT result representing a lower risk for 28-day all-cause mortality of patients diagnosed with severe sepsis or septic shock.      LACTIC ACID, PLASMA - Normal   CBC WITH AUTO DIFFERENTIAL - Normal   APTT - Normal   PROTIME-INR - Normal   PSA DIAGNOSTIC - Normal    Narrative:     Results may be falsely decreased if patient taking Biotin.    Testing Method: [employer] Electrochemiluminescence Immunoassay(ECLIA)  Values obtained with different assay methods or kits cannot be used interchangeably.   URINALYSIS, MICROSCOPIC ONLY   CBC AND DIFFERENTIAL    Narrative:     The following orders were created for panel order CBC & Differential.  Procedure                               Abnormality         Status                     ---------                               -----------         ------                     CBC Auto Differential[[id number]]        Normal              Final result                 Please view results for these tests on the individual orders.       EKG:   ECG 12 Lead Other; dyspnea, epigastric pain   Preliminary Result   HEART RATE= 77  bpm   RR Interval= 779  ms   FL Interval= 124  ms   P Horizontal Axis= -2  deg   P Front Axis= 84  deg   QRSD Interval= 94  ms   QT Interval= 374  ms   QTcB= 424  ms   QRS Axis= 82  deg   T Wave Axis= 88  deg   - ABNORMAL ECG -   Sinus rhythm   Biatrial enlargement   Borderline right axis deviation   Probable left ventricular hypertrophy   Electronically Signed By:    Date and Time of Study: 2023-10-29 16:22:26          Meds given in ED:   Medications   sodium chloride 0.9 % flush 10 mL (has no administration in time range)   acetaminophen (TYLENOL) tablet 650 mg (has no administration in time range)   ondansetron (ZOFRAN) tablet 4 mg (has no administration in time range)     Or   ondansetron (ZOFRAN) injection 4 mg (has no administration in time range)   melatonin  tablet 3 mg (has no administration in time range)   Pharmacy to Dose enoxaparin (LOVENOX) (has no administration in time range)   dextrose (D5W) 5 % infusion (100 mL/hr Intravenous New Bag 10/29/23 2004)   pantoprazole (PROTONIX) injection 40 mg (has no administration in time range)   Enoxaparin Sodium (LOVENOX) syringe 40 mg (has no administration in time range)   ketorolac (TORADOL) injection 15 mg (has no administration in time range)   levoFLOXacin (LEVAQUIN) 750 mg/150 mL D5W (premix) (LEVAQUIN) 750 mg (has no administration in time range)   lactated ringers bolus 1,000 mL (0 mL Intravenous Stopped 10/29/23 1750)   HYDROmorphone (DILAUDID) injection 0.5 mg (0.5 mg Intravenous Given 10/29/23 1750)   iopamidol (ISOVUE-300) 61 % injection 100 mL (95 mL Intravenous Given by Other 10/29/23 1801)   HYDROmorphone (DILAUDID) injection 0.5 mg (0.5 mg Intravenous Given 10/29/23 1926)       Imaging results:  CT Abdomen Pelvis With Contrast    Result Date: 10/29/2023  1. There is likely an ileus of the distal small bowel. There is no convincing evidence for bowel obstruction. There is sigmoid diverticulosis, but no evidence for diverticulitis. 2. Stable pancreatic ductal dilatation. 3. Prostatomegaly. Follow-up with PSA is recommended.      XR Chest 1 View    Result Date: 10/29/2023  Emphysematous change in the lungs. No acute cardiopulmonary abnormality is identified.  This report was finalized on 10/29/2023 4:39 PM by Dr. Anson Ashraf M.D on Workstation: [id number]       Ambulatory status:   - ad sivaukmar    Social issues:   Social History     Socioeconomic History    Marital status: Single   Tobacco Use    Smoking status: Some Days    Smokeless tobacco: Never   Substance and Sexual Activity    Alcohol use: No    Drug use: No       NIH Stroke Scale:       Su Deras RN  10/29/23 20:31 EDT          Electronically signed by Su Deras RN at 10/29/23 2031       Fred Jon MD at 10/29/23 4710            "EMERGENCY DEPARTMENT ENCOUNTER    Room Number:  14/14  Date seen:  10/29/2023  PCP: Janelle Lara MD  Historian(s): patient      HPI:  Chief Complaint: abd pain, congestion, nausea, constipated  A complete HPI/ROS/PMH/PSH/SH/FH are unobtainable / limited due to: none  Context: Donny Mallory is a 63 y.o. male who presents to the ED c/o multiple symptoms that have been present for several days this week.  He says that he had a COVID exposure to a friend of a family member recently.  He has had some sinus symptoms for several days and was taking an antibiotic.  However for the past 3 days he has felt very constipated and complains of some abdominal pain that radiates upwards towards his chest.  He has had some coughing and gagging with nausea as well.  Today he felt very chilled and took a hot shower because he felt cold \"all the way down to his feet.\"      PAST MEDICAL HISTORY  Active Ambulatory Problems     Diagnosis Date Noted    Psychosis 05/16/2017    Acute pancreatitis 04/21/2022    COPD (chronic obstructive pulmonary disease)     Hypertension     Dysphagia     Constipation     Cholelithiasis 04/25/2022    Severe malnutrition 04/28/2022     Resolved Ambulatory Problems     Diagnosis Date Noted    No Resolved Ambulatory Problems     Past Medical History:   Diagnosis Date    Bell's palsy          PAST SURGICAL HISTORY  Past Surgical History:   Procedure Laterality Date    CHOLECYSTECTOMY      CHOLECYSTECTOMY WITH INTRAOPERATIVE CHOLANGIOGRAM N/A 04/25/2022    Procedure: Laparoscopic cholecystectomy with intraoperative cholangiogram, possible open;  Surgeon: Khari Schofield MD;  Location: [facility];  Service: General;  Laterality: N/A;    HERNIA REPAIR           FAMILY HISTORY  Family History   Family history unknown: Yes         SOCIAL HISTORY  Social History     Socioeconomic History    Marital status: Single   Tobacco Use    Smoking status: Some Days    Smokeless tobacco: Never   Substance and " Sexual Activity    Alcohol use: No    Drug use: No         ALLERGIES  Prochlorperazine        REVIEW OF SYSTEMS  Review of Systems   Constitutional:  Positive for activity change, chills and fever.   HENT:  Positive for congestion and sinus pressure.    Eyes:  Negative for pain and visual disturbance.   Respiratory:  Positive for cough. Negative for shortness of breath.    Cardiovascular:  Negative for chest pain.   Gastrointestinal:  Positive for abdominal pain, constipation and nausea.   Genitourinary:  Negative for dysuria.   Musculoskeletal:  Positive for myalgias.   Skin:  Negative for color change.   Neurological:  Positive for weakness. Negative for syncope and headaches.   All other systems reviewed and are negative.           PHYSICAL EXAM  ED Triage Vitals [10/29/23 1538]   Temp Heart Rate Resp BP SpO2   98 °F (36.7 °C) 116 18 -- 91 %      Temp src Heart Rate Source Patient Position BP Location FiO2 (%)   Tympanic Monitor -- -- --       Physical Exam      GENERAL: Appears ill and uncomfortable.  No diaphoresis  HENT: nares patent, normocephalic and atraumatic, mucous membranes somewhat dry.  Patient making some gagging noises occasionally  EYES: no scleral icterus, EOMI, normal conjunctivae  CV: regular rhythm, elevated heart rate, normal distal pulses, no murmurs  RESPIRATORY: normal effort, no stridor, diminished at the bases with just a few scattered rhonchi.  No wheezes.  No coughing during my evaluation.  ABDOMEN: soft moderate diffuse tenderness in all 4 quadrants equally.  Thin.  No masses palpable.  MUSCULOSKELETAL: no deformity, no asymmetry  NEURO: alert, moves all extremities, follows commands, no focal motor deficits apparent.  PSYCH:  calm, cooperative  SKIN: warm, dry    Vital signs and nursing notes reviewed.        LAB RESULTS  Recent Results (from the past 24 hour(s))   ECG 12 Lead Other; dyspnea, epigastric pain    Collection Time: 10/29/23  4:22 PM   Result Value Ref Range    QT Interval  374 ms    QTC Interval 424 ms   Comprehensive Metabolic Panel    Collection Time: 10/29/23  4:29 PM    Specimen: Blood   Result Value Ref Range    Glucose 88 65 - 99 mg/dL    BUN 15 8 - 23 mg/dL    Creatinine 0.93 0.76 - 1.27 mg/dL    Sodium 154 (H) 136 - 145 mmol/L    Potassium 4.5 3.5 - 5.2 mmol/L    Chloride 116 (H) 98 - 107 mmol/L    CO2 24.3 22.0 - 29.0 mmol/L    Calcium 9.3 8.6 - 10.5 mg/dL    Total Protein 6.5 6.0 - 8.5 g/dL    Albumin 4.2 3.5 - 5.2 g/dL    ALT (SGPT) 14 1 - 41 U/L    AST (SGOT) 24 1 - 40 U/L    Alkaline Phosphatase 77 39 - 117 U/L    Total Bilirubin 0.5 0.0 - 1.2 mg/dL    Globulin 2.3 gm/dL    A/G Ratio 1.8 g/dL    BUN/Creatinine Ratio 16.1 7.0 - 25.0    Anion Gap 13.7 5.0 - 15.0 mmol/L    eGFR 93.4 >60.0 mL/min/1.73   Lipase    Collection Time: 10/29/23  4:29 PM    Specimen: Blood   Result Value Ref Range    Lipase 31 13 - 60 U/L   Procalcitonin    Collection Time: 10/29/23  4:29 PM    Specimen: Blood   Result Value Ref Range    Procalcitonin 0.05 0.00 - 0.25 ng/mL   Lactic Acid, Plasma    Collection Time: 10/29/23  4:29 PM    Specimen: Blood   Result Value Ref Range    Lactate 0.9 0.5 - 2.0 mmol/L   CBC Auto Differential    Collection Time: 10/29/23  4:29 PM    Specimen: Blood   Result Value Ref Range    WBC 8.36 3.40 - 10.80 10*3/mm3    RBC 4.32 4.14 - 5.80 10*6/mm3    Hemoglobin 13.3 13.0 - 17.7 g/dL    Hematocrit 40.4 37.5 - 51.0 %    MCV 93.5 79.0 - 97.0 fL    MCH 30.8 26.6 - 33.0 pg    MCHC 32.9 31.5 - 35.7 g/dL    RDW 13.1 12.3 - 15.4 %    RDW-SD 44.5 37.0 - 54.0 fl    MPV 10.0 6.0 - 12.0 fL    Platelets 246 140 - 450 10*3/mm3    Neutrophil % 60.4 42.7 - 76.0 %    Lymphocyte % 27.8 19.6 - 45.3 %    Monocyte % 6.6 5.0 - 12.0 %    Eosinophil % 3.9 0.3 - 6.2 %    Basophil % 1.2 0.0 - 1.5 %    Immature Grans % 0.1 0.0 - 0.5 %    Neutrophils, Absolute 5.05 1.70 - 7.00 10*3/mm3    Lymphocytes, Absolute 2.32 0.70 - 3.10 10*3/mm3    Monocytes, Absolute 0.55 0.10 - 0.90 10*3/mm3     Eosinophils, Absolute 0.33 0.00 - 0.40 10*3/mm3    Basophils, Absolute 0.10 0.00 - 0.20 10*3/mm3    Immature Grans, Absolute 0.01 0.00 - 0.05 10*3/mm3    nRBC 0.0 0.0 - 0.2 /100 WBC   aPTT    Collection Time: 10/29/23  4:29 PM    Specimen: Blood   Result Value Ref Range    PTT 29.8 22.7 - 35.4 seconds   Protime-INR    Collection Time: 10/29/23  4:29 PM    Specimen: Blood   Result Value Ref Range    Protime 12.9 11.7 - 14.2 Seconds    INR 0.96 0.90 - 1.10   Single High Sensitivity Troponin T    Collection Time: 10/29/23  4:29 PM    Specimen: Blood   Result Value Ref Range    HS Troponin T 20 (H) <15 ng/L   Respiratory Panel PCR w/COVID-19(SARS-CoV-2) [facility]/T[facility]/HORTENCIA/PAD/[facility]/MAD/GRETEL In-House, NP Swab in UTM/VTM, 3-4 HR TAT - Swab, Nasopharynx    Collection Time: 10/29/23  4:30 PM    Specimen: Nasopharynx; Swab   Result Value Ref Range    ADENOVIRUS, PCR Not Detected Not Detected    Coronavirus 229E Not Detected Not Detected    Coronavirus HKU1 Not Detected Not Detected    Coronavirus NL63 Not Detected Not Detected    Coronavirus OC43 Not Detected Not Detected    COVID19 Not Detected Not Detected - Ref. Range    Human Metapneumovirus Not Detected Not Detected    Human Rhinovirus/Enterovirus Not Detected Not Detected    Influenza A PCR Not Detected Not Detected    Influenza B PCR Not Detected Not Detected    Parainfluenza Virus 1 Not Detected Not Detected    Parainfluenza Virus 2 Not Detected Not Detected    Parainfluenza Virus 3 Not Detected Not Detected    Parainfluenza Virus 4 Not Detected Not Detected    RSV, PCR Not Detected Not Detected    Bordetella pertussis pcr Not Detected Not Detected    Bordetella parapertussis PCR Not Detected Not Detected    Chlamydophila pneumoniae PCR Not Detected Not Detected    Mycoplasma pneumo by PCR Not Detected Not Detected       Ordered the above labs and reviewed the results.        RADIOLOGY  CT Abdomen Pelvis With Contrast    Result Date: 10/29/2023  CT ABDOMEN AND PELVIS WITH IV  CONTRAST  HISTORY: 63-year-old male with abdominal pain. Cholecystectomy in the past.  TECHNIQUE: Radiation dose reduction techniques were utilized, including automated exposure control and exposure modulation based on body size. 3 mm images were obtained through the abdomen and pelvis after the administration of IV contrast. Compared compared with previous CT 06/26/2023.   FINDINGS: 1. Small hepatic cysts appear stable and there is pneumobilia, and there is no new liver abnormality. The spleen, adrenals, and kidneys appear unremarkable other than several renal cysts. The ectasia of the pancreatic duct to the ampulla appears stable, measuring up to 5 mm in diameter.  2. There is mild-moderate dilatation of small bowel loops within the pelvis which also have air-fluid levels. There is no definitive small bowel thickening. This likely represents a small bowel ileus. There is no convincing evidence for bowel obstruction. Caliber of the colon is within normal limits. There is moderately extensive sigmoid diverticulosis without evidence for diverticulitis. The appendix appears within normal limits. Urinary bladder is collapsed. The prostate is enlarged measuring 5.7 cm in diameter. There are moderately extensive abdominal aortic atherosclerotic changes without aneurysmal dilatation.      1. There is likely an ileus of the distal small bowel. There is no convincing evidence for bowel obstruction. There is sigmoid diverticulosis, but no evidence for diverticulitis. 2. Stable pancreatic ductal dilatation. 3. Prostatomegaly. Follow-up with PSA is recommended.      XR Chest 1 View    Result Date: 10/29/2023  PORTABLE CHEST X-RAY  HISTORY: Cough and abdomen pain.  Portable chest x-ray consisting of 2 images is provided. Correlation: Multiple prior chest x-rays as recent as June 11, 2022 and as remote as February 21, 2022. Chest CT April 21, 2022 was also reviewed.  FINDINGS: The cardiomediastinal silhouette is normal. There is  prominent emphysematous change in the upper lung zones and some parenchymal distortion bilaterally which appears similar as on the previous x-rays and the chest CT. The costophrenic sulci are dry and the bones appear normal. There is no pneumothorax.      Emphysematous change in the lungs. No acute cardiopulmonary abnormality is identified.  This report was finalized on 10/29/2023 4:39 PM by Dr. Anson Ashraf M.D on Workstation: [id number]       Ordered the above noted radiological studies. Reviewed by me in PACS.          PROCEDURES  Procedures        MEDICATIONS GIVEN IN ER  Medications   sodium chloride 0.9 % flush 10 mL (has no administration in time range)   HYDROmorphone (DILAUDID) injection 0.5 mg (has no administration in time range)   lactated ringers bolus 1,000 mL (0 mL Intravenous Stopped 10/29/23 1750)   HYDROmorphone (DILAUDID) injection 0.5 mg (0.5 mg Intravenous Given 10/29/23 1750)   iopamidol (ISOVUE-300) 61 % injection 100 mL (95 mL Intravenous Given by Other 10/29/23 1801)           MEDICAL DECISION MAKING, PROGRESS, and CONSULTS    All labs have been independently reviewed by me.  All radiology studies have been reviewed by me and I have also reviewed the radiology report.   EKG's independently viewed and interpreted by me.  Discussion below represents my analysis of pertinent findings related to patient's condition, differential diagnosis, treatment plan and final disposition.      Additional sources:    - External (non-ED) record review: I reviewed the GI office progress note from April 30, 2022 when he had assessment for constipation.  Additional note of acute biliary pancreatitis status postcholecystectomy is made.  Abdominal pain listed during that visit was identified as multifactorial        Orders placed during this visit:  Orders Placed This Encounter   Procedures    Respiratory Panel PCR w/COVID-19(SARS-CoV-2) [facility]/HORTENCIA/PAD/COR/MAD/GRETEL In-House, NP Swab in UTM/VTM, 3-4 HR TAT -  Swab, Nasopharynx    CT Abdomen Pelvis With Contrast    XR Chest 1 View    Comprehensive Metabolic Panel    Lipase    Urinalysis With Culture If Indicated - Urine, Clean Catch    Procalcitonin    Lactic Acid, Plasma    CBC Auto Differential    aPTT    Protime-INR    Single High Sensitivity Troponin T    Monitor Blood Pressure    Pulse Oximetry, Continuous    [facility] (on-call MD unless specified) Details    Discontinue Patient Isolation    ECG 12 Lead Other; dyspnea, epigastric pain    Insert Peripheral IV    Initiate Observation Status    CBC & Differential           Differential diagnosis includes but is not limited to:    Bowel obstruction, abdominal aortic aneurysm, COVID-19, pneumonia, diverticulitis, appendicitis      Independent interpretation of labs, radiology studies, and discussions with consultants:  ED Course as of 10/29/23 1917   Sun Oct 29, 2023   1616 Patient does appear somewhat ill here.  I suspect he may have COVID-19 or another infectious etiology causing exacerbation of his symptoms.  He has had some chronic abdominal pain and constipation complaints in the past.  Nevertheless given his nausea, and diffuse tenderness, I will proceed with typical abdominal pain work-up checking CT scan of the abdomen and pelvis with contrast. [AGNIESZKA]   1628 EKG           EKG time/Interp time: 1622/1626  Rhythm/Rate: Sinus rhythm, 77 bpm  P waves and HI: Present, 124 milliseconds  QRS, axis: 94 ms, borderline right axis deviation  ST and T waves: No ST segment elevation present    Independently interpreted by me contemporaneously with treatment     [AGNIESZKA]   1726 I independently interpreted the Chest X-ray and my findings are: Lungs are hyperinflated bilaterally, no Pneumothorax, No Effusion, No Infiltrate   [AGNIESZKA]   1727 Sodium(!): 154 [AGNIESZKA]   1727 Chloride(!): 116 [AGNIESZKA]   1754 RVP is unremarkable. [AGNIESZKA]   1914 I just discussed with Dr. Camara from [facility] about this patient.  He agrees to accept him for further medical management  on the hospitalist service today. [AGNIESZKA]   1915 I independently interpreted the CT scan of the abdomen pelvis and my findings are: No free air, there are dilated loops of small bowel in the lower abdomen/pelvis region. [AGNIESZKA]      ED Course User Index  [AGNIESZKA] Fred Jon MD         DIAGNOSIS  Final diagnoses:   Ileus   Hypernatremia         DISPOSITION  Observation to [facility], telemetry        Latest Documented Vital Signs:  As of 19:17 EDT  BP- 140/87 HR- 64 Temp- 98 °F (36.7 °C) (Tympanic) O2 sat- 97%              --    Please note that portions of this were completed with a voice recognition program.       Note Disclaimer: At [facility], we believe that sharing information builds trust and better relationships. You are receiving this note because you are receiving care at [facility] or recently visited. It is possible you will see health information before a provider has talked with you about it. This kind of information can be easy to misunderstand. To help you fully understand what it means for your health, we urge you to discuss this note with your provider.             Fred Jon MD  10/29/23 1916       Fred Jon MD  10/29/23 1917      Electronically signed by Fred Jon MD at 10/29/23 1917       Degonda, Janet, RN at 10/29/23 1536          Pt has been constipated x 3 days.  He has taken senokot, linzess, motegrity.  He reports soa, has pain in his eyes, ears and jaw.  He is gagging in triage.  He is lightheaded and seeing stars    Electronically signed by Degonda, Janet, RN at 10/29/23 1538       Lines, Drains & Airways       Active LDAs       Name Placement date Placement time Site Days    Peripheral IV 10/29/23 1726 Anterior;Left Forearm 10/29/23  1726  Forearm  less than 1                  Medication Administration Report for Donny Mallory as of 10/30/23 1551     Legend:    Given Hold Not Given Due Canceled Entry Other Actions    Time Time (Time) Time Time-Action         Discontinued      Completed     Future     MAR Hold     Linked             Medications 10/29/23 10/30/23      acetaminophen (TYLENOL) tablet 650 mg  Dose: 650 mg  Freq: Every 4 Hours PRN Route: PO  PRN Reason: Mild Pain  Start: 10/29/23 1919   Admin Instructions:   Do not exceed 4 grams of acetaminophen in a 24 hr period.    If given for pain, use the following pain scale:   Mild Pain = Pain Score of 1-3, CPOT 1-2  Moderate Pain = Pain Score of 4-6, CPOT 3-4  Severe Pain = Pain Score of 7-10, CPOT 5-8  Based on patient request - if ordered for moderate or severe pain, provider allows for administration of a medication prescribed for a lower pain scale.    Do not exceed 4 grams of acetaminophen in a 24 hr period. Max dose of 2gm for AST/ALT greater than 120 units/L.    If given for pain, use the following pain scale:   Mild Pain = Pain Score of 1-3, CPOT 1-2  Moderate Pain = Pain Score of 4-6, CPOT 3-4  Severe Pain = Pain Score of 7-10, CPOT 5-8         bisacodyl (DULCOLAX) suppository 10 mg  Dose: 10 mg  Freq: Daily Route: RE  Start: 10/30/23 1515   Admin Instructions:   Hold for diarrhea     1515               budesonide-formoterol (SYMBICORT) 160-4.5 MCG/ACT inhaler 2 puff  Dose: 2 puff  Freq: 2 Times Daily - RT Route: IN  Start: 10/29/23 2245   Admin Instructions:    Shake well.  Rinse mouth after use, do not swallow water.  Send aerosols to pharmacy in ziplock bag for proper disposal.    2339-Given            0700-Given     2130             And  tiotropium (SPIRIVA RESPIMAT) 2.5 mcg/act aerosol solution inhaler  Dose: 2 puff  Freq: Daily - RT Route: IN  Start: 10/30/23 0930     0701-Given               dextrose 5 % and lactated Ringer's infusion  Rate: 100 mL/hr Dose: 100 mL/hr  Freq: Continuous Route: IV  Start: 10/30/23 1015     1016-New Bag               ipratropium-albuterol (DUO-NEB) nebulizer solution 3 mL  Dose: 3 mL  Freq: Every 4 Hours PRN Route: NEBULIZATION  PRN Reason: Wheezing  Start: 10/29/23 2149   Admin  Instructions:   Include Respiratory Treatment Education         ketorolac (TORADOL) injection 15 mg  Dose: 15 mg  Freq: Every 6 Hours PRN Route: IV  PRN Reason: Severe Pain  Start: 10/29/23 2006   End: 11/03/23 2005   Admin Instructions:   Based on patient request - if ordered for moderate or severe pain, provider allows for administration of a medication prescribed for a lower pain scale.      If given for pain, use the following pain scale:  Mild Pain = Pain Score of 1-3, CPOT 1-2  Moderate Pain = Pain Score of 4-6, CPOT 3-4  Severe Pain = Pain Score of 7-10, CPOT 5-8    2216-Given            0357-Given     0946-Given              levoFLOXacin (LEVAQUIN) 750 mg/150 mL D5W (premix) (LEVAQUIN) 750 mg  Dose: 750 mg  Freq: Every 24 Hours Route: IV  Indications of Use: UPPER RESPIRATORY TRACT INFECTION  Start: 10/29/23 2024   End: 11/03/23 2024   Admin Instructions:   Caution: Look alike/sound alike drug alert. Protect from light. Do NOT refrigerate.    2216-New Bag            0019-Stopped     2025              melatonin tablet 3 mg  Dose: 3 mg  Freq: Nightly PRN Route: PO  PRN Reason: Sleep  Start: 10/29/23 1919     0112-Given               ondansetron (ZOFRAN) tablet 4 mg  Dose: 4 mg  Freq: Every 6 Hours PRN Route: PO  PRN Reasons: Nausea,Vomiting  Start: 10/29/23 1919    2217-Not Given:  See Alt            0357-Not Given:  See Alt     0952-Not Given:  See Alt             Or  ondansetron (ZOFRAN) injection 4 mg  Dose: 4 mg  Freq: Every 6 Hours PRN Route: IV  PRN Reasons: Nausea,Vomiting  Start: 10/29/23 1919    2217-Given            0357-Given     0952-Given              pantoprazole (PROTONIX) injection 40 mg  Dose: 40 mg  Freq: Every Early Morning Route: IV  Indications of Use: GASTROESOPHAGEAL REFLUX DISEASE  Start: 10/30/23 0600   Admin Instructions:   Dilute with 10 mL of 0.9% NaCl and give IV push over 2 minutes.     0409-Given               sodium chloride 0.9 % flush 10 mL  Dose: 10 mL  Freq: As Needed Route:  IV  PRN Reason: Line Care  Start: 10/29/23 1605        Future Medications  Medications 10/29/23 10/30/23       Enoxaparin Sodium (LOVENOX) syringe 30 mg  Dose: 30 mg  Freq: Every 24 Hours Route: SC  Indications of Use: PROPHYLAXIS OF VENOUS THROMBOEMBOLISM  Start: 10/30/23 2015   Admin Instructions:   Give subcutaneous in abdomen only. Do not massage site after injection.     2015               senna (SENOKOT) tablet 1 tablet  Dose: 1 tablet  Freq: 2 Times Daily Route: PO  Start: 10/30/23 2100     2100              Completed Medications  Medications 10/29/23 10/30/23       HYDROmorphone (DILAUDID) injection 0.5 mg  Dose: 0.5 mg  Freq: Once Route: IV  Start: 10/29/23 2129   End: 10/29/23 2120   Admin Instructions:   Based on patient request - if ordered for moderate or severe pain, provider allows for administration of a medication prescribed for a lower pain scale.  If given for pain, use the following pain scale:  Mild Pain = Pain Score of 1-3, CPOT 1-2  Moderate Pain = Pain Score of 4-6, CPOT 3-4  Severe Pain = Pain Score of 7-10, CPOT 5-8    2120-Given                HYDROmorphone (DILAUDID) injection 0.5 mg  Dose: 0.5 mg  Freq: Once Route: IV  Start: 10/29/23 1921   End: 10/29/23 1926   Admin Instructions:   Based on patient request - if ordered for moderate or severe pain, provider allows for administration of a medication prescribed for a lower pain scale.  If given for pain, use the following pain scale:  Mild Pain = Pain Score of 1-3, CPOT 1-2  Moderate Pain = Pain Score of 4-6, CPOT 3-4  Severe Pain = Pain Score of 7-10, CPOT 5-8    1926-Given                HYDROmorphone (DILAUDID) injection 0.5 mg  Dose: 0.5 mg  Freq: Once Route: IV  Start: 10/29/23 1742   End: 10/29/23 1750   Admin Instructions:   Based on patient request - if ordered for moderate or severe pain, provider allows for administration of a medication prescribed for a lower pain scale.  If given for pain, use the following pain scale:  Mild  Pain = Pain Score of 1-3, CPOT 1-2  Moderate Pain = Pain Score of 4-6, CPOT 3-4  Severe Pain = Pain Score of 7-10, CPOT 5-8    1750-Given                iopamidol (ISOVUE-300) 61 % injection 100 mL  Dose: 100 mL  Freq: Once in Imaging Route: IV  Start: 10/29/23 181   End: 10/29/23 180    1801-Given by Other                lactated ringers bolus 1,000 mL  Dose: 1,000 mL  Freq: Once Route: IV  Start: 10/29/23 162   End: 10/29/23 175    1632-New Bag     1750-Stopped              Discontinued Medications  Medications 10/29/23 10/30/23       dextrose (D5W) 5 % infusion  Rate: 100 mL/hr Dose: 100 mL/hr  Freq: Continuous Route: IV  Start: 10/29/23 1938   End: 10/30/23 0926    2004-New Bag     2217-Currently Infusing           0019-Rate/Dose Verify     0221-Rate/Dose Verify     0431-Rate/Dose Verify     0632-Rate/Dose Verify            dextrose 5 % and sodium chloride 0.45 % infusion  Rate: 75 mL/hr Dose: 75 mL/hr  Freq: Continuous Route: IV  Start: 10/30/23 1015   End: 10/30/23 0927         Enoxaparin Sodium (LOVENOX) syringe 40 mg  Dose: 40 mg  Freq: Every 24 Hours Route: SC  Indications of Use: PROPHYLAXIS OF VENOUS THROMBOEMBOLISM  Start: 10/29/23 2014   End: 10/30/23 0748   Admin Instructions:   Give subcutaneous in abdomen only. Do not massage site after injection.    221-Given                Pharmacy to Dose enoxaparin (LOVENOX)  Freq: Continuous PRN Route: XX  PRN Reason: Consult  Indications Comment: Prophylaxis of Venous Thromboembolism  Start: 10/29/23 1920   End: 10/30/23 0748   Order specific questions:   Indication of use Prophylaxis                         Physician Progress Notes (last 48 hours)        Fernando Weber MD at 10/30/23 0956              Name: Donny Mallory ADMIT: 10/29/2023   : 1960  PCP: Janelel Lara MD    MRN: [id number] LOS: 0 days   AGE/SEX: 63 y.o. male  ROOM: [facility]     Subjective   Subjective   Lying in bed.  Reports feeling slightly better, continues to feel  nausea, no vomiting.  Abdominal pain still present but improved.  No BM but passing some gas.  Denies fevers or chills, no chest pain or palpitations.    Review of Systems  As above  Objective   Objective   Vital Signs  Temp:  [97.7 °F (36.5 °C)-98.3 °F (36.8 °C)] 98.3 °F (36.8 °C)  Heart Rate:  [] 54  Resp:  [16-18] 16  BP: (122-158)/(77-99) 128/77  SpO2:  [91 %-98 %] 97 %  on   ;   Device (Oxygen Therapy): room air  Body mass index is 16.89 kg/m².  Physical Exam    General: Alert and oriented x3, no acute distress  HEENT: Normocephalic, atraumatic  CV: Regular rate and rhythm, no murmurs rubs or gallops  Lungs: Clear to auscultation bilaterally, no crackles or wheezes  Abdomen: Mildly distended, generalized tenderness to palpation,  Extremities: No significant peripheral edema , no cyanosis     Results Review     I reviewed the patient's new clinical results.  Results from last 7 days   Lab Units 10/30/23  0455 10/29/23  1629 10/28/23  1232   WBC 10*3/mm3 5.97 8.36 7.72   HEMOGLOBIN g/dL 11.3* 13.3 14.5   PLATELETS 10*3/mm3 214 246 280     Results from last 7 days   Lab Units 10/30/23  0455 10/29/23  1629   SODIUM mmol/L 141 154*   POTASSIUM mmol/L 3.6 4.5   CHLORIDE mmol/L 107 116*   CO2 mmol/L 27.0 24.3   BUN mg/dL 13 15   CREATININE mg/dL 0.90 0.93   GLUCOSE mg/dL 88 88   Estimated Creatinine Clearance: 65.1 mL/min (by C-G formula based on SCr of 0.9 mg/dL).  Results from last 7 days   Lab Units 10/29/23  1629   ALBUMIN g/dL 4.2   BILIRUBIN mg/dL 0.5   ALK PHOS U/L 77   AST (SGOT) U/L 24   ALT (SGPT) U/L 14     Results from last 7 days   Lab Units 10/30/23  0455 10/29/23  1629   CALCIUM mg/dL 8.8 9.3   ALBUMIN g/dL  --  4.2   MAGNESIUM mg/dL 1.7  --    PHOSPHORUS mg/dL 3.5  --      Results from last 7 days   Lab Units 10/29/23  1629   PROCALCITONIN ng/mL 0.05   LACTATE mmol/L 0.9     COVID19   Date Value Ref Range Status   10/29/2023 Not Detected Not Detected - Ref. Range Final     SARS-CoV-2, CAREY   Date  "Value Ref Range Status   09/21/2023 NEGATIVE Negative Final     Comment:     The 2019-CoV rRT-PCR Assay is only for use under a Food and Drug Administration Emergency Use Authorization. The performance characteristics of the assay were verified by the Clinical Microbiology Laboratory at the [facility].   Results should be used in conjunction with the patient's clinical symptoms, medical history, and other clinical/laboratory findings to determine an overall clinical diagnosis. Negative results do not preclude infection with SARS-CoV-2 (COVID-19).    Test parameters have not been validated for screening asymptomatic patients.     No results found for: \"HGBA1C\", \"POCGLU\"        CT Abdomen Pelvis With Contrast  Narrative: CT ABDOMEN AND PELVIS WITH IV CONTRAST     HISTORY: 63-year-old male with abdominal pain. Cholecystectomy in the  past.     TECHNIQUE: Radiation dose reduction techniques were utilized, including  automated exposure control and exposure modulation based on body size.   3 mm images were obtained through the abdomen and pelvis after the  administration of IV contrast. Compared compared with previous CT  06/26/2023.        FINDINGS:  1. Small hepatic cysts appear stable and there is pneumobilia, and there  is no new liver abnormality. The spleen, adrenals, and kidneys appear  unremarkable other than several renal cysts. The ectasia of the  pancreatic duct to the ampulla appears stable, measuring up to 5 mm in  diameter.     2. There is mild-moderate dilatation of small bowel loops within the  pelvis which also have air-fluid levels. There is no definitive small  bowel thickening. This likely represents a small bowel ileus. There is  no convincing evidence for bowel obstruction. Caliber of the colon is  within normal limits. There is moderately extensive sigmoid  diverticulosis without evidence for diverticulitis. The appendix appears  within normal limits. Urinary bladder is " collapsed. The prostate is  enlarged measuring 5.7 cm in diameter. There are moderately extensive  abdominal aortic atherosclerotic changes without aneurysmal dilatation.     Impression: 1. There is likely an ileus of the distal small bowel. There is no  convincing evidence for bowel obstruction. There is sigmoid  diverticulosis, but no evidence for diverticulitis.  2. Stable pancreatic ductal dilatation.  3. Prostatomegaly. Follow-up with PSA is recommended.     XR Chest 1 View  Narrative: PORTABLE CHEST X-RAY     HISTORY: Cough and abdomen pain.     Portable chest x-ray consisting of 2 images is provided. Correlation:  Multiple prior chest x-rays as recent as June 11, 2022 and as remote as  February 21, 2022. Chest CT April 21, 2022 was also reviewed.     FINDINGS: The cardiomediastinal silhouette is normal. There is prominent  emphysematous change in the upper lung zones and some parenchymal  distortion bilaterally which appears similar as on the previous x-rays  and the chest CT. The costophrenic sulci are dry and the bones appear  normal. There is no pneumothorax.     Impression: Emphysematous change in the lungs. No acute cardiopulmonary  abnormality is identified.     This report was finalized on 10/29/2023 4:39 PM by Dr. Anson Ashraf M.D on Workstation: [id number]       Scheduled Medications  budesonide-formoterol, 2 puff, Inhalation, BID - RT   And  tiotropium bromide monohydrate, 2 puff, Inhalation, Daily - RT  enoxaparin, 30 mg, Subcutaneous, Q24H  levoFLOXacin, 750 mg, Intravenous, Q24H  pantoprazole, 40 mg, Intravenous, Q AM    Infusions  dextrose 5 % and lactated Ringer's, 100 mL/hr    Diet  NPO Diet NPO Type: Strict NPO    I have personally reviewed     [x]  Laboratory   []  Microbiology   [x]  Radiology   []  EKG/Telemetry  []  Cardiology/Vascular   []  Pathology    []  Records      Assessment/Plan     Active Hospital Problems    Diagnosis  POA    **Ileus [K56.7]  Yes    GERD without esophagitis  [K21.9]  Yes    Hypernatremia [E87.0]  Yes    COPD (chronic obstructive pulmonary disease) [J44.9]  Yes    Hypertension [I10]  Yes      Resolved Hospital Problems   No resolved problems to display.       63 y.o. male admitted with Ileus.    Patient is a 63 y.o. man with copd, hypertension, gerd who presents with a persistent sinus infection despite 10 days of amoxicillin as well as polyuria and hypernatremia and an ileus.      Ileus-CT scan showed  likely an ileus of the distal small bowel.  Continue IV fluids, pain management.  Monitor labs, electrolytes repleted as needed.  GI consult.        Hypernatremia-sodium was 154 on admission, repeat sodium this morning 141.  Discontinue D5 W, initiated on LR with D5.  Monitor.        Persistent sinus infection despite 10 days of amoxicillin therapy-will treat with 5 days of levaquin    Prostatomegaly-PSA normal.   will check PVR.   will need outpatient evaluation.    Hypertension-hold antihypertensives for the moment    GERD-iv ppi    COPD-continue home inhalers    Chronic pancreatic ductal dilation          Lovenox 30 mg SC daily for DVT prophylaxis.  Full code.  Discussed with patient.  Anticipate discharge  TBD      Copied text in this note has been reviewed and is accurate as of 10/30/23.         Dictated utilizing Dragon dictation        Fernando Weber MD  [facility]  10/30/23  09:56 EDT        Electronically signed by Fernando Weber MD at 10/30/23 1002          Consult Notes (last 48 hours)        Mahad Galdamez PA-C at 10/30/23 1202        Consult Orders    1. Inpatient Gastroenterology Consult [[id number]] ordered by Oliver Camara MD at 10/29/23 1920              Attestation signed by Clovis Tolentino MD at 10/30/23 1502    I have reviewed this documentation and agree.                  [facility]  Initial Inpatient Consult Note    Referring Provider: Dr. Weber    Reason for Consultation:  Ileus    Subjective     History of present illness:    63 y.o. male, a patient of Dr. Juarez of [facility], w/ PMHx of HTN, chronic constipation, hx of depression w/ prior suicide attempt, COPD, chronic sinusitis, hx of biliary and PD dilation presented to the ED w/ worsening abd pain, although it seems he has chronic abdominal pain. At arrival, his CT scan showed ileus. He reports he has chronic constipation and has tried linzess, amitiza, trulance, senna, miralax, colace in the past with suboptimal response. His last BM was 4 days ago.     Reviewed his GI records:   Patient has had multiple imaging that has shown patient to have biliary and PD dilation, but no mas. He has had EUS as well. EGD w/ no esophageal mass or stricture. No evidence of chronic pancreatitis. Patient was last seen by GI 09/19/2023 for abd pain. At the time, patient was recommended enteral feeding given continued weight loss, but patient had declined.     ERCP w/ spygximena w/ Dr. Denton 09/14/2023 w/ stent replacement and biopsy that showed atypical cells.      Reports colonoscopy 3 weeks ago that was negative apart from polyps that were resected.     CT 10/22/2023: No acute findings.       Past Medical History:  Past Medical History:   Diagnosis Date    Bell's palsy     COPD (chronic obstructive pulmonary disease)     Dysphagia     previous workup at [facility]    Hypertension      Past Surgical History:  Past Surgical History:   Procedure Laterality Date    CHOLECYSTECTOMY      CHOLECYSTECTOMY WITH INTRAOPERATIVE CHOLANGIOGRAM N/A 04/25/2022    Procedure: Laparoscopic cholecystectomy with intraoperative cholangiogram, possible open;  Surgeon: Khari Schofield MD;  Location: [facility];  Service: General;  Laterality: N/A;    HERNIA REPAIR        Social History:   Social History     Tobacco Use    Smoking status: Some Days    Smokeless tobacco: Never   Substance Use Topics    Alcohol use: No      Family History:  Family History   Family  history unknown: Yes       Home Meds:  Medications Prior to Admission   Medication Sig Dispense Refill Last Dose    acetaminophen (TYLENOL) 500 MG tablet Take 2 tablets by mouth Every 4 (Four) Hours As Needed for Mild Pain.       docusate sodium 100 MG capsule Take 1 capsule by mouth 2 (Two) Times a Day. 60 capsule 0     Fluticasone-Umeclidin-Vilant (Trelegy Ellipta) 100-62.5-25 MCG/INH inhaler Inhale 1 puff Daily.   10/28/2023    ibuprofen (ADVIL,MOTRIN) 200 MG tablet Take 1 tablet by mouth Every 6 (Six) Hours As Needed for Mild Pain.       ipratropium-albuterol (DUO-NEB) 0.5-2.5 mg/3 ml nebulizer Take 3 mL by nebulization Every 4 (Four) Hours As Needed for Wheezing.       lisinopril (PRINIVIL,ZESTRIL) 20 MG tablet Take 1 tablet by mouth Daily.   10/28/2023    omeprazole (priLOSEC) 20 MG capsule Take 1 capsule by mouth Daily. 30 capsule 0 10/28/2023    ondansetron ODT (ZOFRAN-ODT) 4 MG disintegrating tablet Place 1 tablet on the tongue 4 (Four) Times a Day As Needed for Nausea or Vomiting. 15 tablet 0 10/29/2023    promethazine (PHENERGAN) 25 MG tablet Take 1 tablet by mouth Every 6 (Six) Hours As Needed for Nausea or Vomiting. 10 tablet 0 Unknown    sucralfate (CARAFATE) 1 g tablet Take 1 tablet by mouth 4 (Four) Times a Day. 40 tablet 0      Current Meds:   budesonide-formoterol, 2 puff, Inhalation, BID - RT   And  tiotropium bromide monohydrate, 2 puff, Inhalation, Daily - RT  enoxaparin, 30 mg, Subcutaneous, Q24H  levoFLOXacin, 750 mg, Intravenous, Q24H  pantoprazole, 40 mg, Intravenous, Q AM      Allergies:  Allergies   Allergen Reactions    Prochlorperazine Anxiety       Objective     Vital Signs  Temp:  [97.7 °F (36.5 °C)-98.3 °F (36.8 °C)] 97.8 °F (36.6 °C)  Heart Rate:  [] 53  Resp:  [16-18] 16  BP: (122-158)/(77-99) 125/84  Physical Exam:  General Appearance:     Flat affect; Alert, cooperative, in no acute distress   Abdomen:     Soft, but mild distension, diffuse mild TTP, BS+   Rectal:      Deferred       Results Review:   I reviewed the patient's new clinical results.    Results from last 7 days   Lab Units 10/30/23  0455 10/29/23  1629 10/28/23  1232   WBC 10*3/mm3 5.97 8.36 7.72   HEMOGLOBIN g/dL 11.3* 13.3 14.5   HEMATOCRIT % 33.9* 40.4 45.1   PLATELETS 10*3/mm3 214 246 280     Results from last 7 days   Lab Units 10/30/23  0455 10/29/23  1629   SODIUM mmol/L 141 154*   POTASSIUM mmol/L 3.6 4.5   CHLORIDE mmol/L 107 116*   CO2 mmol/L 27.0 24.3   BUN mg/dL 13 15   CREATININE mg/dL 0.90 0.93   CALCIUM mg/dL 8.8 9.3   BILIRUBIN mg/dL  --  0.5   ALK PHOS U/L  --  77   ALT (SGPT) U/L  --  14   AST (SGOT) U/L  --  24   GLUCOSE mg/dL 88 88     Results from last 7 days   Lab Units 10/29/23  1629   INR  0.96     Lab Results   Lab Value Date/Time    LIPASE 31 10/29/2023 1629    LIPASE 20 10/28/2023 1232    LIPASE 43 10/22/2023 1526    LIPASE 18 10/16/2023 1509    LIPASE 36 10/09/2023 1435    LIPASE 20 10/07/2023 1303    LIPASE 44 09/26/2023 1716    LIPASE 49 09/21/2023 0031    LIPASE 23 09/11/2023 1507    LIPASE 40 08/15/2023 1537    LIPASE 40 07/26/2023 1437    LIPASE 22 07/22/2023 1843    LIPASE 20 07/14/2023 1223    LIPASE 25 07/01/2023 1451    LIPASE 25 06/26/2023 1902    LIPASE 26 06/23/2023 1500    LIPASE 17 06/19/2023 1222    LIPASE 21 06/15/2023 2359    LIPASE 27 05/16/2023 1512    LIPASE 67 (H) 07/28/2022 1556    LIPASE 20 06/27/2022 2034    LIPASE 33 06/23/2022 1837    LIPASE 22 06/18/2022 1127    LIPASE 31 06/11/2022 1213    LIPASE 95 (H) 06/08/2022 1506    LIPASE 26 06/03/2022 1004    LIPASE 25 05/16/2022 1136    LIPASE 23 05/02/2022 1706    LIPASE 80 (H) 04/22/2022 0548    LIPASE 425 (H) 04/21/2022 1012    LIPASE 20 04/19/2022 1118    LIPASE 25 04/03/2022 1123    LIPASE 23 03/12/2022 2156    LIPASE 31 02/21/2022 1614    LIPASE 31 02/18/2022 1400    LIPASE 29 02/06/2022 1552    LIPASE 30 01/24/2022 1221    LIPASE 30 12/22/2021 1517    LIPASE 24 11/12/2021 1253    LIPASE 18 10/23/2021 1707     LIPASE 22 08/31/2021 1206    LIPASE 24 08/20/2021 1347    LIPASE 23 08/03/2021 0024    LIPASE 25 07/22/2021 0218    LIPASE 129 (H) 07/02/2021 1554    LIPASE 36 06/25/2021 1615    LIPASE 26 06/15/2021 1725    LIPASE 66 04/12/2021 1630    LIPASE 103 08/12/2019 1105    LIPASE 55 02/12/2019 2052       Radiology:  CT Abdomen Pelvis With Contrast   Final Result   1. There is likely an ileus of the distal small bowel. There is no   convincing evidence for bowel obstruction. There is sigmoid   diverticulosis, but no evidence for diverticulitis.   2. Stable pancreatic ductal dilatation.   3. Prostatomegaly. Follow-up with PSA is recommended.       This report was finalized on 10/30/2023 10:22 AM by Dr. Jennifer Morris M.D   on Workstation: [id number]          XR Chest 1 View   Final Result   Emphysematous change in the lungs. No acute cardiopulmonary   abnormality is identified.       This report was finalized on 10/29/2023 4:39 PM by Dr. Anson Ashraf M.D on Workstation: [id number]                Assessment:   Ileus- may be from recent acute illness, chronic constipation  Abdominal pain  Chronic constipation- last BM 4 days ago  Sinusitis- recent completed abx  Chronically dilated biliary and PD s/p stent placement 09/14,  WNL; CEA minimally elevated at 4.5 on 10/6/2023    Plan:   Continue supportive care w/ IVF, anti-emetics. Recommend staying NPO given significant abd pain.   Minimize narcotics if possible  Encouraged ambulation  Start gentle bowel regimen with stool softener and suppositories.   Will repeat KUB tomorrow am  Per patient report, last colonoscopy 3 weeks ago w/ Dr. Juarez that was negative apart from colonic polyps    I discussed the patients findings and my recommendations with patient.         Mahad Galdamez PA-C  [facility]  [address] [place] [address]  Office: [phone]        Electronically signed by Clovis Tolentino MD at 10/30/23 2029

## 2023-10-30 NOTE — SIGNIFICANT NOTE
10/30/23 0646   OTHER   Discipline physical therapist   Therapy Assessment/Plan (PT)   Criteria for Skilled Interventions Met (PT) no problems identified which require skilled intervention  (nsg doc ampac of 24 (indep with all mobility).  No indication for acute PT.)

## 2023-10-30 NOTE — CONSULTS
"Nutrition Services    Patient Name:  Donny Mallory  YOB: 1960  MRN: [id number]  Admit Date:  10/29/2023    Assessment Date:  10/30/23    Summary: Nutrition assessment initiated due to nursing admission screen.  Pt was admitted with Ileus, sinus infection, COPD, hypernatremia.  Struggling with nausea. Receiving zofran, IVF@ 100mL/hr. Pt reports he has lost ~35 lbs in a year but our weight index does not reflect this.    Plan/Recommendations:  Adv diet as tolerates.    Will follow clinical course, nutrition needs.    CLINICAL NUTRITION ASSESSMENT      Reason for Assessment Nurse Admission Screen, Per Organizational Policy     Diagnosis/Problem   Ileus, sinus infection, COPD, hypernatremia   Medical/Surgical History Past Medical History:   Diagnosis Date    Bell's palsy     COPD (chronic obstructive pulmonary disease)     Dysphagia     previous workup at [facility]    Hypertension        Past Surgical History:   Procedure Laterality Date    CHOLECYSTECTOMY      CHOLECYSTECTOMY WITH INTRAOPERATIVE CHOLANGIOGRAM N/A 04/25/2022    Procedure: Laparoscopic cholecystectomy with intraoperative cholangiogram, possible open;  Surgeon: Khari Schofield MD;  Location: [facility];  Service: General;  Laterality: N/A;    HERNIA REPAIR          Anthropometrics        Current Height  Current Weight  BMI kg/m2 Height: 177.8 cm (70\")  Weight: 53.4 kg (117 lb 11.6 oz) (10/30/23 0600)  Body mass index is 16.89 kg/m².   Adjusted BMI (if applicable)    BMI Category Underweight (18.4 or below)   Ideal Body Weight (IBW) 160lb   Usual Body Weight (UBW) 154lb   Weight Trend Loss   Weight History Wt Readings from Last 30 Encounters:   10/30/23 0600 53.4 kg (117 lb 11.6 oz)   10/30/23 0500 53.4 kg (117 lb 11.6 oz)   10/30/23 0200 53.4 kg (117 lb 11.6 oz)   10/29/23 2158 53.4 kg (117 lb 11.6 oz)   10/29/23 1636 54.4 kg (120 lb)   06/26/23 1658 56.7 kg (125 lb)   06/03/22 1003 54.4 kg (120 lb)   05/02/22 1624 55.8 kg (123 " "lb)   04/30/22 0500 55.8 kg (123 lb)   04/29/22 0709 55.9 kg (123 lb 3.2 oz)   04/29/22 0600 57.5 kg (126 lb 12.2 oz)   04/28/22 0422 57.9 kg (127 lb 9.6 oz)   04/27/22 0646 58.7 kg (129 lb 8 oz)   04/26/22 0630 60.9 kg (134 lb 4.2 oz)   04/24/22 0500 59.5 kg (131 lb 2.8 oz)   04/23/22 1251 57.2 kg (126 lb)   04/23/22 0500 57.2 kg (126 lb 1.7 oz)   04/22/22 0922 57.1 kg (125 lb 14.1 oz)   04/21/22 1433 57.1 kg (125 lb 14.1 oz)   04/21/22 1000 54.4 kg (120 lb)   02/21/22 1615 54.9 kg (121 lb)   05/16/17 1824 55.2 kg (121 lb 9.6 oz)   05/15/17 1846 56.7 kg (125 lb)      --  Labs       Pertinent Labs    Results from last 7 days   Lab Units 10/30/23  0455 10/29/23  1629   SODIUM mmol/L 141 154*   POTASSIUM mmol/L 3.6 4.5   CHLORIDE mmol/L 107 116*   CO2 mmol/L 27.0 24.3   BUN mg/dL 13 15   CREATININE mg/dL 0.90 0.93   CALCIUM mg/dL 8.8 9.3   BILIRUBIN mg/dL  --  0.5   ALK PHOS U/L  --  77   ALT (SGPT) U/L  --  14   AST (SGOT) U/L  --  24   GLUCOSE mg/dL 88 88     Results from last 7 days   Lab Units 10/30/23  0455 10/29/23  1629   MAGNESIUM mg/dL 1.7  --    PHOSPHORUS mg/dL 3.5  --    HEMOGLOBIN g/dL 11.3* 13.3   HEMATOCRIT % 33.9* 40.4   WBC 10*3/mm3 5.97 8.36   ALBUMIN g/dL  --  4.2     Results from last 7 days   Lab Units 10/30/23  0455 10/29/23  1629 10/28/23  1232   INR   --  0.96  --    APTT seconds  --  29.8  --    PLATELETS 10*3/mm3 214 246 280     COVID19   Date Value Ref Range Status   10/29/2023 Not Detected Not Detected - Ref. Range Final     No results found for: \"HGBA1C\"       Medications           Scheduled Medications bisacodyl, 10 mg, Rectal, Daily  budesonide-formoterol, 2 puff, Inhalation, BID - RT   And  tiotropium bromide monohydrate, 2 puff, Inhalation, Daily - RT  enoxaparin, 30 mg, Subcutaneous, Q24H  levoFLOXacin, 750 mg, Intravenous, Q24H  pantoprazole, 40 mg, Intravenous, Q AM  senna, 1 tablet, Oral, BID       Infusions dextrose 5 % and lactated Ringer's, 100 mL/hr, Last Rate: 100 mL/hr " (10/30/23 1016)       PRN Medications   acetaminophen    ipratropium-albuterol    ketorolac    melatonin    ondansetron **OR** ondansetron    [COMPLETED] Insert Peripheral IV **AND** sodium chloride     Physical Findings          General Findings alert, oriented, underweight   Oral/Mouth Cavity tooth or teeth missing   Edema  not assessed   Gastrointestinal nausea, last bowel movement: 10/27   Skin  skin intact   Tubes/Drains/Lines none   NFPE Patient/family declined exam   --  Current Nutrition Orders & Evaluation of Intake       Oral Nutrition     Food Allergies NKFA   Current PO Diet NPO Diet NPO Type: Strict NPO   Supplement n/a   PO Evaluation     % PO Intake 0    Factors Affecting Intake: altered GI function, nausea   --  PES STATEMENT / NUTRITION DIAGNOSIS      Nutrition Dx Problem  Problem: Altered GI Function  Etiology: Medical Diagnosis - Ileus    Signs/Symptoms: NPO, Report of Minimal PO Intake, BMI, and Unintended Weight Change     NUTRITION INTERVENTION / PLAN OF CARE      Intervention Goal(s) Maintain nutrition status, Reduce/improve symptoms, Meet estimated needs, Disease management/therapy, Initiate feeding/diet, Tolerate PO , and Appropriate weight gain         RD Intervention/Action Await initiation/advancement of PO diet, Continue to monitor, and Care plan reviewed   --      Prescription/Orders:       PO Diet       Supplements       Enteral Nutrition       Parenteral Nutrition    New Prescription Ordered? No changes at this time   --      Monitor/Evaluation Per protocol   Discharge Plan/Needs Pending clinical course   --    RD to follow per protocol.    Electronically signed by:  Mary Mallory RD  10/30/23 15:50 EDT

## 2023-10-30 NOTE — ED NOTES
Nursing report ED to floor  Donny Mallory  63 y.o.  male    HPI :   Chief Complaint   Patient presents with    Abdominal Pain       Admitting doctor:   Oliver Camara MD    Admitting diagnosis:   The primary encounter diagnosis was Ileus. A diagnosis of Hypernatremia was also pertinent to this visit.    Code status:   Current Code Status       Date Active Code Status Order ID Comments User Context       10/29/2023 1958 No CPR (Do Not Attempt to Resuscitate) [id number]  Oliver Camara MD ED        Question Answer    Code Status (Patient has no pulse and is not breathing) No CPR (Do Not Attempt to Resuscitate)    Medical Interventions (Patient has pulse or is breathing) Full Support    Level Of Support Discussed With Patient                    Allergies:   Prochlorperazine    Isolation:   No active isolations    Intake and Output    Intake/Output Summary (Last 24 hours) at 10/29/2023 2031  Last data filed at 10/29/2023 1750  Gross per 24 hour   Intake 1000 ml   Output --   Net 1000 ml       Weight:       10/29/23  1636   Weight: 54.4 kg (120 lb)       Most recent vitals:   Vitals:    10/29/23 1816 10/29/23 1846 10/29/23 1945 10/29/23 1946   BP: 144/92 140/87  158/79   Pulse: 65 64     Resp:       Temp:       TempSrc:       SpO2: 97% 97% 94%    Weight:       Height:           Active LDAs/IV Access:   Lines, Drains & Airways       Active LDAs       Name Placement date Placement time Site Days    Peripheral IV 10/29/23 1726 Anterior;Left Forearm 10/29/23  1726  Forearm  less than 1                    Labs (abnormal labs have a star):   Labs Reviewed   COMPREHENSIVE METABOLIC PANEL - Abnormal; Notable for the following components:       Result Value    Sodium 154 (*)     Chloride 116 (*)     All other components within normal limits    Narrative:     GFR Normal >60  Chronic Kidney Disease <60  Kidney Failure <15     URINALYSIS W/ CULTURE IF INDICATED - Abnormal; Notable for the following components:    Blood, UA Small (1+)  (*)     Protein,  mg/dL (2+) (*)     All other components within normal limits    Narrative:     In absence of clinical symptoms, the presence of pyuria, bacteria, and/or nitrites on the urinalysis result does not correlate with infection.   SINGLE HSTROPONIN T - Abnormal; Notable for the following components:    HS Troponin T 20 (*)     All other components within normal limits    Narrative:     High Sensitive Troponin T Reference Range:  <10.0 ng/L- Negative Female for AMI  <15.0 ng/L- Negative Male for AMI  >=10 - Abnormal Female indicating possible myocardial injury.  >=15 - Abnormal Male indicating possible myocardial injury.   Clinicians would have to utilize clinical acumen, EKG, Troponin, and serial changes to determine if it is an Acute Myocardial Infarction or myocardial injury due to an underlying chronic condition.        RESPIRATORY PANEL PCR W/ COVID-19 (SARS-COV-2), NP SWAB IN UTM/VTP, 3-4 HR TAT - Normal    Narrative:     In the setting of a positive respiratory panel with a viral infection PLUS a negative procalcitonin without other underlying concern for bacterial infection, consider observing off antibiotics or discontinuation of antibiotics and continue supportive care. If the respiratory panel is positive for atypical bacterial infection (Bordetella pertussis, Chlamydophila pneumoniae, or Mycoplasma pneumoniae), consider antibiotic de-escalation to target atypical bacterial infection.   LIPASE - Normal   PROCALCITONIN - Normal    Narrative:     As a Marker for Sepsis (Non-Neonates):    1. <0.5 ng/mL represents a low risk of severe sepsis and/or septic shock.  2. >2 ng/mL represents a high risk of severe sepsis and/or septic shock.    As a Marker for Lower Respiratory Tract Infections that require antibiotic therapy:    PCT on Admission    Antibiotic Therapy       6-12 Hrs later    >0.5                Strongly Recommended  >0.25 - <0.5        Recommended   0.1 - 0.25          Discouraged     "          Remeasure/reassess PCT  <0.1                Strongly Discouraged     Remeasure/reassess PCT    As 28 day mortality risk marker: \"Change in Procalcitonin Result\" (>80% or <=80%) if Day 0 (or Day 1) and Day 4 values are available. Refer to [email or web address]://[email or web address]-pct-calculator.com    Change in PCT <=80%  A decrease of PCT levels below or equal to 80% defines a positive change in PCT test result representing a higher risk for 28-day all-cause mortality of patients diagnosed with severe sepsis for septic shock.    Change in PCT >80%  A decrease of PCT levels of more than 80% defines a negative change in PCT result representing a lower risk for 28-day all-cause mortality of patients diagnosed with severe sepsis or septic shock.      LACTIC ACID, PLASMA - Normal   CBC WITH AUTO DIFFERENTIAL - Normal   APTT - Normal   PROTIME-INR - Normal   PSA DIAGNOSTIC - Normal    Narrative:     Results may be falsely decreased if patient taking Biotin.    Testing Method: [employer] Electrochemiluminescence Immunoassay(ECLIA)  Values obtained with different assay methods or kits cannot be used interchangeably.   URINALYSIS, MICROSCOPIC ONLY   CBC AND DIFFERENTIAL    Narrative:     The following orders were created for panel order CBC & Differential.  Procedure                               Abnormality         Status                     ---------                               -----------         ------                     CBC Auto Differential[[id number]]        Normal              Final result                 Please view results for these tests on the individual orders.       EKG:   ECG 12 Lead Other; dyspnea, epigastric pain   Preliminary Result   HEART RATE= 77  bpm   RR Interval= 779  ms   MO Interval= 124  ms   P Horizontal Axis= -2  deg   P Front Axis= 84  deg   QRSD Interval= 94  ms   QT Interval= 374  ms   QTcB= 424  ms   QRS Axis= 82  deg   T Wave Axis= 88  deg   - ABNORMAL ECG -   Sinus rhythm   Biatrial enlargement "   Borderline right axis deviation   Probable left ventricular hypertrophy   Electronically Signed By:    Date and Time of Study: 2023-10-29 16:22:26          Meds given in ED:   Medications   sodium chloride 0.9 % flush 10 mL (has no administration in time range)   acetaminophen (TYLENOL) tablet 650 mg (has no administration in time range)   ondansetron (ZOFRAN) tablet 4 mg (has no administration in time range)     Or   ondansetron (ZOFRAN) injection 4 mg (has no administration in time range)   melatonin tablet 3 mg (has no administration in time range)   Pharmacy to Dose enoxaparin (LOVENOX) (has no administration in time range)   dextrose (D5W) 5 % infusion (100 mL/hr Intravenous New Bag 10/29/23 2004)   pantoprazole (PROTONIX) injection 40 mg (has no administration in time range)   Enoxaparin Sodium (LOVENOX) syringe 40 mg (has no administration in time range)   ketorolac (TORADOL) injection 15 mg (has no administration in time range)   levoFLOXacin (LEVAQUIN) 750 mg/150 mL D5W (premix) (LEVAQUIN) 750 mg (has no administration in time range)   lactated ringers bolus 1,000 mL (0 mL Intravenous Stopped 10/29/23 1750)   HYDROmorphone (DILAUDID) injection 0.5 mg (0.5 mg Intravenous Given 10/29/23 1750)   iopamidol (ISOVUE-300) 61 % injection 100 mL (95 mL Intravenous Given by Other 10/29/23 1801)   HYDROmorphone (DILAUDID) injection 0.5 mg (0.5 mg Intravenous Given 10/29/23 1926)       Imaging results:  CT Abdomen Pelvis With Contrast    Result Date: 10/29/2023  1. There is likely an ileus of the distal small bowel. There is no convincing evidence for bowel obstruction. There is sigmoid diverticulosis, but no evidence for diverticulitis. 2. Stable pancreatic ductal dilatation. 3. Prostatomegaly. Follow-up with PSA is recommended.      XR Chest 1 View    Result Date: 10/29/2023  Emphysematous change in the lungs. No acute cardiopulmonary abnormality is identified.  This report was finalized on 10/29/2023 4:39 PM by  Dr. Anson Ashraf M.D on Workstation: [id number]       Ambulatory status:   - ad sivakumar    Social issues:   Social History     Socioeconomic History    Marital status: Single   Tobacco Use    Smoking status: Some Days    Smokeless tobacco: Never   Substance and Sexual Activity    Alcohol use: No    Drug use: No       NIH Stroke Scale:       Su Deras RN  10/29/23 20:31 EDT

## 2023-10-30 NOTE — CONSULTS
[facility]  Initial Inpatient Consult Note    Referring Provider: Dr. Weber    Reason for Consultation: Ileus    Subjective     History of present illness:    63 y.o. male, a patient of Dr. Juarez of [facility], w/ PMHx of HTN, chronic constipation, hx of depression w/ prior suicide attempt, COPD, chronic sinusitis, hx of biliary and PD dilation presented to the ED w/ worsening abd pain, although it seems he has chronic abdominal pain. At arrival, his CT scan showed ileus. He reports he has chronic constipation and has tried linzess, amitiza, trulance, senna, miralax, colace in the past with suboptimal response. His last BM was 4 days ago.     Reviewed his GI records:   Patient has had multiple imaging that has shown patient to have biliary and PD dilation, but no mas. He has had EUS as well. EGD w/ no esophageal mass or stricture. No evidence of chronic pancreatitis. Patient was last seen by GI 09/19/2023 for abd pain. At the time, patient was recommended enteral feeding given continued weight loss, but patient had declined.     ERCP w/ spyglass w/ Dr. Denton 09/14/2023 w/ stent replacement and biopsy that showed atypical cells.      Reports colonoscopy 3 weeks ago that was negative apart from polyps that were resected.     CT 10/22/2023: No acute findings.       Past Medical History:  Past Medical History:   Diagnosis Date    Bell's palsy     COPD (chronic obstructive pulmonary disease)     Dysphagia     previous workup at [facility]    Hypertension      Past Surgical History:  Past Surgical History:   Procedure Laterality Date    CHOLECYSTECTOMY      CHOLECYSTECTOMY WITH INTRAOPERATIVE CHOLANGIOGRAM N/A 04/25/2022    Procedure: Laparoscopic cholecystectomy with intraoperative cholangiogram, possible open;  Surgeon: Khari Schofield MD;  Location: [facility];  Service: General;  Laterality: N/A;    HERNIA REPAIR        Social History:   Social History     Tobacco Use    Smoking  status: Some Days    Smokeless tobacco: Never   Substance Use Topics    Alcohol use: No      Family History:  Family History   Family history unknown: Yes       Home Meds:  Medications Prior to Admission   Medication Sig Dispense Refill Last Dose    acetaminophen (TYLENOL) 500 MG tablet Take 2 tablets by mouth Every 4 (Four) Hours As Needed for Mild Pain.       docusate sodium 100 MG capsule Take 1 capsule by mouth 2 (Two) Times a Day. 60 capsule 0     Fluticasone-Umeclidin-Vilant (Trelegy Ellipta) 100-62.5-25 MCG/INH inhaler Inhale 1 puff Daily.   10/28/2023    ibuprofen (ADVIL,MOTRIN) 200 MG tablet Take 1 tablet by mouth Every 6 (Six) Hours As Needed for Mild Pain.       ipratropium-albuterol (DUO-NEB) 0.5-2.5 mg/3 ml nebulizer Take 3 mL by nebulization Every 4 (Four) Hours As Needed for Wheezing.       lisinopril (PRINIVIL,ZESTRIL) 20 MG tablet Take 1 tablet by mouth Daily.   10/28/2023    omeprazole (priLOSEC) 20 MG capsule Take 1 capsule by mouth Daily. 30 capsule 0 10/28/2023    ondansetron ODT (ZOFRAN-ODT) 4 MG disintegrating tablet Place 1 tablet on the tongue 4 (Four) Times a Day As Needed for Nausea or Vomiting. 15 tablet 0 10/29/2023    promethazine (PHENERGAN) 25 MG tablet Take 1 tablet by mouth Every 6 (Six) Hours As Needed for Nausea or Vomiting. 10 tablet 0 Unknown    sucralfate (CARAFATE) 1 g tablet Take 1 tablet by mouth 4 (Four) Times a Day. 40 tablet 0      Current Meds:   budesonide-formoterol, 2 puff, Inhalation, BID - RT   And  tiotropium bromide monohydrate, 2 puff, Inhalation, Daily - RT  enoxaparin, 30 mg, Subcutaneous, Q24H  levoFLOXacin, 750 mg, Intravenous, Q24H  pantoprazole, 40 mg, Intravenous, Q AM      Allergies:  Allergies   Allergen Reactions    Prochlorperazine Anxiety       Objective     Vital Signs  Temp:  [97.7 °F (36.5 °C)-98.3 °F (36.8 °C)] 97.8 °F (36.6 °C)  Heart Rate:  [] 53  Resp:  [16-18] 16  BP: (122-158)/(77-99) 125/84  Physical Exam:  General Appearance:     Flat  affect; Alert, cooperative, in no acute distress   Abdomen:     Soft, but mild distension, diffuse mild TTP, BS+   Rectal:     Deferred       Results Review:   I reviewed the patient's new clinical results.    Results from last 7 days   Lab Units 10/30/23  0455 10/29/23  1629 10/28/23  1232   WBC 10*3/mm3 5.97 8.36 7.72   HEMOGLOBIN g/dL 11.3* 13.3 14.5   HEMATOCRIT % 33.9* 40.4 45.1   PLATELETS 10*3/mm3 214 246 280     Results from last 7 days   Lab Units 10/30/23  0455 10/29/23  1629   SODIUM mmol/L 141 154*   POTASSIUM mmol/L 3.6 4.5   CHLORIDE mmol/L 107 116*   CO2 mmol/L 27.0 24.3   BUN mg/dL 13 15   CREATININE mg/dL 0.90 0.93   CALCIUM mg/dL 8.8 9.3   BILIRUBIN mg/dL  --  0.5   ALK PHOS U/L  --  77   ALT (SGPT) U/L  --  14   AST (SGOT) U/L  --  24   GLUCOSE mg/dL 88 88     Results from last 7 days   Lab Units 10/29/23  1629   INR  0.96     Lab Results   Lab Value Date/Time    LIPASE 31 10/29/2023 1629    LIPASE 20 10/28/2023 1232    LIPASE 43 10/22/2023 1526    LIPASE 18 10/16/2023 1509    LIPASE 36 10/09/2023 1435    LIPASE 20 10/07/2023 1303    LIPASE 44 09/26/2023 1716    LIPASE 49 09/21/2023 0031    LIPASE 23 09/11/2023 1507    LIPASE 40 08/15/2023 1537    LIPASE 40 07/26/2023 1437    LIPASE 22 07/22/2023 1843    LIPASE 20 07/14/2023 1223    LIPASE 25 07/01/2023 1451    LIPASE 25 06/26/2023 1902    LIPASE 26 06/23/2023 1500    LIPASE 17 06/19/2023 1222    LIPASE 21 06/15/2023 2359    LIPASE 27 05/16/2023 1512    LIPASE 67 (H) 07/28/2022 1556    LIPASE 20 06/27/2022 2034    LIPASE 33 06/23/2022 1837    LIPASE 22 06/18/2022 1127    LIPASE 31 06/11/2022 1213    LIPASE 95 (H) 06/08/2022 1506    LIPASE 26 06/03/2022 1004    LIPASE 25 05/16/2022 1136    LIPASE 23 05/02/2022 1706    LIPASE 80 (H) 04/22/2022 0548    LIPASE 425 (H) 04/21/2022 1012    LIPASE 20 04/19/2022 1118    LIPASE 25 04/03/2022 1123    LIPASE 23 03/12/2022 2156    LIPASE 31 02/21/2022 1614    LIPASE 31 02/18/2022 1400    LIPASE 29 02/06/2022  1552    LIPASE 30 01/24/2022 1221    LIPASE 30 12/22/2021 1517    LIPASE 24 11/12/2021 1253    LIPASE 18 10/23/2021 1707    LIPASE 22 08/31/2021 1206    LIPASE 24 08/20/2021 1347    LIPASE 23 08/03/2021 0024    LIPASE 25 07/22/2021 0218    LIPASE 129 (H) 07/02/2021 1554    LIPASE 36 06/25/2021 1615    LIPASE 26 06/15/2021 1725    LIPASE 66 04/12/2021 1630    LIPASE 103 08/12/2019 1105    LIPASE 55 02/12/2019 2052       Radiology:  CT Abdomen Pelvis With Contrast   Final Result   1. There is likely an ileus of the distal small bowel. There is no   convincing evidence for bowel obstruction. There is sigmoid   diverticulosis, but no evidence for diverticulitis.   2. Stable pancreatic ductal dilatation.   3. Prostatomegaly. Follow-up with PSA is recommended.       This report was finalized on 10/30/2023 10:22 AM by Dr. Jennifer Morris M.D   on Workstation: [id number]          XR Chest 1 View   Final Result   Emphysematous change in the lungs. No acute cardiopulmonary   abnormality is identified.       This report was finalized on 10/29/2023 4:39 PM by Dr. Anson Ashraf M.D on Workstation: [id number]                Assessment:   Ileus- may be from recent acute illness, chronic constipation  Abdominal pain  Chronic constipation- last BM 4 days ago  Sinusitis- recent completed abx  Chronically dilated biliary and PD s/p stent placement 09/14,  WNL; CEA minimally elevated at 4.5 on 10/6/2023    Plan:   Continue supportive care w/ IVF, anti-emetics. Recommend staying NPO given significant abd pain.   Minimize narcotics if possible  Encouraged ambulation  Start gentle bowel regimen with stool softener and suppositories.   Will repeat KUB tomorrow am  Per patient report, last colonoscopy 3 weeks ago w/ Dr. Juarez that was negative apart from colonic polyps    I discussed the patients findings and my recommendations with patient.         Mahad Galdamez PA-C  [facility]  [address] [place] [address]  [address]  [place] [address]  Office: [phone]

## 2023-10-30 NOTE — PROGRESS NOTES
Name: Donny Mallory ADMIT: 10/29/2023   : 1960  PCP: Janelle Lara MD    MRN: [id number] LOS: 0 days   AGE/SEX: 63 y.o. male  ROOM: [facility]     Subjective   Subjective   Lying in bed.  Reports feeling slightly better, continues to feel nausea, no vomiting.  Abdominal pain still present but improved.  No BM but passing some gas.  Denies fevers or chills, no chest pain or palpitations.    Review of Systems   As above  Objective   Objective   Vital Signs  Temp:  [97.7 °F (36.5 °C)-98.3 °F (36.8 °C)] 98.3 °F (36.8 °C)  Heart Rate:  [] 54  Resp:  [16-18] 16  BP: (122-158)/(77-99) 128/77  SpO2:  [91 %-98 %] 97 %  on   ;   Device (Oxygen Therapy): room air  Body mass index is 16.89 kg/m².  Physical Exam    General: Alert and oriented x3, no acute distress  HEENT: Normocephalic, atraumatic  CV: Regular rate and rhythm, no murmurs rubs or gallops  Lungs: Clear to auscultation bilaterally, no crackles or wheezes  Abdomen: Mildly distended, generalized tenderness to palpation,  Extremities: No significant peripheral edema , no cyanosis     Results Review     I reviewed the patient's new clinical results.  Results from last 7 days   Lab Units 10/30/23  0455 10/29/23  1629 10/28/23  1232   WBC 10*3/mm3 5.97 8.36 7.72   HEMOGLOBIN g/dL 11.3* 13.3 14.5   PLATELETS 10*3/mm3 214 246 280     Results from last 7 days   Lab Units 10/30/23  0455 10/29/23  1629   SODIUM mmol/L 141 154*   POTASSIUM mmol/L 3.6 4.5   CHLORIDE mmol/L 107 116*   CO2 mmol/L 27.0 24.3   BUN mg/dL 13 15   CREATININE mg/dL 0.90 0.93   GLUCOSE mg/dL 88 88   Estimated Creatinine Clearance: 65.1 mL/min (by C-G formula based on SCr of 0.9 mg/dL).  Results from last 7 days   Lab Units 10/29/23  1629   ALBUMIN g/dL 4.2   BILIRUBIN mg/dL 0.5   ALK PHOS U/L 77   AST (SGOT) U/L 24   ALT (SGPT) U/L 14     Results from last 7 days   Lab Units 10/30/23  0455 10/29/23  1629   CALCIUM mg/dL 8.8 9.3   ALBUMIN g/dL  --  4.2   MAGNESIUM mg/dL 1.7  --   "  PHOSPHORUS mg/dL 3.5  --      Results from last 7 days   Lab Units 10/29/23  1629   PROCALCITONIN ng/mL 0.05   LACTATE mmol/L 0.9     COVID19   Date Value Ref Range Status   10/29/2023 Not Detected Not Detected - Ref. Range Final     SARS-CoV-2, CAREY   Date Value Ref Range Status   09/21/2023 NEGATIVE Negative Final     Comment:     The 2019-CoV rRT-PCR Assay is only for use under a Food and Drug Administration Emergency Use Authorization. The performance characteristics of the assay were verified by the Clinical Microbiology Laboratory at the [facility].   Results should be used in conjunction with the patient's clinical symptoms, medical history, and other clinical/laboratory findings to determine an overall clinical diagnosis. Negative results do not preclude infection with SARS-CoV-2 (COVID-19).    Test parameters have not been validated for screening asymptomatic patients.     No results found for: \"HGBA1C\", \"POCGLU\"        CT Abdomen Pelvis With Contrast  Narrative: CT ABDOMEN AND PELVIS WITH IV CONTRAST     HISTORY: 63-year-old male with abdominal pain. Cholecystectomy in the  past.     TECHNIQUE: Radiation dose reduction techniques were utilized, including  automated exposure control and exposure modulation based on body size.   3 mm images were obtained through the abdomen and pelvis after the  administration of IV contrast. Compared compared with previous CT  06/26/2023.        FINDINGS:  1. Small hepatic cysts appear stable and there is pneumobilia, and there  is no new liver abnormality. The spleen, adrenals, and kidneys appear  unremarkable other than several renal cysts. The ectasia of the  pancreatic duct to the ampulla appears stable, measuring up to 5 mm in  diameter.     2. There is mild-moderate dilatation of small bowel loops within the  pelvis which also have air-fluid levels. There is no definitive small  bowel thickening. This likely represents a small bowel ileus. " There is  no convincing evidence for bowel obstruction. Caliber of the colon is  within normal limits. There is moderately extensive sigmoid  diverticulosis without evidence for diverticulitis. The appendix appears  within normal limits. Urinary bladder is collapsed. The prostate is  enlarged measuring 5.7 cm in diameter. There are moderately extensive  abdominal aortic atherosclerotic changes without aneurysmal dilatation.     Impression: 1. There is likely an ileus of the distal small bowel. There is no  convincing evidence for bowel obstruction. There is sigmoid  diverticulosis, but no evidence for diverticulitis.  2. Stable pancreatic ductal dilatation.  3. Prostatomegaly. Follow-up with PSA is recommended.     XR Chest 1 View  Narrative: PORTABLE CHEST X-RAY     HISTORY: Cough and abdomen pain.     Portable chest x-ray consisting of 2 images is provided. Correlation:  Multiple prior chest x-rays as recent as June 11, 2022 and as remote as  February 21, 2022. Chest CT April 21, 2022 was also reviewed.     FINDINGS: The cardiomediastinal silhouette is normal. There is prominent  emphysematous change in the upper lung zones and some parenchymal  distortion bilaterally which appears similar as on the previous x-rays  and the chest CT. The costophrenic sulci are dry and the bones appear  normal. There is no pneumothorax.     Impression: Emphysematous change in the lungs. No acute cardiopulmonary  abnormality is identified.     This report was finalized on 10/29/2023 4:39 PM by Dr. Anson Ashraf M.D on Workstation: [id number]       Scheduled Medications  budesonide-formoterol, 2 puff, Inhalation, BID - RT   And  tiotropium bromide monohydrate, 2 puff, Inhalation, Daily - RT  enoxaparin, 30 mg, Subcutaneous, Q24H  levoFLOXacin, 750 mg, Intravenous, Q24H  pantoprazole, 40 mg, Intravenous, Q AM    Infusions  dextrose 5 % and lactated Ringer's, 100 mL/hr    Diet  NPO Diet NPO Type: Strict NPO    I have personally  reviewed     [x]  Laboratory   []  Microbiology   [x]  Radiology   []  EKG/Telemetry  []  Cardiology/Vascular   []  Pathology    []  Records       Assessment/Plan     Active Hospital Problems    Diagnosis  POA    **Ileus [K56.7]  Yes    GERD without esophagitis [K21.9]  Yes    Hypernatremia [E87.0]  Yes    COPD (chronic obstructive pulmonary disease) [J44.9]  Yes    Hypertension [I10]  Yes      Resolved Hospital Problems   No resolved problems to display.       63 y.o. male admitted with Ileus.    Patient is a 63 y.o. man with copd, hypertension, gerd who presents with a persistent sinus infection despite 10 days of amoxicillin as well as polyuria and hypernatremia and an ileus.      Ileus-CT scan showed  likely an ileus of the distal small bowel.  Continue IV fluids, pain management.  Monitor labs, electrolytes repleted as needed.  GI consult.        Hypernatremia-sodium was 154 on admission, repeat sodium this morning 141.  Discontinue D5 W, initiated on LR with D5.  Monitor.        Persistent sinus infection despite 10 days of amoxicillin therapy-will treat with 5 days of levaquin    Prostatomegaly-PSA normal.   will check PVR.   will need outpatient evaluation.    Hypertension-hold antihypertensives for the moment    GERD-iv ppi    COPD-continue home inhalers    Chronic pancreatic ductal dilation          Lovenox 30 mg SC daily for DVT prophylaxis.  Full code.  Discussed with patient.  Anticipate discharge  TBD      Copied text in this note has been reviewed and is accurate as of 10/30/23.         Dictated utilizing Dragon dictation        Fernando Weber MD  [facility]  10/30/23  09:56 EDT

## 2023-10-30 NOTE — PLAN OF CARE
Problem: Adult Inpatient Plan of Care  Goal: Plan of Care Review  10/30/2023 0253 by Colleen Mcconnell RN  Outcome: Ongoing, Progressing  10/30/2023 0248 by Colleen Mcconnell RN  Outcome: Ongoing, Progressing  Flowsheets (Taken 10/30/2023 0248)  Outcome Evaluation: Pt arrived from ER. C/o of pain and nausea, PRN meds given. IVF cont. NPO. SB on the monitor. RA. GI consulted  Goal: Patient-Specific Goal (Individualized)  10/30/2023 0253 by Colleen Mcconnell, REECE  Outcome: Ongoing, Progressing  10/30/2023 0248 by Colleen Mcconnell RN  Outcome: Ongoing, Progressing  Goal: Absence of Hospital-Acquired Illness or Injury  10/30/2023 0253 by Colleen Mcconnell RN  Outcome: Ongoing, Progressing  10/30/2023 0248 by Colleen Mcconnell RN  Outcome: Ongoing, Progressing  Intervention: Identify and Manage Fall Risk  Recent Flowsheet Documentation  Taken 10/30/2023 0028 by Colleen Mcconnell RN  Safety Promotion/Fall Prevention:   activity supervised   assistive device/personal items within reach   clutter free environment maintained   lighting adjusted   mobility aid in reach   nonskid shoes/slippers when out of bed   safety round/check completed   toileting scheduled  Taken 10/29/2023 2200 by Colleen Mcconnell RN  Safety Promotion/Fall Prevention:   activity supervised   assistive device/personal items within reach   clutter free environment maintained   lighting adjusted   mobility aid in reach   nonskid shoes/slippers when out of bed   safety round/check completed   toileting scheduled  Intervention: Prevent Skin Injury  Recent Flowsheet Documentation  Taken 10/29/2023 2200 by Colleen Mcconnell, RN  Body Position: position changed independently  Intervention: Prevent and Manage VTE (Venous Thromboembolism) Risk  Recent Flowsheet Documentation  Taken 10/30/2023 0028 by Colleen Mcconnell RN  Activity Management: activity encouraged  Taken 10/29/2023 2200 by Colleen Mcconnell, REECE  Activity  Management: activity encouraged  Goal: Optimal Comfort and Wellbeing  10/30/2023 0253 by Colleen Mcconnell, RN  Outcome: Ongoing, Progressing  10/30/2023 0248 by Colleen Mcconnell, RN  Outcome: Ongoing, Progressing  Intervention: Monitor Pain and Promote Comfort  Recent Flowsheet Documentation  Taken 10/29/2023 2200 by Colleen Mcconnell, RN  Pain Management Interventions:   position adjusted   pillow support provided  Goal: Readiness for Transition of Care  10/30/2023 0253 by Colleen Mcconnell, REECE  Outcome: Ongoing, Progressing  10/30/2023 0248 by Colleen Mcconnell, RN  Outcome: Ongoing, Progressing  Intervention: Mutually Develop Transition Plan  Recent Flowsheet Documentation  Taken 10/29/2023 2157 by Colleen Mcconnell, RN  Equipment Currently Used at Home: none  Taken 10/29/2023 2153 by Colleen Mcconnell, RN  Transportation Anticipated: family or friend will provide  Patient/Family Anticipated Services at Transition: none  Patient/Family Anticipates Transition to: home with family   Goal Outcome Evaluation:              Outcome Evaluation: Pt arrived from ER. C/o of pain and nausea, PRN meds given. IVF cont. NPO. SB on the monitor. RA. GI consulted

## 2023-10-30 NOTE — PLAN OF CARE
Problem: Adult Inpatient Plan of Care  Goal: Plan of Care Review  Outcome: Ongoing, Progressing  Flowsheets (Taken 10/30/2023 0248)  Outcome Evaluation: Pt arrived from ER. C/o of pain and nausea, PRN meds given. IVF cont. NPO. SB on the monitor. RA. GI consulted  Goal: Patient-Specific Goal (Individualized)  Outcome: Ongoing, Progressing  Goal: Absence of Hospital-Acquired Illness or Injury  Outcome: Ongoing, Progressing  Intervention: Identify and Manage Fall Risk  Recent Flowsheet Documentation  Taken 10/30/2023 0028 by Colleen Mcconnell, RN  Safety Promotion/Fall Prevention:   activity supervised   assistive device/personal items within reach   clutter free environment maintained   lighting adjusted   mobility aid in reach   nonskid shoes/slippers when out of bed   safety round/check completed   toileting scheduled  Taken 10/29/2023 2200 by Colleen Mcconnell RN  Safety Promotion/Fall Prevention:   activity supervised   assistive device/personal items within reach   clutter free environment maintained   lighting adjusted   mobility aid in reach   nonskid shoes/slippers when out of bed   safety round/check completed   toileting scheduled  Intervention: Prevent Skin Injury  Recent Flowsheet Documentation  Taken 10/29/2023 2200 by Colleen Mcconnell, RN  Body Position: position changed independently  Intervention: Prevent and Manage VTE (Venous Thromboembolism) Risk  Recent Flowsheet Documentation  Taken 10/30/2023 0028 by Colleen Mcconnell, RN  Activity Management: activity encouraged  Taken 10/29/2023 2200 by Colleen Mcconnell, RN  Activity Management: activity encouraged  Goal: Optimal Comfort and Wellbeing  Outcome: Ongoing, Progressing  Intervention: Monitor Pain and Promote Comfort  Recent Flowsheet Documentation  Taken 10/29/2023 2200 by Colleen Mcconnell, RN  Pain Management Interventions:   position adjusted   pillow support provided  Goal: Readiness for Transition of Care  Outcome:  Ongoing, Progressing  Intervention: Mutually Develop Transition Plan  Recent Flowsheet Documentation  Taken 10/29/2023 2157 by Colleen Mcconnell, RN  Equipment Currently Used at Home: none  Taken 10/29/2023 2153 by Colleen Mcconnell, RN  Transportation Anticipated: family or friend will provide  Patient/Family Anticipated Services at Transition: none  Patient/Family Anticipates Transition to: home with family   Goal Outcome Evaluation:              Outcome Evaluation: Pt arrived from ER. C/o of pain and nausea, PRN meds given. IVF cont. NPO. SB on the monitor. RA. GI consulted

## 2023-10-30 NOTE — PLAN OF CARE
Problem: Pain Acute  Goal: Acceptable Pain Control and Functional Ability  Outcome: Ongoing, Progressing   Goal Outcome Evaluation:  Plan of Care Reviewed With: patient        Progress: improving  Outcome Evaluation: VSS. C/o pain managed with prn medication. Prn zofran given. NPO. Will monitor.

## 2023-10-31 ENCOUNTER — APPOINTMENT (OUTPATIENT)
Dept: GENERAL RADIOLOGY | Facility: HOSPITAL | Age: 63
End: 2023-10-31
Payer: COMMERCIAL

## 2023-10-31 LAB
ANION GAP SERPL CALCULATED.3IONS-SCNC: 7.6 MMOL/L (ref 5–15)
BUN SERPL-MCNC: 11 MG/DL (ref 8–23)
BUN/CREAT SERPL: 11.6 (ref 7–25)
CALCIUM SPEC-SCNC: 8.9 MG/DL (ref 8.6–10.5)
CHLORIDE SERPL-SCNC: 110 MMOL/L (ref 98–107)
CO2 SERPL-SCNC: 23.4 MMOL/L (ref 22–29)
CREAT SERPL-MCNC: 0.95 MG/DL (ref 0.76–1.27)
DEPRECATED RDW RBC AUTO: 41.9 FL (ref 37–54)
EGFRCR SERPLBLD CKD-EPI 2021: 91.1 ML/MIN/1.73
ERYTHROCYTE [DISTWIDTH] IN BLOOD BY AUTOMATED COUNT: 12.8 % (ref 12.3–15.4)
GLUCOSE SERPL-MCNC: 95 MG/DL (ref 65–99)
HCT VFR BLD AUTO: 35 % (ref 37.5–51)
HGB BLD-MCNC: 11.8 G/DL (ref 13–17.7)
MAGNESIUM SERPL-MCNC: 1.8 MG/DL (ref 1.6–2.4)
MCH RBC QN AUTO: 30.7 PG (ref 26.6–33)
MCHC RBC AUTO-ENTMCNC: 33.7 G/DL (ref 31.5–35.7)
MCV RBC AUTO: 91.1 FL (ref 79–97)
PHOSPHATE SERPL-MCNC: 3.7 MG/DL (ref 2.5–4.5)
PLATELET # BLD AUTO: 212 10*3/MM3 (ref 140–450)
PMV BLD AUTO: 10 FL (ref 6–12)
POTASSIUM SERPL-SCNC: 3.7 MMOL/L (ref 3.5–5.2)
QT INTERVAL: 445 MS
QTC INTERVAL: 414 MS
RBC # BLD AUTO: 3.84 10*6/MM3 (ref 4.14–5.8)
SODIUM SERPL-SCNC: 141 MMOL/L (ref 136–145)
TSH SERPL DL<=0.05 MIU/L-ACNC: 0.88 UIU/ML (ref 0.27–4.2)
WBC NRBC COR # BLD: 4.85 10*3/MM3 (ref 3.4–10.8)

## 2023-10-31 PROCEDURE — 94799 UNLISTED PULMONARY SVC/PX: CPT

## 2023-10-31 PROCEDURE — 25010000002 ENOXAPARIN PER 10 MG: Performed by: STUDENT IN AN ORGANIZED HEALTH CARE EDUCATION/TRAINING PROGRAM

## 2023-10-31 PROCEDURE — 25010000002 LEVOFLOXACIN PER 250 MG: Performed by: STUDENT IN AN ORGANIZED HEALTH CARE EDUCATION/TRAINING PROGRAM

## 2023-10-31 PROCEDURE — 25010000002 KETOROLAC TROMETHAMINE PER 15 MG: Performed by: STUDENT IN AN ORGANIZED HEALTH CARE EDUCATION/TRAINING PROGRAM

## 2023-10-31 PROCEDURE — 99232 SBSQ HOSP IP/OBS MODERATE 35: CPT | Performed by: PHYSICIAN ASSISTANT

## 2023-10-31 PROCEDURE — 93005 ELECTROCARDIOGRAM TRACING: CPT | Performed by: STUDENT IN AN ORGANIZED HEALTH CARE EDUCATION/TRAINING PROGRAM

## 2023-10-31 PROCEDURE — 83735 ASSAY OF MAGNESIUM: CPT | Performed by: STUDENT IN AN ORGANIZED HEALTH CARE EDUCATION/TRAINING PROGRAM

## 2023-10-31 PROCEDURE — 84100 ASSAY OF PHOSPHORUS: CPT | Performed by: STUDENT IN AN ORGANIZED HEALTH CARE EDUCATION/TRAINING PROGRAM

## 2023-10-31 PROCEDURE — 93010 ELECTROCARDIOGRAM REPORT: CPT | Performed by: INTERNAL MEDICINE

## 2023-10-31 PROCEDURE — 25010000002 ONDANSETRON PER 1 MG: Performed by: STUDENT IN AN ORGANIZED HEALTH CARE EDUCATION/TRAINING PROGRAM

## 2023-10-31 PROCEDURE — 94664 DEMO&/EVAL PT USE INHALER: CPT

## 2023-10-31 PROCEDURE — 85027 COMPLETE CBC AUTOMATED: CPT | Performed by: STUDENT IN AN ORGANIZED HEALTH CARE EDUCATION/TRAINING PROGRAM

## 2023-10-31 PROCEDURE — 25010000002 HYDROMORPHONE PER 4 MG: Performed by: STUDENT IN AN ORGANIZED HEALTH CARE EDUCATION/TRAINING PROGRAM

## 2023-10-31 PROCEDURE — 74018 RADEX ABDOMEN 1 VIEW: CPT

## 2023-10-31 PROCEDURE — 80048 BASIC METABOLIC PNL TOTAL CA: CPT | Performed by: STUDENT IN AN ORGANIZED HEALTH CARE EDUCATION/TRAINING PROGRAM

## 2023-10-31 PROCEDURE — 84443 ASSAY THYROID STIM HORMONE: CPT | Performed by: STUDENT IN AN ORGANIZED HEALTH CARE EDUCATION/TRAINING PROGRAM

## 2023-10-31 RX ORDER — LUBIPROSTONE 24 UG/1
24 CAPSULE ORAL 2 TIMES DAILY WITH MEALS
Status: DISCONTINUED | OUTPATIENT
Start: 2023-10-31 | End: 2023-11-02 | Stop reason: HOSPADM

## 2023-10-31 RX ORDER — HYDROMORPHONE HYDROCHLORIDE 1 MG/ML
0.5 INJECTION, SOLUTION INTRAMUSCULAR; INTRAVENOUS; SUBCUTANEOUS
Status: DISCONTINUED | OUTPATIENT
Start: 2023-10-31 | End: 2023-11-01

## 2023-10-31 RX ADMIN — SENNOSIDES 1 TABLET: 8.6 TABLET, FILM COATED ORAL at 08:31

## 2023-10-31 RX ADMIN — BUDESONIDE AND FORMOTEROL FUMARATE DIHYDRATE 2 PUFF: 160; 4.5 AEROSOL RESPIRATORY (INHALATION) at 10:11

## 2023-10-31 RX ADMIN — HYDROMORPHONE HYDROCHLORIDE 0.5 MG: 1 INJECTION, SOLUTION INTRAMUSCULAR; INTRAVENOUS; SUBCUTANEOUS at 15:58

## 2023-10-31 RX ADMIN — ONDANSETRON 4 MG: 2 INJECTION INTRAMUSCULAR; INTRAVENOUS at 20:23

## 2023-10-31 RX ADMIN — ENOXAPARIN SODIUM 30 MG: 100 INJECTION SUBCUTANEOUS at 20:15

## 2023-10-31 RX ADMIN — LUBIPROSTONE 24 MCG: 24 CAPSULE, GELATIN COATED ORAL at 18:18

## 2023-10-31 RX ADMIN — Medication 3 MG: at 20:23

## 2023-10-31 RX ADMIN — HYDROMORPHONE HYDROCHLORIDE 0.5 MG: 1 INJECTION, SOLUTION INTRAMUSCULAR; INTRAVENOUS; SUBCUTANEOUS at 12:30

## 2023-10-31 RX ADMIN — HYDROMORPHONE HYDROCHLORIDE 0.5 MG: 1 INJECTION, SOLUTION INTRAMUSCULAR; INTRAVENOUS; SUBCUTANEOUS at 23:38

## 2023-10-31 RX ADMIN — BISACODYL 10 MG: 10 SUPPOSITORY RECTAL at 08:31

## 2023-10-31 RX ADMIN — PANTOPRAZOLE SODIUM 40 MG: 40 INJECTION, POWDER, FOR SOLUTION INTRAVENOUS at 06:03

## 2023-10-31 RX ADMIN — KETOROLAC TROMETHAMINE 15 MG: 15 INJECTION, SOLUTION INTRAMUSCULAR; INTRAVENOUS at 06:03

## 2023-10-31 RX ADMIN — ONDANSETRON 4 MG: 2 INJECTION INTRAMUSCULAR; INTRAVENOUS at 12:30

## 2023-10-31 RX ADMIN — LEVOFLOXACIN 750 MG: 5 INJECTION, SOLUTION INTRAVENOUS at 20:15

## 2023-10-31 RX ADMIN — TIOTROPIUM BROMIDE INHALATION SPRAY 2 PUFF: 3.12 SPRAY, METERED RESPIRATORY (INHALATION) at 10:11

## 2023-10-31 RX ADMIN — HYDROMORPHONE HYDROCHLORIDE 0.5 MG: 1 INJECTION, SOLUTION INTRAMUSCULAR; INTRAVENOUS; SUBCUTANEOUS at 20:23

## 2023-10-31 RX ADMIN — ONDANSETRON 4 MG: 2 INJECTION INTRAMUSCULAR; INTRAVENOUS at 06:03

## 2023-10-31 RX ADMIN — HYDROMORPHONE HYDROCHLORIDE 0.5 MG: 1 INJECTION, SOLUTION INTRAMUSCULAR; INTRAVENOUS; SUBCUTANEOUS at 18:18

## 2023-10-31 NOTE — PROGRESS NOTES
Name: Donny Mallory ADMIT: 10/29/2023   : 1960  PCP: Janelle Lara MD    MRN: [id number] LOS: 1 days   AGE/SEX: 63 y.o. male  ROOM: [facility]     Subjective   Subjective   Laying in bed, continues to complain of abdominal pain, no change since yesterday.  No BM, not passing gases.  Feels nauseous but no vomiting.    Review of Systems   As above  Objective   Objective   Vital Signs  Temp:  [97.7 °F (36.5 °C)-98.3 °F (36.8 °C)] 98.3 °F (36.8 °C)  Heart Rate:  [46-62] 62  Resp:  [16-17] 17  BP: (125-143)/(73-96) 127/96  SpO2:  [96 %-97 %] 97 %  on   ;   Device (Oxygen Therapy): room air  Body mass index is 16.89 kg/m².  Physical Exam    General: Alert, laying in bed, not in distress,  HEENT: Normocephalic, atraumatic  CV: Bradycardic, regular rhythm.  Lungs: Clear to auscultation bilaterally, no crackles or wheezes  Abdomen: Soft, tender to palpation, nondistended,  Extremities: No significant peripheral edema , no cyanosis     Results Review     I reviewed the patient's new clinical results.  Results from last 7 days   Lab Units 10/31/23  0438 10/30/23  0455 10/29/23  1629 10/28/23  1232   WBC 10*3/mm3 4.85 5.97 8.36 7.72   HEMOGLOBIN g/dL 11.8* 11.3* 13.3 14.5   PLATELETS 10*3/mm3 212 214 246 280     Results from last 7 days   Lab Units 10/31/23  0438 10/30/23  1623 10/30/23  0455 10/29/23  162   SODIUM mmol/L 141 142 141 154*   POTASSIUM mmol/L 3.7  --  3.6 4.5   CHLORIDE mmol/L 110*  --  107 116*   CO2 mmol/L 23.4  --  27.0 24.3   BUN mg/dL 11  --  13 15   CREATININE mg/dL 0.95  --  0.90 0.93   GLUCOSE mg/dL 95  --  88 88   Estimated Creatinine Clearance: 61.7 mL/min (by C-G formula based on SCr of 0.95 mg/dL).  Results from last 7 days   Lab Units 10/29/23  1629   ALBUMIN g/dL 4.2   BILIRUBIN mg/dL 0.5   ALK PHOS U/L 77   AST (SGOT) U/L 24   ALT (SGPT) U/L 14     Results from last 7 days   Lab Units 10/31/23  0438 10/30/23  0455 10/29/23  1629   CALCIUM mg/dL 8.9 8.8 9.3   ALBUMIN g/dL  --   --   "4.2   MAGNESIUM mg/dL 1.8 1.7  --    PHOSPHORUS mg/dL 3.7 3.5  --      Results from last 7 days   Lab Units 10/29/23  1629   PROCALCITONIN ng/mL 0.05   LACTATE mmol/L 0.9     COVID19   Date Value Ref Range Status   10/29/2023 Not Detected Not Detected - Ref. Range Final     SARS-CoV-2, CAREY   Date Value Ref Range Status   09/21/2023 NEGATIVE Negative Final     Comment:     The 2019-CoV rRT-PCR Assay is only for use under a Food and Drug Administration Emergency Use Authorization. The performance characteristics of the assay were verified by the Clinical Microbiology Laboratory at the [facility].   Results should be used in conjunction with the patient's clinical symptoms, medical history, and other clinical/laboratory findings to determine an overall clinical diagnosis. Negative results do not preclude infection with SARS-CoV-2 (COVID-19).    Test parameters have not been validated for screening asymptomatic patients.     No results found for: \"HGBA1C\", \"POCGLU\"        CT Abdomen Pelvis With Contrast  Narrative: CT ABDOMEN AND PELVIS WITH IV CONTRAST     HISTORY: 63-year-old male with abdominal pain. Cholecystectomy in the  past.     TECHNIQUE: Radiation dose reduction techniques were utilized, including  automated exposure control and exposure modulation based on body size.   3 mm images were obtained through the abdomen and pelvis after the  administration of IV contrast. Compared compared with previous CT  06/26/2023.        FINDINGS:  1. Small hepatic cysts appear stable and there is pneumobilia, and there  is no new liver abnormality. The spleen, adrenals, and kidneys appear  unremarkable other than several renal cysts. The ectasia of the  pancreatic duct to the ampulla appears stable, measuring up to 5 mm in  diameter.     2. There is mild-moderate dilatation of small bowel loops within the  pelvis which also have air-fluid levels. There is no definitive small  bowel thickening. This " likely represents a small bowel ileus. There is  no convincing evidence for bowel obstruction. Caliber of the colon is  within normal limits. There is moderately extensive sigmoid  diverticulosis without evidence for diverticulitis. The appendix appears  within normal limits. Urinary bladder is collapsed. The prostate is  enlarged measuring 5.7 cm in diameter. There are moderately extensive  abdominal aortic atherosclerotic changes without aneurysmal dilatation.     Impression: 1. There is likely an ileus of the distal small bowel. There is no  convincing evidence for bowel obstruction. There is sigmoid  diverticulosis, but no evidence for diverticulitis.  2. Stable pancreatic ductal dilatation.  3. Prostatomegaly. Follow-up with PSA is recommended.     This report was finalized on 10/30/2023 10:22 AM by Dr. Jennifer Morris M.D  on Workstation: [id number]       Scheduled Medications  bisacodyl, 10 mg, Rectal, Daily  budesonide-formoterol, 2 puff, Inhalation, BID - RT   And  tiotropium bromide monohydrate, 2 puff, Inhalation, Daily - RT  enoxaparin, 30 mg, Subcutaneous, Q24H  levoFLOXacin, 750 mg, Intravenous, Q24H  pantoprazole, 40 mg, Intravenous, Q AM  senna, 1 tablet, Oral, BID    Infusions  dextrose 5 % and lactated Ringer's, 100 mL/hr, Last Rate: 100 mL/hr (10/30/23 3214)    Diet  NPO Diet NPO Type: Strict NPO    I have personally reviewed     [x]  Laboratory   []  Microbiology   []  Radiology   []  EKG/Telemetry  []  Cardiology/Vascular   []  Pathology    []  Records       Assessment/Plan     Active Hospital Problems    Diagnosis  POA    **Ileus [K56.7]  Yes    GERD without esophagitis [K21.9]  Yes    Hypernatremia [E87.0]  Yes    COPD (chronic obstructive pulmonary disease) [J44.9]  Yes    Hypertension [I10]  Yes      Resolved Hospital Problems   No resolved problems to display.       63 y.o. male admitted with Ileus.    Patient is a 63 y.o. man with copd, hypertension, gerd who presents with a persistent sinus  infection despite 10 days of amoxicillin as well as polyuria and hypernatremia and an ileus.      Ileus-CT scan showed  likely an ileus of the distal small bowel.  Continue IV fluids, pain management.  Monitor labs, electrolytes repleted as needed.    GI follow-up, repeat KUB pending  Advance diet per GI      Hypernatremia-sodium was 154 on admission, resolved.  Monitor.        Persistent sinus infection despite 10 days of amoxicillin therapy continue 5 days of levaquin    Prostatomegaly-PSA normal. will need outpatient evaluation.    Hypertension-hold antihypertensives, BP stable.    GERD-iv ppi    COPD-continue home inhalers    Chronic pancreatic ductal dilation      Bradycardia: Ordered TSH, EKG.    Lovenox 30 mg SC daily for DVT prophylaxis.  Full code.  Discussed with patient.  Anticipate discharge  TBD      Copied text in this note has been reviewed and is accurate as of 10/31/23.         Dictated utilizing Dragon dictation        Fernando Weber MD  [facility]  10/31/23  10:12 EDT

## 2023-10-31 NOTE — CASE MANAGEMENT/SOCIAL WORK
Discharge Planning Assessment  [facility]     Patient Name: Donny Mallory  MRN: [id number]  Today's Date: 10/31/2023    Admit Date: 10/29/2023    Plan: Home   Discharge Needs Assessment       Row Name 10/31/23 1412       Living Environment    People in Home grandchild(valentina);sibling(s)    Current Living Arrangements home    Potentially Unsafe Housing Conditions none    Primary Care Provided by self    Provides Primary Care For no one    Family Caregiver if Needed sibling(s)    Quality of Family Relationships helpful;involved;supportive    Able to Return to Prior Arrangements yes       Resource/Environmental Concerns    Resource/Environmental Concerns none    Transportation Concerns none       Transition Planning    Patient/Family Anticipates Transition to home with family    Patient/Family Anticipated Services at Transition none    Transportation Anticipated public transportation       Discharge Needs Assessment    Readmission Within the Last 30 Days no previous admission in last 30 days    Equipment Currently Used at Home none    Concerns to be Addressed no discharge needs identified;denies needs/concerns at this time    Anticipated Changes Related to Illness none    Equipment Needed After Discharge none    Provided Post Acute Provider List? N/A    Provided Post Acute Provider Quality & Resource List? N/A                   Discharge Plan       Row Name 10/31/23 1413       Plan    Plan Home    Patient/Family in Agreement with Plan yes    Plan Comments CCP met with the patient at bedside. Patient confirmed the information on his face sheet was accurate. Patient states he lives at home with his sister and 16-year-old granddaughter. Patient confirmed his PCP is Janelle Lara. Patient denies a history of HH/SNF. Patient denies using any DME and states he is normally IADL’s. Patient states there are 3 steps to enter the home without any handrails. Patient denies any steps within the home to use. Patient is  enrolled in the [facility] M2[facility] program. Patient states he plans to take an Uber home at discharge. CCP to follow. CD, CSW.                  Continued Care and Services - Admitted Since 10/29/2023    Coordination has not been started for this encounter.       Expected Discharge Date and Time       Expected Discharge Date Expected Discharge Time    Nov 1, 2023            Demographic Summary       Row Name 10/31/23 1404       General Information    Admission Type inpatient    Arrived From emergency department    Referral Source admission list    Reason for Consult discharge planning    Preferred Language English                   Functional Status       Row Name 10/31/23 1411       Functional Status    Usual Activity Tolerance good    Current Activity Tolerance moderate       Functional Status, IADL    Medications independent    Meal Preparation independent    Housekeeping independent    Laundry independent    Shopping independent       Mental Status    General Appearance WDL WDL       Mental Status Summary    Recent Changes in Mental Status/Cognitive Functioning no changes       Employment/    Employment Status unemployed                   Psychosocial    No documentation.                  Abuse/Neglect    No documentation.                  Legal    No documentation.                  Substance Abuse    No documentation.                  Patient Forms    No documentation.

## 2023-10-31 NOTE — PLAN OF CARE
Goal Outcome Evaluation:  Plan of Care Reviewed With: patient        Progress: improving  Outcome Evaluation: Advanced to clear liquid diet today, changed pain medication to 0.5 mg dilaudid Q2 hr. Pain better managed with that. KUB completed. New IV placed, given suppository and senna today, still no BM.

## 2023-10-31 NOTE — PROGRESS NOTES
[facility]  Inpatient Progress Note    Reason for Follow Up:  Ileus, abd pain    Subjective     Interval History:   Pt reports he continues to have abd pain. This is unchanged  Denies any flatulence or BM. Continues to have nausea as well.  He continues to be NPO.   KUB waiting to be done.       Current Facility-Administered Medications:     acetaminophen (TYLENOL) tablet 650 mg, 650 mg, Oral, Q4H PRN, Oliver Camara MD    bisacodyl (DULCOLAX) suppository 10 mg, 10 mg, Rectal, Daily, Mahad Galdamez PA-C, 10 mg at 10/31/23 0831    budesonide-formoterol (SYMBICORT) 160-4.5 MCG/ACT inhaler 2 puff, 2 puff, Inhalation, BID - RT, 2 puff at 10/30/23 2025 **AND** tiotropium (SPIRIVA RESPIMAT) 2.5 mcg/act aerosol solution inhaler, 2 puff, Inhalation, Daily - RT, Oliver Camara MD, 2 puff at 10/30/23 0701    dextrose 5 % and lactated Ringer's infusion, 100 mL/hr, Intravenous, Continuous, Fernando Weber MD, Last Rate: 100 mL/hr at 10/30/23 2315, 100 mL/hr at 10/30/23 2315    Enoxaparin Sodium (LOVENOX) syringe 30 mg, 30 mg, Subcutaneous, Q24H, Fernando Weber MD, 30 mg at 10/30/23 1958    ipratropium-albuterol (DUO-NEB) nebulizer solution 3 mL, 3 mL, Nebulization, Q4H PRN, Oliver Camara MD    ketorolac (TORADOL) injection 15 mg, 15 mg, Intravenous, Q6H PRN, Oliver Camara MD, 15 mg at 10/31/23 0603    levoFLOXacin (LEVAQUIN) 750 mg/150 mL D5W (premix) (LEVAQUIN) 750 mg, 750 mg, Intravenous, Q24H, Oliver Camara MD, Last Rate: 100 mL/hr at 10/30/23 1958, 750 mg at 10/30/23 1958    melatonin tablet 3 mg, 3 mg, Oral, Nightly PRN, Oliver Camara MD, 3 mg at 10/30/23 2314    ondansetron (ZOFRAN) tablet 4 mg, 4 mg, Oral, Q6H PRN **OR** ondansetron (ZOFRAN) injection 4 mg, 4 mg, Intravenous, Q6H PRN, Oliver Camara MD, 4 mg at 10/31/23 0603    pantoprazole (PROTONIX) injection 40 mg, 40 mg, Intravenous, Q AM, Oliver Camara MD, 40 mg at 10/31/23 0603    senna (SENOKOT) tablet 1 tablet, 1 tablet,  Oral, BID, Mahad Galdamez PA-C, 1 tablet at 10/31/23 0831    [COMPLETED] Insert Peripheral IV, , , Once **AND** sodium chloride 0.9 % flush 10 mL, 10 mL, Intravenous, PRN, Fred Jon MD        Objective     Vital Signs  Temp:  [97.7 °F (36.5 °C)-98.3 °F (36.8 °C)] 98.3 °F (36.8 °C)  Heart Rate:  [46-62] 62  Resp:  [16-17] 17  BP: (125-143)/(73-96) 127/96  Body mass index is 16.89 kg/m².    Intake/Output Summary (Last 24 hours) at 10/31/2023 0918  Last data filed at 10/30/2023 1650  Gross per 24 hour   Intake --   Output 400 ml   Net -400 ml     No intake/output data recorded.     Physical Exam:   General: patient awake, alert and cooperative; flat affect   Eyes: Normal lids and lashes, no scleral icterus   Abdomen: soft, nontender, nondistended; normal bowel sounds      Results Review:     I reviewed the patient's new clinical results.    Results from last 7 days   Lab Units 10/31/23  0438 10/30/23  0455 10/29/23  1629   WBC 10*3/mm3 4.85 5.97 8.36   HEMOGLOBIN g/dL 11.8* 11.3* 13.3   HEMATOCRIT % 35.0* 33.9* 40.4   PLATELETS 10*3/mm3 212 214 246     Results from last 7 days   Lab Units 10/31/23  0438 10/30/23  1623 10/30/23  0455 10/29/23  1629   SODIUM mmol/L 141 142 141 154*   POTASSIUM mmol/L 3.7  --  3.6 4.5   CHLORIDE mmol/L 110*  --  107 116*   CO2 mmol/L 23.4  --  27.0 24.3   BUN mg/dL 11  --  13 15   CREATININE mg/dL 0.95  --  0.90 0.93   CALCIUM mg/dL 8.9  --  8.8 9.3   BILIRUBIN mg/dL  --   --   --  0.5   ALK PHOS U/L  --   --   --  77   ALT (SGPT) U/L  --   --   --  14   AST (SGOT) U/L  --   --   --  24   GLUCOSE mg/dL 95  --  88 88     Results from last 7 days   Lab Units 10/29/23  1629   INR  0.96     Lab Results   Lab Value Date/Time    LIPASE 31 10/29/2023 1629    LIPASE 20 10/28/2023 1232    LIPASE 43 10/22/2023 1526    LIPASE 18 10/16/2023 1509    LIPASE 36 10/09/2023 1435    LIPASE 20 10/07/2023 1303    LIPASE 44 09/26/2023 1716    LIPASE 49 09/21/2023 0031    LIPASE 23 09/11/2023 1507     LIPASE 40 08/15/2023 1537    LIPASE 40 07/26/2023 1437    LIPASE 22 07/22/2023 1843    LIPASE 20 07/14/2023 1223    LIPASE 25 07/01/2023 1451    LIPASE 25 06/26/2023 1902    LIPASE 26 06/23/2023 1500    LIPASE 17 06/19/2023 1222    LIPASE 21 06/15/2023 2359    LIPASE 27 05/16/2023 1512    LIPASE 67 (H) 07/28/2022 1556    LIPASE 20 06/27/2022 2034    LIPASE 33 06/23/2022 1837    LIPASE 22 06/18/2022 1127    LIPASE 31 06/11/2022 1213    LIPASE 95 (H) 06/08/2022 1506    LIPASE 26 06/03/2022 1004    LIPASE 25 05/16/2022 1136    LIPASE 23 05/02/2022 1706    LIPASE 80 (H) 04/22/2022 0548    LIPASE 425 (H) 04/21/2022 1012    LIPASE 20 04/19/2022 1118    LIPASE 25 04/03/2022 1123    LIPASE 23 03/12/2022 2156    LIPASE 31 02/21/2022 1614    LIPASE 31 02/18/2022 1400    LIPASE 29 02/06/2022 1552    LIPASE 30 01/24/2022 1221    LIPASE 30 12/22/2021 1517    LIPASE 24 11/12/2021 1253    LIPASE 18 10/23/2021 1707    LIPASE 22 08/31/2021 1206    LIPASE 24 08/20/2021 1347    LIPASE 23 08/03/2021 0024    LIPASE 25 07/22/2021 0218    LIPASE 129 (H) 07/02/2021 1554    LIPASE 36 06/25/2021 1615    LIPASE 26 06/15/2021 1725    LIPASE 66 04/12/2021 1630    LIPASE 103 08/12/2019 1105    LIPASE 55 02/12/2019 2052       Radiology:  CT Abdomen Pelvis With Contrast   Final Result   1. There is likely an ileus of the distal small bowel. There is no   convincing evidence for bowel obstruction. There is sigmoid   diverticulosis, but no evidence for diverticulitis.   2. Stable pancreatic ductal dilatation.   3. Prostatomegaly. Follow-up with PSA is recommended.       This report was finalized on 10/30/2023 10:22 AM by Dr. Jennifer Morris M.D   on Workstation: [id number]          XR Chest 1 View   Final Result   Emphysematous change in the lungs. No acute cardiopulmonary   abnormality is identified.       This report was finalized on 10/29/2023 4:39 PM by Dr. Anson Ashraf M.D on Workstation: [id number]          XR Abdomen KUB    (Results  Pending)       Assessment & Plan     Active Hospital Problems    Diagnosis     **Ileus     GERD without esophagitis     Hypernatremia     COPD (chronic obstructive pulmonary disease)     Hypertension        Assessment:   Ileus- may be from recent acute illness, chronic constipation  Abdominal pain  Chronic constipation- last BM 5 days ago  Sinusitis- recent completed abx  Chronically dilated biliary and PD s/p stent placement 09/14,  WNL; CEA minimally elevated at 4.5 on 10/6/2023    Plan:   Will await for the KUB  Continue supportive care w/ anti-emetics/analgesics PRN  Encouraged patient out of bed and ambulation  If there is significant stool burden- can consider enemas and/or amitiza pending on findings of KUB.  Pt continues to remain NPO given significant abd pain- but some of this pain seems to be chronic. Can consider clears later today    I discussed the patients findings and my recommendations with patient.         Mahad Galdamez PA-C  [facility]  [address] [place] [address]  Office: [phone]

## 2023-11-01 LAB
ANION GAP SERPL CALCULATED.3IONS-SCNC: 8 MMOL/L (ref 5–15)
BUN SERPL-MCNC: 11 MG/DL (ref 8–23)
BUN/CREAT SERPL: 11.5 (ref 7–25)
CALCIUM SPEC-SCNC: 8.8 MG/DL (ref 8.6–10.5)
CHLORIDE SERPL-SCNC: 108 MMOL/L (ref 98–107)
CO2 SERPL-SCNC: 27 MMOL/L (ref 22–29)
CREAT SERPL-MCNC: 0.96 MG/DL (ref 0.76–1.27)
DEPRECATED RDW RBC AUTO: 43.1 FL (ref 37–54)
EGFRCR SERPLBLD CKD-EPI 2021: 89.9 ML/MIN/1.73
ERYTHROCYTE [DISTWIDTH] IN BLOOD BY AUTOMATED COUNT: 12.7 % (ref 12.3–15.4)
GLUCOSE SERPL-MCNC: 82 MG/DL (ref 65–99)
HCT VFR BLD AUTO: 34.4 % (ref 37.5–51)
HGB BLD-MCNC: 11.4 G/DL (ref 13–17.7)
MAGNESIUM SERPL-MCNC: 1.8 MG/DL (ref 1.6–2.4)
MCH RBC QN AUTO: 30.8 PG (ref 26.6–33)
MCHC RBC AUTO-ENTMCNC: 33.1 G/DL (ref 31.5–35.7)
MCV RBC AUTO: 93 FL (ref 79–97)
PHOSPHATE SERPL-MCNC: 3.2 MG/DL (ref 2.5–4.5)
PLATELET # BLD AUTO: 209 10*3/MM3 (ref 140–450)
PMV BLD AUTO: 10 FL (ref 6–12)
POTASSIUM SERPL-SCNC: 3.7 MMOL/L (ref 3.5–5.2)
RBC # BLD AUTO: 3.7 10*6/MM3 (ref 4.14–5.8)
SODIUM SERPL-SCNC: 143 MMOL/L (ref 136–145)
T4 FREE SERPL-MCNC: 1.27 NG/DL (ref 0.93–1.7)
WBC NRBC COR # BLD: 6.66 10*3/MM3 (ref 3.4–10.8)

## 2023-11-01 PROCEDURE — 94799 UNLISTED PULMONARY SVC/PX: CPT

## 2023-11-01 PROCEDURE — 99232 SBSQ HOSP IP/OBS MODERATE 35: CPT | Performed by: PHYSICIAN ASSISTANT

## 2023-11-01 PROCEDURE — 83735 ASSAY OF MAGNESIUM: CPT | Performed by: STUDENT IN AN ORGANIZED HEALTH CARE EDUCATION/TRAINING PROGRAM

## 2023-11-01 PROCEDURE — 25010000002 ENOXAPARIN PER 10 MG: Performed by: STUDENT IN AN ORGANIZED HEALTH CARE EDUCATION/TRAINING PROGRAM

## 2023-11-01 PROCEDURE — 25010000002 ONDANSETRON PER 1 MG: Performed by: STUDENT IN AN ORGANIZED HEALTH CARE EDUCATION/TRAINING PROGRAM

## 2023-11-01 PROCEDURE — 94761 N-INVAS EAR/PLS OXIMETRY MLT: CPT

## 2023-11-01 PROCEDURE — 25010000002 HYDROMORPHONE PER 4 MG: Performed by: STUDENT IN AN ORGANIZED HEALTH CARE EDUCATION/TRAINING PROGRAM

## 2023-11-01 PROCEDURE — 85027 COMPLETE CBC AUTOMATED: CPT | Performed by: STUDENT IN AN ORGANIZED HEALTH CARE EDUCATION/TRAINING PROGRAM

## 2023-11-01 PROCEDURE — 25010000002 LEVOFLOXACIN PER 250 MG: Performed by: STUDENT IN AN ORGANIZED HEALTH CARE EDUCATION/TRAINING PROGRAM

## 2023-11-01 PROCEDURE — 80048 BASIC METABOLIC PNL TOTAL CA: CPT | Performed by: STUDENT IN AN ORGANIZED HEALTH CARE EDUCATION/TRAINING PROGRAM

## 2023-11-01 PROCEDURE — 84100 ASSAY OF PHOSPHORUS: CPT | Performed by: STUDENT IN AN ORGANIZED HEALTH CARE EDUCATION/TRAINING PROGRAM

## 2023-11-01 PROCEDURE — 94664 DEMO&/EVAL PT USE INHALER: CPT

## 2023-11-01 PROCEDURE — 84439 ASSAY OF FREE THYROXINE: CPT | Performed by: STUDENT IN AN ORGANIZED HEALTH CARE EDUCATION/TRAINING PROGRAM

## 2023-11-01 RX ORDER — OXYCODONE HYDROCHLORIDE AND ACETAMINOPHEN 5; 325 MG/1; MG/1
1 TABLET ORAL EVERY 6 HOURS PRN
Status: DISCONTINUED | OUTPATIENT
Start: 2023-11-01 | End: 2023-11-02 | Stop reason: HOSPADM

## 2023-11-01 RX ORDER — POLYETHYLENE GLYCOL 3350 17 G/17G
17 POWDER, FOR SOLUTION ORAL 2 TIMES DAILY
Status: DISCONTINUED | OUTPATIENT
Start: 2023-11-01 | End: 2023-11-02 | Stop reason: HOSPADM

## 2023-11-01 RX ADMIN — LEVOFLOXACIN 750 MG: 5 INJECTION, SOLUTION INTRAVENOUS at 20:11

## 2023-11-01 RX ADMIN — TIOTROPIUM BROMIDE INHALATION SPRAY 2 PUFF: 3.12 SPRAY, METERED RESPIRATORY (INHALATION) at 08:56

## 2023-11-01 RX ADMIN — LUBIPROSTONE 24 MCG: 24 CAPSULE, GELATIN COATED ORAL at 08:34

## 2023-11-01 RX ADMIN — PANTOPRAZOLE SODIUM 40 MG: 40 INJECTION, POWDER, FOR SOLUTION INTRAVENOUS at 06:27

## 2023-11-01 RX ADMIN — HYDROMORPHONE HYDROCHLORIDE 0.5 MG: 1 INJECTION, SOLUTION INTRAMUSCULAR; INTRAVENOUS; SUBCUTANEOUS at 08:35

## 2023-11-01 RX ADMIN — ONDANSETRON 4 MG: 2 INJECTION INTRAMUSCULAR; INTRAVENOUS at 20:22

## 2023-11-01 RX ADMIN — OXYCODONE HYDROCHLORIDE AND ACETAMINOPHEN 1 TABLET: 5; 325 TABLET ORAL at 17:07

## 2023-11-01 RX ADMIN — HYDROMORPHONE HYDROCHLORIDE 0.5 MG: 1 INJECTION, SOLUTION INTRAMUSCULAR; INTRAVENOUS; SUBCUTANEOUS at 03:32

## 2023-11-01 RX ADMIN — OXYCODONE HYDROCHLORIDE AND ACETAMINOPHEN 1 TABLET: 5; 325 TABLET ORAL at 23:20

## 2023-11-01 RX ADMIN — BUDESONIDE AND FORMOTEROL FUMARATE DIHYDRATE 2 PUFF: 160; 4.5 AEROSOL RESPIRATORY (INHALATION) at 08:54

## 2023-11-01 RX ADMIN — ONDANSETRON 4 MG: 2 INJECTION INTRAMUSCULAR; INTRAVENOUS at 14:23

## 2023-11-01 RX ADMIN — Medication 3 MG: at 20:10

## 2023-11-01 RX ADMIN — MAGNESIUM HYDROXIDE 15 ML: 400 SUSPENSION ORAL at 17:07

## 2023-11-01 RX ADMIN — HYDROMORPHONE HYDROCHLORIDE 0.5 MG: 1 INJECTION, SOLUTION INTRAMUSCULAR; INTRAVENOUS; SUBCUTANEOUS at 06:28

## 2023-11-01 RX ADMIN — LUBIPROSTONE 24 MCG: 24 CAPSULE, GELATIN COATED ORAL at 17:37

## 2023-11-01 RX ADMIN — SENNOSIDES 1 TABLET: 8.6 TABLET, FILM COATED ORAL at 20:11

## 2023-11-01 RX ADMIN — BUDESONIDE AND FORMOTEROL FUMARATE DIHYDRATE 2 PUFF: 160; 4.5 AEROSOL RESPIRATORY (INHALATION) at 20:16

## 2023-11-01 RX ADMIN — ONDANSETRON 4 MG: 2 INJECTION INTRAMUSCULAR; INTRAVENOUS at 08:35

## 2023-11-01 RX ADMIN — POLYETHYLENE GLYCOL 3350 17 G: 17 POWDER, FOR SOLUTION ORAL at 10:44

## 2023-11-01 RX ADMIN — SENNOSIDES 1 TABLET: 8.6 TABLET, FILM COATED ORAL at 08:34

## 2023-11-01 RX ADMIN — POLYETHYLENE GLYCOL 3350 17 G: 17 POWDER, FOR SOLUTION ORAL at 20:10

## 2023-11-01 RX ADMIN — OXYCODONE HYDROCHLORIDE AND ACETAMINOPHEN 1 TABLET: 5; 325 TABLET ORAL at 10:43

## 2023-11-01 RX ADMIN — ENOXAPARIN SODIUM 30 MG: 100 INJECTION SUBCUTANEOUS at 20:11

## 2023-11-01 RX ADMIN — HYDROMORPHONE HYDROCHLORIDE 0.5 MG: 1 INJECTION, SOLUTION INTRAMUSCULAR; INTRAVENOUS; SUBCUTANEOUS at 14:23

## 2023-11-01 NOTE — PLAN OF CARE
Problem: Adult Inpatient Plan of Care  Goal: Plan of Care Review  Outcome: Ongoing, Progressing  Flowsheets (Taken 10/31/2023 1756 by No Saavedra RN)  Progress: improving  Plan of Care Reviewed With: patient  Outcome Evaluation: Advanced to clear liquid diet today, changed pain medication to 0.5 mg dilaudid Q2 hr. Pain better managed with that. KUB completed. New IV placed, given suppository and senna today, still no BM.  Goal: Patient-Specific Goal (Individualized)  Outcome: Ongoing, Progressing  Goal: Absence of Hospital-Acquired Illness or Injury  Outcome: Ongoing, Progressing  Intervention: Identify and Manage Fall Risk  Recent Flowsheet Documentation  Taken 10/31/2023 2000 by Telma Lyman RN  Safety Promotion/Fall Prevention: safety round/check completed  Intervention: Prevent Skin Injury  Recent Flowsheet Documentation  Taken 10/31/2023 2000 by Telma Lyman RN  Body Position: position changed independently  Intervention: Prevent and Manage VTE (Venous Thromboembolism) Risk  Recent Flowsheet Documentation  Taken 10/31/2023 2000 by Telma Lyman RN  Activity Management: activity encouraged  Intervention: Prevent Infection  Recent Flowsheet Documentation  Taken 10/31/2023 2000 by Telma Lyman RN  Infection Prevention:   equipment surfaces disinfected   personal protective equipment utilized   hand hygiene promoted   rest/sleep promoted  Goal: Optimal Comfort and Wellbeing  Outcome: Ongoing, Progressing  Intervention: Provide Person-Centered Care  Recent Flowsheet Documentation  Taken 10/31/2023 2000 by Telma Lyman RN  Trust Relationship/Rapport:   care explained   questions answered   questions encouraged  Goal: Readiness for Transition of Care  Outcome: Ongoing, Progressing     Problem: Pain Acute  Goal: Acceptable Pain Control and Functional Ability  Outcome: Ongoing, Progressing  Intervention: Prevent or Manage Pain  Recent Flowsheet Documentation  Taken 10/31/2023 2000 by Telma Lyman  RN  Medication Review/Management: medications reviewed   Goal Outcome Evaluation:

## 2023-11-01 NOTE — PROGRESS NOTES
Name: Donny Mallory ADMIT: 10/29/2023   : 1960  PCP: Janelle Lara MD    MRN: [id number] LOS: 2 days   AGE/SEX: 63 y.o. male  ROOM: [facility]     Subjective   Subjective     In the bed, eating breakfast.  Patient denies having BM or passing gases nausea or no vomiting.  Abdominal  improved compared to yesterday.  Patient refusing IV fluids.    Review of Systems   As above  Objective   Objective   Vital Signs  Temp:  [97.6 °F (36.4 °C)-98.1 °F (36.7 °C)] 97.7 °F (36.5 °C)  Heart Rate:  [44-65] 49  Resp:  [16-18] 18  BP: (114-128)/(64-81) 114/78  SpO2:  [93 %-100 %] 98 %  on   ;   Device (Oxygen Therapy): room air  Body mass index is 17.63 kg/m².  Physical Exam    General: Alert, sitting up in the bed, not in distress, flat affect  HEENT: Normocephalic, atraumatic  CV: Bradycardic, regular rhythm.  Lungs: Clear to auscultation bilaterally, no crackles or wheezes  Abdomen: Soft, tender to palpation, nondistended,  Extremities: No significant peripheral edema , no cyanosis     Results Review     I reviewed the patient's new clinical results.  Results from last 7 days   Lab Units 23  0504 10/31/23  0438 10/30/23  0455 10/29/23  1629   WBC 10*3/mm3 6.66 4.85 5.97 8.36   HEMOGLOBIN g/dL 11.4* 11.8* 11.3* 13.3   PLATELETS 10*3/mm3 209 212 214 246     Results from last 7 days   Lab Units 23  0504 10/31/23  0438 10/30/23  1623 10/30/23  0455 10/29/23  1629   SODIUM mmol/L 143 141 142 141 154*   POTASSIUM mmol/L 3.7 3.7  --  3.6 4.5   CHLORIDE mmol/L 108* 110*  --  107 116*   CO2 mmol/L 27.0 23.4  --  27.0 24.3   BUN mg/dL 11 11  --  13 15   CREATININE mg/dL 0.96 0.95  --  0.90 0.93   GLUCOSE mg/dL 82 95  --  88 88   Estimated Creatinine Clearance: 63.7 mL/min (by C-G formula based on SCr of 0.96 mg/dL).  Results from last 7 days   Lab Units 10/29/23  1629   ALBUMIN g/dL 4.2   BILIRUBIN mg/dL 0.5   ALK PHOS U/L 77   AST (SGOT) U/L 24   ALT (SGPT) U/L 14     Results from last 7 days   Lab Units  "11/01/23  0504 10/31/23  0438 10/30/23  0455 10/29/23  1629   CALCIUM mg/dL 8.8 8.9 8.8 9.3   ALBUMIN g/dL  --   --   --  4.2   MAGNESIUM mg/dL 1.8 1.8 1.7  --    PHOSPHORUS mg/dL 3.2 3.7 3.5  --      Results from last 7 days   Lab Units 10/29/23  1629   PROCALCITONIN ng/mL 0.05   LACTATE mmol/L 0.9     COVID19   Date Value Ref Range Status   10/29/2023 Not Detected Not Detected - Ref. Range Final     SARS-CoV-2, CAREY   Date Value Ref Range Status   09/21/2023 NEGATIVE Negative Final     Comment:     The 2019-CoV rRT-PCR Assay is only for use under a Food and Drug Administration Emergency Use Authorization. The performance characteristics of the assay were verified by the Clinical Microbiology Laboratory at the [facility].   Results should be used in conjunction with the patient's clinical symptoms, medical history, and other clinical/laboratory findings to determine an overall clinical diagnosis. Negative results do not preclude infection with SARS-CoV-2 (COVID-19).    Test parameters have not been validated for screening asymptomatic patients.     No results found for: \"HGBA1C\", \"POCGLU\"        XR Abdomen KUB  Narrative: EXAM: XR ABDOMEN KUB-     INDICATION: Stool burden evaluation     COMPARISON: CT abdomen pelvis 10/29/2023        Impression: Moderate colonic stool burden, similar to recent CT.  Nonobstructive bowel gas pattern. No pneumatosis. CBD stent.  Cholecystectomy clips.           This report was finalized on 10/31/2023 10:54 AM by Dr. Law Daley M.D on Workstation: [id number]       Scheduled Medications  bisacodyl, 10 mg, Rectal, Daily  budesonide-formoterol, 2 puff, Inhalation, BID - RT   And  tiotropium bromide monohydrate, 2 puff, Inhalation, Daily - RT  enoxaparin, 30 mg, Subcutaneous, Q24H  levoFLOXacin, 750 mg, Intravenous, Q24H  lubiprostone, 24 mcg, Oral, BID With Meals  pantoprazole, 40 mg, Intravenous, Q AM  polyethylene glycol, 17 g, Oral, BID  senna, 1 tablet, " Oral, BID    Infusions  dextrose 5 % and lactated Ringer's, 100 mL/hr, Last Rate: 100 mL/hr (10/30/23 5391)    Diet  Diet: Regular/House Diet; Fluid Consistency: Thin (IDDSI 0)    I have personally reviewed     [x]  Laboratory   []  Microbiology   []  Radiology   []  EKG/Telemetry  []  Cardiology/Vascular   []  Pathology    []  Records       Assessment/Plan     Active Hospital Problems    Diagnosis  POA    **Ileus [K56.7]  Yes    GERD without esophagitis [K21.9]  Yes    Hypernatremia [E87.0]  Yes    COPD (chronic obstructive pulmonary disease) [J44.9]  Yes    Hypertension [I10]  Yes      Resolved Hospital Problems   No resolved problems to display.       63 y.o. male admitted with Ileus.    Patient is a 63 y.o. man with copd, hypertension, gerd who presents with a persistent sinus infection despite 10 days of amoxicillin as well as polyuria and hypernatremia and an ileus.      Ileus/constipation  -CT scan showed  likely an ileus of the distal small bowel.   -Refusing IV fluids   -KUB 10/31 moderate colonic stool burden,   -On aggressive bowel regimen per GI, with senna 1 tab twice daily, MiraLAX twice daily, lubiprostone 24 mcg twice daily  -One-time milk of magnesia ordered  -Initiated on diet, advance as tolerated    Hypernatremia-sodium was 154 on admission, resolved.  Monitor.        Persistent sinus infection despite 10 days of amoxicillin therapy continue 5 days of levaquin    Prostatomegaly-PSA normal. will need outpatient evaluation.    Hypertension-hold antihypertensives, BP stable.    GERD-iv ppi    COPD-continue home inhalers    Chronic pancreatic ductal dilation      Bradycardia: Ordered TSH normal, EKG showed sinus bradycardia.  Asymptomatic, monitor    Lovenox 30 mg SC daily for DVT prophylaxis.  Full code.  Discussed with patient.  Anticipate discharge possibly tomorrow if has BM and cleared by GI.      Copied text in this note has been reviewed and is accurate as of 11/01/23.         Dictated  utilizing Dragon dictation        Fernando Weber MD  [facility]  11/01/23  17:51 EDT

## 2023-11-01 NOTE — PLAN OF CARE
Goal Outcome Evaluation:  Plan of Care Reviewed With: patient        Progress: improving  Outcome Evaluation: Pt alert and orient. On room air. Pain control with prn meds. Scheduled meds given to help with BM. Diet advanced to regular, pt tolerating well.  PRN zofran given for nausea. Possible d/c once pt is able to have BM.

## 2023-11-01 NOTE — PROGRESS NOTES
[facility]  Inpatient Progress Note    Reason for Follow Up:  Ileus    Subjective     Interval History:   Pt reports he is feeling the same in regards to the abdominal pain. He is now having flatulence however. He denied having BM, but had informed the RN that he had small BM yesterday.   Tolerating clears w/o vomiting.    KUB yesterday: Moderate colonic stool burden, similar to recent CT. Nonobstructive bowel gas pattern. No pneumatosis. CBD stent.  Cholecystectomy clips.         Current Facility-Administered Medications:     acetaminophen (TYLENOL) tablet 650 mg, 650 mg, Oral, Q4H PRN, Oliver Camara MD    bisacodyl (DULCOLAX) suppository 10 mg, 10 mg, Rectal, Daily, Mahad Galdamez PA-C, 10 mg at 10/31/23 0831    budesonide-formoterol (SYMBICORT) 160-4.5 MCG/ACT inhaler 2 puff, 2 puff, Inhalation, BID - RT, 2 puff at 11/01/23 0854 **AND** tiotropium (SPIRIVA RESPIMAT) 2.5 mcg/act aerosol solution inhaler, 2 puff, Inhalation, Daily - RT, Oliver Camara MD, 2 puff at 11/01/23 0856    dextrose 5 % and lactated Ringer's infusion, 100 mL/hr, Intravenous, Continuous, Fernando Weber MD, Last Rate: 100 mL/hr at 10/30/23 2315, 100 mL/hr at 10/30/23 2315    Enoxaparin Sodium (LOVENOX) syringe 30 mg, 30 mg, Subcutaneous, Q24H, Fernando Weber MD, 30 mg at 10/31/23 2015    HYDROmorphone (DILAUDID) injection 0.5 mg, 0.5 mg, Intravenous, Q2H PRN, Fernando Weber MD, 0.5 mg at 11/01/23 0835    ipratropium-albuterol (DUO-NEB) nebulizer solution 3 mL, 3 mL, Nebulization, Q4H PRN, Oliver Camara MD    levoFLOXacin (LEVAQUIN) 750 mg/150 mL D5W (premix) (LEVAQUIN) 750 mg, 750 mg, Intravenous, Q24H, Oliver Camara MD, Last Rate: 100 mL/hr at 10/31/23 2015, 750 mg at 10/31/23 2015    lubiprostone (AMITIZA) capsule 24 mcg, 24 mcg, Oral, BID With Meals, Mahad Galdamez PA-C, 24 mcg at 11/01/23 0834    melatonin tablet 3 mg, 3 mg, Oral, Nightly PRN, Oliver Camara MD, 3 mg at 10/31/23 2023     ondansetron (ZOFRAN) tablet 4 mg, 4 mg, Oral, Q6H PRN **OR** ondansetron (ZOFRAN) injection 4 mg, 4 mg, Intravenous, Q6H PRN, Oliver Camara MD, 4 mg at 11/01/23 0835    pantoprazole (PROTONIX) injection 40 mg, 40 mg, Intravenous, Q AM, Oliver Camara MD, 40 mg at 11/01/23 0627    senna (SENOKOT) tablet 1 tablet, 1 tablet, Oral, BID, Mahad Galdamez PA-C, 1 tablet at 11/01/23 0834    [COMPLETED] Insert Peripheral IV, , , Once **AND** sodium chloride 0.9 % flush 10 mL, 10 mL, Intravenous, PRN, Fred Jon MD        Objective     Vital Signs  Temp:  [97.5 °F (36.4 °C)-97.8 °F (36.6 °C)] 97.6 °F (36.4 °C)  Heart Rate:  [49-65] 49  Resp:  [16-18] 18  BP: (122-139)/(66-91) 122/66  Body mass index is 17.63 kg/m².    Intake/Output Summary (Last 24 hours) at 11/1/2023 0959  Last data filed at 10/31/2023 1700  Gross per 24 hour   Intake 480 ml   Output --   Net 480 ml     No intake/output data recorded.     Physical Exam:   General: patient awake, alert and cooperative   Eyes: Normal lids and lashes, no scleral icterus   Abdomen: soft, nontender, nondistended; normal bowel sounds      Results Review:     I reviewed the patient's new clinical results.    Results from last 7 days   Lab Units 11/01/23  0504 10/31/23  0438 10/30/23  0455   WBC 10*3/mm3 6.66 4.85 5.97   HEMOGLOBIN g/dL 11.4* 11.8* 11.3*   HEMATOCRIT % 34.4* 35.0* 33.9*   PLATELETS 10*3/mm3 209 212 214     Results from last 7 days   Lab Units 11/01/23  0504 10/31/23  0438 10/30/23  1623 10/30/23  0455 10/29/23  1629   SODIUM mmol/L 143 141 142 141 154*   POTASSIUM mmol/L 3.7 3.7  --  3.6 4.5   CHLORIDE mmol/L 108* 110*  --  107 116*   CO2 mmol/L 27.0 23.4  --  27.0 24.3   BUN mg/dL 11 11  --  13 15   CREATININE mg/dL 0.96 0.95  --  0.90 0.93   CALCIUM mg/dL 8.8 8.9  --  8.8 9.3   BILIRUBIN mg/dL  --   --   --   --  0.5   ALK PHOS U/L  --   --   --   --  77   ALT (SGPT) U/L  --   --   --   --  14   AST (SGOT) U/L  --   --   --   --  24   GLUCOSE mg/dL 82 95   --  88 88     Results from last 7 days   Lab Units 10/29/23  1629   INR  0.96     Lab Results   Lab Value Date/Time    LIPASE 31 10/29/2023 1629    LIPASE 20 10/28/2023 1232    LIPASE 43 10/22/2023 1526    LIPASE 18 10/16/2023 1509    LIPASE 36 10/09/2023 1435    LIPASE 20 10/07/2023 1303    LIPASE 44 09/26/2023 1716    LIPASE 49 09/21/2023 0031    LIPASE 23 09/11/2023 1507    LIPASE 40 08/15/2023 1537    LIPASE 40 07/26/2023 1437    LIPASE 22 07/22/2023 1843    LIPASE 20 07/14/2023 1223    LIPASE 25 07/01/2023 1451    LIPASE 25 06/26/2023 1902    LIPASE 26 06/23/2023 1500    LIPASE 17 06/19/2023 1222    LIPASE 21 06/15/2023 2359    LIPASE 27 05/16/2023 1512    LIPASE 67 (H) 07/28/2022 1556    LIPASE 20 06/27/2022 2034    LIPASE 33 06/23/2022 1837    LIPASE 22 06/18/2022 1127    LIPASE 31 06/11/2022 1213    LIPASE 95 (H) 06/08/2022 1506    LIPASE 26 06/03/2022 1004    LIPASE 25 05/16/2022 1136    LIPASE 23 05/02/2022 1706    LIPASE 80 (H) 04/22/2022 0548    LIPASE 425 (H) 04/21/2022 1012    LIPASE 20 04/19/2022 1118    LIPASE 25 04/03/2022 1123    LIPASE 23 03/12/2022 2156    LIPASE 31 02/21/2022 1614    LIPASE 31 02/18/2022 1400    LIPASE 29 02/06/2022 1552    LIPASE 30 01/24/2022 1221    LIPASE 30 12/22/2021 1517    LIPASE 24 11/12/2021 1253    LIPASE 18 10/23/2021 1707    LIPASE 22 08/31/2021 1206    LIPASE 24 08/20/2021 1347    LIPASE 23 08/03/2021 0024    LIPASE 25 07/22/2021 0218    LIPASE 129 (H) 07/02/2021 1554    LIPASE 36 06/25/2021 1615    LIPASE 26 06/15/2021 1725    LIPASE 66 04/12/2021 1630    LIPASE 103 08/12/2019 1105    LIPASE 55 02/12/2019 2052       Radiology:  XR Abdomen KUB   Final Result   Moderate colonic stool burden, similar to recent CT.   Nonobstructive bowel gas pattern. No pneumatosis. CBD stent.   Cholecystectomy clips.               This report was finalized on 10/31/2023 10:54 AM by Dr. Law Daley M.D on Workstation: [id number]          CT Abdomen Pelvis With Contrast   Final  Result   1. There is likely an ileus of the distal small bowel. There is no   convincing evidence for bowel obstruction. There is sigmoid   diverticulosis, but no evidence for diverticulitis.   2. Stable pancreatic ductal dilatation.   3. Prostatomegaly. Follow-up with PSA is recommended.       This report was finalized on 10/30/2023 10:22 AM by Dr. Jennifer Morris M.D   on Workstation: [id number]          XR Chest 1 View   Final Result   Emphysematous change in the lungs. No acute cardiopulmonary   abnormality is identified.       This report was finalized on 10/29/2023 4:39 PM by Dr. Anson Ashraf M.D on Workstation: [id number]              Assessment & Plan     Active Hospital Problems    Diagnosis     **Ileus     GERD without esophagitis     Hypernatremia     COPD (chronic obstructive pulmonary disease)     Hypertension          Assessment:   Ileus- may be from recent acute illness, chronic constipation. Repeat KUB yesterday w/ moderate stool burden.  Abdominal pain  Chronic constipation- last BM 5 days ago  Sinusitis- recent completed abx  Chronically dilated biliary and PD s/p stent placement 09/14,  WNL; CEA minimally elevated at 4.5 on 10/6/2023    Plan:   Advance diet to regular. He clinically looks better today. It seems he may have had a small BM yesterday  Continue current bowel regimen, added Miralax as well now that he is tolerating food.   If he has a BM and tolerates diet, he can be d/c and follow up outpatient with his primary GI.     I discussed the patients findings and my recommendations with patient.         Mahad Galdamez PA-C  [facility]  [address] [place] [address]  Office: [phone]

## 2023-11-02 ENCOUNTER — APPOINTMENT (OUTPATIENT)
Dept: GENERAL RADIOLOGY | Facility: HOSPITAL | Age: 63
End: 2023-11-02
Payer: COMMERCIAL

## 2023-11-02 ENCOUNTER — READMISSION MANAGEMENT (OUTPATIENT)
Dept: CALL CENTER | Facility: HOSPITAL | Age: 63
End: 2023-11-02
Payer: COMMERCIAL

## 2023-11-02 VITALS
SYSTOLIC BLOOD PRESSURE: 131 MMHG | BODY MASS INDEX: 17.6 KG/M2 | WEIGHT: 122.9 LBS | DIASTOLIC BLOOD PRESSURE: 80 MMHG | OXYGEN SATURATION: 95 % | HEART RATE: 53 BPM | TEMPERATURE: 98.3 F | RESPIRATION RATE: 18 BRPM | HEIGHT: 70 IN

## 2023-11-02 LAB
ANION GAP SERPL CALCULATED.3IONS-SCNC: 6.2 MMOL/L (ref 5–15)
BUN SERPL-MCNC: 10 MG/DL (ref 8–23)
BUN/CREAT SERPL: 9.7 (ref 7–25)
CALCIUM SPEC-SCNC: 8.9 MG/DL (ref 8.6–10.5)
CHLORIDE SERPL-SCNC: 107 MMOL/L (ref 98–107)
CO2 SERPL-SCNC: 27.8 MMOL/L (ref 22–29)
CREAT SERPL-MCNC: 1.03 MG/DL (ref 0.76–1.27)
DEPRECATED RDW RBC AUTO: 43.8 FL (ref 37–54)
EGFRCR SERPLBLD CKD-EPI 2021: 82.6 ML/MIN/1.73
ERYTHROCYTE [DISTWIDTH] IN BLOOD BY AUTOMATED COUNT: 12.8 % (ref 12.3–15.4)
GLUCOSE SERPL-MCNC: 90 MG/DL (ref 65–99)
HCT VFR BLD AUTO: 33.8 % (ref 37.5–51)
HGB BLD-MCNC: 11.3 G/DL (ref 13–17.7)
MCH RBC QN AUTO: 31 PG (ref 26.6–33)
MCHC RBC AUTO-ENTMCNC: 33.4 G/DL (ref 31.5–35.7)
MCV RBC AUTO: 92.6 FL (ref 79–97)
PHOSPHATE SERPL-MCNC: 4.1 MG/DL (ref 2.5–4.5)
PLATELET # BLD AUTO: 217 10*3/MM3 (ref 140–450)
PMV BLD AUTO: 10.6 FL (ref 6–12)
POTASSIUM SERPL-SCNC: 3.9 MMOL/L (ref 3.5–5.2)
RBC # BLD AUTO: 3.65 10*6/MM3 (ref 4.14–5.8)
SODIUM SERPL-SCNC: 141 MMOL/L (ref 136–145)
WBC NRBC COR # BLD: 5.86 10*3/MM3 (ref 3.4–10.8)

## 2023-11-02 PROCEDURE — 25010000002 ONDANSETRON PER 1 MG: Performed by: STUDENT IN AN ORGANIZED HEALTH CARE EDUCATION/TRAINING PROGRAM

## 2023-11-02 PROCEDURE — 80048 BASIC METABOLIC PNL TOTAL CA: CPT | Performed by: STUDENT IN AN ORGANIZED HEALTH CARE EDUCATION/TRAINING PROGRAM

## 2023-11-02 PROCEDURE — 85027 COMPLETE CBC AUTOMATED: CPT | Performed by: STUDENT IN AN ORGANIZED HEALTH CARE EDUCATION/TRAINING PROGRAM

## 2023-11-02 PROCEDURE — 94664 DEMO&/EVAL PT USE INHALER: CPT

## 2023-11-02 PROCEDURE — 94799 UNLISTED PULMONARY SVC/PX: CPT

## 2023-11-02 PROCEDURE — 94761 N-INVAS EAR/PLS OXIMETRY MLT: CPT

## 2023-11-02 PROCEDURE — 84100 ASSAY OF PHOSPHORUS: CPT | Performed by: STUDENT IN AN ORGANIZED HEALTH CARE EDUCATION/TRAINING PROGRAM

## 2023-11-02 PROCEDURE — 99232 SBSQ HOSP IP/OBS MODERATE 35: CPT

## 2023-11-02 PROCEDURE — 74018 RADEX ABDOMEN 1 VIEW: CPT

## 2023-11-02 RX ORDER — AMOXICILLIN 250 MG
2 CAPSULE ORAL 2 TIMES DAILY
Qty: 120 TABLET | Refills: 0 | Status: SHIPPED | OUTPATIENT
Start: 2023-11-02 | End: 2023-12-07

## 2023-11-02 RX ORDER — LEVOFLOXACIN 750 MG/1
750 TABLET ORAL DAILY
Qty: 1 TABLET | Refills: 0 | Status: SHIPPED | OUTPATIENT
Start: 2023-11-03 | End: 2023-11-08

## 2023-11-02 RX ORDER — LEVOFLOXACIN 750 MG/1
750 TABLET ORAL DAILY
Qty: 2 TABLET | Refills: 0 | Status: SHIPPED | OUTPATIENT
Start: 2023-11-03 | End: 2023-11-02 | Stop reason: SDUPTHER

## 2023-11-02 RX ADMIN — TIOTROPIUM BROMIDE INHALATION SPRAY 2 PUFF: 3.12 SPRAY, METERED RESPIRATORY (INHALATION) at 07:21

## 2023-11-02 RX ADMIN — OXYCODONE HYDROCHLORIDE AND ACETAMINOPHEN 1 TABLET: 5; 325 TABLET ORAL at 05:12

## 2023-11-02 RX ADMIN — OXYCODONE HYDROCHLORIDE AND ACETAMINOPHEN 1 TABLET: 5; 325 TABLET ORAL at 12:33

## 2023-11-02 RX ADMIN — BISACODYL 10 MG: 10 SUPPOSITORY RECTAL at 09:27

## 2023-11-02 RX ADMIN — ONDANSETRON 4 MG: 2 INJECTION INTRAMUSCULAR; INTRAVENOUS at 12:33

## 2023-11-02 RX ADMIN — BUDESONIDE AND FORMOTEROL FUMARATE DIHYDRATE 2 PUFF: 160; 4.5 AEROSOL RESPIRATORY (INHALATION) at 07:21

## 2023-11-02 RX ADMIN — POLYETHYLENE GLYCOL 3350 17 G: 17 POWDER, FOR SOLUTION ORAL at 09:27

## 2023-11-02 RX ADMIN — ONDANSETRON 4 MG: 2 INJECTION INTRAMUSCULAR; INTRAVENOUS at 05:12

## 2023-11-02 RX ADMIN — LUBIPROSTONE 24 MCG: 24 CAPSULE, GELATIN COATED ORAL at 09:27

## 2023-11-02 RX ADMIN — SENNOSIDES 1 TABLET: 8.6 TABLET, FILM COATED ORAL at 09:27

## 2023-11-02 RX ADMIN — PANTOPRAZOLE SODIUM 40 MG: 40 INJECTION, POWDER, FOR SOLUTION INTRAVENOUS at 05:12

## 2023-11-02 NOTE — PROGRESS NOTES
Gastroenterology   Inpatient Progress Note    Reason for Follow Up: Ileus    Subjective  Interval History:   Denies bowel movement overnight.  Reports generalized abdominal pain and flatulence.    Current Facility-Administered Medications:     acetaminophen (TYLENOL) tablet 650 mg, 650 mg, Oral, Q4H PRN, Oliver Camara MD    bisacodyl (DULCOLAX) suppository 10 mg, 10 mg, Rectal, Daily, Mahad Galdamez PA-C, 10 mg at 10/31/23 0831    budesonide-formoterol (SYMBICORT) 160-4.5 MCG/ACT inhaler 2 puff, 2 puff, Inhalation, BID - RT, 2 puff at 11/02/23 0721 **AND** tiotropium (SPIRIVA RESPIMAT) 2.5 mcg/act aerosol solution inhaler, 2 puff, Inhalation, Daily - RT, Oliver Camara MD, 2 puff at 11/02/23 0721    dextrose 5 % and lactated Ringer's infusion, 100 mL/hr, Intravenous, Continuous, Fernando Weber MD, Last Rate: 100 mL/hr at 10/30/23 2315, 100 mL/hr at 10/30/23 2315    Enoxaparin Sodium (LOVENOX) syringe 30 mg, 30 mg, Subcutaneous, Q24H, Fernando Weber MD, 30 mg at 11/01/23 2011    ipratropium-albuterol (DUO-NEB) nebulizer solution 3 mL, 3 mL, Nebulization, Q4H PRN, Oliver Camara MD    levoFLOXacin (LEVAQUIN) 750 mg/150 mL D5W (premix) (LEVAQUIN) 750 mg, 750 mg, Intravenous, Q24H, Oliver Camara MD, Last Rate: 100 mL/hr at 11/01/23 2011, 750 mg at 11/01/23 2011    lubiprostone (AMITIZA) capsule 24 mcg, 24 mcg, Oral, BID With Meals, Mahad Galdamez PA-C, 24 mcg at 11/01/23 3747    melatonin tablet 3 mg, 3 mg, Oral, Nightly PRN, Oliver Camara MD, 3 mg at 11/01/23 2010    ondansetron (ZOFRAN) tablet 4 mg, 4 mg, Oral, Q6H PRN **OR** ondansetron (ZOFRAN) injection 4 mg, 4 mg, Intravenous, Q6H PRN, Oliver Camara MD, 4 mg at 11/02/23 0512    oxyCODONE-acetaminophen (PERCOCET) 5-325 MG per tablet 1 tablet, 1 tablet, Oral, Q6H PRN, Fernando Weber MD, 1 tablet at 11/02/23 0512    pantoprazole (PROTONIX) injection 40 mg, 40 mg, Intravenous, Q AM, Oliver Camara MD, 40 mg at 11/02/23 0512    polyethylene  glycol (MIRALAX) packet 17 g, 17 g, Oral, BID, Galdamez, Mahad, PA-C, 17 g at 11/01/23 2010    senna (SENOKOT) tablet 1 tablet, 1 tablet, Oral, BID, Galdamez, Mahad, PA-C, 1 tablet at 11/01/23 2011    [COMPLETED] Insert Peripheral IV, , , Once **AND** sodium chloride 0.9 % flush 10 mL, 10 mL, Intravenous, PRN, Fred Jon MD  Review of Systems:               All systems were reviewed and negative except for:  Constitution:  positive for See HPI  Gastrointestinal: positive for  bloating / distention and constipation    Objective     Vital Signs  Temp:  [97.7 °F (36.5 °C)-98.1 °F (36.7 °C)] 98.1 °F (36.7 °C)  Heart Rate:  [44-54] 53  Resp:  [16-20] 18  BP: (110-134)/(64-88) 134/88  Body mass index is 17.63 kg/m².                  General Appearance:  awake, alert, oriented, in no acute distress  Abdomen:  Soft, generalized abdominal tenderness to palpation, normal bowel sounds; no bruits, organomegaly or masses.                Results Review:                I reviewed the patient's new clinical results.    Results from last 7 days   Lab Units 11/02/23  0532 11/01/23  0504 10/31/23  0438   WBC 10*3/mm3 5.86 6.66 4.85   HEMOGLOBIN g/dL 11.3* 11.4* 11.8*   HEMATOCRIT % 33.8* 34.4* 35.0*   PLATELETS 10*3/mm3 217 209 212     Results from last 7 days   Lab Units 11/02/23  0532 11/01/23  0504 10/31/23  0438 10/30/23  0455 10/29/23  1629   SODIUM mmol/L 141 143 141   < > 154*   POTASSIUM mmol/L 3.9 3.7 3.7   < > 4.5   CHLORIDE mmol/L 107 108* 110*   < > 116*   CO2 mmol/L 27.8 27.0 23.4   < > 24.3   BUN mg/dL 10 11 11   < > 15   CREATININE mg/dL 1.03 0.96 0.95   < > 0.93   CALCIUM mg/dL 8.9 8.8 8.9   < > 9.3   BILIRUBIN mg/dL  --   --   --   --  0.5   ALK PHOS U/L  --   --   --   --  77   ALT (SGPT) U/L  --   --   --   --  14   AST (SGOT) U/L  --   --   --   --  24   GLUCOSE mg/dL 90 82 95   < > 88    < > = values in this interval not displayed.     Results from last 7 days   Lab Units 10/29/23  1629   INR  0.96     Lab  Results   Lab Value Date/Time    LIPASE 31 10/29/2023 1629    LIPASE 20 10/28/2023 1232    LIPASE 43 10/22/2023 1526    LIPASE 18 10/16/2023 1509    LIPASE 36 10/09/2023 1435    LIPASE 20 10/07/2023 1303       Radiology:  XR Abdomen KUB   Final Result   Moderate colonic stool burden, similar to recent CT.   Nonobstructive bowel gas pattern. No pneumatosis. CBD stent.   Cholecystectomy clips.               This report was finalized on 10/31/2023 10:54 AM by Dr. Law Daley M.D on Workstation: [id number]          CT Abdomen Pelvis With Contrast   Final Result   1. There is likely an ileus of the distal small bowel. There is no   convincing evidence for bowel obstruction. There is sigmoid   diverticulosis, but no evidence for diverticulitis.   2. Stable pancreatic ductal dilatation.   3. Prostatomegaly. Follow-up with PSA is recommended.       This report was finalized on 10/30/2023 10:22 AM by Dr. Jennifer Morris M.D   on Workstation: [id number]          XR Chest 1 View   Final Result   Emphysematous change in the lungs. No acute cardiopulmonary   abnormality is identified.       This report was finalized on 10/29/2023 4:39 PM by Dr. Anson Ashraf M.D on Workstation: [id number]              Assessment & Plan     Active Hospital Problems    Diagnosis     **Ileus     GERD without esophagitis     Hypernatremia     COPD (chronic obstructive pulmonary disease)     Hypertension        Assessment:  Ileus-thought to be secondary from recent acute illness, chronic constipation.  Abdominal pain  Chronic constipation-  Sinusitis  Chronically dilated biliary and PD status post stent placement on 9/14, CA 19-9 normal, CEA minimally elevated at 4.5 on 10/6/2023 assessment  These problems are new to me    Plan:  Nursing staff to notify GI service on call if any change in clinical status.  Obtain abdominal KUB x-ray to assess status of ileus secondary to absence of bowel movement.  If tolerating regular diet and experiences  small bowel movement today can go home with follow-up outpatient with primary GI provider.    I discussed the patients findings and my recommendations with patient and nursing staff.    Addendum:    KUB noted interval decrease in colonic stool burden.  However there is remaining moderate stool burden in colon.  Nursing staff notified of orders to insert a single enema secondary to patient's concern of persistent abdominal fullness.        TOBIAS Monge  [facility] [place]  [address] [place] [address]

## 2023-11-02 NOTE — PROGRESS NOTES
Discharge Planning Assessment  [facility]     Patient Name: Donny Mallory  MRN: [id number]  Today's Date: 11/2/2023    Admit Date: 10/29/2023    Plan: HOme   Discharge Needs Assessment    No documentation.                  Discharge Plan       Row Name 11/02/23 1831       Plan    Plan HOme    Final Discharge Disposition Code 01 - home or self-care    Final Note Home                  Continued Care and Services - Discharged on 11/2/2023 Admission date: 10/29/2023 - Discharge disposition: Home or Self Care   Coordination has not been started for this encounter.       Expected Discharge Date and Time       Expected Discharge Date Expected Discharge Time    Nov 2, 2023            Demographic Summary    No documentation.                  Functional Status    No documentation.                  Psychosocial    No documentation.                  Abuse/Neglect    No documentation.                  Legal    No documentation.                  Substance Abuse    No documentation.                  Patient Forms    No documentation.                     Vaishali Borjas RN

## 2023-11-02 NOTE — DISCHARGE SUMMARY
Patient Name: Donny Mallory  : 1960  MRN: [id number]    Date of Admission: 10/29/2023  Date of Discharge:  2023  Primary Care Physician: Janelle Lara MD      Chief Complaint:   Abdominal Pain      Discharge Diagnoses     Active Hospital Problems    Diagnosis  POA    **Ileus [K56.7]  Yes    GERD without esophagitis [K21.9]  Yes    Hypernatremia [E87.0]  Yes    COPD (chronic obstructive pulmonary disease) [J44.9]  Yes    Hypertension [I10]  Yes      Resolved Hospital Problems   No resolved problems to display.        Hospital Course       Mr. Mallory is a 63 y.o. man with copd, hypertension, gerd who presents with constipation and abdominal pain for 3 days. He is passing flatus. He also complains of cough, nausea, and gagging following a covid exposure. He reports that he was recently treated with a 10 day course of amoxicillin for a sinus infection, but he has persistent facial/sinus pain, sore throat, and chills. He complains of polyuria and thirst.     leus/constipation  CT scan showed  likely an ileus of the distal small bowel.  Patient was made n.p.o., initiated on IV fluids, antiemetics, pain management.  GI was consulted.  Initiated on aggressive bowel regimen given lubiprostone, MiraLAX, senna.  Follow-up KUB was obtained which showed moderate colonic stool burden, otherwise no acute abnormalities.  Patient did not have BM despite aggressive bowel regimen so enema was ordered and patient had successful BM prior to discharge..  Discharged on higher dose of home Linzess 145 mcg daily per GI recommendations, in addition to docusate senna 2 tabs twice daily.  However patient notified RN that he was not going to  medications.  Patient was recommended to follow-up with primary GI in 4 to 6 weeks.       Hypernatremia-sodium was 154 on admission, resolved with IV fluids.           Persistent sinus infection despite 10 days of amoxicillin therapy -treated with 4 days of Levaquin  inpatient, discharged on 1 more day of Levaquin to complete 5-day course.      Prostatomegaly-PSA normal. will need outpatient evaluation.  Discussed with patient.           Chronic pancreatic ductal [id number]-[id number] with primary GI.        Bradycardia:TSH normal, EKG showed sinus bradycardia.  Asymptomatic, monitor           Patient was discharged this afternoon, as patient was medical stable to be discharged and cleared by GI.  Was prescribed medications for constipation and 1 dose of levofloxacin, however patient notified RN that he was not happy about discharge and was not going to  prescriptions.        At the time of discharge patient was told to take all medications as prescribed, keep all follow-up appointments, and call their doctor or return to the hospital with any worsening or concerning symptoms.              Day of Discharge     Subjective:  Patient seen this morning.  Lying in bed, no acute events overnight.  Still no BM, but passing gases.  Abdominal pain unchanged.  Tolerating advance diet, no vomiting, but some nausea.        Physical Exam:  Temp:  [98.1 °F (36.7 °C)-98.3 °F (36.8 °C)] 98.3 °F (36.8 °C)  Heart Rate:  [47-53] 53  Resp:  [18-20] 18  BP: (110-134)/(68-88) 131/80  Body mass index is 17.63 kg/m².  Physical Exam      General: Alert, laying in bed, not in distress, flat affect,   HEENT: Normocephalic, atraumatic  CV: Regular rate and rhythm, no murmurs rubs or gallops  Lungs: Clear to auscultation bilaterally, no crackles or wheezes  Abdomen: Soft, nondistended,  generalized tenderness to palpation, +positive bowel sounds  Extremities: No significant peripheral edema , no cyanosis       Consultants     Consult Orders (all) (From admission, onward)       Start     Ordered    11/02/23 1047  Inpatient Access Center Consult  Once        Provider:  (Not yet assigned)    11/02/23 1049    10/29/23 2151  Inpatient Nutrition Consult  Once        Provider:  (Not yet assigned)    10/29/23  2154    10/29/23 1921  Inpatient Gastroenterology Consult  Once        Specialty:  Gastroenterology  Provider:  Ezequiel Short MD    10/29/23 1920    10/29/23 1906  [facility] (on-call MD unless specified) Details  Once        Specialty:  Hospitalist  Provider:  (Not yet assigned)    10/29/23 1905                  Procedures     * Surgery not found *      Imaging Results (All)       Procedure Component Value Units Date/Time    XR Abdomen KUB [[id number]] Collected: 11/02/23 1240     Updated: 11/02/23 1244    Narrative:      XR ABDOMEN KUB-     INDICATIONS: Constipation     TECHNIQUE: SUPINE VIEW OF THE ABDOMEN     COMPARISON: 10/31/2023     FINDINGS:      The bowel gas pattern is nonobstructive. No supine evidence for free  intraperitoneal gas. Interval decrease in amount of stool apparent in  the colon. Follow-up as indications persist.       Impression:       IMPRESSION:     As described.        This report was finalized on 11/2/2023 12:41 PM by Dr. Abdiaziz Kumar M.D on Workstation: [id number]       XR Abdomen KUB [[id number]] Collected: 10/31/23 1052     Updated: 10/31/23 1057    Narrative:      EXAM: XR ABDOMEN KUB-     INDICATION: Stool burden evaluation     COMPARISON: CT abdomen pelvis 10/29/2023          Impression:      Moderate colonic stool burden, similar to recent CT.  Nonobstructive bowel gas pattern. No pneumatosis. CBD stent.  Cholecystectomy clips.           This report was finalized on 10/31/2023 10:54 AM by Dr. Law Daley M.D on Workstation: [id number]       CT Abdomen Pelvis With Contrast [[id number]] Collected: 10/29/23 1848     Updated: 10/30/23 1025    Narrative:      CT ABDOMEN AND PELVIS WITH IV CONTRAST     HISTORY: 63-year-old male with abdominal pain. Cholecystectomy in the  past.     TECHNIQUE: Radiation dose reduction techniques were utilized, including  automated exposure control and exposure modulation based on body size.   3 mm images were obtained through the abdomen and pelvis after  the  administration of IV contrast. Compared compared with previous CT  06/26/2023.        FINDINGS:  1. Small hepatic cysts appear stable and there is pneumobilia, and there  is no new liver abnormality. The spleen, adrenals, and kidneys appear  unremarkable other than several renal cysts. The ectasia of the  pancreatic duct to the ampulla appears stable, measuring up to 5 mm in  diameter.     2. There is mild-moderate dilatation of small bowel loops within the  pelvis which also have air-fluid levels. There is no definitive small  bowel thickening. This likely represents a small bowel ileus. There is  no convincing evidence for bowel obstruction. Caliber of the colon is  within normal limits. There is moderately extensive sigmoid  diverticulosis without evidence for diverticulitis. The appendix appears  within normal limits. Urinary bladder is collapsed. The prostate is  enlarged measuring 5.7 cm in diameter. There are moderately extensive  abdominal aortic atherosclerotic changes without aneurysmal dilatation.       Impression:      1. There is likely an ileus of the distal small bowel. There is no  convincing evidence for bowel obstruction. There is sigmoid  diverticulosis, but no evidence for diverticulitis.  2. Stable pancreatic ductal dilatation.  3. Prostatomegaly. Follow-up with PSA is recommended.     This report was finalized on 10/30/2023 10:22 AM by Dr. Jennifer Morris M.D  on Workstation: [id number]       XR Chest 1 View [[id number]] Collected: 10/29/23 1637     Updated: 10/29/23 1642    Narrative:      PORTABLE CHEST X-RAY     HISTORY: Cough and abdomen pain.     Portable chest x-ray consisting of 2 images is provided. Correlation:  Multiple prior chest x-rays as recent as June 11, 2022 and as remote as  February 21, 2022. Chest CT April 21, 2022 was also reviewed.     FINDINGS: The cardiomediastinal silhouette is normal. There is prominent  emphysematous change in the upper lung zones and some  parenchymal  distortion bilaterally which appears similar as on the previous x-rays  and the chest CT. The costophrenic sulci are dry and the bones appear  normal. There is no pneumothorax.       Impression:      Emphysematous change in the lungs. No acute cardiopulmonary  abnormality is identified.     This report was finalized on 10/29/2023 4:39 PM by Dr. Anson Ashraf M.D on Workstation: [id number]               Results for orders placed during the hospital encounter of 04/21/22    Adult Transthoracic Echo Complete W/ Cont if Necessary Per Protocol    Interpretation Summary  · Calculated left ventricular EF = 62.3% Estimated left ventricular EF was in agreement with the calculated left ventricular EF. Left ventricular systolic function is normal.  · Left ventricular diastolic function was normal.  · There is calcification of the aortic valve.  · Mild mitral valve regurgitation is present.  · Mild tricuspid valve regurgitation is present.  · Estimated right ventricular systolic pressure from tricuspid regurgitation is normal (<35 mmHg).    Pertinent Labs     Results from last 7 days   Lab Units 11/02/23  0532 11/01/23  0504 10/31/23  0438 10/30/23  0455   WBC 10*3/mm3 5.86 6.66 4.85 5.97   HEMOGLOBIN g/dL 11.3* 11.4* 11.8* 11.3*   PLATELETS 10*3/mm3 217 209 212 214     Results from last 7 days   Lab Units 11/02/23  0532 11/01/23  0504 10/31/23  0438 10/30/23  1623 10/30/23  0455   SODIUM mmol/L 141 143 141 142 141   POTASSIUM mmol/L 3.9 3.7 3.7  --  3.6   CHLORIDE mmol/L 107 108* 110*  --  107   CO2 mmol/L 27.8 27.0 23.4  --  27.0   BUN mg/dL 10 11 11  --  13   CREATININE mg/dL 1.03 0.96 0.95  --  0.90   GLUCOSE mg/dL 90 82 95  --  88   Estimated Creatinine Clearance: 59.3 mL/min (by C-G formula based on SCr of 1.03 mg/dL).  Results from last 7 days   Lab Units 10/29/23  1629   ALBUMIN g/dL 4.2   BILIRUBIN mg/dL 0.5   ALK PHOS U/L 77   AST (SGOT) U/L 24   ALT (SGPT) U/L 14     Results from last 7 days   Lab  "Units 11/02/23  0532 11/01/23  0504 10/31/23  0438 10/30/23  0455 10/29/23  1629   CALCIUM mg/dL 8.9 8.8 8.9 8.8 9.3   ALBUMIN g/dL  --   --   --   --  4.2   MAGNESIUM mg/dL  --  1.8 1.8 1.7  --    PHOSPHORUS mg/dL 4.1 3.2 3.7 3.5  --      Results from last 7 days   Lab Units 10/29/23  1629 10/28/23  1232   LIPASE U/L 31 20     Results from last 7 days   Lab Units 10/29/23  1629   HSTROP T ng/L 20*           Invalid input(s): \"LDLCALC\"      Results from last 7 days   Lab Units 10/29/23  1630   COVID19  Not Detected       Test Results Pending at Discharge       Discharge Details        Discharge Medications        New Medications        Instructions Start Date   levoFLOXacin 750 MG tablet  Commonly known as: Levaquin   750 mg, Oral, Daily   Start Date: November 3, 2023     linaclotide 145 MCG capsule capsule  Commonly known as: Linzess   145 mcg, Oral, Every Morning Before Breakfast      sennosides-docusate 8.6-50 MG per tablet  Commonly known as: PERICOLACE   2 tablets, Oral, 2 Times Daily             Continue These Medications        Instructions Start Date   acetaminophen 500 MG tablet  Commonly known as: TYLENOL   1,000 mg, Oral, Every 4 Hours PRN      ibuprofen 200 MG tablet  Commonly known as: ADVIL,MOTRIN   200 mg, Oral, Every 6 Hours PRN      ipratropium-albuterol 0.5-2.5 mg/3 ml nebulizer  Commonly known as: DUO-NEB   3 mL, Nebulization, Every 4 Hours PRN      lisinopril 20 MG tablet  Commonly known as: PRINIVIL,ZESTRIL   20 mg, Oral, Daily      omeprazole 20 MG capsule  Commonly known as: priLOSEC   20 mg, Oral, Daily      ondansetron ODT 4 MG disintegrating tablet  Commonly known as: ZOFRAN-ODT   4 mg, Translingual, 4 Times Daily PRN      promethazine 25 MG tablet  Commonly known as: PHENERGAN   25 mg, Oral, Every 6 Hours PRN      sucralfate 1 g tablet  Commonly known as: CARAFATE   1 g, Oral, 4 Times Daily      Trelegy Ellipta 100-62.5-25 MCG/INH inhaler  Generic drug: Fluticasone-Umeclidin-Vilant   1 " puff, Inhalation, Daily - RT             Stop These Medications      docusate sodium 100 MG capsule              Allergies   Allergen Reactions    Prochlorperazine Anxiety       Discharge Disposition:  Home or Self Care      Discharge Diet:  Diet Order   Procedures    Diet: Regular/House Diet; Fluid Consistency: Thin (IDDSI 0)       Discharge Activity:       CODE STATUS:    Code Status and Medical Interventions:   Ordered at: 10/29/23 1958     Level Of Support Discussed With:    Patient     Code Status (Patient has no pulse and is not breathing):    No CPR (Do Not Attempt to Resuscitate)     Medical Interventions (Patient has pulse or is breathing):    Full Support       No future appointments.   Follow-up Information       Janelle Lara MD Follow up in 1 week(s).    Specialty: Family Medicine  Contact information:  [address]  [address] [address] [phone]               Tess Juarez MD. Schedule an appointment as soon as possible for a visit in 1 month(s).    Specialty: Gastroenterology  Contact information:  [address] [place] [id number] [phone]                             Time Spent on Discharge:  Greater than 30 minutes      Fernando Weber MD  [facility]  11/02/23  16:29 EDT

## 2023-11-02 NOTE — PLAN OF CARE
Goal Outcome Evaluation:  VSS. Pain controlled with PO oxycodone. Still has not had a BM after all the ordered interventions. Active bowel sounds and patient states he is having flatulence. Possible DC home today if patient has a BM. Call light within reach. Plan of care ongoing.

## 2023-11-02 NOTE — PROGRESS NOTES
"Nutrition Services    Patient Name:  Donny Mallory  YOB: 1960  MRN: [id number]  Admit Date:  10/29/2023    Assessment Date:  11/02/23    Summary: Nutrition follow up. Diet has been advanced to regular and he is tolerating it % of meals. Still needs to have a BM. Hopeful for BM - on multiple laxatives. Labs reviewed.    Plan/Recommendations:  Encourage po intake.    Will follow clinical course, nutrition needs.    CLINICAL NUTRITION ASSESSMENT      Reason for Assessment Follow-up Protocol     Diagnosis/Problem   Ileus, sinus infection, COPD, hypernatremia     Anthropometrics        Current Height  Current Weight  BMI kg/m2 Height: 177.8 cm (70\")  Weight: 55.7 kg (122 lb 14.4 oz) (11/01/23 0500)  Body mass index is 17.63 kg/m².   Adjusted BMI (if applicable)    BMI Category Underweight (18.4 or below)   Ideal Body Weight (IBW) 160lb   Usual Body Weight (UBW) 154lb   Weight Trend Loss   --  Labs       Pertinent Labs    Results from last 7 days   Lab Units 11/02/23  0532 11/01/23  0504 10/31/23  0438 10/30/23  0455 10/29/23  1629   SODIUM mmol/L 141 143 141   < > 154*   POTASSIUM mmol/L 3.9 3.7 3.7   < > 4.5   CHLORIDE mmol/L 107 108* 110*   < > 116*   CO2 mmol/L 27.8 27.0 23.4   < > 24.3   BUN mg/dL 10 11 11   < > 15   CREATININE mg/dL 1.03 0.96 0.95   < > 0.93   CALCIUM mg/dL 8.9 8.8 8.9   < > 9.3   BILIRUBIN mg/dL  --   --   --   --  0.5   ALK PHOS U/L  --   --   --   --  77   ALT (SGPT) U/L  --   --   --   --  14   AST (SGOT) U/L  --   --   --   --  24   GLUCOSE mg/dL 90 82 95   < > 88    < > = values in this interval not displayed.     Results from last 7 days   Lab Units 11/02/23 0532 11/01/23  0504 10/31/23  0438 10/30/23  0455 10/29/23  1629 10/29/23  1629   MAGNESIUM mg/dL  --  1.8 1.8 1.7  --   --    PHOSPHORUS mg/dL 4.1 3.2 3.7 3.5   < >  --    HEMOGLOBIN g/dL 11.3* 11.4* 11.8* 11.3*  --  13.3   HEMATOCRIT % 33.8* 34.4* 35.0* 33.9*  --  40.4   WBC 10*3/mm3 5.86 6.66 4.85 5.97  --  8.36 " "  ALBUMIN g/dL  --   --   --   --   --  4.2    < > = values in this interval not displayed.     Results from last 7 days   Lab Units 11/02/23  0532 11/01/23  0504 10/31/23  0438 10/30/23  0455 10/29/23  1629   INR   --   --   --   --  0.96   APTT seconds  --   --   --   --  29.8   PLATELETS 10*3/mm3 217 209 212 214 246     COVID19   Date Value Ref Range Status   10/29/2023 Not Detected Not Detected - Ref. Range Final     No results found for: \"HGBA1C\"       Medications           Scheduled Medications bisacodyl, 10 mg, Rectal, Daily  budesonide-formoterol, 2 puff, Inhalation, BID - RT   And  tiotropium bromide monohydrate, 2 puff, Inhalation, Daily - RT  enoxaparin, 30 mg, Subcutaneous, Q24H  levoFLOXacin, 750 mg, Intravenous, Q24H  lubiprostone, 24 mcg, Oral, BID With Meals  pantoprazole, 40 mg, Intravenous, Q AM  polyethylene glycol, 17 g, Oral, BID  senna, 1 tablet, Oral, BID       Infusions dextrose 5 % and lactated Ringer's, 100 mL/hr, Last Rate: 100 mL/hr (10/30/23 2316)       PRN Medications   acetaminophen    ipratropium-albuterol    melatonin    ondansetron **OR** ondansetron    oxyCODONE-acetaminophen    [COMPLETED] Insert Peripheral IV **AND** sodium chloride     Physical Findings          General Findings alert, oriented, underweight   Oral/Mouth Cavity tooth or teeth missing   Edema  not assessed   Gastrointestinal last bowel movement: 10/27   Skin  skin intact   Tubes/Drains/Lines none   NFPE Patient/family declined exam   --  Current Nutrition Orders & Evaluation of Intake       Oral Nutrition     Food Allergies NKFA   Current PO Diet Diet: Regular/House Diet; Fluid Consistency: Thin (IDDSI 0)   Supplement n/a   PO Evaluation     % PO Intake %    Factors Affecting Intake: constipation   --  PES STATEMENT / NUTRITION DIAGNOSIS      Nutrition Dx Problem  Problem: Altered GI Function  Etiology: Medical Diagnosis - Ileus    Signs/Symptoms: NPO, Report of Minimal PO Intake, BMI, and Unintended Weight " Change     NUTRITION INTERVENTION / PLAN OF CARE      Intervention Goal(s) Maintain nutrition status, Reduce/improve symptoms, Meet estimated needs, Disease management/therapy, Tolerate PO , Increase intake, and Appropriate weight gain         RD Intervention/Action Encourage intake, Continue to monitor, and Care plan reviewed   --      Prescription/Orders:       PO Diet       Supplements       Enteral Nutrition       Parenteral Nutrition    New Prescription Ordered? No changes at this time   --      Monitor/Evaluation Per protocol   Discharge Plan/Needs Pending clinical course   --    RD to follow per protocol.    Electronically signed by:  Mary Mallory RD  11/02/23 10:39 EDT

## 2023-11-03 NOTE — OUTREACH NOTE
Prep Survey      Flowsheet Row Responses   [facility] facility patient discharged from? [place]   Is LACE score < 7 ? No   Eligibility Readm Mgmt   Discharge diagnosis **Ileus   Does the patient have one of the following disease processes/diagnoses(primary or secondary)? Other   Does the patient have Home health ordered? No   Is there a DME ordered? No   Prep survey completed? Yes            VALDEMAR BLAND - Registered Nurse

## 2023-11-07 ENCOUNTER — READMISSION MANAGEMENT (OUTPATIENT)
Dept: CALL CENTER | Facility: HOSPITAL | Age: 63
End: 2023-11-07
Payer: COMMERCIAL

## 2023-11-07 NOTE — OUTREACH NOTE
Medical Week 1 Survey      Flowsheet Row Responses   [facility] patient discharged from? [place]   Does the patient have one of the following disease processes/diagnoses(primary or secondary)? Other   Week 1 attempt successful? No   Unsuccessful attempts Attempt 1            Radha HENAO - Registered Nurse

## 2023-11-14 ENCOUNTER — READMISSION MANAGEMENT (OUTPATIENT)
Dept: CALL CENTER | Facility: HOSPITAL | Age: 63
End: 2023-11-14
Payer: COMMERCIAL

## 2023-11-14 NOTE — OUTREACH NOTE
Medical Week 2 Survey      Flowsheet Row Responses   [facility] patient discharged from? [place]   Does the patient have one of the following disease processes/diagnoses(primary or secondary)? Other   Week 2 attempt successful? No   Unsuccessful attempts Attempt 1            Anabel GRANDA - Licensed Nurse

## 2023-11-17 ENCOUNTER — READMISSION MANAGEMENT (OUTPATIENT)
Dept: CALL CENTER | Facility: HOSPITAL | Age: 63
End: 2023-11-17
Payer: COMMERCIAL

## 2023-11-17 NOTE — OUTREACH NOTE
"Medical Week 2 Survey      Flowsheet Row Responses   [facility] patient discharged from? [place]   Does the patient have one of the following disease processes/diagnoses(primary or secondary)? Other   Week 2 attempt successful? Yes   Call start time 1351   Discharge diagnosis Ileus   Call end time 1357   Is patient permission given to speak with other caregiver? Yes   List who call center can speak with Tali sister   Person spoke with today (if not patient) and relationship Tali sister   Meds reviewed with patient/caregiver? Yes   Is the patient having any side effects they believe may be caused by any medication additions or changes? No   Does the patient have all medications ordered at discharge? Yes   Is the patient taking all medications as directed (includes completed medication regime)? Yes   Does the patient have a primary care provider?  Yes   PCP Nursing Intervention Provided number to obtain PCP   Comments regarding PCP PCP help number provided   Has the patient kept scheduled appointments due by today? Yes   Did the patient receive a copy of their discharge instructions? Yes   Nursing interventions Reviewed instructions with patient   What is the patient's perception of their health status since discharge? Same  [\"Stomach pains/depression\" per sister]   Is the patient/caregiver able to teach back signs and symptoms related to disease process for when to call PCP? Yes   Is the patient/caregiver able to teach back signs and symptoms related to disease process for when to call 911? Yes   Is the patient/caregiver able to teach back the hierarchy of who to call/visit for symptoms/problems? PCP, Specialist, Home health nurse, Urgent Care, ED, 911 Yes   If the patient is a current smoker, are they able to teach back resources for cessation? [id number]   Week 2 Call Completed? Yes   Call end time 1357            Cassandra H - Registered Nurse  "

## 2023-11-21 ENCOUNTER — READMISSION MANAGEMENT (OUTPATIENT)
Dept: CALL CENTER | Facility: HOSPITAL | Age: 63
End: 2023-11-21
Payer: COMMERCIAL

## 2023-11-21 ENCOUNTER — HOSPITAL ENCOUNTER (OUTPATIENT)
Facility: HOSPITAL | Age: 63
Setting detail: OBSERVATION
Discharge: HOME | End: 2023-11-26
Attending: EMERGENCY MEDICINE | Admitting: STUDENT IN AN ORGANIZED HEALTH CARE EDUCATION/TRAINING PROGRAM
Payer: COMMERCIAL

## 2023-11-21 ENCOUNTER — APPOINTMENT (OUTPATIENT)
Dept: CT IMAGING | Facility: HOSPITAL | Age: 63
End: 2023-11-21
Payer: COMMERCIAL

## 2023-11-21 DIAGNOSIS — R10.84 GENERALIZED ABDOMINAL PAIN: ICD-10-CM

## 2023-11-21 DIAGNOSIS — Z87.09 HISTORY OF COPD: ICD-10-CM

## 2023-11-21 DIAGNOSIS — R11.0 NAUSEA: ICD-10-CM

## 2023-11-21 DIAGNOSIS — K56.7 ILEUS: Primary | ICD-10-CM

## 2023-11-21 DIAGNOSIS — K59.00 CONSTIPATION, UNSPECIFIED CONSTIPATION TYPE: ICD-10-CM

## 2023-11-21 LAB
ALBUMIN SERPL-MCNC: 4.7 G/DL (ref 3.5–5.2)
ALBUMIN/GLOB SERPL: 1.7 G/DL
ALP SERPL-CCNC: 80 U/L (ref 39–117)
ALT SERPL W P-5'-P-CCNC: 18 U/L (ref 1–41)
ANION GAP SERPL CALCULATED.3IONS-SCNC: 14.1 MMOL/L (ref 5–15)
AST SERPL-CCNC: 28 U/L (ref 1–40)
BACTERIA UR QL AUTO: ABNORMAL /HPF
BASOPHILS # BLD AUTO: 0.12 10*3/MM3 (ref 0–0.2)
BASOPHILS NFR BLD AUTO: 1.4 % (ref 0–1.5)
BILIRUB SERPL-MCNC: 0.4 MG/DL (ref 0–1.2)
BILIRUB UR QL STRIP: ABNORMAL
BUN SERPL-MCNC: 15 MG/DL (ref 8–23)
BUN/CREAT SERPL: 10.6 (ref 7–25)
CALCIUM SPEC-SCNC: 9.6 MG/DL (ref 8.6–10.5)
CHLORIDE SERPL-SCNC: 101 MMOL/L (ref 98–107)
CLARITY UR: ABNORMAL
CO2 SERPL-SCNC: 24.9 MMOL/L (ref 22–29)
COLOR UR: ABNORMAL
CREAT SERPL-MCNC: 1.42 MG/DL (ref 0.76–1.27)
D-LACTATE SERPL-SCNC: 1 MMOL/L (ref 0.5–2)
D-LACTATE SERPL-SCNC: 2.9 MMOL/L (ref 0.5–2)
DEPRECATED RDW RBC AUTO: 44.3 FL (ref 37–54)
EGFRCR SERPLBLD CKD-EPI 2021: 56.2 ML/MIN/1.73
EOSINOPHIL # BLD AUTO: 0.18 10*3/MM3 (ref 0–0.4)
EOSINOPHIL NFR BLD AUTO: 2.1 % (ref 0.3–6.2)
ERYTHROCYTE [DISTWIDTH] IN BLOOD BY AUTOMATED COUNT: 13.1 % (ref 12.3–15.4)
GLOBULIN UR ELPH-MCNC: 2.7 GM/DL
GLUCOSE SERPL-MCNC: 185 MG/DL (ref 65–99)
GLUCOSE UR STRIP-MCNC: NEGATIVE MG/DL
HCT VFR BLD AUTO: 46 % (ref 37.5–51)
HGB BLD-MCNC: 15.2 G/DL (ref 13–17.7)
HGB UR QL STRIP.AUTO: ABNORMAL
HOLD SPECIMEN: NORMAL
HOLD SPECIMEN: NORMAL
HYALINE CASTS UR QL AUTO: ABNORMAL /LPF
IMM GRANULOCYTES # BLD AUTO: 0.02 10*3/MM3 (ref 0–0.05)
IMM GRANULOCYTES NFR BLD AUTO: 0.2 % (ref 0–0.5)
KETONES UR QL STRIP: ABNORMAL
LEUKOCYTE ESTERASE UR QL STRIP.AUTO: ABNORMAL
LIPASE SERPL-CCNC: 34 U/L (ref 13–60)
LYMPHOCYTES # BLD AUTO: 1.52 10*3/MM3 (ref 0.7–3.1)
LYMPHOCYTES NFR BLD AUTO: 17.3 % (ref 19.6–45.3)
MCH RBC QN AUTO: 30.8 PG (ref 26.6–33)
MCHC RBC AUTO-ENTMCNC: 33 G/DL (ref 31.5–35.7)
MCV RBC AUTO: 93.1 FL (ref 79–97)
MONOCYTES # BLD AUTO: 0.33 10*3/MM3 (ref 0.1–0.9)
MONOCYTES NFR BLD AUTO: 3.8 % (ref 5–12)
NEUTROPHILS NFR BLD AUTO: 6.6 10*3/MM3 (ref 1.7–7)
NEUTROPHILS NFR BLD AUTO: 75.2 % (ref 42.7–76)
NITRITE UR QL STRIP: NEGATIVE
NRBC BLD AUTO-RTO: 0 /100 WBC (ref 0–0.2)
PH UR STRIP.AUTO: <=5 [PH] (ref 5–8)
PLATELET # BLD AUTO: 279 10*3/MM3 (ref 140–450)
PMV BLD AUTO: 9.7 FL (ref 6–12)
POTASSIUM SERPL-SCNC: 4.1 MMOL/L (ref 3.5–5.2)
PROT SERPL-MCNC: 7.4 G/DL (ref 6–8.5)
PROT UR QL STRIP: ABNORMAL
RBC # BLD AUTO: 4.94 10*6/MM3 (ref 4.14–5.8)
RBC # UR STRIP: ABNORMAL /HPF
REF LAB TEST METHOD: ABNORMAL
SODIUM SERPL-SCNC: 140 MMOL/L (ref 136–145)
SP GR UR STRIP: 1.02 (ref 1–1.03)
SQUAMOUS #/AREA URNS HPF: ABNORMAL /HPF
UROBILINOGEN UR QL STRIP: ABNORMAL
WBC # UR STRIP: ABNORMAL /HPF
WBC NRBC COR # BLD AUTO: 8.77 10*3/MM3 (ref 3.4–10.8)
WHOLE BLOOD HOLD COAG: NORMAL
WHOLE BLOOD HOLD SPECIMEN: NORMAL
YEAST URNS QL MICRO: ABNORMAL /HPF

## 2023-11-21 PROCEDURE — G0378 HOSPITAL OBSERVATION PER HR: HCPCS

## 2023-11-21 PROCEDURE — 83690 ASSAY OF LIPASE: CPT

## 2023-11-21 PROCEDURE — 25010000002 MORPHINE PER 10 MG: Performed by: EMERGENCY MEDICINE

## 2023-11-21 PROCEDURE — 85025 COMPLETE CBC W/AUTO DIFF WBC: CPT

## 2023-11-21 PROCEDURE — 36415 COLL VENOUS BLD VENIPUNCTURE: CPT

## 2023-11-21 PROCEDURE — 83605 ASSAY OF LACTIC ACID: CPT

## 2023-11-21 PROCEDURE — 25810000003 SODIUM CHLORIDE 0.9 % SOLUTION: Performed by: EMERGENCY MEDICINE

## 2023-11-21 PROCEDURE — 94799 UNLISTED PULMONARY SVC/PX: CPT

## 2023-11-21 PROCEDURE — 96374 THER/PROPH/DIAG INJ IV PUSH: CPT

## 2023-11-21 PROCEDURE — 74177 CT ABD & PELVIS W/CONTRAST: CPT

## 2023-11-21 PROCEDURE — 25010000002 DIPHENHYDRAMINE PER 50 MG: Performed by: NURSE PRACTITIONER

## 2023-11-21 PROCEDURE — 94664 DEMO&/EVAL PT USE INHALER: CPT

## 2023-11-21 PROCEDURE — 94761 N-INVAS EAR/PLS OXIMETRY MLT: CPT

## 2023-11-21 PROCEDURE — 96375 TX/PRO/DX INJ NEW DRUG ADDON: CPT

## 2023-11-21 PROCEDURE — 25810000003 SODIUM CHLORIDE 0.9 % SOLUTION: Performed by: INTERNAL MEDICINE

## 2023-11-21 PROCEDURE — 81001 URINALYSIS AUTO W/SCOPE: CPT

## 2023-11-21 PROCEDURE — 80053 COMPREHEN METABOLIC PANEL: CPT

## 2023-11-21 PROCEDURE — 94640 AIRWAY INHALATION TREATMENT: CPT

## 2023-11-21 PROCEDURE — 99285 EMERGENCY DEPT VISIT HI MDM: CPT

## 2023-11-21 PROCEDURE — 25010000002 ONDANSETRON PER 1 MG: Performed by: INTERNAL MEDICINE

## 2023-11-21 PROCEDURE — 25010000002 ONDANSETRON PER 1 MG: Performed by: EMERGENCY MEDICINE

## 2023-11-21 PROCEDURE — 25010000002 MORPHINE PER 10 MG: Performed by: NURSE PRACTITIONER

## 2023-11-21 PROCEDURE — 96376 TX/PRO/DX INJ SAME DRUG ADON: CPT

## 2023-11-21 PROCEDURE — 25510000001 IOPAMIDOL 61 % SOLUTION: Performed by: EMERGENCY MEDICINE

## 2023-11-21 RX ORDER — ACETAMINOPHEN 650 MG/1
650 SUPPOSITORY RECTAL EVERY 4 HOURS PRN
Status: DISCONTINUED | OUTPATIENT
Start: 2023-11-21 | End: 2023-11-26 | Stop reason: HOSPADM

## 2023-11-21 RX ORDER — MORPHINE SULFATE 2 MG/ML
4 INJECTION, SOLUTION INTRAMUSCULAR; INTRAVENOUS ONCE
Status: COMPLETED | OUTPATIENT
Start: 2023-11-21 | End: 2023-11-21

## 2023-11-21 RX ORDER — POLYETHYLENE GLYCOL 3350 17 G/17G
17 POWDER, FOR SOLUTION ORAL DAILY PRN
Status: DISCONTINUED | OUTPATIENT
Start: 2023-11-21 | End: 2023-11-26 | Stop reason: HOSPADM

## 2023-11-21 RX ORDER — PANTOPRAZOLE SODIUM 40 MG/1
40 TABLET, DELAYED RELEASE ORAL
Status: DISCONTINUED | OUTPATIENT
Start: 2023-11-22 | End: 2023-11-26 | Stop reason: HOSPADM

## 2023-11-21 RX ORDER — ONDANSETRON 2 MG/ML
4 INJECTION INTRAMUSCULAR; INTRAVENOUS ONCE
Status: COMPLETED | OUTPATIENT
Start: 2023-11-21 | End: 2023-11-21

## 2023-11-21 RX ORDER — IPRATROPIUM BROMIDE AND ALBUTEROL SULFATE 2.5; .5 MG/3ML; MG/3ML
3 SOLUTION RESPIRATORY (INHALATION) EVERY 4 HOURS PRN
Status: DISCONTINUED | OUTPATIENT
Start: 2023-11-21 | End: 2023-11-26 | Stop reason: HOSPADM

## 2023-11-21 RX ORDER — IPRATROPIUM BROMIDE AND ALBUTEROL SULFATE 2.5; .5 MG/3ML; MG/3ML
3 SOLUTION RESPIRATORY (INHALATION) ONCE
Status: COMPLETED | OUTPATIENT
Start: 2023-11-21 | End: 2023-11-21

## 2023-11-21 RX ORDER — SODIUM CHLORIDE 9 MG/ML
75 INJECTION, SOLUTION INTRAVENOUS CONTINUOUS
Status: DISCONTINUED | OUTPATIENT
Start: 2023-11-21 | End: 2023-11-26 | Stop reason: HOSPADM

## 2023-11-21 RX ORDER — MORPHINE SULFATE 2 MG/ML
4 INJECTION, SOLUTION INTRAMUSCULAR; INTRAVENOUS
Status: DISCONTINUED | OUTPATIENT
Start: 2023-11-21 | End: 2023-11-21 | Stop reason: SDUPTHER

## 2023-11-21 RX ORDER — BISACODYL 10 MG
10 SUPPOSITORY, RECTAL RECTAL DAILY PRN
Status: DISCONTINUED | OUTPATIENT
Start: 2023-11-21 | End: 2023-11-26 | Stop reason: HOSPADM

## 2023-11-21 RX ORDER — BUDESONIDE AND FORMOTEROL FUMARATE DIHYDRATE 160; 4.5 UG/1; UG/1
2 AEROSOL RESPIRATORY (INHALATION)
Status: DISCONTINUED | OUTPATIENT
Start: 2023-11-21 | End: 2023-11-26 | Stop reason: HOSPADM

## 2023-11-21 RX ORDER — AMOXICILLIN 250 MG
2 CAPSULE ORAL 2 TIMES DAILY
Status: DISCONTINUED | OUTPATIENT
Start: 2023-11-21 | End: 2023-11-21

## 2023-11-21 RX ORDER — ACETAMINOPHEN 325 MG/1
650 TABLET ORAL EVERY 4 HOURS PRN
Status: DISCONTINUED | OUTPATIENT
Start: 2023-11-21 | End: 2023-11-26 | Stop reason: HOSPADM

## 2023-11-21 RX ORDER — SODIUM CHLORIDE 0.9 % (FLUSH) 0.9 %
10 SYRINGE (ML) INJECTION AS NEEDED
Status: DISCONTINUED | OUTPATIENT
Start: 2023-11-21 | End: 2023-11-26 | Stop reason: HOSPADM

## 2023-11-21 RX ORDER — LISINOPRIL 20 MG/1
20 TABLET ORAL DAILY
Status: DISCONTINUED | OUTPATIENT
Start: 2023-11-22 | End: 2023-11-26 | Stop reason: HOSPADM

## 2023-11-21 RX ORDER — ONDANSETRON 2 MG/ML
4 INJECTION INTRAMUSCULAR; INTRAVENOUS EVERY 6 HOURS PRN
Status: DISCONTINUED | OUTPATIENT
Start: 2023-11-21 | End: 2023-11-26 | Stop reason: HOSPADM

## 2023-11-21 RX ORDER — BISACODYL 5 MG/1
5 TABLET, DELAYED RELEASE ORAL DAILY PRN
Status: DISCONTINUED | OUTPATIENT
Start: 2023-11-21 | End: 2023-11-26 | Stop reason: HOSPADM

## 2023-11-21 RX ORDER — DIPHENHYDRAMINE HYDROCHLORIDE 50 MG/ML
25 INJECTION INTRAMUSCULAR; INTRAVENOUS ONCE
Status: COMPLETED | OUTPATIENT
Start: 2023-11-22 | End: 2023-11-21

## 2023-11-21 RX ORDER — ACETAMINOPHEN 160 MG/5ML
650 SOLUTION ORAL EVERY 4 HOURS PRN
Status: DISCONTINUED | OUTPATIENT
Start: 2023-11-21 | End: 2023-11-26 | Stop reason: HOSPADM

## 2023-11-21 RX ORDER — MORPHINE SULFATE 4 MG/ML
4 INJECTION, SOLUTION INTRAMUSCULAR; INTRAVENOUS EVERY 4 HOURS PRN
Status: DISCONTINUED | OUTPATIENT
Start: 2023-11-21 | End: 2023-11-23

## 2023-11-21 RX ADMIN — MORPHINE SULFATE 4 MG: 2 INJECTION, SOLUTION INTRAMUSCULAR; INTRAVENOUS at 21:43

## 2023-11-21 RX ADMIN — ACETAMINOPHEN 650 MG: 325 TABLET, FILM COATED ORAL at 20:02

## 2023-11-21 RX ADMIN — MORPHINE SULFATE 4 MG: 2 INJECTION, SOLUTION INTRAMUSCULAR; INTRAVENOUS at 14:57

## 2023-11-21 RX ADMIN — IOPAMIDOL 85 ML: 612 INJECTION, SOLUTION INTRAVENOUS at 16:00

## 2023-11-21 RX ADMIN — ONDANSETRON 4 MG: 2 INJECTION INTRAMUSCULAR; INTRAVENOUS at 20:02

## 2023-11-21 RX ADMIN — IPRATROPIUM BROMIDE AND ALBUTEROL SULFATE 3 ML: 2.5; .5 SOLUTION RESPIRATORY (INHALATION) at 16:44

## 2023-11-21 RX ADMIN — SODIUM CHLORIDE 1000 ML: 9 INJECTION, SOLUTION INTRAVENOUS at 14:36

## 2023-11-21 RX ADMIN — MORPHINE SULFATE 4 MG: 2 INJECTION, SOLUTION INTRAMUSCULAR; INTRAVENOUS at 17:37

## 2023-11-21 RX ADMIN — SODIUM CHLORIDE 75 ML/HR: 9 INJECTION, SOLUTION INTRAVENOUS at 22:40

## 2023-11-21 RX ADMIN — DIPHENHYDRAMINE HYDROCHLORIDE 25 MG: 50 INJECTION, SOLUTION INTRAMUSCULAR; INTRAVENOUS at 23:58

## 2023-11-21 RX ADMIN — ONDANSETRON 4 MG: 2 INJECTION INTRAMUSCULAR; INTRAVENOUS at 14:40

## 2023-11-21 NOTE — ED PROVIDER NOTES
EMERGENCY DEPARTMENT ENCOUNTER    Room Number:  26/26  PCP: Janelle Lara MD  Historian: Patient      HPI:  Chief Complaint: Abdominal pain  A complete HPI/ROS/PMH/PSH/SH/FH are unobtainable due to: None    Context: Donny Mallory is a 63 y.o. male who presents to the ED via private vehicle for evaluation for several days of increasing intermittent abdominal pains with lack of bowel movements.  Has chronic issues with constipation and is on Linzess, bowel movement a couple days ago after taking a dose of Linzess.  Reports nausea but no overt vomiting.  Has had progressive infection is on amoxicillin.  Able to urinate well but increased frequency.  No measured fevers.      MEDICAL RECORD REVIEW    External (non-ED) record review: Discharge summary reviewed from November 2, 2023 when the patient was admitted on October 29, 2023 with an ileus with GERD, found distal small bowel ileus, aggressive bowel regimen was started.    PAST MEDICAL HISTORY  Active Ambulatory Problems     Diagnosis Date Noted    Psychosis 05/16/2017    Acute pancreatitis 04/21/2022    COPD (chronic obstructive pulmonary disease)     Hypertension     Dysphagia     Constipation     Cholelithiasis 04/25/2022    Severe malnutrition 04/28/2022    Ileus 10/29/2023    GERD without esophagitis 10/29/2023    Hypernatremia 10/29/2023     Resolved Ambulatory Problems     Diagnosis Date Noted    No Resolved Ambulatory Problems     Past Medical History:   Diagnosis Date    Bell's palsy          PAST SURGICAL HISTORY  Past Surgical History:   Procedure Laterality Date    CHOLECYSTECTOMY      CHOLECYSTECTOMY WITH INTRAOPERATIVE CHOLANGIOGRAM N/A 04/25/2022    Procedure: Laparoscopic cholecystectomy with intraoperative cholangiogram, possible open;  Surgeon: Khari Schofield MD;  Location: [facility] OR;  Service: General;  Laterality: N/A;    HERNIA REPAIR           FAMILY HISTORY  Family History   Family history unknown: Yes         SOCIAL  HISTORY  Social History     Socioeconomic History    Marital status: Single   Tobacco Use    Smoking status: Some Days    Smokeless tobacco: Never   Vaping Use    Vaping Use: Former   Substance and Sexual Activity    Alcohol use: No    Drug use: No    Sexual activity: Defer         ALLERGIES  Prochlorperazine        REVIEW OF SYSTEMS  Review of Systems     All systems reviewed and negative except for those discussed in HPI.       PHYSICAL EXAM    I have reviewed the triage vital signs and nursing notes.    ED Triage Vitals   Temp Heart Rate Resp BP SpO2   11/21/23 1254 11/21/23 1254 11/21/23 1304 11/21/23 1304 11/21/23 1254   97.2 °F (36.2 °C) 119 20 105/87 99 %      Temp src Heart Rate Source Patient Position BP Location FiO2 (%)   11/21/23 1254 11/21/23 1254 11/21/23 1304 11/21/23 1304 --   Tympanic Monitor Sitting Right arm        Physical Exam  General: Appears uncomfortable, nontoxic, nondiaphoretic  HEENT: Mucous membranes moist, atraumatic, EOMI  Neck: Full ROM  Pulm: Symmetric chest rise, nonlabored, lungs CTAB  Cardiovascular: Regular rate and rhythm, intact distal pulses  GI: Soft, mild generalized tenderness to palpation, nondistended, no rebound, no guarding, bowel sounds present  MSK: Full ROM, no deformity  Skin: Warm, dry  Neuro: Awake, alert, oriented x 4, GCS 15, moving all extremities, no focal deficits  Psych: Calm, cooperative        LAB RESULTS  Recent Results (from the past 24 hour(s))   Comprehensive Metabolic Panel    Collection Time: 11/21/23  1:12 PM    Specimen: Blood   Result Value Ref Range    Glucose 185 (H) 65 - 99 mg/dL    BUN 15 8 - 23 mg/dL    Creatinine 1.42 (H) 0.76 - 1.27 mg/dL    Sodium 140 136 - 145 mmol/L    Potassium 4.1 3.5 - 5.2 mmol/L    Chloride 101 98 - 107 mmol/L    CO2 24.9 22.0 - 29.0 mmol/L    Calcium 9.6 8.6 - 10.5 mg/dL    Total Protein 7.4 6.0 - 8.5 g/dL    Albumin 4.7 3.5 - 5.2 g/dL    ALT (SGPT) 18 1 - 41 U/L    AST (SGOT) 28 1 - 40 U/L    Alkaline Phosphatase  80 39 - 117 U/L    Total Bilirubin 0.4 0.0 - 1.2 mg/dL    Globulin 2.7 gm/dL    A/G Ratio 1.7 g/dL    BUN/Creatinine Ratio 10.6 7.0 - 25.0    Anion Gap 14.1 5.0 - 15.0 mmol/L    eGFR 56.2 (L) >60.0 mL/min/1.73   Lipase    Collection Time: 11/21/23  1:12 PM    Specimen: Blood   Result Value Ref Range    Lipase 34 13 - 60 U/L   Lactic Acid, Plasma    Collection Time: 11/21/23  1:12 PM    Specimen: Blood   Result Value Ref Range    Lactate 2.9 (C) 0.5 - 2.0 mmol/L   Green Top (Gel)    Collection Time: 11/21/23  1:12 PM   Result Value Ref Range    Extra Tube Hold for add-ons.    Lavender Top    Collection Time: 11/21/23  1:12 PM   Result Value Ref Range    Extra Tube hold for add-on    Gold Top - SST    Collection Time: 11/21/23  1:12 PM   Result Value Ref Range    Extra Tube Hold for add-ons.    Light Blue Top    Collection Time: 11/21/23  1:12 PM   Result Value Ref Range    Extra Tube Hold for add-ons.    CBC Auto Differential    Collection Time: 11/21/23  1:12 PM    Specimen: Blood   Result Value Ref Range    WBC 8.77 3.40 - 10.80 10*3/mm3    RBC 4.94 4.14 - 5.80 10*6/mm3    Hemoglobin 15.2 13.0 - 17.7 g/dL    Hematocrit 46.0 37.5 - 51.0 %    MCV 93.1 79.0 - 97.0 fL    MCH 30.8 26.6 - 33.0 pg    MCHC 33.0 31.5 - 35.7 g/dL    RDW 13.1 12.3 - 15.4 %    RDW-SD 44.3 37.0 - 54.0 fl    MPV 9.7 6.0 - 12.0 fL    Platelets 279 140 - 450 10*3/mm3    Neutrophil % 75.2 42.7 - 76.0 %    Lymphocyte % 17.3 (L) 19.6 - 45.3 %    Monocyte % 3.8 (L) 5.0 - 12.0 %    Eosinophil % 2.1 0.3 - 6.2 %    Basophil % 1.4 0.0 - 1.5 %    Immature Grans % 0.2 0.0 - 0.5 %    Neutrophils, Absolute 6.60 1.70 - 7.00 10*3/mm3    Lymphocytes, Absolute 1.52 0.70 - 3.10 10*3/mm3    Monocytes, Absolute 0.33 0.10 - 0.90 10*3/mm3    Eosinophils, Absolute 0.18 0.00 - 0.40 10*3/mm3    Basophils, Absolute 0.12 0.00 - 0.20 10*3/mm3    Immature Grans, Absolute 0.02 0.00 - 0.05 10*3/mm3    nRBC 0.0 0.0 - 0.2 /100 WBC   Urinalysis With Microscopic If Indicated (No  Culture) - Urine, Clean Catch    Collection Time: 11/21/23  2:59 PM    Specimen: Urine, Clean Catch   Result Value Ref Range    Color, UA Dark Yellow (A) Yellow, Straw    Appearance, UA Cloudy (A) Clear    pH, UA <=5.0 5.0 - 8.0    Specific Gravity, UA 1.025 1.005 - 1.030    Glucose, UA Negative Negative    Ketones, UA Trace (A) Negative    Bilirubin, UA Small (1+) (A) Negative    Blood, UA Trace (A) Negative    Protein,  mg/dL (2+) (A) Negative    Leuk Esterase, UA Trace (A) Negative    Nitrite, UA Negative Negative    Urobilinogen, UA 1.0 E.U./dL 0.2 - 1.0 E.U./dL   Urinalysis, Microscopic Only - Urine, Clean Catch    Collection Time: 11/21/23  2:59 PM    Specimen: Urine, Clean Catch   Result Value Ref Range    RBC, UA 3-5 (A) None Seen, 0-2 /HPF    WBC, UA 3-5 (A) None Seen, 0-2 /HPF    Bacteria, UA None Seen None Seen /HPF    Squamous Epithelial Cells, UA 3-6 (A) None Seen, 0-2 /HPF    Yeast, UA Small/1+ Yeast None Seen /HPF    Hyaline Casts, UA 7-12 None Seen /LPF    Methodology Manual Light Microscopy    STAT Lactic Acid, Reflex    Collection Time: 11/21/23  4:29 PM    Specimen: Blood   Result Value Ref Range    Lactate 1.0 0.5 - 2.0 mmol/L       Ordered the above labs and independently interpreted results. My findings will be discussed in the medical decision making section below        RADIOLOGY  CT Abdomen Pelvis With Contrast    Result Date: 11/21/2023  CT ABDOMEN AND PELVIS WITH IV CONTRAST  HISTORY: Abdominal pain, nausea vomiting  TECHNIQUE: Radiation dose reduction techniques were utilized, including automated exposure control and exposure modulation based on body size. 3 mm images were obtained through the abdomen and pelvis after the administration of IV contrast.  COMPARISON: Multiple CT abdomen and pelvis dating back to 6/23/2022  FINDINGS:  A few sub-6 mm pleural-based pulmonary nodules in the left lung base are present, as before. There are air-fluid levels throughout the small bowel which  also demonstrates moderate distention with loops measuring up to approximately 3.7 cm in diameter. The more distal small bowel is relatively decompressed. While a discrete transition point is not definitively seen, the small bowel cannot be followed in its entirety due to lack of intraperitoneal fat.  The appendix is not seen. Gallbladder is surgically absent with associated pneumobilia, as before. Subcentimeter hepatic lesions are too small to characterize. A biliary stent is present and projects through the distal common bile duct and into the first portion of the duodenum, similar to that seen on 10/29/2023. Main pancreatic duct is mildly distended measuring up to 0.7 cm, grossly unchanged since 6/23/2022. The spleen and adrenal glands have an unremarkable postcontrast CT appearance. Subcentimeter renal lesions are too small to characterize. Larger hypodense lesions demonstrating density less than 15 Hounsfield units are likely benign per Bosniak 2019 criteria. There is an indeterminate density right renal lesion measuring 2.1 cm there is no hydronephrosis. Please note that the bilateral ureters cannot be followed in their entirety due to lack of intraperitoneal fat. Abdominal pelvic adenopathy cannot be excluded on this examination due to the above-stated limitations. The rectum is severely distended with stool and air. The bladder is decompressed and cannot be evaluated.  No free intraperitoneal air is seen. No suspicious lytic or blastic osseous lesions are present. For the purposes dictation, last well-formed space referred to as L5-S1. Compression formae of L1 is present, as before. There is mild retrolisthesis of L5 on S1.      1.  Air-fluid levels in moderate distention of small bowel loops with relative decompression of the more distal small bowel loops. A discrete transition point is not definitively seen; however, the small bowel is unable to be followed in its entirety due to lack of intraperitoneal  fat. Therefore, findings represent ileus versus obstruction in the appropriate context and further evaluation with small bowel follow-through is recommended. 2.  Severe distention of the rectum with air and stool. 3.  The appendix is not visualized and cannot be evaluated. 4.  Indeterminate right renal lesion measuring 2.1 cm. Further evaluation with multiphase CT with and without contrast is recommended to exclude neoplasm. 5.  Biliary stent is in place with its most proximal aspect within the distal common bile duct, similar that seen on 10/29/2023. Correlation with surgical history is recommended to ensure appropriate positioning of the ductal stent. 6.  Other findings as above.  This report was finalized on 11/21/2023 5:01 PM by Dr. Stephen Camacho M.D on Workstation: [employer]       Ordered the above noted radiological studies.  Independently interpreted by me and my independent review of findings can be found in the ED Course.  See dictation for official radiology interpretation.      PROCEDURES    Procedures      MEDICATIONS GIVEN IN ER    Medications   sodium chloride 0.9 % flush 10 mL (has no administration in time range)   sodium chloride 0.9 % bolus 1,000 mL ( Intravenous Currently Infusing 11/21/23 1739)   ondansetron (ZOFRAN) injection 4 mg (4 mg Intravenous Given 11/21/23 1440)   morphine injection 4 mg (4 mg Intravenous Given 11/21/23 1457)   iopamidol (ISOVUE-300) 61 % injection 100 mL (85 mL Intravenous Given by Other 11/21/23 1600)   ipratropium-albuterol (DUO-NEB) nebulizer solution 3 mL (3 mL Nebulization Given 11/21/23 1644)   morphine injection 4 mg (4 mg Intravenous Given 11/21/23 1737)         PROGRESS, DATA ANALYSIS, CONSULTS, AND MEDICAL DECISION MAKING    Please note that this section constitutes my independent interpretation of clinical data including lab results, radiology, EKG's.  This constitutes my independent professional opinion regarding differential diagnosis and management of this  patient.  It may include any factors such as history from outside sources, review of external records, social determinants of health, management of medications, response to those treatments, and discussions with other providers.    Differential Diagnosis and Plan: Initial concern for constipation, ileus, small bowel obstruction, colitis, viral syndrome, dehydration, renal failure, electrolyte abnormalities, among others.  Plan for labs, symptomatic care, CT scan, reevaluation with results.    Additional sources:  - Discussed/ obtained information from independent historians:       - Chronic or social conditions impacting care:      - Shared decision making:  Patient fully updated on and in agreement with the course and plan moving forward    ED Course as of 11/21/23 1752   Tue Nov 21, 2023   1426 WBC: 8.77 [DC]   1426 Hemoglobin: 15.2 [DC]   1426 Platelets: 279 [DC]   1426 Lipase: 34 [DC]   1426 Glucose(!): 185 [DC]   1426 BUN: 15 [DC]   1426 Creatinine(!): 1.42  1.03 two weeks ago [DC]   1426 Sodium: 140 [DC]   1426 Potassium: 4.1 [DC]   1426 ALT (SGPT): 18 [DC]   1426 AST (SGOT): 28 [DC]   1426 Alkaline Phosphatase: 80 [DC]   1426 Total Bilirubin: 0.4 [DC]   1426 Lactate(!!): 2.9 [DC]   1553 Nitrite, UA: Negative [DC]   1553 Leukocytes, UA(!): Trace [DC]   1553 Blood, UA(!): Trace [DC]   1553 Ketones, UA(!): Trace [DC]   1608 CT Abdomen Pelvis With Contrast  Per my independent interpretation of the CT abdomen pelvis, concern for possible recurrent ileus versus obstruction though certainly not a clear-cut definitive process, patient does have significant stool burden but no evidence of rectal fecal impaction [DC]   1727 CT Abdomen Pelvis With Contrast  Radiology report reviewed, air-fluid levels and moderate distention of small bowel loops with relative decompression more distal small bowel loops without a discrete transition point, however small bowel is unable to be followed therefore findings represent ileus  versus obstruction, severe distention of the rectum with air and stool, nonvisualized appendix, 2.1 cm right renal lesion requiring further evaluation with multiphase CT with and without contrast recommended, biliary stent in place similar to October 29, 2023 [DC]   1751 Discussed with Dr. Jama, [facility], discussed patient's clinical course and findings today, treatment modalities, and need for hospitalization. [DC]      ED Course User Index  [DC] Asad Rangel MD       Hospitalization Considered?: yes    Orders Placed During This Visit:  Orders Placed This Encounter   Procedures    CT Abdomen Pelvis With Contrast    [place] Draw    Comprehensive Metabolic Panel    Lipase    Urinalysis With Microscopic If Indicated (No Culture) - Urine, Clean Catch    Lactic Acid, Plasma    CBC Auto Differential    STAT Lactic Acid, Reflex    Urinalysis, Microscopic Only - Urine, Clean Catch    NPO Diet NPO Type: Strict NPO    Undress & Gown    [facility] (on-call MD unless specified) Details    Insert Peripheral IV    Initiate Observation Status    CBC & Differential    Green Top (Gel)    Lavender Top    Gold Top - SST    Light Blue Top       Additional orders considered but not placed:      Independent interpretation of labs, radiology studies, and discussions with consultants: See ED Course        AS OF 17:52 EST VITALS:    BP - 108/76  HR - 70  TEMP - 97.2 °F (36.2 °C) (Tympanic)  02 SATS - 95%        DIAGNOSIS  Final diagnoses:   Ileus   Generalized abdominal pain   Nausea   Constipation, unspecified constipation type   History of COPD         DISPOSITION  HOSPITALIZATION    Discussed treatment plan and reason for hospitalization with pt/family and hospitalizing physician.  Pt/family voiced understanding of the plan for hospitalization for further testing/treatment as needed.                 --    Please note that portions of this were completed with a voice recognition program.       Note Disclaimer: At [facility], we believe that  sharing information builds trust and better relationships. You are receiving this note because you are receiving care at [facility] or recently visited. It is possible you will see health information before a provider has talked with you about it. This kind of information can be easy to misunderstand. To help you fully understand what it means for your health, we urge you to discuss this note with your provider.           Asad Rangel MD  11/21/23 2033

## 2023-11-21 NOTE — ED NOTES
Nursing report ED to floor  Donny Mallory  63 y.o.  male    HPI :   Chief Complaint   Patient presents with    Abdominal Pain    Vomiting       Admitting doctor:   Mary Jama MD    Admitting diagnosis:   The primary encounter diagnosis was Ileus. Diagnoses of Generalized abdominal pain, Nausea, Constipation, unspecified constipation type, and History of COPD were also pertinent to this visit.    Code status:   Current Code Status       Date Active Code Status Order ID Comments User Context       Prior            Allergies:   Prochlorperazine    Isolation:   No active isolations    Intake and Output  No intake or output data in the 24 hours ending 11/21/23 1812    Weight:       11/21/23  1254   Weight: 54.4 kg (120 lb)       Most recent vitals:   Vitals:    11/21/23 1631 11/21/23 1644 11/21/23 1703 11/21/23 1731   BP: 99/66  110/72 108/76   BP Location:       Patient Position:       Pulse: 70  71 70   Resp: 16 16     Temp:       TempSrc:       SpO2: 97%  98% 95%   Weight:       Height:           Active LDAs/IV Access:   Lines, Drains & Airways       Active LDAs       Name Placement date Placement time Site Days    Peripheral IV Right Antecubital --  --  Antecubital  --                    Labs (abnormal labs have a star):   Labs Reviewed   COMPREHENSIVE METABOLIC PANEL - Abnormal; Notable for the following components:       Result Value    Glucose 185 (*)     Creatinine 1.42 (*)     eGFR 56.2 (*)     All other components within normal limits    Narrative:     GFR Normal >60  Chronic Kidney Disease <60  Kidney Failure <15     URINALYSIS W/ MICROSCOPIC IF INDICATED (NO CULTURE) - Abnormal; Notable for the following components:    Color, UA Dark Yellow (*)     Appearance, UA Cloudy (*)     Ketones, UA Trace (*)     Bilirubin, UA Small (1+) (*)     Blood, UA Trace (*)     Protein,  mg/dL (2+) (*)     Leuk Esterase, UA Trace (*)     All other components within normal limits   LACTIC ACID, PLASMA - Abnormal;  Notable for the following components:    Lactate 2.9 (*)     All other components within normal limits   CBC WITH AUTO DIFFERENTIAL - Abnormal; Notable for the following components:    Lymphocyte % 17.3 (*)     Monocyte % 3.8 (*)     All other components within normal limits   URINALYSIS, MICROSCOPIC ONLY - Abnormal; Notable for the following components:    RBC, UA 3-5 (*)     WBC, UA 3-5 (*)     Squamous Epithelial Cells, UA 3-6 (*)     All other components within normal limits   LIPASE - Normal   LACTIC ACID, REFLEX - Normal   RAINBOW DRAW    Narrative:     The following orders were created for panel order [place] Draw.  Procedure                               Abnormality         Status                     ---------                               -----------         ------                     Green Top (Gel)[[id number]]                                  Final result               Lavender Top[[id number]]                                     Final result               Gold Top - SST[[id number]]                                   Final result               Light Blue Top[[id number]]                                   Final result                 Please view results for these tests on the individual orders.   CBC AND DIFFERENTIAL    Narrative:     The following orders were created for panel order CBC & Differential.  Procedure                               Abnormality         Status                     ---------                               -----------         ------                     CBC Auto Differential[[id number]]        Abnormal            Final result                 Please view results for these tests on the individual orders.   GREEN TOP   LAVENDER TOP   GOLD TOP - SST   LIGHT BLUE TOP       EKG:   No orders to display       Meds given in ED:   Medications   sodium chloride 0.9 % flush 10 mL (has no administration in time range)   sodium chloride 0.9 % bolus 1,000 mL ( Intravenous Currently Infusing 11/21/23 5867)    ondansetron (ZOFRAN) injection 4 mg (4 mg Intravenous Given 11/21/23 1440)   morphine injection 4 mg (4 mg Intravenous Given 11/21/23 1457)   iopamidol (ISOVUE-300) 61 % injection 100 mL (85 mL Intravenous Given by Other 11/21/23 1600)   ipratropium-albuterol (DUO-NEB) nebulizer solution 3 mL (3 mL Nebulization Given 11/21/23 1644)   morphine injection 4 mg (4 mg Intravenous Given 11/21/23 1737)       Imaging results:  CT Abdomen Pelvis With Contrast    Result Date: 11/21/2023  1.  Air-fluid levels in moderate distention of small bowel loops with relative decompression of the more distal small bowel loops. A discrete transition point is not definitively seen; however, the small bowel is unable to be followed in its entirety due to lack of intraperitoneal fat. Therefore, findings represent ileus versus obstruction in the appropriate context and further evaluation with small bowel follow-through is recommended. 2.  Severe distention of the rectum with air and stool. 3.  The appendix is not visualized and cannot be evaluated. 4.  Indeterminate right renal lesion measuring 2.1 cm. Further evaluation with multiphase CT with and without contrast is recommended to exclude neoplasm. 5.  Biliary stent is in place with its most proximal aspect within the distal common bile duct, similar that seen on 10/29/2023. Correlation with surgical history is recommended to ensure appropriate positioning of the ductal stent. 6.  Other findings as above.  This report was finalized on 11/21/2023 5:01 PM by Dr. Stephen Camacho M.D on Workstation: [id number]       Ambulatory status:   - independent    Social issues:   Social History     Socioeconomic History    Marital status: Single   Tobacco Use    Smoking status: Some Days    Smokeless tobacco: Never   Vaping Use    Vaping Use: Former   Substance and Sexual Activity    Alcohol use: No    Drug use: No    Sexual activity: Defer       NIH Stroke Scale:       Oliver Roe RN  11/21/23 18:12  EST

## 2023-11-22 LAB
ANION GAP SERPL CALCULATED.3IONS-SCNC: 7.7 MMOL/L (ref 5–15)
BASOPHILS # BLD AUTO: 0.1 10*3/MM3 (ref 0–0.2)
BASOPHILS NFR BLD AUTO: 1.3 % (ref 0–1.5)
BUN SERPL-MCNC: 13 MG/DL (ref 8–23)
BUN/CREAT SERPL: 12.5 (ref 7–25)
CALCIUM SPEC-SCNC: 8.8 MG/DL (ref 8.6–10.5)
CHLORIDE SERPL-SCNC: 109 MMOL/L (ref 98–107)
CO2 SERPL-SCNC: 25.3 MMOL/L (ref 22–29)
CREAT SERPL-MCNC: 1.04 MG/DL (ref 0.76–1.27)
DEPRECATED RDW RBC AUTO: 44.5 FL (ref 37–54)
EGFRCR SERPLBLD CKD-EPI 2021: 81.7 ML/MIN/1.73
EOSINOPHIL # BLD AUTO: 0.5 10*3/MM3 (ref 0–0.4)
EOSINOPHIL NFR BLD AUTO: 6.6 % (ref 0.3–6.2)
ERYTHROCYTE [DISTWIDTH] IN BLOOD BY AUTOMATED COUNT: 13.1 % (ref 12.3–15.4)
GLUCOSE SERPL-MCNC: 75 MG/DL (ref 65–99)
HCT VFR BLD AUTO: 36.5 % (ref 37.5–51)
HGB BLD-MCNC: 12.4 G/DL (ref 13–17.7)
IMM GRANULOCYTES # BLD AUTO: 0.02 10*3/MM3 (ref 0–0.05)
IMM GRANULOCYTES NFR BLD AUTO: 0.3 % (ref 0–0.5)
LYMPHOCYTES # BLD AUTO: 2.37 10*3/MM3 (ref 0.7–3.1)
LYMPHOCYTES NFR BLD AUTO: 31.1 % (ref 19.6–45.3)
MCH RBC QN AUTO: 31.2 PG (ref 26.6–33)
MCHC RBC AUTO-ENTMCNC: 34 G/DL (ref 31.5–35.7)
MCV RBC AUTO: 91.9 FL (ref 79–97)
MONOCYTES # BLD AUTO: 0.53 10*3/MM3 (ref 0.1–0.9)
MONOCYTES NFR BLD AUTO: 7 % (ref 5–12)
NEUTROPHILS NFR BLD AUTO: 4.1 10*3/MM3 (ref 1.7–7)
NEUTROPHILS NFR BLD AUTO: 53.7 % (ref 42.7–76)
NRBC BLD AUTO-RTO: 0 /100 WBC (ref 0–0.2)
PLATELET # BLD AUTO: 197 10*3/MM3 (ref 140–450)
PMV BLD AUTO: 10 FL (ref 6–12)
POTASSIUM SERPL-SCNC: 3.8 MMOL/L (ref 3.5–5.2)
RBC # BLD AUTO: 3.97 10*6/MM3 (ref 4.14–5.8)
SODIUM SERPL-SCNC: 142 MMOL/L (ref 136–145)
WBC NRBC COR # BLD AUTO: 7.62 10*3/MM3 (ref 3.4–10.8)

## 2023-11-22 PROCEDURE — 94761 N-INVAS EAR/PLS OXIMETRY MLT: CPT

## 2023-11-22 PROCEDURE — 90791 PSYCH DIAGNOSTIC EVALUATION: CPT

## 2023-11-22 PROCEDURE — 94799 UNLISTED PULMONARY SVC/PX: CPT

## 2023-11-22 PROCEDURE — 80048 BASIC METABOLIC PNL TOTAL CA: CPT | Performed by: INTERNAL MEDICINE

## 2023-11-22 PROCEDURE — 99213 OFFICE O/P EST LOW 20 MIN: CPT | Performed by: STUDENT IN AN ORGANIZED HEALTH CARE EDUCATION/TRAINING PROGRAM

## 2023-11-22 PROCEDURE — 85025 COMPLETE CBC W/AUTO DIFF WBC: CPT | Performed by: INTERNAL MEDICINE

## 2023-11-22 PROCEDURE — G0378 HOSPITAL OBSERVATION PER HR: HCPCS

## 2023-11-22 PROCEDURE — 99244 OFF/OP CNSLTJ NEW/EST MOD 40: CPT | Performed by: PHYSICIAN ASSISTANT

## 2023-11-22 PROCEDURE — 25010000002 ONDANSETRON PER 1 MG: Performed by: INTERNAL MEDICINE

## 2023-11-22 PROCEDURE — 96376 TX/PRO/DX INJ SAME DRUG ADON: CPT

## 2023-11-22 PROCEDURE — 36415 COLL VENOUS BLD VENIPUNCTURE: CPT | Performed by: INTERNAL MEDICINE

## 2023-11-22 PROCEDURE — 25010000002 MORPHINE PER 10 MG: Performed by: INTERNAL MEDICINE

## 2023-11-22 RX ORDER — UREA 10 %
3 LOTION (ML) TOPICAL NIGHTLY PRN
Status: DISCONTINUED | OUTPATIENT
Start: 2023-11-22 | End: 2023-11-26 | Stop reason: HOSPADM

## 2023-11-22 RX ORDER — LUBIPROSTONE 24 UG/1
24 CAPSULE ORAL 2 TIMES DAILY WITH MEALS
Status: DISCONTINUED | OUTPATIENT
Start: 2023-11-22 | End: 2023-11-26 | Stop reason: HOSPADM

## 2023-11-22 RX ADMIN — BUDESONIDE AND FORMOTEROL FUMARATE DIHYDRATE 2 PUFF: 160; 4.5 AEROSOL RESPIRATORY (INHALATION) at 07:02

## 2023-11-22 RX ADMIN — MORPHINE SULFATE 4 MG: 4 INJECTION, SOLUTION INTRAMUSCULAR; INTRAVENOUS at 23:28

## 2023-11-22 RX ADMIN — MORPHINE SULFATE 4 MG: 4 INJECTION, SOLUTION INTRAMUSCULAR; INTRAVENOUS at 19:19

## 2023-11-22 RX ADMIN — MORPHINE SULFATE 4 MG: 4 INJECTION, SOLUTION INTRAMUSCULAR; INTRAVENOUS at 10:02

## 2023-11-22 RX ADMIN — ONDANSETRON 4 MG: 2 INJECTION INTRAMUSCULAR; INTRAVENOUS at 05:59

## 2023-11-22 RX ADMIN — Medication 3 MG: at 01:34

## 2023-11-22 RX ADMIN — ONDANSETRON 4 MG: 2 INJECTION INTRAMUSCULAR; INTRAVENOUS at 12:20

## 2023-11-22 RX ADMIN — MORPHINE SULFATE 4 MG: 4 INJECTION, SOLUTION INTRAMUSCULAR; INTRAVENOUS at 15:30

## 2023-11-22 RX ADMIN — MORPHINE SULFATE 4 MG: 4 INJECTION, SOLUTION INTRAMUSCULAR; INTRAVENOUS at 01:34

## 2023-11-22 RX ADMIN — ONDANSETRON 4 MG: 2 INJECTION INTRAMUSCULAR; INTRAVENOUS at 01:34

## 2023-11-22 RX ADMIN — PANTOPRAZOLE SODIUM 40 MG: 40 TABLET, DELAYED RELEASE ORAL at 05:59

## 2023-11-22 RX ADMIN — TIOTROPIUM BROMIDE INHALATION SPRAY 2 PUFF: 3.12 SPRAY, METERED RESPIRATORY (INHALATION) at 07:05

## 2023-11-22 RX ADMIN — MORPHINE SULFATE 4 MG: 4 INJECTION, SOLUTION INTRAMUSCULAR; INTRAVENOUS at 05:59

## 2023-11-22 RX ADMIN — ONDANSETRON 4 MG: 2 INJECTION INTRAMUSCULAR; INTRAVENOUS at 23:28

## 2023-11-22 RX ADMIN — BUDESONIDE AND FORMOTEROL FUMARATE DIHYDRATE 2 PUFF: 160; 4.5 AEROSOL RESPIRATORY (INHALATION) at 22:13

## 2023-11-22 RX ADMIN — LUBIPROSTONE 24 MCG: 24 CAPSULE, GELATIN COATED ORAL at 19:19

## 2023-11-22 NOTE — PROGRESS NOTES
"Nutrition Services    Patient Name:  Donny Mallory  YOB: 1960  MRN: [id number]  Admit Date:  11/21/2023    Assessment Date:  11/22/23    Summary:     Pt is a 63 y.o. male adm for ileus. H/o COPD, dysphagia, severe malnutrition, GERD. Nutrition assessment completed. Pt was sleeping at time of attempted visit. He has been having chronic constipation but had a BM yesterday after enema was given. Wt hx reviewed, wt is stable. Currently on clears. Will add Boost Breeze.    Labs: Cl 109   Meds: IVFs, protonix     REC:  Bowel regimen to help with constipation   Boost Breeze TID for additional calories/protein and to support adequate PO intake and wt gn   Will continue to encourage and monitor PO/ONS intake   Advance diet as tolerated and once medically appropriate per MD  Will follow up for NFPE     RD to follow per protocol.    CLINICAL NUTRITION ASSESSMENT      Reason for Assessment BMI     Diagnosis/Problem   Ileus    Medical/Surgical History Past Medical History:   Diagnosis Date    Bell's palsy     COPD (chronic obstructive pulmonary disease)     Dysphagia     previous [facility]up at [facility]    Hypertension        Past Surgical History:   Procedure Laterality Date    CHOLECYSTECTOMY      CHOLECYSTECTOMY WITH INTRAOPERATIVE CHOLANGIOGRAM N/A 04/25/2022    Procedure: Laparoscopic cholecystectomy with intraoperative cholangiogram, possible open;  Surgeon: Khari Schofield MD;  Location: [facility];  Service: General;  Laterality: N/A;    HERNIA REPAIR          Anthropometrics        Current Height  Current Weight  BMI kg/m2 Height: 177.8 cm (70\")  Weight: 53.8 kg (118 lb 9.7 oz) (11/21/23 2227)  Body mass index is 17.02 kg/m².   Adjusted BMI (if applicable)    BMI Category Underweight (18.4 or below)   Ideal Body Weight (IBW) 73 kg    Usual Body Weight (UBW) 120-125 lbs    Weight Trend Stable   Weight History Wt Readings from Last 30 Encounters:   11/21/23 2227 53.8 kg (118 lb 9.7 oz)   11/21/23 " "1254 54.4 kg (120 lb)   11/01/23 0500 55.7 kg (122 lb 14.4 oz)   10/30/23 0600 53.4 kg (117 lb 11.6 oz)   10/30/23 0500 53.4 kg (117 lb 11.6 oz)   10/30/23 0200 53.4 kg (117 lb 11.6 oz)   10/29/23 2158 53.4 kg (117 lb 11.6 oz)   10/29/23 1636 54.4 kg (120 lb)   06/26/23 1658 56.7 kg (125 lb)   06/03/22 1003 54.4 kg (120 lb)   05/02/22 1624 55.8 kg (123 lb)   04/30/22 0500 55.8 kg (123 lb)   04/29/22 0709 55.9 kg (123 lb 3.2 oz)   04/29/22 0600 57.5 kg (126 lb 12.2 oz)   04/28/22 0422 57.9 kg (127 lb 9.6 oz)   04/27/22 0646 58.7 kg (129 lb 8 oz)   04/26/22 0630 60.9 kg (134 lb 4.2 oz)   04/24/22 0500 59.5 kg (131 lb 2.8 oz)   04/23/22 1251 57.2 kg (126 lb)   04/23/22 0500 57.2 kg (126 lb 1.7 oz)   04/22/22 0922 57.1 kg (125 lb 14.1 oz)   04/21/22 1433 57.1 kg (125 lb 14.1 oz)   04/21/22 1000 54.4 kg (120 lb)   02/21/22 1615 54.9 kg (121 lb)   05/15/17 1846 56.7 kg (125 lb)        Estimated Requirements         Weight used  53.8 kg     Calories  4421-0281 (30 kcal/kg, 35 kcal/kg)    Protein  65-81 (1.2 - 1.5 gm/kg)    Fluid  1 mL/kcal       Labs       Pertinent Labs    Results from last 7 days   Lab Units 11/22/23  0621 11/21/23  1312   SODIUM mmol/L 142 140   POTASSIUM mmol/L 3.8 4.1   CHLORIDE mmol/L 109* 101   CO2 mmol/L 25.3 24.9   BUN mg/dL 13 15   CREATININE mg/dL 1.04 1.42*   CALCIUM mg/dL 8.8 9.6   BILIRUBIN mg/dL  --  0.4   ALK PHOS U/L  --  80   ALT (SGPT) U/L  --  18   AST (SGOT) U/L  --  28   GLUCOSE mg/dL 75 185*     Results from last 7 days   Lab Units 11/22/23  0621 11/21/23  1312   HEMOGLOBIN g/dL 12.4* 15.2   HEMATOCRIT % 36.5* 46.0   WBC 10*3/mm3 7.62 8.77   ALBUMIN g/dL  --  4.7     Results from last 7 days   Lab Units 11/22/23  0621 11/21/23  1312   PLATELETS 10*3/mm3 197 279     COVID19   Date Value Ref Range Status   10/29/2023 Not Detected Not Detected - Ref. Range Final     No results found for: \"HGBA1C\"       Medications           Scheduled Medications budesonide-formoterol, 2 puff, " Inhalation, BID - RT   And  tiotropium bromide monohydrate, 2 puff, Inhalation, Daily - RT  lisinopril, 20 mg, Oral, Daily  pantoprazole, 40 mg, Oral, Q AM       Infusions sodium chloride, 75 mL/hr, Last Rate: 75 mL/hr (11/22/23 1149)       PRN Medications   acetaminophen **OR** acetaminophen **OR** acetaminophen    [DISCONTINUED] senna-docusate sodium **AND** polyethylene glycol **AND** bisacodyl **AND** bisacodyl    influenza vaccine    ipratropium-albuterol    melatonin    Morphine    ondansetron    sodium chloride     Physical Findings          General Findings frail, room air, underweight   Oral/Mouth Cavity dental caries, tooth or teeth missing   Edema  no edema   Gastrointestinal abdominal pain, normoactive, last bowel movement: 11/22   Skin  skin intact   Tubes/Drains/Lines none   NFPE Unable to perform due to: pt sleeping at time of visit    --  Current Nutrition Orders & Evaluation of Intake       Oral Nutrition     Food Allergies NKFA   Current PO Diet Diet: Liquid Diets; Clear Liquid; Fluid Consistency: Thin (IDDSI 0)   Supplement n/a   PO Evaluation     % PO Intake 720 mL    Factors Affecting Intake: abdominal pain, constipation   --  PES STATEMENT / NUTRITION DIAGNOSIS      Nutrition Dx Problem  Problem: Altered GI Function  Etiology: Medical Diagnosis - ileus and Factors Affecting Nutrition - constipation, abdominal pain    Signs/Symptoms: Clear Liquid Diet     NUTRITION INTERVENTION / PLAN OF CARE      Intervention Goal(s) Maintain nutrition status, Establish goals of care, Reduce/improve symptoms, Meet estimated needs, Disease management/therapy, Increase intake, Advance diet, Appropriate weight gain, and PO intake goal %: 75%         RD Intervention/Action Await initiation/advancement of PO diet, Encourage intake, Continue to monitor, Care plan reviewed, and Recommend/order: ONS   --      Prescription/Orders:       PO Diet ADAT      Supplements Boost Breeze TID       Enteral Nutrition        Parenteral Nutrition    New Prescription Ordered? Yes   --      Monitor/Evaluation Per protocol   Discharge Plan/Needs Pending clinical course   --    RD to follow per protocol.      Electronically signed by:  Alethea Liang Dietitian Intern   11/22/23 12:37 EST

## 2023-11-22 NOTE — CONSULTS
"[facility] consulted regarding depression and history of suicide attempt.  Patient evaluated alone in room 478 [facility].  On arrival to floor patient was ambulating the hallway.  He is alert and oriented x 4.  He is cooperative though is a bit guarded and provides little eye contact.  Affect is flat; however, he does smile some during conversation.  Speech is of normal rate and rhythm.    He is a 64 yo S/W/M, lives with his sister and one granddaughter.  He has one adult daughter and two grandchildren.  He does not work, is disabled related to COPD.  He cites his sister as support.  He reports being safe at home, denies history of abuse and violence.     He denies feeling depressed; reports feeling frustrated and angry related to constant stomach pain/constipation and not being able to find any reason.  Reports feeling tired and frustrated at being in and out of hospitals-feels there is something physically wrong.  Other stressors include the death of his father in August.  He reports his sister recently lost her job and appears concerned regarding same.  He reports he tries to sleep a lot because when he's asleep there is no pain but on the other hand reports difficulty sleeping related to the pain.  He denies issues with appetite despite the pain.  He tries to stay active at home by doing chores such as picking up leaves but also feels too sick to do anything.  He reports constant worry and racing thoughts.  Discussed somatic symptoms of anxiety and depression; however, he was adamant that symptoms are not psychological.    He denies current SI, denies plan and intention.  He reports history of SI attempt in August of 2022 during which he jumped [place] the [address] bridge.  He reports he was having money issues and \"a lot going on\" that lead to that attempt.  He was admitted to  psych unit at that time.  He denies that he was treated with any medication.  He was admitted to [facility] at [facility] in 2017 related " "to bizarre behaviors (see chart for those notes).  He was discharged on an MIW at that time and spent a month in [facility].  While on CMU he was started on Celexa.  He does not have a mental health provider at this time nor is on any psychotropic medications.  He reports he cannot afford these. He reports he does not follow-up with his PCP as it is a \"waste of time\".      He denies HI and A/V hallucinations.  He has history of alcohol use with DUI during his high school years.  He also reports an episode that occurred 2 years ago during which he drank a 1/2 gallon of liquor on a whim and passed out in the sun.  He suffered a severe sun burn and subsequent MRSA as a result.  He denies any alcohol use since.  He reports history of MJ use with last being 2 months ago but talks about using again to aid in his anxiety and \"help me eat\".  He smokes 2 cigarettes daily and is trying to stop; is down from a ppd.      He declined offer for psychiatrist consult, Access follow-up and outpatient mental health resources.  He was encouraged to inform his RN if he were to change his mind and Access can return.  RN informed.    Access will sign off, call if any questions or patient request.    "

## 2023-11-22 NOTE — PROGRESS NOTES
Name: Donny Mallory ADMIT: 2023   : 1960  PCP: Janelle Lara MD    MRN: [id number] LOS: 0 days   AGE/SEX: 63 y.o. male  ROOM: [facility]     Subjective   He states that he had a small bowel movement after receiving the enema yesterday.  Reportedly he had a suicide attempt not too long ago.      Objective   Objective   Vital Signs  Temp:  [97 °F (36.1 °C)-98.3 °F (36.8 °C)] 97.9 °F (36.6 °C)  Heart Rate:  [48-71] 51  Resp:  [16] 16  BP: ()/(59-83) 134/81  SpO2:  [95 %-99 %] 99 %  on   ;   Device (Oxygen Therapy): room air  Body mass index is 17.02 kg/m².  Physical Exam  Constitutional:       General: He is not in acute distress.  HENT:      Head: Normocephalic and atraumatic.   Cardiovascular:      Rate and Rhythm: Normal rate and regular rhythm.   Pulmonary:      Effort: Pulmonary effort is normal. No respiratory distress.   Abdominal:      General: There is distension.      Tenderness: There is abdominal tenderness.   Neurological:      Mental Status: He is alert and oriented to person, place, and time.   Psychiatric:         Behavior: Withdrawn: flat affect.         Results Review     I reviewed the patient's new clinical results.  Results from last 7 days   Lab Units 23  0621 23  1312   WBC 10*3/mm3 7.62 8.77   HEMOGLOBIN g/dL 12.4* 15.2   PLATELETS 10*3/mm3 197 279     Results from last 7 days   Lab Units 23  0621 23  1312   SODIUM mmol/L 142 140   POTASSIUM mmol/L 3.8 4.1   CHLORIDE mmol/L 109* 101   CO2 mmol/L 25.3 24.9   BUN mg/dL 13 15   CREATININE mg/dL 1.04 1.42*   GLUCOSE mg/dL 75 185*   Estimated Creatinine Clearance: 56.8 mL/min (by C-G formula based on SCr of 1.04 mg/dL).  Results from last 7 days   Lab Units 23  1312   ALBUMIN g/dL 4.7   BILIRUBIN mg/dL 0.4   ALK PHOS U/L 80   AST (SGOT) U/L 28   ALT (SGPT) U/L 18     Results from last 7 days   Lab Units 23  0621 23  1312   CALCIUM mg/dL 8.8 9.6   ALBUMIN g/dL  --  4.7     Results  "from last 7 days   Lab Units 11/21/23  1629 11/21/23  1312   LACTATE mmol/L 1.0 2.9*     COVID19   Date Value Ref Range Status   10/29/2023 Not Detected Not Detected - Ref. Range Final     SARS-CoV-2, CAREY   Date Value Ref Range Status   09/21/2023 NEGATIVE Negative Final     Comment:     The 2019-CoV rRT-PCR Assay is only for use under a Food and Drug Administration Emergency Use Authorization. The performance characteristics of the assay were verified by the Clinical Microbiology Laboratory at the [facility].   Results should be used in conjunction with the patient's clinical symptoms, medical history, and other clinical/laboratory findings to determine an overall clinical diagnosis. Negative results do not preclude infection with SARS-CoV-2 (COVID-19).    Test parameters have not been validated for screening asymptomatic patients.     No results found for: \"HGBA1C\", \"POCGLU\"  Results for orders placed or performed during the hospital encounter of 05/02/22   Blood Culture - Blood, Arm, Left    Specimen: Arm, Left; Blood   Result Value Ref Range    Blood Culture No growth at 5 days          CT Abdomen Pelvis With Contrast  Narrative: CT ABDOMEN AND PELVIS WITH IV CONTRAST     HISTORY: Abdominal pain, nausea vomiting     TECHNIQUE: Radiation dose reduction techniques were utilized, including  automated exposure control and exposure modulation based on body size.   3 mm images were obtained through the abdomen and pelvis after the  administration of IV contrast.     COMPARISON: Multiple CT abdomen and pelvis dating back to 6/23/2022     FINDINGS:     A few sub-6 mm pleural-based pulmonary nodules in the left lung base are  present, as before. There are air-fluid levels throughout the small  bowel which also demonstrates moderate distention with loops measuring  up to approximately 3.7 cm in diameter. The more distal small bowel is  relatively decompressed. While a discrete transition point is " not  definitively seen, the small bowel cannot be followed in its entirety  due to lack of intraperitoneal fat.     The appendix is not seen. Gallbladder is surgically absent with  associated pneumobilia, as before. Subcentimeter hepatic lesions are too  small to characterize. A biliary stent is present and projects through  the distal common bile duct and into the first portion of the duodenum,  similar to that seen on 10/29/2023. Main pancreatic duct is mildly  distended measuring up to 0.7 cm, grossly unchanged since 6/23/2022. The  spleen and adrenal glands have an unremarkable postcontrast CT  appearance. Subcentimeter renal lesions are too small to characterize.  Larger hypodense lesions demonstrating density less than 15 Hounsfield  units are likely benign per Bosniak 2019 criteria. There is an  indeterminate density right renal lesion measuring 2.1 cm there is no  hydronephrosis. Please note that the bilateral ureters cannot be  followed in their entirety due to lack of intraperitoneal fat. Abdominal  pelvic adenopathy cannot be excluded on this examination due to the  above-stated limitations. The rectum is severely distended with stool  and air. The bladder is decompressed and cannot be evaluated.     No free intraperitoneal air is seen. No suspicious lytic or blastic  osseous lesions are present. For the purposes dictation, last  well-formed space referred to as L5-S1. Compression formae of L1 is  present, as before. There is mild retrolisthesis of L5 on S1.     Impression: 1.  Air-fluid levels in moderate distention of small bowel loops with  relative decompression of the more distal small bowel loops. A discrete  transition point is not definitively seen; however, the small bowel is  unable to be followed in its entirety due to lack of intraperitoneal  fat. Therefore, findings represent ileus versus obstruction in the  appropriate context and further evaluation with small bowel  follow-through is  recommended.  2.  Severe distention of the rectum with air and stool.  3.  The appendix is not visualized and cannot be evaluated.  4.  Indeterminate right renal lesion measuring 2.1 cm. Further  evaluation with multiphase CT with and without contrast is recommended  to exclude neoplasm.  5.  Biliary stent is in place with its most proximal aspect within the  distal common bile duct, similar that seen on 10/29/2023. Correlation  with surgical history is recommended to ensure appropriate positioning  of the ductal stent.  6.  Other findings as above.     This report was finalized on 11/21/2023 5:01 PM by Dr. Stephen Camacho M.D on Workstation: [id number]       Scheduled Medications  budesonide-formoterol, 2 puff, Inhalation, BID - RT   And  tiotropium bromide monohydrate, 2 puff, Inhalation, Daily - RT  lisinopril, 20 mg, Oral, Daily  lubiprostone, 24 mcg, Oral, BID With Meals  pantoprazole, 40 mg, Oral, Q AM    Infusions  sodium chloride, 75 mL/hr, Last Rate: Stopped (11/22/23 1421)    Diet  Diet: Liquid Diets; Clear Liquid; Fluid Consistency: Thin (IDDSI 0)       Assessment/Plan     Active Hospital Problems    Diagnosis  POA    **Ileus [K56.7]  Yes      Resolved Hospital Problems   No resolved problems to display.       63 y.o. male admitted with Ileus.    Chronic constipation  GI and general surgery have evaluated the patient.  Initially there was some concern for ileus however this has been ruled out.  He has been started on lubiprostone by GI.    Hypertension  GERD  Continue lisinopril and levothyroxine    COPD  Continue home Symbicort    Access consult for history of suicidal ideation, suspected depression    SCDs for DVT prophylaxis  Full code  Discussed with patient and nursing staff and consulting provider  Anticipate discharge to be determined      Steve Cherry MD  [facility]  11/22/23  14:53 EST

## 2023-11-22 NOTE — CONSULTS
General Surgery Consultation    Consulting Physician: Marybeth Hernandez MD  Referring:     Mary Jama MD       Reason for consultation:     abdominal pain, ileus       CC:   constipation    HPI:   The patient is a 63 y.o. male who presented to the hospital with constipation.   The patient has chronic constipation and takes MiraLAX, senna, Linzess, and suppositories.  He states that he uses these all as needed and is not on a regular bowel regimen.  His last bowel movement prior to arrival was 2 days ago after he took Linzess.  Subsequently he was seen in our emergency department and admitted and states he had an enema last night with a small bowel movement of hard stools.  He reports abdominal pain that starts in his lower abdomen and works its way up, he denies any vomiting, he has had some sweats and states he has had chills on and off for the past several months.     He reports prior surgeries being cholecystectomy performed by Dr. Schofield, as well as an open left inguinal hernia repair and a laparoscopic right inguinal hernia repair.  He also has undergone biliary stent placement at [facility].  He follows with Dr. Juarez at [facility] who is performed colonoscopy and reportedly the patient has undergone anorectal manometry last week at [facility] to be evaluated for his chronic constipation.  He was admitted here previously this month with ileus and was seen by the GI service, with plans to follow-up with them outpatient.    Review of prior GI records here:  Patient has had multiple imaging that has shown patient to have biliary and PD dilation, but no mass. He has had EUS as well. EGD w/ no esophageal mass or stricture. No evidence of chronic pancreatitis. Patient was seen by GI 09/19/2023 for abd pain. At the time, patient was recommended enteral feeding given continued weight loss, but patient had declined.      ERCP w/ spygximena w/ Dr. 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       Per outside record review, patient has seen Dr. Juarez at [facility] on 10/5/2023 for colonoscopy.  This demonstrated colon polyps, ulceration in the rectum, external hemorrhoids.  He was recommended to undergo anorectal evaluation and possible pelvic floor PT referral.    Past Medical History:  Chronic constipation  Past Medical History:   Diagnosis Date    Bell's palsy     COPD (chronic obstructive pulmonary disease)     Dysphagia     previous workup at [facility]    Hypertension        Past Surgical History:  Open left inguinal hernia repair  Laparoscopic right inguinal hernia repair  Cholecystectomy with intraoperative cholangiogram  Biliary stent, pancreatic stent reported  Past Surgical History:   Procedure Laterality Date    CHOLECYSTECTOMY      CHOLECYSTECTOMY WITH INTRAOPERATIVE CHOLANGIOGRAM N/A 04/25/2022    Procedure: Laparoscopic cholecystectomy with intraoperative cholangiogram, possible open;  Surgeon: Khari Schofield MD;  Location: [facility];  Service: General;  Laterality: N/A;    HERNIA REPAIR         Medications:  Medications Prior to Admission   Medication Sig Dispense Refill Last Dose    acetaminophen (TYLENOL) 500 MG tablet Take 2 tablets by mouth Every 4 (Four) Hours As Needed for Mild Pain.       ibuprofen (ADVIL,MOTRIN) 200 MG tablet Take 1 tablet by mouth Every 6 (Six) Hours As Needed for Mild Pain.       linaclotide (Linzess) 145 MCG capsule capsule Take 1 capsule by mouth Every Morning Before Breakfast for 30 days. 30 capsule 0     lisinopril (PRINIVIL,ZESTRIL) 20 MG tablet Take 1 tablet by mouth Daily.       sennosides-docusate (senna-docusate sodium) 8.6-50 MG per tablet Take 2 tablets by mouth 2 (Two) Times a Day for 30 days. 120 tablet 0     Fluticasone-Umeclidin-Vilant (Trelegy Ellipta) 100-62.5-25 MCG/INH inhaler Inhale 1 puff Daily.       ipratropium-albuterol (DUO-NEB) 0.5-2.5 mg/3 ml nebulizer Take 3 mL by nebulization Every 4 (Four) Hours As Needed for  Wheezing.       omeprazole (priLOSEC) 20 MG capsule Take 1 capsule by mouth Daily. 30 capsule 0     ondansetron ODT (ZOFRAN-ODT) 4 MG disintegrating tablet Place 1 tablet on the tongue 4 (Four) Times a Day As Needed for Nausea or Vomiting. 15 tablet 0     promethazine (PHENERGAN) 25 MG tablet Take 1 tablet by mouth Every 6 (Six) Hours As Needed for Nausea or Vomiting. 10 tablet 0     sucralfate (CARAFATE) 1 g tablet Take 1 tablet by mouth 4 (Four) Times a Day. 40 tablet 0      Current Facility-Administered Medications:     acetaminophen (TYLENOL) tablet 650 mg, 650 mg, Oral, Q4H PRN, 650 mg at 11/21/23 2002 **OR** acetaminophen (TYLENOL) 160 MG/5ML oral solution 650 mg, 650 mg, Oral, Q4H PRN **OR** acetaminophen (TYLENOL) suppository 650 mg, 650 mg, Rectal, Q4H PRN, Mary Jama MD    [DISCONTINUED] sennosides-docusate (PERICOLACE) 8.6-50 MG per tablet 2 tablet, 2 tablet, Oral, BID **AND** polyethylene glycol (MIRALAX) packet 17 g, 17 g, Oral, Daily PRN **AND** bisacodyl (DULCOLAX) EC tablet 5 mg, 5 mg, Oral, Daily PRN **AND** bisacodyl (DULCOLAX) suppository 10 mg, 10 mg, Rectal, Daily PRN, Mary Jama MD    budesonide-formoterol (SYMBICORT) 160-4.5 MCG/ACT inhaler 2 puff, 2 puff, Inhalation, BID - RT, 2 puff at 11/22/23 0702 **AND** tiotropium (SPIRIVA RESPIMAT) 2.5 mcg/act aerosol solution inhaler, 2 puff, Inhalation, Daily - RT, Mary Jama MD, 2 puff at 11/22/23 0705    influenza vac split quad (FLUZONE,FLUARIX,AFLURIA,FLULAVAL) injection 0.5 mL, 0.5 mL, Intramuscular, During Hospitalization, Mary Jama MD    ipratropium-albuterol (DUO-NEB) nebulizer solution 3 mL, 3 mL, Nebulization, Q4H PRN, Mary Jama MD    lisinopril (PRINIVIL,ZESTRIL) tablet 20 mg, 20 mg, Oral, Daily, Mary Jama MD    melatonin tablet 3 mg, 3 mg, Oral, Nightly PRN, Renetta Naranjo, APRN, 3 mg at 11/22/23 0134    Morphine sulfate (PF) injection 4 mg, 4 mg, Intravenous,  Q4H PRN, Mary Jama MD, 4 mg at 11/22/23 0559    ondansetron (ZOFRAN) injection 4 mg, 4 mg, Intravenous, Q6H PRN, Mary Jama MD, 4 mg at 11/22/23 0559    pantoprazole (PROTONIX) EC tablet 40 mg, 40 mg, Oral, Q AM, Mary Jama MD, 40 mg at 11/22/23 0559    sodium chloride 0.9 % flush 10 mL, 10 mL, Intravenous, PRN, Emergency, Triage Protocol, MD    sodium chloride 0.9 % infusion, 75 mL/hr, Intravenous, Continuous, Mary Jama MD, Last Rate: 75 mL/hr at 11/22/23 0514, 75 mL/hr at 11/22/23 0514      Allergies:   Allergies   Allergen Reactions    Prochlorperazine Anxiety       Social History:   Social History     Socioeconomic History    Marital status: Single   Tobacco Use    Smoking status: Every Day     Packs/day: 0.25     Years: 15.00     Additional pack years: 0.00     Total pack years: 3.75     Types: Cigarettes    Smokeless tobacco: Never   Vaping Use    Vaping Use: Former   Substance and Sexual Activity    Alcohol use: No    Drug use: No    Sexual activity: Defer       Family History:   Negative for colorectal cancer    Review of Systems:  Constitutional: +sweats, chills  Cardiovascular: denies chest pain   Respiratory: + cough, no shortness of breath  Gastrointestinal: + abdominal pain, +nausea, no vomiting, no diarrhea, +constipation, no melena or hematochezia  Genitourinary: denies dysuria or hematuria  Skin: denies rash or jaundice     Physical Exam:   VS reviewed in [facility].  GENERAL: alert, interactive, cooperative  HEENT: no scleral icterus; moist mucous membranes  NECK: Supple  RESPIRATORY: normal work of breathing on room air  GASTROINTESTINAL: abdomen soft, mildly distended, nontender, no palpable masses, well healed surgical incisions  MUSCULOSKELETAL: no cyanosis or edema   NEUROLOGIC: alert and oriented, normal speech, no gross focal deficits   SKIN: warm, no rash, no jaundice      Diagnostic workup:     Pertinent labs:   Results from last 7 days   Lab  Units 11/22/23  0621 11/21/23  1312   WBC 10*3/mm3 7.62 8.77   HEMOGLOBIN g/dL 12.4* 15.2   HEMATOCRIT % 36.5* 46.0   PLATELETS 10*3/mm3 197 279     Results from last 7 days   Lab Units 11/22/23  0621 11/21/23  1312   SODIUM mmol/L 142 140   POTASSIUM mmol/L 3.8 4.1   CHLORIDE mmol/L 109* 101   CO2 mmol/L 25.3 24.9   BUN mg/dL 13 15   CREATININE mg/dL 1.04 1.42*   CALCIUM mg/dL 8.8 9.6   BILIRUBIN mg/dL  --  0.4   ALK PHOS U/L  --  80   ALT (SGPT) U/L  --  18   AST (SGOT) U/L  --  28   GLUCOSE mg/dL 75 185*     Lactate 2.9    Imaging:  CT abdomen and pelvis 11/21/2023 images and report independently reviewed, demonstrates possible ileus versus obstruction with air-fluid levels and moderate distention of small bowel loops with relative decompression of the distal bowel loops, there is severe distention of the rectum with air and stool, there is a moderate stool burden in the colon, there is a biliary stent in place with the most proximal aspect in the distal common bile duct    Assessment and plan:   The patient is a 63 y.o. male with chronic constipation presenting with CT imaging evidence of constipation as well as ileus versus bowel obstruction.    AVSS. Patient has not been vomiting and had a bowel movement following enema yesterday.  His lactic acid is mildly elevated, but abdominal exam is nontender, with mild distension. WBC 8.7. He does have significant stool burden on his CT in the setting of chronic constipation.  Based on his history and workup, I do not feel he has a bowel obstruction but rather chronic constipation for which he does not require surgical intervention. Would recommend consistent bowel regimen, as per report the patient takes his medications as needed, which is insufficient given his ongoing constipation.  Would have GI see him for constipation management and evaluation of biliary stent noted on imaging with history of workup for pancreatic and biliary ductal dilation.      Marybeth COURTNEY  ZION Hernandez.  General, Robotic, and Endoscopic Surgery  [facility]    [address], [address]  [place], [place], [address]  P: [phone]  F: [phone]

## 2023-11-22 NOTE — H&P
HISTORY AND PHYSICAL   [facility]        Date of Admission: 2023  Patient Identification:  Name: Donny Mallory  Age: 63 y.o.  Sex: male  :  1960  MRN: [id number]                     Primary Care Physician: Janelle Lara MD    Chief Complaint:  63 year old gentleman who presented to the emergency room with abdominal pain and constipation; his last bowel movement was two days ago after he took linzess; he denies fever or chills; he has had some nausea; he was admitted with an ileus at the beginning of this month;     History of Present Illness:   As above    Past Medical History:  Past Medical History:   Diagnosis Date    Bell's palsy     COPD (chronic obstructive pulmonary disease)     Dysphagia     previous workup at [facility]      Past Surgical History:  Past Surgical History:   Procedure Laterality Date    CHOLECYSTECTOMY      CHOLECYSTECTOMY WITH INTRAOPERATIVE CHOLANGIOGRAM N/A 2022    Procedure: Laparoscopic cholecystectomy with intraoperative cholangiogram, possible open;  Surgeon: Khari Schofield MD;  Location: [facility];  Service: General;  Laterality: N/A;    HERNIA REPAIR        Home Meds:  Medications Prior to Admission   Medication Sig Dispense Refill Last Dose    acetaminophen (TYLENOL) 500 MG tablet Take 2 tablets by mouth Every 4 (Four) Hours As Needed for Mild Pain.       Fluticasone-Umeclidin-Vilant (Trelegy Ellipta) 100-62.5-25 MCG/INH inhaler Inhale 1 puff Daily.       ibuprofen (ADVIL,MOTRIN) 200 MG tablet Take 1 tablet by mouth Every 6 (Six) Hours As Needed for Mild Pain.       ipratropium-albuterol (DUO-NEB) 0.5-2.5 mg/3 ml nebulizer Take 3 mL by nebulization Every 4 (Four) Hours As Needed for Wheezing.       linaclotide (Linzess) 145 MCG capsule capsule Take 1 capsule by mouth Every Morning Before Breakfast for 30 days. 30 capsule 0     lisinopril (PRINIVIL,ZESTRIL) 20 MG tablet Take 1 tablet by mouth Daily.        omeprazole (priLOSEC) 20 MG capsule Take 1 capsule by mouth Daily. 30 capsule 0     ondansetron ODT (ZOFRAN-ODT) 4 MG disintegrating tablet Place 1 tablet on the tongue 4 (Four) Times a Day As Needed for Nausea or Vomiting. 15 tablet 0     promethazine (PHENERGAN) 25 MG tablet Take 1 tablet by mouth Every 6 (Six) Hours As Needed for Nausea or Vomiting. 10 tablet 0     sennosides-docusate (senna-docusate sodium) 8.6-50 MG per tablet Take 2 tablets by mouth 2 (Two) Times a Day for 30 days. 120 tablet 0     sucralfate (CARAFATE) 1 g tablet Take 1 tablet by mouth 4 (Four) Times a Day. 40 tablet 0        Allergies:  Allergies   Allergen Reactions    Prochlorperazine Anxiety     Immunizations:  Immunization History   Administered Date(s) Administered    COVID-19 (MODERNA) 1st,2nd,3rd Dose Monovalent 09/15/2021, 10/13/2021    Fluzone Quad >6mos (Multi-dose) 03/14/2017    Pneumococcal Polysaccharide (PPSV23) 08/14/2018     Social History:   Social History     Social History Narrative    Not on file     Social History     Socioeconomic History    Marital status: Single   Tobacco Use    Smoking status: Some Days    Smokeless tobacco: Never   Vaping Use    Vaping Use: Former   Substance and Sexual Activity    Alcohol use: No    Drug use: No    Sexual activity: Defer       Family History:  Family History   Family history unknown: Yes        Review of Systems  See history of present illness and past medical history.  Patient denies headache, dizziness, syncope, falls, trauma, change in vision, change in hearing, change in taste, changes in weight, changes in appetite, focal weakness, numbness, or paresthesia.  Patient denies chest pain, palpitations, dyspnea, orthopnea, PND, cough, sinus pressure, rhinorrhea, epistaxis, hemoptysis, nausea, vomiting,hematemesis, diarrhea, constipation or hematochezia.  Denies cold or heat intolerance, polydipsia, polyuria, polyphagia. Denies hematuria, pyuria, dysuria, hesitancy, frequency or  "urgency. Denies consumption of raw and under cooked meats foods or change in water source.  Denies fever, chills, sweats, night sweats.  Denies missing any routine medications. Remainder of ROS is negative.    Objective:  T Max 24 hrs: Temp (24hrs), Av.2 °F (36.2 °C), Min:97.2 °F (36.2 °C), Max:97.2 °F (36.2 °C)    Vitals Ranges:   Temp:  [97.2 °F (36.2 °C)] 97.2 °F (36.2 °C)  Heart Rate:  [] 54  Resp:  [16-20] 16  BP: ()/(65-99) 103/77      Exam:  /77 (Patient Position: Lying)   Pulse 54   Temp 97.2 °F (36.2 °C) (Tympanic)   Resp 16   Ht 177.8 cm (70\")   Wt 54.4 kg (120 lb)   SpO2 97%   BMI 17.22 kg/m²     General Appearance:    Alert, cooperative, no distress, appears stated age; thin, frail; chronically ill appearing   Head:    Normocephalic, without obvious abnormality, atraumatic; bitemporal wasting   Eyes:    PERRL, conjunctivae/corneas clear, EOM's intact, both eyes   Ears:    Normal external ear canals, both ears   Nose:   Nares normal, septum midline, mucosa normal, no drainage    or sinus tenderness   Throat:   Lips, mucosa, and tongue normal   Neck:   Supple, symmetrical, trachea midline, no adenopathy;     thyroid:  no enlargement/tenderness/nodules; no carotid    bruit or JVD   Back:     Symmetric, no curvature, ROM normal, no CVA tenderness   Lungs:     Clear to auscultation bilaterally, respirations unlabored   Chest Wall:    No tenderness or deformity    Heart:    Regular rate and rhythm, S1 and S2 normal, no murmur, rub   or gallop   Abdomen:     Soft,mildly distended   Extremities:   Extremities normal, atraumatic, no cyanosis or edema                       .    Data Review:  Labs in chart were reviewed.  WBC   Date Value Ref Range Status   2023 8.77 3.40 - 10.80 10*3/mm3 Final     Hemoglobin   Date Value Ref Range Status   2023 15.2 13.0 - 17.7 g/dL Final     Hematocrit   Date Value Ref Range Status   2023 46.0 37.5 - 51.0 % Final     Platelets   Date " Value Ref Range Status   11/21/2023 279 140 - 450 10*3/mm3 Final     Sodium   Date Value Ref Range Status   11/21/2023 140 136 - 145 mmol/L Final     Potassium   Date Value Ref Range Status   11/21/2023 4.1 3.5 - 5.2 mmol/L Final     Comment:     Slight hemolysis detected by analyzer. Result may be falsely elevated.     Chloride   Date Value Ref Range Status   11/21/2023 101 98 - 107 mmol/L Final     CO2   Date Value Ref Range Status   11/21/2023 24.9 22.0 - 29.0 mmol/L Final     BUN   Date Value Ref Range Status   11/21/2023 15 8 - 23 mg/dL Final     Creatinine   Date Value Ref Range Status   11/21/2023 1.42 (H) 0.76 - 1.27 mg/dL Final     Glucose   Date Value Ref Range Status   11/21/2023 185 (H) 65 - 99 mg/dL Final     Calcium   Date Value Ref Range Status   11/21/2023 9.6 8.6 - 10.5 mg/dL Final                Imaging Results (All)       Procedure Component Value Units Date/Time    CT Abdomen Pelvis With Contrast [[id number]] Collected: 11/21/23 1644     Updated: 11/21/23 1704    Narrative:      CT ABDOMEN AND PELVIS WITH IV CONTRAST     HISTORY: Abdominal pain, nausea vomiting     TECHNIQUE: Radiation dose reduction techniques were utilized, including  automated exposure control and exposure modulation based on body size.   3 mm images were obtained through the abdomen and pelvis after the  administration of IV contrast.     COMPARISON: Multiple CT abdomen and pelvis dating back to 6/23/2022     FINDINGS:     A few sub-6 mm pleural-based pulmonary nodules in the left lung base are  present, as before. There are air-fluid levels throughout the small  bowel which also demonstrates moderate distention with loops measuring  up to approximately 3.7 cm in diameter. The more distal small bowel is  relatively decompressed. While a discrete transition point is not  definitively seen, the small bowel cannot be followed in its entirety  due to lack of intraperitoneal fat.     The appendix is not seen. Gallbladder is  surgically absent with  associated pneumobilia, as before. Subcentimeter hepatic lesions are too  small to characterize. A biliary stent is present and projects through  the distal common bile duct and into the first portion of the duodenum,  similar to that seen on 10/29/2023. Main pancreatic duct is mildly  distended measuring up to 0.7 cm, grossly unchanged since 6/23/2022. The  spleen and adrenal glands have an unremarkable postcontrast CT  appearance. Subcentimeter renal lesions are too small to characterize.  Larger hypodense lesions demonstrating density less than 15 Hounsfield  units are likely benign per Bosniak 2019 criteria. There is an  indeterminate density right renal lesion measuring 2.1 cm there is no  hydronephrosis. Please note that the bilateral ureters cannot be  followed in their entirety due to lack of intraperitoneal fat. Abdominal  pelvic adenopathy cannot be excluded on this examination due to the  above-stated limitations. The rectum is severely distended with stool  and air. The bladder is decompressed and cannot be evaluated.     No free intraperitoneal air is seen. No suspicious lytic or blastic  osseous lesions are present. For the purposes dictation, last  well-formed space referred to as L5-S1. Compression formae of L1 is  present, as before. There is mild retrolisthesis of L5 on S1.       Impression:      1.  Air-fluid levels in moderate distention of small bowel loops with  relative decompression of the more distal small bowel loops. A discrete  transition point is not definitively seen; however, the small bowel is  unable to be followed in its entirety due to lack of intraperitoneal  fat. Therefore, findings represent ileus versus obstruction in the  appropriate context and further evaluation with small bowel  follow-through is recommended.  2.  Severe distention of the rectum with air and stool.  3.  The appendix is not visualized and cannot be evaluated.  4.  Indeterminate right  renal lesion measuring 2.1 cm. Further  evaluation with multiphase CT with and without contrast is recommended  to exclude neoplasm.  5.  Biliary stent is in place with its most proximal aspect within the  distal common bile duct, similar that seen on 10/29/2023. Correlation  with surgical history is recommended to ensure appropriate positioning  of the ductal stent.  6.  Other findings as above.     This report was finalized on 11/21/2023 5:01 PM by Dr. Stephen Camacho M.D on Workstation: [id number]                 Assessment:  Active Hospital Problems    Diagnosis  POA    **Ileus [K56.7]  Yes      Resolved Hospital Problems   No resolved problems to display.   Abdominal pain  Constipation  Acute kidney injury  Hyperglycemia  hypertension    Plan:  Ask surgery to see him  Trend labs  Clear liquids, fluids  Pain control  Dw patient and ed provider    Mary Jama MD  11/21/2023  22:29 EST

## 2023-11-22 NOTE — PLAN OF CARE
Goal Outcome Evaluation:  Plan of Care Reviewed With: patient        Progress: no change  Outcome Evaluation: New admit with ileus and abd pain. Enema given with some results. Surgery consult placed. Very afraid MD will send him home without finding out whats wrong

## 2023-11-22 NOTE — CASE MANAGEMENT/SOCIAL WORK
Discharge Planning Assessment  [facility]     Patient Name: Donny Mallory  MRN: [id number]  Today's Date: 11/22/2023    Admit Date: 11/21/2023        Discharge Needs Assessment       Row Name 11/22/23 1153       Living Environment    People in Home sibling(s);grandchild(valentina)    Family Caregiver if Needed sibling(s)      Row Name 11/22/23 1151       Living Environment    People in Home sibling(s);grandchild(valentina)    Current Living Arrangements home    Potentially Unsafe Housing Conditions none    Primary Care Provided by self    Provides Primary Care For no one    Family Caregiver if Needed spouse    Quality of Family Relationships helpful;involved;supportive    Able to Return to Prior Arrangements yes       Resource/Environmental Concerns    Resource/Environmental Concerns none    Transportation Concerns none       Transition Planning    Patient/Family Anticipates Transition to home with family    Patient/Family Anticipated Services at Transition none    Transportation Anticipated family or friend will provide       Discharge Needs Assessment    Equipment Currently Used at Home none    Concerns to be Addressed no discharge needs identified;denies needs/concerns at this time    Anticipated Changes Related to Illness none    Equipment Needed After Discharge none    Provided Post Acute Provider List? N/A    Provided Post Acute Provider Quality & Resource List? N/A                   Discharge Plan       Row Name 11/22/23 1151       Plan    Plan Comments CCP met with the patient at bedside. Patient confirmed the information on his face sheet was accurate. Patient states he lives at home with his sister and 16-year-old granddaughter. Patient confirmed his PCP is Janelle Lara. Patient denies a history of HH/SNF. Patient denies using any DME and states he is normally IADL’s. Patient states there are 3 steps to enter the home without any handrails. Patient denies any steps within the home to use. Patient states his  pharmacy is [facility] on [address]. Patient states he plans to take an Uber home at discharge. CCP to follow. CD, CSW.                  Continued Care and Services - Admitted Since 11/21/2023    Coordination has not been started for this encounter.          Demographic Summary       Row Name 11/22/23 1154       General Information    Admission Type observation    Arrived From emergency department    Referral Source admission list    Reason for Consult discharge planning    Preferred Language English                   Functional Status       Row Name 11/22/23 1150       Functional Status    Usual Activity Tolerance good    Current Activity Tolerance moderate       Functional Status, IADL    Medications independent    Meal Preparation independent    Housekeeping independent    Laundry independent    Shopping independent       Mental Status    General Appearance WDL WDL       Mental Status Summary    Recent Changes in Mental Status/Cognitive Functioning no changes       Employment/    Employment Status unemployed                   Psychosocial    No documentation.                  Abuse/Neglect    No documentation.                  Legal    No documentation.                  Substance Abuse    No documentation.                  Patient Forms    No documentation.

## 2023-11-22 NOTE — OUTREACH NOTE
Medical Week 3 Survey      Flowsheet Row Responses   [facility] patient discharged from? [place]   Does the patient have one of the following disease processes/diagnoses(primary or secondary)? Other   Week 3 attempt successful? No   Unsuccessful attempts Attempt 1   Revoke Readmitted            BARTOLOME ALLEN - Registered Nurse

## 2023-11-22 NOTE — PLAN OF CARE
Goal Outcome Evaluation:  Plan of Care Reviewed With: patient        Progress: no change  Outcome Evaluation: Pt is A&Ox4, up ad sivakumar. Pt c/o stomach pain and nausea. Pt has received PRN morphine and zofran. Pt expecting to go home today. Access center consulted and saw patient. Pt reports anxiety but no depression. Declines need for psychiatrist. Pt continues on clear liquid diet, unhappy about this but also states that solid food would probably make him feel worse. Pt started on amitiza this evening. No needs at this time.

## 2023-11-22 NOTE — CONSULTS
[facility]  Initial Inpatient Consult Note    Referring Provider: Dr. Cherry    Reason for Consultation: constipation, biliary stents    Subjective     History of present illness:    63 y.o. male w/ PMHx of COPD, HTN, hx of chronic constipation, hx of depression w/ prior suicide attempt, hx of biliary and PD dilation s/p stent placement (follows Dr. Juarez of [facility]), presented to the ED w/ abdominal pain and constipation. He reports he tends to have BM usually every 4-5 days at home, despite linzess. He has tried miralax, milk of magnesia at home. He received enema since admission which has helped w/ BM, but he states it hasn't really helped with his abdominal pain. No nausea/vomiting. No rectal bleeding reported. Denies chronic diarrhea. No family hx of IBD. Pt was seen by us w/ similar presentation 3 weeks ago.     Pt sees Dr. Juarez at [facility] for chronically dilated biliary and pancreatic duct. He has had EUS that has shown no pass. There is no evidence of chronic pancreatitis either.     ERCP w/ spyglass w/ Dr. Denton 09/14/2023 w/ stent replacement and biopsy that showed atypical cells.       Last colonoscopy 10/5/2023 w/ Dr. Juarez:   Polyp (5 mm) in the descending colon. (Polypectomy).   Polyp (4 mm) in the sigmoid colon. (Polypectomy).   Ulceration and altered mucosal texture in the rectum. (Biopsy).   External hemorrhoids.   Path: Hyperplastic polyps. Fragments of colonic mucosa with cryptitis and rare crypt abscess.    Past Medical History:  Past Medical History:   Diagnosis Date    Bell's palsy     COPD (chronic obstructive pulmonary disease)     Dysphagia     previous workup at [facility]    Hypertension      Past Surgical History:  Past Surgical History:   Procedure Laterality Date    CHOLECYSTECTOMY      CHOLECYSTECTOMY WITH INTRAOPERATIVE CHOLANGIOGRAM N/A 04/25/2022    Procedure: Laparoscopic cholecystectomy with intraoperative cholangiogram, possible open;  Surgeon: Kanwal  Khari GOMEZ MD;  Location: [facility] MAIN OR;  Service: General;  Laterality: N/A;    HERNIA REPAIR        Social History:   Social History     Tobacco Use    Smoking status: Every Day     Packs/day: 0.25     Years: 15.00     Additional pack years: 0.00     Total pack years: 3.75     Types: Cigarettes    Smokeless tobacco: Never   Substance Use Topics    Alcohol use: No      Family History:  Family History   Family history unknown: Yes       Home Meds:  Medications Prior to Admission   Medication Sig Dispense Refill Last Dose    acetaminophen (TYLENOL) 500 MG tablet Take 2 tablets by mouth Every 4 (Four) Hours As Needed for Mild Pain.       ibuprofen (ADVIL,MOTRIN) 200 MG tablet Take 1 tablet by mouth Every 6 (Six) Hours As Needed for Mild Pain.       linaclotide (Linzess) 145 MCG capsule capsule Take 1 capsule by mouth Every Morning Before Breakfast for 30 days. 30 capsule 0     lisinopril (PRINIVIL,ZESTRIL) 20 MG tablet Take 1 tablet by mouth Daily.       sennosides-docusate (senna-docusate sodium) 8.6-50 MG per tablet Take 2 tablets by mouth 2 (Two) Times a Day for 30 days. 120 tablet 0     Fluticasone-Umeclidin-Vilant (Trelegy Ellipta) 100-62.5-25 MCG/INH inhaler Inhale 1 puff Daily.       ipratropium-albuterol (DUO-NEB) 0.5-2.5 mg/3 ml nebulizer Take 3 mL by nebulization Every 4 (Four) Hours As Needed for Wheezing.       omeprazole (priLOSEC) 20 MG capsule Take 1 capsule by mouth Daily. 30 capsule 0     ondansetron ODT (ZOFRAN-ODT) 4 MG disintegrating tablet Place 1 tablet on the tongue 4 (Four) Times a Day As Needed for Nausea or Vomiting. 15 tablet 0     promethazine (PHENERGAN) 25 MG tablet Take 1 tablet by mouth Every 6 (Six) Hours As Needed for Nausea or Vomiting. 10 tablet 0     sucralfate (CARAFATE) 1 g tablet Take 1 tablet by mouth 4 (Four) Times a Day. 40 tablet 0      Current Meds:   budesonide-formoterol, 2 puff, Inhalation, BID - RT   And  tiotropium bromide monohydrate, 2 puff, Inhalation, Daily -  RT  lisinopril, 20 mg, Oral, Daily  pantoprazole, 40 mg, Oral, Q AM      Allergies:  Allergies   Allergen Reactions    Prochlorperazine Anxiety       Objective     Vital Signs  Temp:  [97 °F (36.1 °C)-98.3 °F (36.8 °C)] 97.7 °F (36.5 °C)  Heart Rate:  [48-92] 55  Resp:  [16] 16  BP: ()/(59-99) 125/67  Physical Exam:  General Appearance:     Alert, cooperative, in no acute distress; very flat affect   Abdomen:     Normal bowel sounds, no masses, no organomegaly, soft     nontender, nondistended, no guarding, no rebound                 tenderness   Rectal:     Deferred       Results Review:   I reviewed the patient's new clinical results.    Results from last 7 days   Lab Units 11/22/23  0621 11/21/23  1312   WBC 10*3/mm3 7.62 8.77   HEMOGLOBIN g/dL 12.4* 15.2   HEMATOCRIT % 36.5* 46.0   PLATELETS 10*3/mm3 197 279     Results from last 7 days   Lab Units 11/22/23  0621 11/21/23  1312   SODIUM mmol/L 142 140   POTASSIUM mmol/L 3.8 4.1   CHLORIDE mmol/L 109* 101   CO2 mmol/L 25.3 24.9   BUN mg/dL 13 15   CREATININE mg/dL 1.04 1.42*   CALCIUM mg/dL 8.8 9.6   BILIRUBIN mg/dL  --  0.4   ALK PHOS U/L  --  80   ALT (SGPT) U/L  --  18   AST (SGOT) U/L  --  28   GLUCOSE mg/dL 75 185*         Lab Results   Lab Value Date/Time    LIPASE 34 11/21/2023 1312    LIPASE 30 11/11/2023 1029    LIPASE 31 10/29/2023 1629    LIPASE 20 10/28/2023 1232    LIPASE 43 10/22/2023 1526    LIPASE 18 10/16/2023 1509    LIPASE 36 10/09/2023 1435    LIPASE 20 10/07/2023 1303    LIPASE 44 09/26/2023 1716    LIPASE 49 09/21/2023 0031    LIPASE 23 09/11/2023 1507    LIPASE 40 08/15/2023 1537    LIPASE 40 07/26/2023 1437    LIPASE 22 07/22/2023 1843    LIPASE 20 07/14/2023 1223    LIPASE 25 07/01/2023 1451    LIPASE 25 06/26/2023 1902    LIPASE 26 06/23/2023 1500    LIPASE 17 06/19/2023 1222    LIPASE 21 06/15/2023 2359    LIPASE 27 05/16/2023 1512    LIPASE 67 (H) 07/28/2022 1556    LIPASE 20 06/27/2022 2034    LIPASE 33 06/23/2022 1837    LIPASE  22 06/18/2022 1127    LIPASE 31 06/11/2022 1213    LIPASE 95 (H) 06/08/2022 1506    LIPASE 26 06/03/2022 1004    LIPASE 25 05/16/2022 1136    LIPASE 23 05/02/2022 1706    LIPASE 80 (H) 04/22/2022 0548    LIPASE 425 (H) 04/21/2022 1012    LIPASE 20 04/19/2022 1118    LIPASE 25 04/03/2022 1123    LIPASE 23 03/12/2022 2156    LIPASE 31 02/21/2022 1614    LIPASE 31 02/18/2022 1400    LIPASE 29 02/06/2022 1552    LIPASE 30 01/24/2022 1221    LIPASE 30 12/22/2021 1517    LIPASE 24 11/12/2021 1253    LIPASE 18 10/23/2021 1707    LIPASE 22 08/31/2021 1206    LIPASE 24 08/20/2021 1347    LIPASE 23 08/03/2021 0024    LIPASE 25 07/22/2021 0218    LIPASE 129 (H) 07/02/2021 1554    LIPASE 36 06/25/2021 1615    LIPASE 26 06/15/2021 1725    LIPASE 66 04/12/2021 1630    LIPASE 103 08/12/2019 1105    LIPASE 55 02/12/2019 2052       Radiology:  CT Abdomen Pelvis With Contrast   Final Result   1.  Air-fluid levels in moderate distention of small bowel loops with   relative decompression of the more distal small bowel loops. A discrete   transition point is not definitively seen; however, the small bowel is   unable to be followed in its entirety due to lack of intraperitoneal   fat. Therefore, findings represent ileus versus obstruction in the   appropriate context and further evaluation with small bowel   follow-through is recommended.   2.  Severe distention of the rectum with air and stool.   3.  The appendix is not visualized and cannot be evaluated.   4.  Indeterminate right renal lesion measuring 2.1 cm. Further   evaluation with multiphase CT with and without contrast is recommended   to exclude neoplasm.   5.  Biliary stent is in place with its most proximal aspect within the   distal common bile duct, similar that seen on 10/29/2023. Correlation   with surgical history is recommended to ensure appropriate positioning   of the ductal stent.   6.  Other findings as above.       This report was finalized on 11/21/2023 5:01 PM by  Dr. Stephen Camacho M.D on Workstation: [id number]                Assessment:   Chronic constipation   Colonoscopy 10/6 w/ polyps. Ulceration in the rectum, path: cryptitis and rare crypt abscess.  Ileus vs obstruction   Abd pain- some of this pain is chronic; he has been in and out of various ED for abdominal pain.   Chronically dilated biliary and PD s/p stent placement 09/14,  WNL; CEA minimally elevated at 4.5 on 10/6/2023     Plan:   Will try amitiza given his chronic constipation (he takes low dose linzess at home). He has failed OTC bowel regimen in the past. He refuses enemas/suppositories   Diet as tolerated   No intervention needed regarding the dilated ducts at that is stable, normal LFTs as well  There was ulceration found in rectum on last colonoscopy w/ Dr. Juarez 10/6- this may be from constipation, rather than true new onset ulcerative colitis.     I discussed the patients findings and my recommendations with patient.         Mahad Galdamez PA-C  [facility]  [address] [place] [address] - [address] [place] [address]  Office: [phone]

## 2023-11-23 PROCEDURE — 94664 DEMO&/EVAL PT USE INHALER: CPT

## 2023-11-23 PROCEDURE — 94799 UNLISTED PULMONARY SVC/PX: CPT

## 2023-11-23 PROCEDURE — 96376 TX/PRO/DX INJ SAME DRUG ADON: CPT

## 2023-11-23 PROCEDURE — 99232 SBSQ HOSP IP/OBS MODERATE 35: CPT | Performed by: INTERNAL MEDICINE

## 2023-11-23 PROCEDURE — 25010000002 ONDANSETRON PER 1 MG: Performed by: INTERNAL MEDICINE

## 2023-11-23 PROCEDURE — 94761 N-INVAS EAR/PLS OXIMETRY MLT: CPT

## 2023-11-23 PROCEDURE — G0378 HOSPITAL OBSERVATION PER HR: HCPCS

## 2023-11-23 PROCEDURE — 25010000002 MORPHINE PER 10 MG: Performed by: INTERNAL MEDICINE

## 2023-11-23 PROCEDURE — 99231 SBSQ HOSP IP/OBS SF/LOW 25: CPT | Performed by: STUDENT IN AN ORGANIZED HEALTH CARE EDUCATION/TRAINING PROGRAM

## 2023-11-23 RX ORDER — POLYETHYLENE GLYCOL 3350 17 G/17G
0.5 POWDER, FOR SOLUTION ORAL EVERY 12 HOURS
Status: COMPLETED | OUTPATIENT
Start: 2023-11-23 | End: 2023-11-24

## 2023-11-23 RX ADMIN — TIOTROPIUM BROMIDE INHALATION SPRAY 2 PUFF: 3.12 SPRAY, METERED RESPIRATORY (INHALATION) at 07:53

## 2023-11-23 RX ADMIN — BISACODYL 5 MG: 5 TABLET, COATED ORAL at 18:00

## 2023-11-23 RX ADMIN — MORPHINE SULFATE 4 MG: 4 INJECTION, SOLUTION INTRAMUSCULAR; INTRAVENOUS at 03:34

## 2023-11-23 RX ADMIN — ACETAMINOPHEN 650 MG: 325 TABLET, FILM COATED ORAL at 18:00

## 2023-11-23 RX ADMIN — POLYETHYLENE GLYCOL 3350 0.5 BOTTLE: 17 POWDER, FOR SOLUTION ORAL at 18:01

## 2023-11-23 RX ADMIN — LUBIPROSTONE 24 MCG: 24 CAPSULE, GELATIN COATED ORAL at 18:01

## 2023-11-23 RX ADMIN — MORPHINE SULFATE 4 MG: 4 INJECTION, SOLUTION INTRAMUSCULAR; INTRAVENOUS at 09:02

## 2023-11-23 RX ADMIN — BUDESONIDE AND FORMOTEROL FUMARATE DIHYDRATE 2 PUFF: 160; 4.5 AEROSOL RESPIRATORY (INHALATION) at 07:48

## 2023-11-23 RX ADMIN — ONDANSETRON 4 MG: 2 INJECTION INTRAMUSCULAR; INTRAVENOUS at 09:02

## 2023-11-23 RX ADMIN — LUBIPROSTONE 24 MCG: 24 CAPSULE, GELATIN COATED ORAL at 10:07

## 2023-11-23 RX ADMIN — ONDANSETRON 4 MG: 2 INJECTION INTRAMUSCULAR; INTRAVENOUS at 15:04

## 2023-11-23 RX ADMIN — BUDESONIDE AND FORMOTEROL FUMARATE DIHYDRATE 2 PUFF: 160; 4.5 AEROSOL RESPIRATORY (INHALATION) at 19:44

## 2023-11-23 RX ADMIN — ONDANSETRON 4 MG: 2 INJECTION INTRAMUSCULAR; INTRAVENOUS at 22:59

## 2023-11-23 RX ADMIN — MAGNESIUM HYDROXIDE 30 ML: 400 SUSPENSION ORAL at 13:17

## 2023-11-23 RX ADMIN — PANTOPRAZOLE SODIUM 40 MG: 40 TABLET, DELAYED RELEASE ORAL at 06:08

## 2023-11-23 RX ADMIN — NALOXEGOL OXALATE 12.5 MG: 12.5 TABLET, FILM COATED ORAL at 13:17

## 2023-11-23 RX ADMIN — LISINOPRIL 20 MG: 20 TABLET ORAL at 10:08

## 2023-11-23 NOTE — PROGRESS NOTES
"General Surgery Progress Note    CC: constipation    S: Patient states his abdominal pain is \"not as bad\" today. He denies NV but has had low appetite. Has not had any bowel movement.    O:/69 (BP Location: Right arm, Patient Position: Lying)   Pulse 60   Temp 97.8 °F (36.6 °C) (Oral)   Resp 16   Ht 177.8 cm (70\")   Wt 53.8 kg (118 lb 9.7 oz)   SpO2 97%   BMI 17.02 kg/m²     Intake & Output (last day)         11/22 0701 11/23 0700 11/23 0701 11/24 0700    P.O. 600     IV Piggyback      Total Intake(mL/kg) 600 (11.2)     Net +600           Urine Unmeasured Occurrence 8 x     Stool Unmeasured Occurrence 0 x             GENERAL: awake, alert, comfortable appearing in bed, mobilizes without assistance  HEENT: EOMI, clear sclera, moist mucus membranes   CHEST: normal work of breathing on room air    ABDOMEN: soft, mildly distended, nontender, well healed surgical incisions  EXTREMITIES: KNUTSON, no cyanosis or edema    SKIN: Warm and dry, no rash    LABS  Results from last 7 days   Lab Units 11/22/23  0621 11/21/23  1312   WBC 10*3/mm3 7.62 8.77   HEMOGLOBIN g/dL 12.4* 15.2   HEMATOCRIT % 36.5* 46.0   PLATELETS 10*3/mm3 197 279     Results from last 7 days   Lab Units 11/22/23  0621 11/21/23  1312   SODIUM mmol/L 142 140   POTASSIUM mmol/L 3.8 4.1   CHLORIDE mmol/L 109* 101   CO2 mmol/L 25.3 24.9   BUN mg/dL 13 15   CREATININE mg/dL 1.04 1.42*   CALCIUM mg/dL 8.8 9.6   BILIRUBIN mg/dL  --  0.4   ALK PHOS U/L  --  80   ALT (SGPT) U/L  --  18   AST (SGOT) U/L  --  28   GLUCOSE mg/dL 75 185*             A/P: 63 y.o. male with chronic constipation    AVSS. Abdominal exam stable, nonsurgical. Recommend continued medical management per GI.       Marybeth Hernandez MD  General, Robotic and Endoscopic Surgery  [facility]    [address], [address]  [place], [place], [address]  P: [phone]  F: [phone]     "

## 2023-11-23 NOTE — PROGRESS NOTES
Name: Donny Mallory ADMIT: 2023   : 1960  PCP: Janelle Lara MD    MRN: [id number] LOS: 0 days   AGE/SEX: 63 y.o. male  ROOM: [facility]     Subjective     Still has not had a BM yet.     Objective   Objective   Vital Signs  Temp:  [97 °F (36.1 °C)-97.9 °F (36.6 °C)] 97.8 °F (36.6 °C)  Heart Rate:  [47-60] 60  Resp:  [16-18] 16  BP: ()/(52-81) 117/69  SpO2:  [95 %-99 %] 97 %  on   ;   Device (Oxygen Therapy): room air  Body mass index is 17.02 kg/m².  Physical Exam  Constitutional:       General: He is not in acute distress.  HENT:      Head: Normocephalic and atraumatic.   Cardiovascular:      Rate and Rhythm: Normal rate and regular rhythm.   Pulmonary:      Effort: Pulmonary effort is normal. No respiratory distress.   Abdominal:      General: There is distension.      Tenderness: There is abdominal tenderness.   Neurological:      Mental Status: He is alert and oriented to person, place, and time.   Psychiatric:         Behavior: Withdrawn: flat affect.         Results Review     I reviewed the patient's new clinical results.  Results from last 7 days   Lab Units 23  0621 23  1312   WBC 10*3/mm3 7.62 8.77   HEMOGLOBIN g/dL 12.4* 15.2   PLATELETS 10*3/mm3 197 279     Results from last 7 days   Lab Units 23  0621 23  1312   SODIUM mmol/L 142 140   POTASSIUM mmol/L 3.8 4.1   CHLORIDE mmol/L 109* 101   CO2 mmol/L 25.3 24.9   BUN mg/dL 13 15   CREATININE mg/dL 1.04 1.42*   GLUCOSE mg/dL 75 185*   Estimated Creatinine Clearance: 56.8 mL/min (by C-G formula based on SCr of 1.04 mg/dL).  Results from last 7 days   Lab Units 23  1312   ALBUMIN g/dL 4.7   BILIRUBIN mg/dL 0.4   ALK PHOS U/L 80   AST (SGOT) U/L 28   ALT (SGPT) U/L 18     Results from last 7 days   Lab Units 23  0621 23  1312   CALCIUM mg/dL 8.8 9.6   ALBUMIN g/dL  --  4.7     Results from last 7 days   Lab Units 23  1629 23  1312   LACTATE mmol/L 1.0 2.9*     COVID19   Date  "Value Ref Range Status   10/29/2023 Not Detected Not Detected - Ref. Range Final     SARS-CoV-2, CAREY   Date Value Ref Range Status   09/21/2023 NEGATIVE Negative Final     Comment:     The 2019-CoV rRT-PCR Assay is only for use under a Food and Drug Administration Emergency Use Authorization. The performance characteristics of the assay were verified by the Clinical Microbiology Laboratory at the [facility].   Results should be used in conjunction with the patient's clinical symptoms, medical history, and other clinical/laboratory findings to determine an overall clinical diagnosis. Negative results do not preclude infection with SARS-CoV-2 (COVID-19).    Test parameters have not been validated for screening asymptomatic patients.     No results found for: \"HGBA1C\", \"POCGLU\"  Results for orders placed or performed during the hospital encounter of 05/02/22   Blood Culture - Blood, Arm, Left    Specimen: Arm, Left; Blood   Result Value Ref Range    Blood Culture No growth at 5 days          CT Abdomen Pelvis With Contrast  Narrative: CT ABDOMEN AND PELVIS WITH IV CONTRAST     HISTORY: Abdominal pain, nausea vomiting     TECHNIQUE: Radiation dose reduction techniques were utilized, including  automated exposure control and exposure modulation based on body size.   3 mm images were obtained through the abdomen and pelvis after the  administration of IV contrast.     COMPARISON: Multiple CT abdomen and pelvis dating back to 6/23/2022     FINDINGS:     A few sub-6 mm pleural-based pulmonary nodules in the left lung base are  present, as before. There are air-fluid levels throughout the small  bowel which also demonstrates moderate distention with loops measuring  up to approximately 3.7 cm in diameter. The more distal small bowel is  relatively decompressed. While a discrete transition point is not  definitively seen, the small bowel cannot be followed in its entirety  due to lack of intraperitoneal " fat.     The appendix is not seen. Gallbladder is surgically absent with  associated pneumobilia, as before. Subcentimeter hepatic lesions are too  small to characterize. A biliary stent is present and projects through  the distal common bile duct and into the first portion of the duodenum,  similar to that seen on 10/29/2023. Main pancreatic duct is mildly  distended measuring up to 0.7 cm, grossly unchanged since 6/23/2022. The  spleen and adrenal glands have an unremarkable postcontrast CT  appearance. Subcentimeter renal lesions are too small to characterize.  Larger hypodense lesions demonstrating density less than 15 Hounsfield  units are likely benign per Bosniak 2019 criteria. There is an  indeterminate density right renal lesion measuring 2.1 cm there is no  hydronephrosis. Please note that the bilateral ureters cannot be  followed in their entirety due to lack of intraperitoneal fat. Abdominal  pelvic adenopathy cannot be excluded on this examination due to the  above-stated limitations. The rectum is severely distended with stool  and air. The bladder is decompressed and cannot be evaluated.     No free intraperitoneal air is seen. No suspicious lytic or blastic  osseous lesions are present. For the purposes dictation, last  well-formed space referred to as L5-S1. Compression formae of L1 is  present, as before. There is mild retrolisthesis of L5 on S1.     Impression: 1.  Air-fluid levels in moderate distention of small bowel loops with  relative decompression of the more distal small bowel loops. A discrete  transition point is not definitively seen; however, the small bowel is  unable to be followed in its entirety due to lack of intraperitoneal  fat. Therefore, findings represent ileus versus obstruction in the  appropriate context and further evaluation with small bowel  follow-through is recommended.  2.  Severe distention of the rectum with air and stool.  3.  The appendix is not visualized and  cannot be evaluated.  4.  Indeterminate right renal lesion measuring 2.1 cm. Further  evaluation with multiphase CT with and without contrast is recommended  to exclude neoplasm.  5.  Biliary stent is in place with its most proximal aspect within the  distal common bile duct, similar that seen on 10/29/2023. Correlation  with surgical history is recommended to ensure appropriate positioning  of the ductal stent.  6.  Other findings as above.     This report was finalized on 11/21/2023 5:01 PM by Dr. Stephen Camacho M.D on Workstation: [id number]       Scheduled Medications  budesonide-formoterol, 2 puff, Inhalation, BID - RT   And  tiotropium bromide monohydrate, 2 puff, Inhalation, Daily - RT  lisinopril, 20 mg, Oral, Daily  lubiprostone, 24 mcg, Oral, BID With Meals  magnesium hydroxide, 30 mL, Oral, Once  Naloxegol Oxalate, 12.5 mg, Oral, QAM  pantoprazole, 40 mg, Oral, Q AM  polyethylene glycol, 0.5 bottle, Oral, Q12H    Infusions  sodium chloride, 75 mL/hr, Last Rate: Stopped (11/22/23 1421)    Diet  Diet: Liquid Diets, Gastrointestinal Diets; Full Liquid; Low Irritant; Fluid Consistency: Thin (IDDSI 0)       Assessment/Plan     Active Hospital Problems    Diagnosis  POA    **Ileus [K56.7]  Yes    GERD without esophagitis [K21.9]  Yes    Severe malnutrition [E43]  Yes    Hypertension [I10]  Yes    Constipation [K59.00]  Yes    COPD (chronic obstructive pulmonary disease) [J44.9]  Yes      Resolved Hospital Problems   No resolved problems to display.       63 y.o. male admitted with Ileus.    Chronic constipation  GI and general surgery have evaluated the patient.  Initially there was some concern for ileus however this has been ruled out.  He has been started on lubiprostone by GI.  He will benefit from a disimpaction    Hypertension  GERD  Continue lisinopril and levothyroxine    COPD  Continue home Symbicort    Access consult for history of suicidal ideation, suspected depression. Presently denies SI    SCDs  for DVT prophylaxis  Full code  Discussed with patient and nursing staff and consulting provider  Anticipate discharge to be determined,       Steve Cherry MD  [facility]  11/23/23  13:03 EST

## 2023-11-23 NOTE — PLAN OF CARE
Goal Outcome Evaluation:           Progress: no change  Outcome Evaluation: Pt alert and oriented, up ad sivakumar. C/O nausea and pain today--morphine x1 (D/C by MD) and zofran x2. PT reports frustation with care, RN acknowledged feelings-- RN educated on use of medications and the need to continue to try to promote a BM. Denies suicidal thoughts. RN encouraged hydration and ambulation. Bisacodyl PRN administered. Disimpaction unsuccessful--no stool in rectum. Pt states he will be leaving tomorrow if no results.

## 2023-11-23 NOTE — PROGRESS NOTES
Gastroenterology   Inpatient Progress Note    Reason for Follow Up: Chronic constipation    Subjective     Interval History:   No new issues this morning.  Patient has not yet had a bowel movement.  Abdominal pain is about the same.    Current Facility-Administered Medications:     acetaminophen (TYLENOL) tablet 650 mg, 650 mg, Oral, Q4H PRN, 650 mg at 11/21/23 2002 **OR** acetaminophen (TYLENOL) 160 MG/5ML oral solution 650 mg, 650 mg, Oral, Q4H PRN **OR** acetaminophen (TYLENOL) suppository 650 mg, 650 mg, Rectal, Q4H PRN, Mary Jama MD    [DISCONTINUED] sennosides-docusate (PERICOLACE) 8.6-50 MG per tablet 2 tablet, 2 tablet, Oral, BID **AND** polyethylene glycol (MIRALAX) packet 17 g, 17 g, Oral, Daily PRN **AND** bisacodyl (DULCOLAX) EC tablet 5 mg, 5 mg, Oral, Daily PRN **AND** bisacodyl (DULCOLAX) suppository 10 mg, 10 mg, Rectal, Daily PRN, Mary Jama MD    budesonide-formoterol (SYMBICORT) 160-4.5 MCG/ACT inhaler 2 puff, 2 puff, Inhalation, BID - RT, 2 puff at 11/22/23 2213 **AND** tiotropium (SPIRIVA RESPIMAT) 2.5 mcg/act aerosol solution inhaler, 2 puff, Inhalation, Daily - RT, Mary Jama MD, 2 puff at 11/22/23 0705    influenza vac split quad (FLUZONE,FLUARIX,AFLURIA,FLULAVAL) injection 0.5 mL, 0.5 mL, Intramuscular, During Hospitalization, Mary Jama MD    ipratropium-albuterol (DUO-NEB) nebulizer solution 3 mL, 3 mL, Nebulization, Q4H PRN, Mary Jama MD    lisinopril (PRINIVIL,ZESTRIL) tablet 20 mg, 20 mg, Oral, Daily, Mary Jama MD    lubiprostone (AMITIZA) capsule 24 mcg, 24 mcg, Oral, BID With Meals, Galdamez, Nitiksha, PA-C, 24 mcg at 11/22/23 1919    melatonin tablet 3 mg, 3 mg, Oral, Nightly PRN, Renetta Naranjo APRN, 3 mg at 11/22/23 0134    Morphine sulfate (PF) injection 4 mg, 4 mg, Intravenous, Q4H PRN, StinglMary MD, 4 mg at 11/23/23 0334    ondansetron (ZOFRAN) injection 4 mg, 4 mg, Intravenous, Q6H PRN,  Mary Jama MD, 4 mg at 11/22/23 2328    pantoprazole (PROTONIX) EC tablet 40 mg, 40 mg, Oral, Q AM, Mary Jama MD, 40 mg at 11/22/23 0559    sodium chloride 0.9 % flush 10 mL, 10 mL, Intravenous, PRN, Emergency, Triage Protocol, MD    sodium chloride 0.9 % infusion, 75 mL/hr, Intravenous, Continuous, Mary Jama MD, Stopped at 11/22/23 1421  Review of Systems:    All systems were reviewed and negative except for:  Gastrointestinal: positive for  constipation and pain    Objective     Vital Signs  Temp:  [97 °F (36.1 °C)-97.9 °F (36.6 °C)] 97.5 °F (36.4 °C)  Heart Rate:  [47-55] 51  Resp:  [16-18] 16  BP: ()/(52-81) 96/52  Body mass index is 17.02 kg/m².    Intake/Output Summary (Last 24 hours) at 11/23/2023 0458  Last data filed at 11/23/2023 0358  Gross per 24 hour   Intake 600 ml   Output --   Net 600 ml     I/O this shift:  In: 240 [P.O.:240]  Out: -      Physical Exam:   General: patient awake, alert and cooperative   Eyes: no scleral icterus   Skin: warm and dry, not jaundiced   Abdomen: soft, nontender, nondistended; normal bowel sounds, no masses palpated, no periumbical lymphadenopathy   Psychiatric: Appropriate affect and behavior     Results Review:     I reviewed the patient's new clinical results.    Results from last 7 days   Lab Units 11/22/23  0621 11/21/23  1312   WBC 10*3/mm3 7.62 8.77   HEMOGLOBIN g/dL 12.4* 15.2   HEMATOCRIT % 36.5* 46.0   PLATELETS 10*3/mm3 197 279     Results from last 7 days   Lab Units 11/22/23  0621 11/21/23  1312   SODIUM mmol/L 142 140   POTASSIUM mmol/L 3.8 4.1   CHLORIDE mmol/L 109* 101   CO2 mmol/L 25.3 24.9   BUN mg/dL 13 15   CREATININE mg/dL 1.04 1.42*   CALCIUM mg/dL 8.8 9.6   BILIRUBIN mg/dL  --  0.4   ALK PHOS U/L  --  80   ALT (SGPT) U/L  --  18   AST (SGOT) U/L  --  28   GLUCOSE mg/dL 75 185*         Lab Results   Lab Value Date/Time    LIPASE 34 11/21/2023 1312    LIPASE 30 11/11/2023 1029    LIPASE 31 10/29/2023 2725     LIPASE 20 10/28/2023 1232    LIPASE 43 10/22/2023 1526    LIPASE 18 10/16/2023 1509    LIPASE 36 10/09/2023 1435    LIPASE 20 10/07/2023 1303    LIPASE 44 09/26/2023 1716    LIPASE 49 09/21/2023 0031    LIPASE 23 09/11/2023 1507    LIPASE 40 08/15/2023 1537    LIPASE 40 07/26/2023 1437    LIPASE 22 07/22/2023 1843    LIPASE 20 07/14/2023 1223    LIPASE 25 07/01/2023 1451    LIPASE 25 06/26/2023 1902    LIPASE 26 06/23/2023 1500    LIPASE 17 06/19/2023 1222    LIPASE 21 06/15/2023 2359    LIPASE 27 05/16/2023 1512    LIPASE 67 (H) 07/28/2022 1556    LIPASE 20 06/27/2022 2034    LIPASE 33 06/23/2022 1837    LIPASE 22 06/18/2022 1127    LIPASE 31 06/11/2022 1213    LIPASE 95 (H) 06/08/2022 1506    LIPASE 26 06/03/2022 1004    LIPASE 25 05/16/2022 1136    LIPASE 23 05/02/2022 1706    LIPASE 80 (H) 04/22/2022 0548    LIPASE 425 (H) 04/21/2022 1012    LIPASE 20 04/19/2022 1118    LIPASE 25 04/03/2022 1123    LIPASE 23 03/12/2022 2156    LIPASE 31 02/21/2022 1614    LIPASE 31 02/18/2022 1400    LIPASE 29 02/06/2022 1552    LIPASE 30 01/24/2022 1221    LIPASE 30 12/22/2021 1517    LIPASE 24 11/12/2021 1253    LIPASE 18 10/23/2021 1707    LIPASE 22 08/31/2021 1206    LIPASE 24 08/20/2021 1347    LIPASE 23 08/03/2021 0024    LIPASE 25 07/22/2021 0218    LIPASE 129 (H) 07/02/2021 1554    LIPASE 36 06/25/2021 1615    LIPASE 26 06/15/2021 1725    LIPASE 66 04/12/2021 1630    LIPASE 103 08/12/2019 1105    LIPASE 55 02/12/2019 2052       Radiology:  CT Abdomen Pelvis With Contrast   Final Result   1.  Air-fluid levels in moderate distention of small bowel loops with   relative decompression of the more distal small bowel loops. A discrete   transition point is not definitively seen; however, the small bowel is   unable to be followed in its entirety due to lack of intraperitoneal   fat. Therefore, findings represent ileus versus obstruction in the   appropriate context and further evaluation with small bowel   follow-through is  recommended.   2.  Severe distention of the rectum with air and stool.   3.  The appendix is not visualized and cannot be evaluated.   4.  Indeterminate right renal lesion measuring 2.1 cm. Further   evaluation with multiphase CT with and without contrast is recommended   to exclude neoplasm.   5.  Biliary stent is in place with its most proximal aspect within the   distal common bile duct, similar that seen on 10/29/2023. Correlation   with surgical history is recommended to ensure appropriate positioning   of the ductal stent.   6.  Other findings as above.       This report was finalized on 11/21/2023 5:01 PM by Dr. Stephen Camacho M.D on Workstation: [id number]              Assessment & Plan     Active Hospital Problems    Diagnosis     **Ileus     GERD without esophagitis     Severe malnutrition     Hypertension     Constipation     COPD (chronic obstructive pulmonary disease)      These problems are new to me  Assessment:  Chronic constipation.  History of rectal ulceration but no specific diagnosis of IBD.  Question if this may be due to stercoral ulceration due to constipation.  Chronic dilation of pancreatic duct and CBD status post stent placement.  CA 19-9 has been normal.  He is followed by [facility].      Plan:  We have initiated treatment regimen with Amitiza  Diet as tolerated.  If no response to Amitiza, may consider Motegrity.  I discussed the patients findings and my recommendations with patient and nursing staff.    MD Ramón Charles M.D.  [facility], [place], operated by [facility] [place] [place] [address]  Office: [phone]

## 2023-11-24 LAB
MAGNESIUM SERPL-MCNC: 2.3 MG/DL (ref 1.6–2.4)
PHOSPHATE SERPL-MCNC: 2.9 MG/DL (ref 2.5–4.5)

## 2023-11-24 PROCEDURE — 94799 UNLISTED PULMONARY SVC/PX: CPT

## 2023-11-24 PROCEDURE — 99231 SBSQ HOSP IP/OBS SF/LOW 25: CPT | Performed by: STUDENT IN AN ORGANIZED HEALTH CARE EDUCATION/TRAINING PROGRAM

## 2023-11-24 PROCEDURE — 84100 ASSAY OF PHOSPHORUS: CPT | Performed by: STUDENT IN AN ORGANIZED HEALTH CARE EDUCATION/TRAINING PROGRAM

## 2023-11-24 PROCEDURE — 94664 DEMO&/EVAL PT USE INHALER: CPT

## 2023-11-24 PROCEDURE — G0378 HOSPITAL OBSERVATION PER HR: HCPCS

## 2023-11-24 PROCEDURE — 99232 SBSQ HOSP IP/OBS MODERATE 35: CPT | Performed by: PHYSICIAN ASSISTANT

## 2023-11-24 PROCEDURE — 96376 TX/PRO/DX INJ SAME DRUG ADON: CPT

## 2023-11-24 PROCEDURE — 83735 ASSAY OF MAGNESIUM: CPT | Performed by: STUDENT IN AN ORGANIZED HEALTH CARE EDUCATION/TRAINING PROGRAM

## 2023-11-24 PROCEDURE — 25010000002 ONDANSETRON PER 1 MG: Performed by: INTERNAL MEDICINE

## 2023-11-24 RX ORDER — MINERAL OIL 100 G/100G
1 OIL RECTAL ONCE
Status: COMPLETED | OUTPATIENT
Start: 2023-11-24 | End: 2023-11-24

## 2023-11-24 RX ORDER — BISACODYL 5 MG/1
5 TABLET, DELAYED RELEASE ORAL ONCE
Status: COMPLETED | OUTPATIENT
Start: 2023-11-24 | End: 2023-11-24

## 2023-11-24 RX ADMIN — NALOXEGOL OXALATE 12.5 MG: 12.5 TABLET, FILM COATED ORAL at 04:34

## 2023-11-24 RX ADMIN — BUDESONIDE AND FORMOTEROL FUMARATE DIHYDRATE 2 PUFF: 160; 4.5 AEROSOL RESPIRATORY (INHALATION) at 19:46

## 2023-11-24 RX ADMIN — LISINOPRIL 20 MG: 20 TABLET ORAL at 08:52

## 2023-11-24 RX ADMIN — BUDESONIDE AND FORMOTEROL FUMARATE DIHYDRATE 2 PUFF: 160; 4.5 AEROSOL RESPIRATORY (INHALATION) at 09:13

## 2023-11-24 RX ADMIN — POLYETHYLENE GLYCOL 3350 0.5 BOTTLE: 17 POWDER, FOR SOLUTION ORAL at 04:34

## 2023-11-24 RX ADMIN — LUBIPROSTONE 24 MCG: 24 CAPSULE, GELATIN COATED ORAL at 08:52

## 2023-11-24 RX ADMIN — ACETAMINOPHEN 650 MG: 325 TABLET, FILM COATED ORAL at 15:55

## 2023-11-24 RX ADMIN — BISACODYL 5 MG: 5 TABLET, COATED ORAL at 18:11

## 2023-11-24 RX ADMIN — ONDANSETRON 4 MG: 2 INJECTION INTRAMUSCULAR; INTRAVENOUS at 08:29

## 2023-11-24 RX ADMIN — ONDANSETRON 4 MG: 2 INJECTION INTRAMUSCULAR; INTRAVENOUS at 15:55

## 2023-11-24 RX ADMIN — LUBIPROSTONE 24 MCG: 24 CAPSULE, GELATIN COATED ORAL at 18:11

## 2023-11-24 RX ADMIN — PANTOPRAZOLE SODIUM 40 MG: 40 TABLET, DELAYED RELEASE ORAL at 04:34

## 2023-11-24 RX ADMIN — MINERAL OIL 1 ENEMA: 100 ENEMA RECTAL at 15:56

## 2023-11-24 RX ADMIN — ACETAMINOPHEN 650 MG: 325 TABLET, FILM COATED ORAL at 20:23

## 2023-11-24 RX ADMIN — ONDANSETRON 4 MG: 2 INJECTION INTRAMUSCULAR; INTRAVENOUS at 21:38

## 2023-11-24 RX ADMIN — TIOTROPIUM BROMIDE INHALATION SPRAY 2 PUFF: 3.12 SPRAY, METERED RESPIRATORY (INHALATION) at 09:14

## 2023-11-24 NOTE — PROGRESS NOTES
Name: Donny Mallory ADMIT: 2023   : 1960  PCP: Janelle Lara MD    MRN: [id number] LOS: 0 days   AGE/SEX: 63 y.o. male  ROOM: [facility]     Subjective     Seen walking around nursing floor. Still has not had a BM. Attempted manual disimpaction yesterday with no stool in rectal vault    Objective   Objective   Vital Signs  Temp:  [97.6 °F (36.4 °C)-99.2 °F (37.3 °C)] 99.2 °F (37.3 °C)  Heart Rate:  [50-63] 63  Resp:  [16-18] 18  BP: ()/(53-77) 112/77  SpO2:  [95 %-96 %] 95 %  on   ;   Device (Oxygen Therapy): room air  Body mass index is 17.02 kg/m².  Physical Exam  Constitutional:       General: He is not in acute distress.  HENT:      Head: Normocephalic and atraumatic.   Cardiovascular:      Rate and Rhythm: Normal rate and regular rhythm.   Pulmonary:      Effort: Pulmonary effort is normal. No respiratory distress.   Abdominal:      General: There is distension.      Tenderness: There is abdominal tenderness.   Neurological:      General: No focal deficit present.      Mental Status: He is alert and oriented to person, place, and time.   Psychiatric:         Behavior: Withdrawn: flat affect.         Results Review     I reviewed the patient's new clinical results.  Results from last 7 days   Lab Units 23  0621 23  1312   WBC 10*3/mm3 7.62 8.77   HEMOGLOBIN g/dL 12.4* 15.2   PLATELETS 10*3/mm3 197 279     Results from last 7 days   Lab Units 23  0621 23  1312   SODIUM mmol/L 142 140   POTASSIUM mmol/L 3.8 4.1   CHLORIDE mmol/L 109* 101   CO2 mmol/L 25.3 24.9   BUN mg/dL 13 15   CREATININE mg/dL 1.04 1.42*   GLUCOSE mg/dL 75 185*   Estimated Creatinine Clearance: 56.8 mL/min (by C-G formula based on SCr of 1.04 mg/dL).  Results from last 7 days   Lab Units 23  1312   ALBUMIN g/dL 4.7   BILIRUBIN mg/dL 0.4   ALK PHOS U/L 80   AST (SGOT) U/L 28   ALT (SGPT) U/L 18     Results from last 7 days   Lab Units 23  1048 23  0621 23  1312   CALCIUM  "mg/dL  --  8.8 9.6   ALBUMIN g/dL  --   --  4.7   MAGNESIUM mg/dL 2.3  --   --    PHOSPHORUS mg/dL 2.9  --   --      Results from last 7 days   Lab Units 11/21/23  1629 11/21/23  1312   LACTATE mmol/L 1.0 2.9*     COVID19   Date Value Ref Range Status   10/29/2023 Not Detected Not Detected - Ref. Range Final     SARS-CoV-2, CAREY   Date Value Ref Range Status   09/21/2023 NEGATIVE Negative Final     Comment:     The 2019-CoV rRT-PCR Assay is only for use under a Food and Drug Administration Emergency Use Authorization. The performance characteristics of the assay were verified by the Clinical Microbiology Laboratory at the [facility].   Results should be used in conjunction with the patient's clinical symptoms, medical history, and other clinical/laboratory findings to determine an overall clinical diagnosis. Negative results do not preclude infection with SARS-CoV-2 (COVID-19).    Test parameters have not been validated for screening asymptomatic patients.     No results found for: \"HGBA1C\", \"POCGLU\"  Results for orders placed or performed during the hospital encounter of 05/02/22   Blood Culture - Blood, Arm, Left    Specimen: Arm, Left; Blood   Result Value Ref Range    Blood Culture No growth at 5 days          CT Abdomen Pelvis With Contrast  Narrative: CT ABDOMEN AND PELVIS WITH IV CONTRAST     HISTORY: Abdominal pain, nausea vomiting     TECHNIQUE: Radiation dose reduction techniques were utilized, including  automated exposure control and exposure modulation based on body size.   3 mm images were obtained through the abdomen and pelvis after the  administration of IV contrast.     COMPARISON: Multiple CT abdomen and pelvis dating back to 6/23/2022     FINDINGS:     A few sub-6 mm pleural-based pulmonary nodules in the left lung base are  present, as before. There are air-fluid levels throughout the small  bowel which also demonstrates moderate distention with loops measuring  up to " approximately 3.7 cm in diameter. The more distal small bowel is  relatively decompressed. While a discrete transition point is not  definitively seen, the small bowel cannot be followed in its entirety  due to lack of intraperitoneal fat.     The appendix is not seen. Gallbladder is surgically absent with  associated pneumobilia, as before. Subcentimeter hepatic lesions are too  small to characterize. A biliary stent is present and projects through  the distal common bile duct and into the first portion of the duodenum,  similar to that seen on 10/29/2023. Main pancreatic duct is mildly  distended measuring up to 0.7 cm, grossly unchanged since 6/23/2022. The  spleen and adrenal glands have an unremarkable postcontrast CT  appearance. Subcentimeter renal lesions are too small to characterize.  Larger hypodense lesions demonstrating density less than 15 Hounsfield  units are likely benign per Bosniak 2019 criteria. There is an  indeterminate density right renal lesion measuring 2.1 cm there is no  hydronephrosis. Please note that the bilateral ureters cannot be  followed in their entirety due to lack of intraperitoneal fat. Abdominal  pelvic adenopathy cannot be excluded on this examination due to the  above-stated limitations. The rectum is severely distended with stool  and air. The bladder is decompressed and cannot be evaluated.     No free intraperitoneal air is seen. No suspicious lytic or blastic  osseous lesions are present. For the purposes dictation, last  well-formed space referred to as L5-S1. Compression formae of L1 is  present, as before. There is mild retrolisthesis of L5 on S1.     Impression: 1.  Air-fluid levels in moderate distention of small bowel loops with  relative decompression of the more distal small bowel loops. A discrete  transition point is not definitively seen; however, the small bowel is  unable to be followed in its entirety due to lack of intraperitoneal  fat. Therefore, findings  represent ileus versus obstruction in the  appropriate context and further evaluation with small bowel  follow-through is recommended.  2.  Severe distention of the rectum with air and stool.  3.  The appendix is not visualized and cannot be evaluated.  4.  Indeterminate right renal lesion measuring 2.1 cm. Further  evaluation with multiphase CT with and without contrast is recommended  to exclude neoplasm.  5.  Biliary stent is in place with its most proximal aspect within the  distal common bile duct, similar that seen on 10/29/2023. Correlation  with surgical history is recommended to ensure appropriate positioning  of the ductal stent.  6.  Other findings as above.     This report was finalized on 11/21/2023 5:01 PM by Dr. Stephen Camacho M.D on Workstation: [id number]       Scheduled Medications  budesonide-formoterol, 2 puff, Inhalation, BID - RT   And  tiotropium bromide monohydrate, 2 puff, Inhalation, Daily - RT  lisinopril, 20 mg, Oral, Daily  lubiprostone, 24 mcg, Oral, BID With Meals  mineral oil, 1 enema, Rectal, Once  Naloxegol Oxalate, 12.5 mg, Oral, QAM  pantoprazole, 40 mg, Oral, Q AM    Infusions  sodium chloride, 75 mL/hr, Last Rate: Stopped (11/22/23 1421)    Diet  Diet: Gastrointestinal Diets; Fiber-Restricted; Texture: Regular Texture (IDDSI 7); Fluid Consistency: Thin (IDDSI 0)       Assessment/Plan     Active Hospital Problems    Diagnosis  POA    **Ileus [K56.7]  Yes    GERD without esophagitis [K21.9]  Yes    Severe malnutrition [E43]  Yes    Hypertension [I10]  Yes    Constipation [K59.00]  Yes    COPD (chronic obstructive pulmonary disease) [J44.9]  Yes      Resolved Hospital Problems   No resolved problems to display.       63 y.o. male admitted with Ileus.    Chronic constipation  GI and general surgery have evaluated the patient.  Initially there was some concern for ileus however this has been ruled out.  He is on Movantik, lubiprostone, bowel regimen.  Attempted manual disimpaction by  nursing staff however there was no stool in the rectal vault.  It looks like a mineral oil enema has been ordered.  Avoid opiates    Hypertension  GERD  Continue lisinopril and levothyroxine    COPD  Continue home Symbicort      SCDs for DVT prophylaxis  Full code  Discussed with patient and nursing staff and consulting provider  Anticipate discharge to be determined,       Steve Cherry MD  [facility]  11/24/23  13:22 EST

## 2023-11-24 NOTE — PROGRESS NOTES
[facility]  Inpatient Progress Note    Reason for Follow-up: Chronic constipation    Subjective     Interval History:   Still no bowel movement despite aggressive bowel regimen which includes: MiraLAX bowel prep-still in the process of completing but has had 2 pitchers full with no results.  Movantik 12.5 mg, lubiprostone 24 mcg twice daily, and 5 mg Dulcolax yesterday.  Denies nausea or vomiting.    Patient is frustrated with lack of response.  Tolerating p.o. intake well.  He is in a lot of pain and has backed off on his pain meds due to the constipation but he asked if he can take some.    Current Facility-Administered Medications:     acetaminophen (TYLENOL) tablet 650 mg, 650 mg, Oral, Q4H PRN, 650 mg at 11/23/23 1800 **OR** acetaminophen (TYLENOL) 160 MG/5ML oral solution 650 mg, 650 mg, Oral, Q4H PRN **OR** acetaminophen (TYLENOL) suppository 650 mg, 650 mg, Rectal, Q4H PRN, Mary Jama MD    [DISCONTINUED] sennosides-docusate (PERICOLACE) 8.6-50 MG per tablet 2 tablet, 2 tablet, Oral, BID **AND** polyethylene glycol (MIRALAX) packet 17 g, 17 g, Oral, Daily PRN **AND** bisacodyl (DULCOLAX) EC tablet 5 mg, 5 mg, Oral, Daily PRN, 5 mg at 11/23/23 1800 **AND** bisacodyl (DULCOLAX) suppository 10 mg, 10 mg, Rectal, Daily PRN, Mary Jama MD    budesonide-formoterol (SYMBICORT) 160-4.5 MCG/ACT inhaler 2 puff, 2 puff, Inhalation, BID - RT, 2 puff at 11/24/23 0913 **AND** tiotropium (SPIRIVA RESPIMAT) 2.5 mcg/act aerosol solution inhaler, 2 puff, Inhalation, Daily - RT, StingMary jovel MD, 2 puff at 11/24/23 0914    influenza vac split quad (FLUZONE,FLUARIX,AFLURIA,FLULAVAL) injection 0.5 mL, 0.5 mL, Intramuscular, During Hospitalization, Mary Jama MD    ipratropium-albuterol (DUO-NEB) nebulizer solution 3 mL, 3 mL, Nebulization, Q4H PRN, Mary Jama MD    lisinopril (PRINIVIL,ZESTRIL) tablet 20 mg, 20 mg, Oral, Daily, Mary Jama  MD Kyrie, 20 mg at 11/24/23 0852    lubiprostone (AMITIZA) capsule 24 mcg, 24 mcg, Oral, BID With Meals, Mahad Galdamez PA-C, 24 mcg at 11/24/23 0852    melatonin tablet 3 mg, 3 mg, Oral, Nightly PRN, Renetta Naranjo, APRN, 3 mg at 11/22/23 0134    Naloxegol Oxalate (MOVANTIK) tablet 12.5 mg, 12.5 mg, Oral, QAM, Steve Cherry MD, 12.5 mg at 11/24/23 0434    ondansetron (ZOFRAN) injection 4 mg, 4 mg, Intravenous, Q6H PRN, StingMary jovel MD, 4 mg at 11/24/23 0829    pantoprazole (PROTONIX) EC tablet 40 mg, 40 mg, Oral, Q AM, StingMary jovel MD, 40 mg at 11/24/23 0434    sodium chloride 0.9 % flush 10 mL, 10 mL, Intravenous, PRN, Emergency, Triage Protocol, MD    sodium chloride 0.9 % infusion, 75 mL/hr, Intravenous, Continuous, StingMary jovel MD, Stopped at 11/22/23 1421  Review of Systems:    The following systems were reviewed and negative;  respiratory and cardiovascular    Objective     Vital Signs  Temp:  [97.6 °F (36.4 °C)-99.2 °F (37.3 °C)] 99.2 °F (37.3 °C)  Heart Rate:  [50-63] 63  Resp:  [16-18] 18  BP: ()/(53-77) 112/77  Body mass index is 17.02 kg/m².    Intake/Output Summary (Last 24 hours) at 11/24/2023 1053  Last data filed at 11/24/2023 1038  Gross per 24 hour   Intake 860 ml   Output --   Net 860 ml     I/O this shift:  In: 360 [P.O.:360]  Out: -      Physical Exam:    General: patient awake, alert and cooperative   Eyes: Normal lids and lashes, no scleral icterus   Skin: warm and dry, not jaundiced   Pulm: regular and unlabored   Abdomen: soft, diffuse TTP without guarding, distended; hyperactive bowel sounds   Psychiatric: Normal mood and behavior; memory intact     Results Review:     I reviewed the patient's new clinical results.    Results from last 7 days   Lab Units 11/22/23  0621 11/21/23  1312   WBC 10*3/mm3 7.62 8.77   HEMOGLOBIN g/dL 12.4* 15.2   HEMATOCRIT % 36.5* 46.0   PLATELETS 10*3/mm3 197 279     Results from last 7 days   Lab Units 11/22/23  0621  11/21/23  1312   SODIUM mmol/L 142 140   POTASSIUM mmol/L 3.8 4.1   CHLORIDE mmol/L 109* 101   CO2 mmol/L 25.3 24.9   BUN mg/dL 13 15   CREATININE mg/dL 1.04 1.42*   CALCIUM mg/dL 8.8 9.6   BILIRUBIN mg/dL  --  0.4   ALK PHOS U/L  --  80   ALT (SGPT) U/L  --  18   AST (SGOT) U/L  --  28   GLUCOSE mg/dL 75 185*         Lab Results   Lab Value Date/Time    LIPASE 34 11/21/2023 1312    LIPASE 30 11/11/2023 1029    LIPASE 31 10/29/2023 1629    LIPASE 20 10/28/2023 1232    LIPASE 43 10/22/2023 1526    LIPASE 18 10/16/2023 1509    LIPASE 36 10/09/2023 1435    LIPASE 20 10/07/2023 1303    LIPASE 44 09/26/2023 1716    LIPASE 49 09/21/2023 0031    LIPASE 23 09/11/2023 1507    LIPASE 40 08/15/2023 1537    LIPASE 40 07/26/2023 1437    LIPASE 22 07/22/2023 1843    LIPASE 20 07/14/2023 1223    LIPASE 25 07/01/2023 1451    LIPASE 25 06/26/2023 1902    LIPASE 26 06/23/2023 1500    LIPASE 17 06/19/2023 1222    LIPASE 21 06/15/2023 2359    LIPASE 27 05/16/2023 1512    LIPASE 67 (H) 07/28/2022 1556    LIPASE 20 06/27/2022 2034    LIPASE 33 06/23/2022 1837    LIPASE 22 06/18/2022 1127    LIPASE 31 06/11/2022 1213    LIPASE 95 (H) 06/08/2022 1506    LIPASE 26 06/03/2022 1004    LIPASE 25 05/16/2022 1136    LIPASE 23 05/02/2022 1706    LIPASE 80 (H) 04/22/2022 0548    LIPASE 425 (H) 04/21/2022 1012    LIPASE 20 04/19/2022 1118    LIPASE 25 04/03/2022 1123    LIPASE 23 03/12/2022 2156    LIPASE 31 02/21/2022 1614    LIPASE 31 02/18/2022 1400    LIPASE 29 02/06/2022 1552    LIPASE 30 01/24/2022 1221    LIPASE 30 12/22/2021 1517    LIPASE 24 11/12/2021 1253    LIPASE 18 10/23/2021 1707    LIPASE 22 08/31/2021 1206    LIPASE 24 08/20/2021 1347    LIPASE 23 08/03/2021 0024    LIPASE 25 07/22/2021 0218    LIPASE 129 (H) 07/02/2021 1554    LIPASE 36 06/25/2021 1615    LIPASE 26 06/15/2021 1725    LIPASE 66 04/12/2021 1630    LIPASE 103 08/12/2019 1105    LIPASE 55 02/12/2019 2052       Radiology:  CT Abdomen Pelvis With Contrast   Final Result    1.  Air-fluid levels in moderate distention of small bowel loops with   relative decompression of the more distal small bowel loops. A discrete   transition point is not definitively seen; however, the small bowel is   unable to be followed in its entirety due to lack of intraperitoneal   fat. Therefore, findings represent ileus versus obstruction in the   appropriate context and further evaluation with small bowel   follow-through is recommended.   2.  Severe distention of the rectum with air and stool.   3.  The appendix is not visualized and cannot be evaluated.   4.  Indeterminate right renal lesion measuring 2.1 cm. Further   evaluation with multiphase CT with and without contrast is recommended   to exclude neoplasm.   5.  Biliary stent is in place with its most proximal aspect within the   distal common bile duct, similar that seen on 10/29/2023. Correlation   with surgical history is recommended to ensure appropriate positioning   of the ductal stent.   6.  Other findings as above.       This report was finalized on 11/21/2023 5:01 PM by Dr. Stephen Camacho M.D on Workstation: [id number]              Assessment & Plan     Active Hospital Problems    Diagnosis     **Ileus     GERD without esophagitis     Severe malnutrition     Hypertension     Constipation     COPD (chronic obstructive pulmonary disease)        Assessment:  Chronic constipation.  History of rectal ulceration -likely stercoral ulceration due to constipation.  Chronic dilation of pancreatic duct and CBD status post stent placement.  CA 19-9 has been normal.  He is followed by [facility].      Plan:  Persistent constipation despite Movantik 12.5 mg, lubiprostone 24 mcg twice daily, and 5 mg Dulcolax tablet given yesterday.  He also started a MiraLAX bowel prep last night and has had 2 pitchers worth and is drinking a third.  He had 1 enema 2 days ago per RN.  Will repeat that today.  He is frustrated by lack of response.  Diet as tolerated.  Will  review with Dr. Melissa.    I discussed the patients findings and my recommendations with patient.    Dragon dictation used throughout this note.            Renetta Ward PA-C  [facility]  [address] [place] [address]  Office: [phone]

## 2023-11-24 NOTE — PROGRESS NOTES
"General Surgery Progress Note    CC: constipation    S: Patient despondent today about his lack of progress. Continues to have abdominal pain, low appetite. Denies fevers, chills, NV, or other symptoms. Has been out of bed walking BID. He drank golytely last night and this morning.  Has had audible bowel sounds but no flatus or BM.  Underwent attempted disimpaction by nursing yesterday but no stool in the rectum to disimpact. He is frustrated with his care and feels that \"as soon as I have a little bowel movement you all are just going to send me home to be in pain there.\"     O:/62   Pulse 55   Temp 98.3 °F (36.8 °C) (Oral)   Resp 16   Ht 177.8 cm (70\")   Wt 53.8 kg (118 lb 9.7 oz)   SpO2 95%   BMI 17.02 kg/m²     Intake & Output (last day)         11/23 0701  11/24 0700 11/24 0701 11/25 0700    P.O. 500     Total Intake(mL/kg) 500 (9.3)     Net +500           Urine Unmeasured Occurrence 7 x     Stool Unmeasured Occurrence 0 x             GENERAL: awake, alert, resting in bed, interactive, cooperative  HEENT: EOMI, clear sclera, moist mucus membranes   CHEST: normal work of breathing on room air    ABDOMEN: soft, mildly distended, mildly diffusely tender without any rebound or guarding, well healed surgical incisions  EXTREMITIES: KNUTSON, no cyanosis or edema    SKIN: Warm and dry, no rash  Psych: frustrated mood and flat affect; states he is just going to leave if he cannot get any results from treatment    LABS  Results from last 7 days   Lab Units 11/22/23  0621 11/21/23  1312   WBC 10*3/mm3 7.62 8.77   HEMOGLOBIN g/dL 12.4* 15.2   HEMATOCRIT % 36.5* 46.0   PLATELETS 10*3/mm3 197 279     Results from last 7 days   Lab Units 11/22/23  0621 11/21/23  1312   SODIUM mmol/L 142 140   POTASSIUM mmol/L 3.8 4.1   CHLORIDE mmol/L 109* 101   CO2 mmol/L 25.3 24.9   BUN mg/dL 13 15   CREATININE mg/dL 1.04 1.42*   CALCIUM mg/dL 8.8 9.6   BILIRUBIN mg/dL  --  0.4   ALK PHOS U/L  --  80   ALT (SGPT) U/L  --  18 "   AST (SGOT) U/L  --  28   GLUCOSE mg/dL 75 185*             A/P: 63 y.o. male with chronic constipation    AVSS. Having ongoing abdominal pain with nonsurgical abdomen. Recheck Mg, Phos as they have not been checked this admission, but I suspect this will be low yield given they were normal at last admission. After discussion patient is agreeable to undergo enemas.  Continue medical management per GI.       Marybeth Hernandez MD  General, Robotic and Endoscopic Surgery  [facility]    [address], [address]  [place], [place], [address]  P: [phone]  F: [phone]

## 2023-11-24 NOTE — PLAN OF CARE
"Goal Outcome Evaluation:  Plan of Care Reviewed With: patient        Progress: no change  Outcome Evaluation: Pt is A&Ox4, up ad sivakumar. Pt withdrawn, flat affect. Pt voices frustration that staff can't \"figure out what's wrong\". Pt continues to c/o constipation. Bowel sounds active. Pt states he hasn't eaten much the last several days but it is noted Pt has been going into family room for snacks. Pt stating frequently that he is going to leave the hospital. Pt did have a small, soft BM today and then another small BM following mineral oil enema. Pt scheduled for CT of abdomen/pelvis. Pt c/o nausea, receiving zofran prn. Pt up ambulating, eating and drinking well. Scheduled bowel meds given, Pt states they are not going to help.         "

## 2023-11-25 ENCOUNTER — APPOINTMENT (OUTPATIENT)
Dept: GENERAL RADIOLOGY | Facility: HOSPITAL | Age: 63
End: 2023-11-25
Payer: COMMERCIAL

## 2023-11-25 PROCEDURE — 74018 RADEX ABDOMEN 1 VIEW: CPT

## 2023-11-25 PROCEDURE — 99231 SBSQ HOSP IP/OBS SF/LOW 25: CPT | Performed by: STUDENT IN AN ORGANIZED HEALTH CARE EDUCATION/TRAINING PROGRAM

## 2023-11-25 PROCEDURE — G0378 HOSPITAL OBSERVATION PER HR: HCPCS

## 2023-11-25 PROCEDURE — 99232 SBSQ HOSP IP/OBS MODERATE 35: CPT | Performed by: INTERNAL MEDICINE

## 2023-11-25 PROCEDURE — 94799 UNLISTED PULMONARY SVC/PX: CPT

## 2023-11-25 PROCEDURE — 94664 DEMO&/EVAL PT USE INHALER: CPT

## 2023-11-25 PROCEDURE — 25010000002 ONDANSETRON PER 1 MG: Performed by: INTERNAL MEDICINE

## 2023-11-25 PROCEDURE — 96376 TX/PRO/DX INJ SAME DRUG ADON: CPT

## 2023-11-25 RX ADMIN — LUBIPROSTONE 24 MCG: 24 CAPSULE, GELATIN COATED ORAL at 19:18

## 2023-11-25 RX ADMIN — ONDANSETRON 4 MG: 2 INJECTION INTRAMUSCULAR; INTRAVENOUS at 19:21

## 2023-11-25 RX ADMIN — ACETAMINOPHEN 650 MG: 325 TABLET, FILM COATED ORAL at 03:53

## 2023-11-25 RX ADMIN — PANTOPRAZOLE SODIUM 40 MG: 40 TABLET, DELAYED RELEASE ORAL at 06:30

## 2023-11-25 RX ADMIN — LUBIPROSTONE 24 MCG: 24 CAPSULE, GELATIN COATED ORAL at 09:10

## 2023-11-25 RX ADMIN — BUDESONIDE AND FORMOTEROL FUMARATE DIHYDRATE 2 PUFF: 160; 4.5 AEROSOL RESPIRATORY (INHALATION) at 10:32

## 2023-11-25 RX ADMIN — TIOTROPIUM BROMIDE INHALATION SPRAY 2 PUFF: 3.12 SPRAY, METERED RESPIRATORY (INHALATION) at 10:33

## 2023-11-25 RX ADMIN — LISINOPRIL 20 MG: 20 TABLET ORAL at 09:10

## 2023-11-25 RX ADMIN — ONDANSETRON 4 MG: 2 INJECTION INTRAMUSCULAR; INTRAVENOUS at 12:36

## 2023-11-25 RX ADMIN — ONDANSETRON 4 MG: 2 INJECTION INTRAMUSCULAR; INTRAVENOUS at 04:45

## 2023-11-25 RX ADMIN — NALOXEGOL OXALATE 12.5 MG: 12.5 TABLET, FILM COATED ORAL at 06:30

## 2023-11-25 NOTE — PLAN OF CARE
"Goal Outcome Evaluation:  Plan of Care Reviewed With: patient           Outcome Evaluation: Eating meals, but c/o nausea. Zofran given x1. Sleeps between care. Irritated that we \"can't figure out what is going on.\" Movantik dose increased starting tomorrow. Still awaiting CT A/P. Stated multiple times about leaving AMA.          "

## 2023-11-25 NOTE — PLAN OF CARE
Goal Outcome Evaluation:  Plan of Care Reviewed With: patient        Progress: no change  Outcome Evaluation: Patient AOx4, given PRN Tylenol for abdominal pain and Zofran for nausea with relief of symptoms. Patient withdrawn during conversation, slept part of night. No other acute changes, VSS, will continue to monitor.

## 2023-11-25 NOTE — PROGRESS NOTES
[facility]  Inpatient Progress Note    Reason for Follow Up: Constipation    Subjective     Interval History:   Denies a bowel movement in the last 24 hours  Manual disimpaction was done yesterday by our physician assistant she felt no stool in the rectal vault      Still no bowel movement despite aggressive bowel regimen which includes: MiraLAX bowel prep-still in the process of completing but has had 2 pitchers full with no results.  Movantik 12.5 mg, lubiprostone 24 mcg twice daily, and 5 mg Dulcolax yesterday.  Denies nausea or vomiting.       Current Facility-Administered Medications:     acetaminophen (TYLENOL) tablet 650 mg, 650 mg, Oral, Q4H PRN, 650 mg at 11/25/23 0353 **OR** acetaminophen (TYLENOL) 160 MG/5ML oral solution 650 mg, 650 mg, Oral, Q4H PRN **OR** acetaminophen (TYLENOL) suppository 650 mg, 650 mg, Rectal, Q4H PRN, Mary Jama MD    [DISCONTINUED] sennosides-docusate (PERICOLACE) 8.6-50 MG per tablet 2 tablet, 2 tablet, Oral, BID **AND** polyethylene glycol (MIRALAX) packet 17 g, 17 g, Oral, Daily PRN **AND** bisacodyl (DULCOLAX) EC tablet 5 mg, 5 mg, Oral, Daily PRN, 5 mg at 11/23/23 1800 **AND** bisacodyl (DULCOLAX) suppository 10 mg, 10 mg, Rectal, Daily PRN, Mary Jama MD    budesonide-formoterol (SYMBICORT) 160-4.5 MCG/ACT inhaler 2 puff, 2 puff, Inhalation, BID - RT, 2 puff at 11/25/23 1032 **AND** tiotropium (SPIRIVA RESPIMAT) 2.5 mcg/act aerosol solution inhaler, 2 puff, Inhalation, Daily - RT, StingMary jovel MD, 2 puff at 11/25/23 1033    influenza vac split quad (FLUZONE,FLUARIX,AFLURIA,FLULAVAL) injection 0.5 mL, 0.5 mL, Intramuscular, During Hospitalization, Mary Jama MD    ipratropium-albuterol (DUO-NEB) nebulizer solution 3 mL, 3 mL, Nebulization, Q4H PRN, Mary Jama MD    lisinopril (PRINIVIL,ZESTRIL) tablet 20 mg, 20 mg, Oral, Daily, Mary Jama MD, 20 mg at 11/25/23 0910    lubiprostone  (AMITIZA) capsule 24 mcg, 24 mcg, Oral, BID With Meals, Mahad Galdamez PA-C, 24 mcg at 11/25/23 0910    melatonin tablet 3 mg, 3 mg, Oral, Nightly PRN, Renetta Naranjo, APRN, 3 mg at 11/22/23 0134    Naloxegol Oxalate (MOVANTIK) tablet 12.5 mg, 12.5 mg, Oral, QAJo Ann OROURKE Aakash, MD, 12.5 mg at 11/25/23 0630    ondansetron (ZOFRAN) injection 4 mg, 4 mg, Intravenous, Q6H PRN, Mary Jama MD, 4 mg at 11/25/23 1236    pantoprazole (PROTONIX) EC tablet 40 mg, 40 mg, Oral, Q AM, StingMary jovel MD, 40 mg at 11/25/23 0630    sodium chloride 0.9 % flush 10 mL, 10 mL, Intravenous, PRN, Emergency, Triage Protocol, MD    sodium chloride 0.9 % infusion, 75 mL/hr, Intravenous, Continuous, StingMary jovel MD, Stopped at 11/22/23 1421  Review of Systems:    Is weakness and fatigue all other systems reviewed and negative    Objective     Vital Signs  Temp:  [97.5 °F (36.4 °C)-98.6 °F (37 °C)] 97.5 °F (36.4 °C)  Heart Rate:  [51-60] 60  Resp:  [16-18] 18  BP: (104-114)/(56-76) 111/76  Body mass index is 17.02 kg/m².    Intake/Output Summary (Last 24 hours) at 11/25/2023 1239  Last data filed at 11/25/2023 0920  Gross per 24 hour   Intake 360 ml   Output --   Net 360 ml     I/O this shift:  In: 120 [P.O.:120]  Out: -      Physical Exam:   General: patient awake, alert and cooperative   Eyes: Normal lids and lashes, no scleral icterus   Neck: supple, normal ROM   Skin: warm and dry, not jaundiced   Cardiovascular: regular rhythm and rate, no murmurs auscultated   Pulm: clear to auscultation bilaterally, regular and unlabored   Abdomen: soft, nontender, nondistended; normal bowel sounds   Extremities: no rash or edema   Psychiatric: Normal mood and behavior; memory intact     Results Review:     I reviewed the patient's new clinical results.    Results from last 7 days   Lab Units 11/22/23  0621 11/21/23  1312   WBC 10*3/mm3 7.62 8.77   HEMOGLOBIN g/dL 12.4* 15.2   HEMATOCRIT % 36.5* 46.0   PLATELETS  10*3/mm3 197 279     Results from last 7 days   Lab Units 11/22/23  0621 11/21/23  1312   SODIUM mmol/L 142 140   POTASSIUM mmol/L 3.8 4.1   CHLORIDE mmol/L 109* 101   CO2 mmol/L 25.3 24.9   BUN mg/dL 13 15   CREATININE mg/dL 1.04 1.42*   CALCIUM mg/dL 8.8 9.6   BILIRUBIN mg/dL  --  0.4   ALK PHOS U/L  --  80   ALT (SGPT) U/L  --  18   AST (SGOT) U/L  --  28   GLUCOSE mg/dL 75 185*         Lab Results   Lab Value Date/Time    LIPASE 34 11/21/2023 1312    LIPASE 30 11/11/2023 1029    LIPASE 31 10/29/2023 1629    LIPASE 20 10/28/2023 1232    LIPASE 43 10/22/2023 1526    LIPASE 18 10/16/2023 1509    LIPASE 36 10/09/2023 1435    LIPASE 20 10/07/2023 1303    LIPASE 44 09/26/2023 1716    LIPASE 49 09/21/2023 0031    LIPASE 23 09/11/2023 1507    LIPASE 40 08/15/2023 1537    LIPASE 40 07/26/2023 1437    LIPASE 22 07/22/2023 1843    LIPASE 20 07/14/2023 1223    LIPASE 25 07/01/2023 1451    LIPASE 25 06/26/2023 1902    LIPASE 26 06/23/2023 1500    LIPASE 17 06/19/2023 1222    LIPASE 21 06/15/2023 2359    LIPASE 27 05/16/2023 1512    LIPASE 67 (H) 07/28/2022 1556    LIPASE 20 06/27/2022 2034    LIPASE 33 06/23/2022 1837    LIPASE 22 06/18/2022 1127    LIPASE 31 06/11/2022 1213    LIPASE 95 (H) 06/08/2022 1506    LIPASE 26 06/03/2022 1004    LIPASE 25 05/16/2022 1136    LIPASE 23 05/02/2022 1706    LIPASE 80 (H) 04/22/2022 0548    LIPASE 425 (H) 04/21/2022 1012    LIPASE 20 04/19/2022 1118    LIPASE 25 04/03/2022 1123    LIPASE 23 03/12/2022 2156    LIPASE 31 02/21/2022 1614    LIPASE 31 02/18/2022 1400    LIPASE 29 02/06/2022 1552    LIPASE 30 01/24/2022 1221    LIPASE 30 12/22/2021 1517    LIPASE 24 11/12/2021 1253    LIPASE 18 10/23/2021 1707    LIPASE 22 08/31/2021 1206    LIPASE 24 08/20/2021 1347    LIPASE 23 08/03/2021 0024    LIPASE 25 07/22/2021 0218    LIPASE 129 (H) 07/02/2021 1554    LIPASE 36 06/25/2021 1615    LIPASE 26 06/15/2021 1725    LIPASE 66 04/12/2021 1630    LIPASE 103 08/12/2019 1105    LIPASE 55  02/12/2019 2052       Radiology:  XR Abdomen KUB   Final Result   Air-filled bowel loops with nonspecific pattern. No clear   asymmetrically dilated bowel loops are present to suggest obstruction   and the appearance of the KUB is similar to previous KUB of 11/02/2023.       This report was finalized on 11/25/2023 8:38 AM by Dr. Edgardo Singh M.D on Workstation: [id number]          CT Abdomen Pelvis With Contrast   Final Result   1.  Air-fluid levels in moderate distention of small bowel loops with   relative decompression of the more distal small bowel loops. A discrete   transition point is not definitively seen; however, the small bowel is   unable to be followed in its entirety due to lack of intraperitoneal   fat. Therefore, findings represent ileus versus obstruction in the   appropriate context and further evaluation with small bowel   follow-through is recommended.   2.  Severe distention of the rectum with air and stool.   3.  The appendix is not visualized and cannot be evaluated.   4.  Indeterminate right renal lesion measuring 2.1 cm. Further   evaluation with multiphase CT with and without contrast is recommended   to exclude neoplasm.   5.  Biliary stent is in place with its most proximal aspect within the   distal common bile duct, similar that seen on 10/29/2023. Correlation   with surgical history is recommended to ensure appropriate positioning   of the ductal stent.   6.  Other findings as above.       This report was finalized on 11/21/2023 5:01 PM by Dr. Stephen Camacho M.D on Workstation: [id number]          CT Abdomen Pelvis With Contrast    (Results Pending)       Assessment & Plan     Active Hospital Problems    Diagnosis     **Ileus     GERD without esophagitis     Severe malnutrition     Hypertension     Constipation     COPD (chronic obstructive pulmonary disease)          Plan:  Persistent constipation despite Movantik 12.5 mg, lubiprostone 24 mcg twice daily, and 5 mg Dulcolax tablet  g  He also started a MiraLAX bowel prep   Advance diet as tolerated  Await response from enemas  No indication for further imaging at this time  Minimize narcotics  Correct electrolytes  I doubled the Movantik today  I discussed the patients findings and my recommendations with patient and nursing staff.    Clovis Tolentino MD

## 2023-11-25 NOTE — PROGRESS NOTES
"General Surgery Progress Note    CC: abd pain    S: Patient had two small bowel movements yesterday. Denies NV. Pain is the same. He remains frustrated stating he is going to leave AMA because he does not feel like he is not getting any results from his care.    O:/68 (BP Location: Left arm, Patient Position: Lying)   Pulse 59   Temp 98.5 °F (36.9 °C) (Oral)   Resp 16   Ht 177.8 cm (70\")   Wt 53.8 kg (118 lb 9.7 oz)   SpO2 98%   BMI 17.02 kg/m²     Intake & Output (last day)         11/24 0701 11/25 0700 11/25 0701 11/26 0700    P.O. 600 360    Total Intake(mL/kg) 600 (11.2) 360 (6.7)    Net +600 +360          Urine Unmeasured Occurrence 2 x     Stool Unmeasured Occurrence 2 x             GENERAL: awake, alert, calm, interactive, cooperative  HEENT: EOMI, clear sclera, moist mucus membranes   CHEST: normal work of breathing on room air  CARDIAC: regular rate and rhythm    ABDOMEN: Mildly distended, mildly tender diffusely without rebound or guarding, no palpable masses  EXTREMITIES: KNUTSON, no cyanosis or edema    SKIN: Warm and dry, no rash    LABS  Results from last 7 days   Lab Units 11/22/23  0621 11/21/23  1312   WBC 10*3/mm3 7.62 8.77   HEMOGLOBIN g/dL 12.4* 15.2   HEMATOCRIT % 36.5* 46.0   PLATELETS 10*3/mm3 197 279     Results from last 7 days   Lab Units 11/22/23  0621 11/21/23  1312   SODIUM mmol/L 142 140   POTASSIUM mmol/L 3.8 4.1   CHLORIDE mmol/L 109* 101   CO2 mmol/L 25.3 24.9   BUN mg/dL 13 15   CREATININE mg/dL 1.04 1.42*   CALCIUM mg/dL 8.8 9.6   BILIRUBIN mg/dL  --  0.4   ALK PHOS U/L  --  80   ALT (SGPT) U/L  --  18   AST (SGOT) U/L  --  28   GLUCOSE mg/dL 75 185*             A/P: 63 y.o. male with chronic constipation.     Patient considering leaving AMA as he does not feel like he is having any progress. He did have 2 bowel movements after enema yesterday.  Abdomen remains nonsurgical.  Electrolytes within normal limits.  Continue medical management per GI.    Marybeth Hernandez, " MD  General, Robotic and Endoscopic Surgery  [facility]    [address], [address]  [place], [place], [address]  P: [phone]  F: [phone]

## 2023-11-26 ENCOUNTER — READMISSION MANAGEMENT (OUTPATIENT)
Dept: CALL CENTER | Facility: HOSPITAL | Age: 63
End: 2023-11-26
Payer: COMMERCIAL

## 2023-11-26 ENCOUNTER — APPOINTMENT (OUTPATIENT)
Dept: CT IMAGING | Facility: HOSPITAL | Age: 63
End: 2023-11-26
Payer: COMMERCIAL

## 2023-11-26 VITALS
OXYGEN SATURATION: 97 % | HEIGHT: 70 IN | WEIGHT: 118.61 LBS | RESPIRATION RATE: 16 BRPM | HEART RATE: 65 BPM | SYSTOLIC BLOOD PRESSURE: 102 MMHG | TEMPERATURE: 98.1 F | BODY MASS INDEX: 16.98 KG/M2 | DIASTOLIC BLOOD PRESSURE: 65 MMHG

## 2023-11-26 LAB
ALBUMIN SERPL-MCNC: 3.8 G/DL (ref 3.5–5.2)
ALBUMIN/GLOB SERPL: 2 G/DL
ALP SERPL-CCNC: 70 U/L (ref 39–117)
ALT SERPL W P-5'-P-CCNC: 16 U/L (ref 1–41)
ANION GAP SERPL CALCULATED.3IONS-SCNC: 9 MMOL/L (ref 5–15)
AST SERPL-CCNC: 17 U/L (ref 1–40)
BILIRUB SERPL-MCNC: 0.5 MG/DL (ref 0–1.2)
BUN SERPL-MCNC: 26 MG/DL (ref 8–23)
BUN/CREAT SERPL: 26.3 (ref 7–25)
CALCIUM SPEC-SCNC: 9 MG/DL (ref 8.6–10.5)
CHLORIDE SERPL-SCNC: 106 MMOL/L (ref 98–107)
CO2 SERPL-SCNC: 25 MMOL/L (ref 22–29)
CREAT SERPL-MCNC: 0.99 MG/DL (ref 0.76–1.27)
DEPRECATED RDW RBC AUTO: 45.2 FL (ref 37–54)
EGFRCR SERPLBLD CKD-EPI 2021: 86.7 ML/MIN/1.73
ERYTHROCYTE [DISTWIDTH] IN BLOOD BY AUTOMATED COUNT: 13.1 % (ref 12.3–15.4)
GLOBULIN UR ELPH-MCNC: 1.9 GM/DL
GLUCOSE SERPL-MCNC: 85 MG/DL (ref 65–99)
HCT VFR BLD AUTO: 39.4 % (ref 37.5–51)
HGB BLD-MCNC: 13 G/DL (ref 13–17.7)
MCH RBC QN AUTO: 30.9 PG (ref 26.6–33)
MCHC RBC AUTO-ENTMCNC: 33 G/DL (ref 31.5–35.7)
MCV RBC AUTO: 93.6 FL (ref 79–97)
PLATELET # BLD AUTO: 210 10*3/MM3 (ref 140–450)
PMV BLD AUTO: 10.4 FL (ref 6–12)
POTASSIUM SERPL-SCNC: 4.1 MMOL/L (ref 3.5–5.2)
PROT SERPL-MCNC: 5.7 G/DL (ref 6–8.5)
RBC # BLD AUTO: 4.21 10*6/MM3 (ref 4.14–5.8)
SODIUM SERPL-SCNC: 140 MMOL/L (ref 136–145)
WBC NRBC COR # BLD AUTO: 7.7 10*3/MM3 (ref 3.4–10.8)

## 2023-11-26 PROCEDURE — 74178 CT ABD&PLV WO CNTR FLWD CNTR: CPT

## 2023-11-26 PROCEDURE — 85027 COMPLETE CBC AUTOMATED: CPT | Performed by: STUDENT IN AN ORGANIZED HEALTH CARE EDUCATION/TRAINING PROGRAM

## 2023-11-26 PROCEDURE — 25510000001 IOPAMIDOL 61 % SOLUTION: Performed by: STUDENT IN AN ORGANIZED HEALTH CARE EDUCATION/TRAINING PROGRAM

## 2023-11-26 PROCEDURE — 0 DIATRIZOATE MEGLUMINE & SODIUM PER 1 ML: Performed by: STUDENT IN AN ORGANIZED HEALTH CARE EDUCATION/TRAINING PROGRAM

## 2023-11-26 PROCEDURE — 96376 TX/PRO/DX INJ SAME DRUG ADON: CPT

## 2023-11-26 PROCEDURE — 80053 COMPREHEN METABOLIC PANEL: CPT | Performed by: STUDENT IN AN ORGANIZED HEALTH CARE EDUCATION/TRAINING PROGRAM

## 2023-11-26 PROCEDURE — 94664 DEMO&/EVAL PT USE INHALER: CPT

## 2023-11-26 PROCEDURE — G0378 HOSPITAL OBSERVATION PER HR: HCPCS

## 2023-11-26 PROCEDURE — 94799 UNLISTED PULMONARY SVC/PX: CPT

## 2023-11-26 PROCEDURE — 99231 SBSQ HOSP IP/OBS SF/LOW 25: CPT | Performed by: STUDENT IN AN ORGANIZED HEALTH CARE EDUCATION/TRAINING PROGRAM

## 2023-11-26 PROCEDURE — 25010000002 ONDANSETRON PER 1 MG: Performed by: INTERNAL MEDICINE

## 2023-11-26 RX ORDER — LUBIPROSTONE 24 UG/1
24 CAPSULE ORAL 2 TIMES DAILY WITH MEALS
Qty: 60 CAPSULE | Refills: 0 | Status: SHIPPED | OUTPATIENT
Start: 2023-11-26

## 2023-11-26 RX ORDER — IBUPROFEN 400 MG/1
200 TABLET ORAL EVERY 6 HOURS PRN
Qty: 30 TABLET | Refills: 0 | Status: SHIPPED | OUTPATIENT
Start: 2023-11-26

## 2023-11-26 RX ORDER — MINERAL OIL 100 G/100G
1 OIL RECTAL DAILY PRN
Qty: 21 EACH | Refills: 0 | Status: SHIPPED | OUTPATIENT
Start: 2023-11-26

## 2023-11-26 RX ADMIN — IOPAMIDOL 85 ML: 612 INJECTION, SOLUTION INTRAVENOUS at 10:52

## 2023-11-26 RX ADMIN — BUDESONIDE AND FORMOTEROL FUMARATE DIHYDRATE 2 PUFF: 160; 4.5 AEROSOL RESPIRATORY (INHALATION) at 09:12

## 2023-11-26 RX ADMIN — ACETAMINOPHEN 650 MG: 325 TABLET, FILM COATED ORAL at 01:31

## 2023-11-26 RX ADMIN — TIOTROPIUM BROMIDE INHALATION SPRAY 2 PUFF: 3.12 SPRAY, METERED RESPIRATORY (INHALATION) at 09:13

## 2023-11-26 RX ADMIN — LISINOPRIL 20 MG: 20 TABLET ORAL at 09:30

## 2023-11-26 RX ADMIN — PANTOPRAZOLE SODIUM 40 MG: 40 TABLET, DELAYED RELEASE ORAL at 05:42

## 2023-11-26 RX ADMIN — ONDANSETRON 4 MG: 2 INJECTION INTRAMUSCULAR; INTRAVENOUS at 01:24

## 2023-11-26 RX ADMIN — ONDANSETRON 4 MG: 2 INJECTION INTRAMUSCULAR; INTRAVENOUS at 09:31

## 2023-11-26 RX ADMIN — LUBIPROSTONE 24 MCG: 24 CAPSULE, GELATIN COATED ORAL at 09:30

## 2023-11-26 RX ADMIN — DIATRIZOATE MEGLUMINE AND DIATRIZOATE SODIUM 30 ML: 660; 100 LIQUID ORAL; RECTAL at 09:34

## 2023-11-26 RX ADMIN — NALOXEGOL OXALATE 25 MG: 12.5 TABLET, FILM COATED ORAL at 09:30

## 2023-11-26 NOTE — NURSING NOTE
2310 Received report on patient, pt care assumed.     Neuro-Pt alert and oriented  -Pt complained of nausea. Medication given per mar.   Pain-Pt complained of abdominal pain. Medication given per mar and rest for pain.

## 2023-11-26 NOTE — CASE MANAGEMENT/SOCIAL WORK
Case Management Discharge Note      Final Note: dc home    Provided Post Acute Provider List?: N/A  Provided Post Acute Provider Quality & Resource List?: N/A    Selected Continued Care - Discharged on 11/26/2023 Admission date: 11/21/2023 - Discharge disposition: Home or Self Care      Destination    No services have been selected for the patient.                Durable Medical Equipment    No services have been selected for the patient.                Dialysis/Infusion    No services have been selected for the patient.                Home Medical Care    No services have been selected for the patient.                Therapy    No services have been selected for the patient.                Community Resources    No services have been selected for the patient.                Community & DME    No services have been selected for the patient.                         Final Discharge Disposition Code: 01 - home or self-care

## 2023-11-26 NOTE — OUTREACH NOTE
Prep Survey      Flowsheet Row Responses   [facility] facility patient discharged from? [place]   Is LACE score < 7 ? No   Eligibility Readm Mgmt   Discharge diagnosis small bowel ileus   Does the patient have one of the following disease processes/diagnoses(primary or secondary)? Other   Does the patient have Home health ordered? No   Is there a DME ordered? No   Prep survey completed? Yes            Samantha INGRAM - Registered Nurse

## 2023-11-26 NOTE — DISCHARGE SUMMARY
Patient Name: Donny Mallory  : 1960  MRN: [id number]    Date of Admission: 2023  Date of Discharge:  2023  Primary Care Physician: Janelle Lara MD      Chief Complaint:   Abdominal Pain and Vomiting      Discharge Diagnoses     Active Hospital Problems    Diagnosis  POA    **Ileus [K56.7]  Yes    GERD without esophagitis [K21.9]  Yes    Severe malnutrition [E43]  Yes    Hypertension [I10]  Yes    Constipation [K59.00]  Yes    COPD (chronic obstructive pulmonary disease) [J44.9]  Yes      Resolved Hospital Problems   No resolved problems to display.        Hospital Course       This is a 63-year-old male with a history of COPD, hypertension, history of chronic constipation, depression with previous suicide attempt, as well as history of biliary pancreatic duct dilation status post stent placement presented to hospital after experiencing abdominal pain.  Every CT was obtained that showed air-fluid levels and moderate distention of small bowel loops with relative decompression of the more distal small bowel loops without a discrete transition point, concerning for a small bowel ileus.  He was able to tolerate p.o.  He was started on several medications to help with bowel movements.  Repeat CT scan was done that showed improvement in rectal distention.  The initial CT scan showed a right-sided kidney lesion.  Repeat CT with contrast showed a 2.1 cm right lateral renal exophytic lesion which did not exhibit internal contrast-enhancement, consistent with a kidney cyst.    He was expertly discharged home.  It was recommended that he continue to take his bowel regimen medications on a scheduled basis, not on an as-needed basis after he already becomes constipated.  Daily enemas also be required until he is able to evacuate his bowels.    Further management should be provided by his primary care provider.    Physical Exam:  Temp:  [97.7 °F (36.5 °C)-98.6 °F (37 °C)] 98.1 °F (36.7 °C)  Heart  Rate:  [54-74] 65  Resp:  [16-18] 16  BP: ()/(46-71) 102/65  Body mass index is 17.02 kg/m².  Physical Exam  Constitutional:       General: He is not in acute distress.  HENT:      Head: Normocephalic and atraumatic.   Cardiovascular:      Rate and Rhythm: Normal rate and regular rhythm.   Pulmonary:      Effort: Pulmonary effort is normal. No respiratory distress.   Abdominal:      General: There is no distension.      Palpations: Abdomen is soft.      Tenderness: There is no abdominal tenderness.   Neurological:      General: No focal deficit present.      Mental Status: He is alert and oriented to person, place, and time.   Psychiatric:      Comments: Flat affect           Consultants     Consult Orders (all) (From admission, onward)       Start     Ordered    11/22/23 1435  Inpatient Access Center Consult  Once        Provider:  (Not yet assigned)    11/22/23 1435    11/22/23 0925  Inpatient Gastroenterology Consult  Once        Specialty:  Gastroenterology  Provider:  Lizzy Melissa MD    11/22/23 0925    11/21/23 2233  Inpatient General Surgery Consult  Once        Specialty:  General Surgery  Provider:  Hoa Macias MD    11/21/23 2232    11/21/23 1728  [facility] (on-call MD unless specified) Details  Once        Specialty:  Hospitalist  Provider:  Mary Jama MD    11/21/23 1727                  Procedures     Imaging Results (All)       Procedure Component Value Units Date/Time    CT Abdomen Pelvis With & Without Contrast [[id number]] Collected: 11/26/23 1138     Updated: 11/26/23 1211    Narrative:      CT ABDOMEN AND PELVIS WITH AND WITHOUT IV CONTRAST     HISTORY: Renal lesion. Evaluate for neoplasm.     TECHNIQUE: CT includes axial imaging through the kidneys without IV  contrast followed by IV contrast administration and CT the abdomen and  pelvis with IV contrast. Oral contrast was used. Radiation dose  reduction techniques were utilized, including automated exposure control  and  exposure modulation based on body size.     COMPARISON: CT abdomen and pelvis 11/21/2023.     FINDINGS: Exophytic 2.1 cm right lateral lower pole renal lesion does  not exhibit internal enhancement supporting this represents a cyst.  There are additional bilateral renal cysts and there are also renal  lesions that are too small to characterize.     There is moderate stool throughout the colon suggesting constipation.  Oral contrast extends to distal small bowel loops without bowel  dilatation to suggest obstruction. Rectal distention with stool has  improved. Biliary stent is present with the proximal aspect of the stent  within the distal common duct and this is without change.        Impression:      1. 2.1 cm right lateral renal exophytic lesion does not exhibit internal  contrast enhancement and is consistent with a cyst. There are additional  bilateral renal cysts and there are also renal lesions that are too  small to definitively characterize.  2. Rectal distention with stool is improved when compared to the CT 5  days ago. There is moderate stool throughout the colon suggesting  constipation.  3. Pneumobilia. Common duct stent is unchanged in position with the  proximal aspect of the stent within the distal common duct.     Radiation dose reduction techniques were utilized, including automated  exposure control and exposure modulation based on body size.        This report was finalized on 11/26/2023 12:08 PM by Dr. Edgardo Singh M.D on Workstation: [id number]       XR Abdomen KUB [[id number]] Collected: 11/25/23 0834     Updated: 11/25/23 0841    Narrative:      KUB     HISTORY: Abdominal discomfort with constipation.     COMPARISON: CT abdomen and pelvis 11/21/2023, KUB 11/02/2023.     FINDINGS: There are air-filled bowel loops with nonspecific pattern. No  clear asymmetrically dilated bowel loops are present to suggest  obstruction and the overall appearance is similar to KUB 11/02/2023.  Biliary stent  is present. There are cholecystectomy clips. Surgical  spiral herniation tacks overlie the lower pelvis.       Impression:      Air-filled bowel loops with nonspecific pattern. No clear  asymmetrically dilated bowel loops are present to suggest obstruction  and the appearance of the KUB is similar to previous KUB of 11/02/2023.     This report was finalized on 11/25/2023 8:38 AM by Dr. Edgardo Singh M.D on Workstation: [id number]       CT Abdomen Pelvis With Contrast [[id number]] Collected: 11/21/23 1644     Updated: 11/21/23 1704    Narrative:      CT ABDOMEN AND PELVIS WITH IV CONTRAST     HISTORY: Abdominal pain, nausea vomiting     TECHNIQUE: Radiation dose reduction techniques were utilized, including  automated exposure control and exposure modulation based on body size.   3 mm images were obtained through the abdomen and pelvis after the  administration of IV contrast.     COMPARISON: Multiple CT abdomen and pelvis dating back to 6/23/2022     FINDINGS:     A few sub-6 mm pleural-based pulmonary nodules in the left lung base are  present, as before. There are air-fluid levels throughout the small  bowel which also demonstrates moderate distention with loops measuring  up to approximately 3.7 cm in diameter. The more distal small bowel is  relatively decompressed. While a discrete transition point is not  definitively seen, the small bowel cannot be followed in its entirety  due to lack of intraperitoneal fat.     The appendix is not seen. Gallbladder is surgically absent with  associated pneumobilia, as before. Subcentimeter hepatic lesions are too  small to characterize. A biliary stent is present and projects through  the distal common bile duct and into the first portion of the duodenum,  similar to that seen on 10/29/2023. Main pancreatic duct is mildly  distended measuring up to 0.7 cm, grossly unchanged since 6/23/2022. The  spleen and adrenal glands have an unremarkable postcontrast CT  appearance.  Subcentimeter renal lesions are too small to characterize.  Larger hypodense lesions demonstrating density less than 15 Hounsfield  units are likely benign per Bosniak 2019 criteria. There is an  indeterminate density right renal lesion measuring 2.1 cm there is no  hydronephrosis. Please note that the bilateral ureters cannot be  followed in their entirety due to lack of intraperitoneal fat. Abdominal  pelvic adenopathy cannot be excluded on this examination due to the  above-stated limitations. The rectum is severely distended with stool  and air. The bladder is decompressed and cannot be evaluated.     No free intraperitoneal air is seen. No suspicious lytic or blastic  osseous lesions are present. For the purposes dictation, last  well-formed space referred to as L5-S1. Compression formae of L1 is  present, as before. There is mild retrolisthesis of L5 on S1.       Impression:      1.  Air-fluid levels in moderate distention of small bowel loops with  relative decompression of the more distal small bowel loops. A discrete  transition point is not definitively seen; however, the small bowel is  unable to be followed in its entirety due to lack of intraperitoneal  fat. Therefore, findings represent ileus versus obstruction in the  appropriate context and further evaluation with small bowel  follow-through is recommended.  2.  Severe distention of the rectum with air and stool.  3.  The appendix is not visualized and cannot be evaluated.  4.  Indeterminate right renal lesion measuring 2.1 cm. Further  evaluation with multiphase CT with and without contrast is recommended  to exclude neoplasm.  5.  Biliary stent is in place with its most proximal aspect within the  distal common bile duct, similar that seen on 10/29/2023. Correlation  with surgical history is recommended to ensure appropriate positioning  of the ductal stent.  6.  Other findings as above.     This report was finalized on 11/21/2023 5:01 PM by   "Stephen Camacho M.D on Workstation: [id number]               Pertinent Labs     Results from last 7 days   Lab Units 11/26/23  0641 11/22/23  0621 11/21/23  1312   WBC 10*3/mm3 7.70 7.62 8.77   HEMOGLOBIN g/dL 13.0 12.4* 15.2   PLATELETS 10*3/mm3 210 197 279     Results from last 7 days   Lab Units 11/26/23  0641 11/22/23  0621 11/21/23  1312   SODIUM mmol/L 140 142 140   POTASSIUM mmol/L 4.1 3.8 4.1   CHLORIDE mmol/L 106 109* 101   CO2 mmol/L 25.0 25.3 24.9   BUN mg/dL 26* 13 15   CREATININE mg/dL 0.99 1.04 1.42*   GLUCOSE mg/dL 85 75 185*   Estimated Creatinine Clearance: 59.6 mL/min (by C-G formula based on SCr of 0.99 mg/dL).  Results from last 7 days   Lab Units 11/26/23  0641 11/21/23  1312   ALBUMIN g/dL 3.8 4.7   BILIRUBIN mg/dL 0.5 0.4   ALK PHOS U/L 70 80   AST (SGOT) U/L 17 28   ALT (SGPT) U/L 16 18     Results from last 7 days   Lab Units 11/26/23  0641 11/24/23  1048 11/22/23  0621 11/21/23  1312   CALCIUM mg/dL 9.0  --  8.8 9.6   ALBUMIN g/dL 3.8  --   --  4.7   MAGNESIUM mg/dL  --  2.3  --   --    PHOSPHORUS mg/dL  --  2.9  --   --      Results from last 7 days   Lab Units 11/21/23  1312   LIPASE U/L 34             Invalid input(s): \"LDLCALC\"        Test Results Pending at Discharge       Discharge Details        Discharge Medications        New Medications        Instructions Start Date   lubiprostone 24 MCG capsule  Commonly known as: AMITIZA   24 mcg, Oral, 2 Times Daily With Meals      mineral oil enema   1 enema, Rectal, Daily PRN      Naloxegol Oxalate 25 MG tablet  Commonly known as: MOVANTIK   25 mg, Oral, Every Morning   Start Date: November 27, 2023            Changes to Medications        Instructions Start Date   ibuprofen 400 MG tablet  Commonly known as: IBU  What changed: medication strength   200 mg, Oral, Every 6 Hours PRN             Continue These Medications        Instructions Start Date   ipratropium-albuterol 0.5-2.5 mg/3 ml nebulizer  Commonly known as: DUO-NEB   3 mL, " Nebulization, Every 4 Hours PRN      Linzess 145 MCG capsule capsule  Generic drug: linaclotide   145 mcg, Oral, Every Morning Before Breakfast      lisinopril 20 MG tablet  Commonly known as: PRINIVIL,ZESTRIL   20 mg, Oral, Daily      omeprazole 20 MG capsule  Commonly known as: priLOSEC   20 mg, Oral, Daily      ondansetron ODT 4 MG disintegrating tablet  Commonly known as: ZOFRAN-ODT   4 mg, Translingual, 4 Times Daily PRN      promethazine 25 MG tablet  Commonly known as: PHENERGAN   25 mg, Oral, Every 6 Hours PRN      Senexon-S 8.6-50 MG per tablet  Generic drug: sennosides-docusate   2 tablets, Oral, 2 Times Daily      sucralfate 1 g tablet  Commonly known as: CARAFATE   1 g, Oral, 4 Times Daily      Trelegy Ellipta 100-62.5-25 MCG/INH inhaler  Generic drug: Fluticasone-Umeclidin-Vilant   1 puff, Inhalation, Daily - RT             Stop These Medications      acetaminophen 500 MG tablet  Commonly known as: TYLENOL              Allergies   Allergen Reactions    Prochlorperazine Anxiety         Discharge Disposition:  Home or Self Care    Discharge Diet:  Diet Order   Procedures    Diet: Gastrointestinal Diets; Fiber-Restricted; Texture: Regular Texture (IDDSI 7); Fluid Consistency: Thin (IDDSI 0)       Discharge Activity: as tolerated       CODE STATUS:    Code Status and Medical Interventions:   Ordered at: 11/21/23 1826     Code Status (Patient has no pulse and is not breathing):    CPR (Attempt to Resuscitate)     Medical Interventions (Patient has pulse or is breathing):    Full Support       No future appointments.   Follow-up Information       Janelle Lara MD .    Specialty: Family Medicine  Contact information:  [address] [address] [phone]                             Time Spent on Discharge:  Greater than 30 minutes      Steve Cherry MD  [facility]  11/26/23  12:29 EST

## 2023-11-26 NOTE — PROGRESS NOTES
"General Surgery Progress Note    CC: constipation    S: Patient reports no change in his abdominal pain. He has eaten some yesterday but is afraid to eat due to fear of more pain. Denies any nausea.  He has not had a bowel movement. Took oral meds for bowel regimen yesterday.      O:/65 (BP Location: Right arm, Patient Position: Lying)   Pulse 65   Temp 98.1 °F (36.7 °C) (Oral)   Resp 16   Ht 177.8 cm (70\")   Wt 53.8 kg (118 lb 9.7 oz)   SpO2 97%   BMI 17.02 kg/m²     Intake & Output (last day)         11/25 0701 11/26 0700 11/26 0701 11/27 0700    P.O. 360     Total Intake(mL/kg) 360 (6.7)     Net +360                   GENERAL: awake, alert, resting in bed, interactive, cooperative  HEENT: EOMI, clear sclera, moist mucus membranes   CHEST: normal work of breathing on room air  ABDOMEN: Mildly distended, nontender, no guarding, no palpable masses  EXTREMITIES: KNUTSON, no cyanosis or edema    SKIN: Warm and dry, no rash    LABS  Results from last 7 days   Lab Units 11/26/23  0641 11/22/23  0621 11/21/23  1312   WBC 10*3/mm3 7.70 7.62 8.77   HEMOGLOBIN g/dL 13.0 12.4* 15.2   HEMATOCRIT % 39.4 36.5* 46.0   PLATELETS 10*3/mm3 210 197 279     Results from last 7 days   Lab Units 11/26/23  0641 11/22/23  0621 11/21/23  1312   SODIUM mmol/L 140 142 140   POTASSIUM mmol/L 4.1 3.8 4.1   CHLORIDE mmol/L 106 109* 101   CO2 mmol/L 25.0 25.3 24.9   BUN mg/dL 26* 13 15   CREATININE mg/dL 0.99 1.04 1.42*   CALCIUM mg/dL 9.0 8.8 9.6   BILIRUBIN mg/dL 0.5  --  0.4   ALK PHOS U/L 70  --  80   ALT (SGPT) U/L 16  --  18   AST (SGOT) U/L 17  --  28   GLUCOSE mg/dL 85 75 185*             A/P: 63 y.o. male with chronic constipation; history of multiple biliary stents for pancreatic and hepatic biliary ductal dilation.     AVSS. No bowel movement since last enema. Abdomen remains nonsurgical.  Continue medical management for constipation per GI.     With regards to his biliary stent and prior GI workup, I reviewed his prior " imaging and path results and did not see a prior MRCP to evaluate his pancreas; however the patient tells me he has had this performed at an outside facility prior to his first ERCP. Negative EUS, no mass lesions noted on prior CT, ERCP with biopsy showing mild atypia without malignancy after replacement of biliary stenting, I have low suspicion of malignancy. Would recommend continued GI follow up.  General surgery will sign off. Please call back for questions or concerns.      Marybeth Hernandez MD  General, Robotic and Endoscopic Surgery  [facility]    [address] [place] [address], [address]  [place], [place], [address]  P: [phone]  F: [phone]

## 2023-11-26 NOTE — PROGRESS NOTES
Name: Donny Mallory ADMIT: 2023   : 1960  PCP: Janelle Lara MD    MRN: [id number] LOS: 0 days   AGE/SEX: 63 y.o. male  ROOM: [facility]     Subjective     Still no BM. Manual disimpaction unsuccessful due to lack of stool in rectal vault.     Objective   Objective   Vital Signs  Temp:  [97.5 °F (36.4 °C)-98.5 °F (36.9 °C)] 98.5 °F (36.9 °C)  Heart Rate:  [51-60] 59  Resp:  [16-18] 16  BP: (109-114)/(56-76) 109/68  SpO2:  [96 %-98 %] 98 %  on   ;   Device (Oxygen Therapy): room air  Body mass index is 17.02 kg/m².  Physical Exam  Constitutional:       General: He is not in acute distress.  HENT:      Head: Normocephalic and atraumatic.   Cardiovascular:      Rate and Rhythm: Normal rate and regular rhythm.   Pulmonary:      Effort: Pulmonary effort is normal. No respiratory distress.   Abdominal:      General: There is no distension.      Tenderness: There is no abdominal tenderness.   Neurological:      General: No focal deficit present.      Mental Status: He is alert and oriented to person, place, and time.   Psychiatric:         Behavior: Withdrawn: flat affect.         Results Review     I reviewed the patient's new clinical results.  Results from last 7 days   Lab Units 23  0621 23  1312   WBC 10*3/mm3 7.62 8.77   HEMOGLOBIN g/dL 12.4* 15.2   PLATELETS 10*3/mm3 197 279     Results from last 7 days   Lab Units 23  0621 23  1312   SODIUM mmol/L 142 140   POTASSIUM mmol/L 3.8 4.1   CHLORIDE mmol/L 109* 101   CO2 mmol/L 25.3 24.9   BUN mg/dL 13 15   CREATININE mg/dL 1.04 1.42*   GLUCOSE mg/dL 75 185*   Estimated Creatinine Clearance: 56.8 mL/min (by C-G formula based on SCr of 1.04 mg/dL).  Results from last 7 days   Lab Units 23  1312   ALBUMIN g/dL 4.7   BILIRUBIN mg/dL 0.4   ALK PHOS U/L 80   AST (SGOT) U/L 28   ALT (SGPT) U/L 18     Results from last 7 days   Lab Units 23  1048 23  0621 23  1312   CALCIUM mg/dL  --  8.8 9.6   ALBUMIN g/dL   "--   --  4.7   MAGNESIUM mg/dL 2.3  --   --    PHOSPHORUS mg/dL 2.9  --   --      Results from last 7 days   Lab Units 11/21/23  1629 11/21/23  1312   LACTATE mmol/L 1.0 2.9*     COVID19   Date Value Ref Range Status   10/29/2023 Not Detected Not Detected - Ref. Range Final     SARS-CoV-2, CAREY   Date Value Ref Range Status   09/21/2023 NEGATIVE Negative Final     Comment:     The 2019-CoV rRT-PCR Assay is only for use under a Food and Drug Administration Emergency Use Authorization. The performance characteristics of the assay were verified by the Clinical Microbiology Laboratory at the [facility].   Results should be used in conjunction with the patient's clinical symptoms, medical history, and other clinical/laboratory findings to determine an overall clinical diagnosis. Negative results do not preclude infection with SARS-CoV-2 (COVID-19).    Test parameters have not been validated for screening asymptomatic patients.     No results found for: \"HGBA1C\", \"POCGLU\"  Results for orders placed or performed during the hospital encounter of 05/02/22   Blood Culture - Blood, Arm, Left    Specimen: Arm, Left; Blood   Result Value Ref Range    Blood Culture No growth at 5 days          XR Abdomen KUB  Narrative: KUB     HISTORY: Abdominal discomfort with constipation.     COMPARISON: CT abdomen and pelvis 11/21/2023, KUB 11/02/2023.     FINDINGS: There are air-filled bowel loops with nonspecific pattern. No  clear asymmetrically dilated bowel loops are present to suggest  obstruction and the overall appearance is similar to KUB 11/02/2023.  Biliary stent is present. There are cholecystectomy clips. Surgical  spiral herniation tacks overlie the lower pelvis.     Impression: Air-filled bowel loops with nonspecific pattern. No clear  asymmetrically dilated bowel loops are present to suggest obstruction  and the appearance of the KUB is similar to previous KUB of 11/02/2023.     This report was finalized " on 11/25/2023 8:38 AM by Dr. Edgardo Singh M.D on Workstation: [id number]       Scheduled Medications  budesonide-formoterol, 2 puff, Inhalation, BID - RT   And  tiotropium bromide monohydrate, 2 puff, Inhalation, Daily - RT  lisinopril, 20 mg, Oral, Daily  lubiprostone, 24 mcg, Oral, BID With Meals  [START ON 11/26/2023] Naloxegol Oxalate, 25 mg, Oral, QAM  pantoprazole, 40 mg, Oral, Q AM    Infusions  sodium chloride, 75 mL/hr, Last Rate: Stopped (11/22/23 1421)    Diet  Diet: Gastrointestinal Diets; Fiber-Restricted; Texture: Regular Texture (IDDSI 7); Fluid Consistency: Thin (IDDSI 0)       Assessment/Plan     Active Hospital Problems    Diagnosis  POA    **Ileus [K56.7]  Yes    GERD without esophagitis [K21.9]  Yes    Severe malnutrition [E43]  Yes    Hypertension [I10]  Yes    Constipation [K59.00]  Yes    COPD (chronic obstructive pulmonary disease) [J44.9]  Yes      Resolved Hospital Problems   No resolved problems to display.       63 y.o. male admitted with Ileus.    Chronic constipation  GI and general surgery have evaluated the patient.  Initially there was some concern for ileus however this has been ruled out.  He is on Movantik, lubiprostone, bowel regimen.  Attempted manual disimpaction by nursing staff however there was no stool in the rectal vault.  It looks like a mineral oil enema has been ordered.  Avoid opiates  His abdomen is actually a lot less distended than it has been previously.     Hypertension  GERD  Continue lisinopril and levothyroxine    COPD  Continue home Symbicort    Renal mass  -obtain dedicated CT Abdomen and pelvis to evaluate.       SCDs for DVT prophylaxis  Full code  Discussed with patient and nursing staff and consulting provider  Anticipate discharge to be determined,       Steve Cherry MD  [facility]  11/25/23  20:19 EST

## 2023-11-29 ENCOUNTER — READMISSION MANAGEMENT (OUTPATIENT)
Dept: CALL CENTER | Facility: HOSPITAL | Age: 63
End: 2023-11-29
Payer: COMMERCIAL

## 2023-11-29 NOTE — OUTREACH NOTE
"Medical Week 1 Survey      Flowsheet Row Responses   [facility] patient discharged from? [place]   Does the patient have one of the following disease processes/diagnoses(primary or secondary)? Other   Week 1 attempt successful? Yes   Call start time 1741   Call end time 1745   Discharge diagnosis small bowel ileus   Meds reviewed with patient/caregiver? Yes   Is the patient having any side effects they believe may be caused by any medication additions or changes? No   Does the patient have all medications ordered at discharge? Yes   Is the patient taking all medications as directed (includes completed medication regime)? Yes   Does the patient have a primary care provider?  Yes   Does the patient have an appointment with their PCP within 7 days of discharge? No   What is preventing the patient from scheduling follow up appointments within 7 days of discharge? Haven't had time   Has the patient kept scheduled appointments due by today? N/A   Psychosocial issues? No   What is the patient's perception of their health status since discharge? Same   Is the patient/caregiver able to teach back signs and symptoms related to disease process for when to call PCP? Yes   Is the patient/caregiver able to teach back signs and symptoms related to disease process for when to call 911? Yes   Is the patient/caregiver able to teach back the hierarchy of who to call/visit for symptoms/problems? PCP, Specialist, Home health nurse, Urgent Care, ED, 911 Yes   Additional teach back comments States he feels the same.  Has been taking meds as directed.  Has not made follow ups with drs.  Advised if he continues to have pains he needs to go to ED.  \"I've done that and they said nothing is wrong so I will just deal with it\".   Week 1 call completed? Yes   Wrap up additional comments Pt was encouraged to f/u with his drs and if continued pain to go to ED.   Call end time 1745            Anabel GRANDA - Licensed Nurse  "

## 2023-12-07 ENCOUNTER — APPOINTMENT (OUTPATIENT)
Dept: GENERAL RADIOLOGY | Facility: HOSPITAL | Age: 63
End: 2023-12-07
Payer: COMMERCIAL

## 2023-12-07 ENCOUNTER — READMISSION MANAGEMENT (OUTPATIENT)
Dept: CALL CENTER | Facility: HOSPITAL | Age: 63
End: 2023-12-07
Payer: COMMERCIAL

## 2023-12-07 ENCOUNTER — APPOINTMENT (OUTPATIENT)
Dept: CT IMAGING | Facility: HOSPITAL | Age: 63
End: 2023-12-07
Payer: COMMERCIAL

## 2023-12-07 ENCOUNTER — HOSPITAL ENCOUNTER (INPATIENT)
Facility: HOSPITAL | Age: 63
LOS: 3 days | Discharge: LEFT BEFORE DISCHARGE | End: 2023-12-12
Attending: EMERGENCY MEDICINE | Admitting: INTERNAL MEDICINE
Payer: COMMERCIAL

## 2023-12-07 DIAGNOSIS — R11.2 NAUSEA AND VOMITING, UNSPECIFIED VOMITING TYPE: Primary | ICD-10-CM

## 2023-12-07 DIAGNOSIS — R10.9 ACUTE ABDOMINAL PAIN: ICD-10-CM

## 2023-12-07 DIAGNOSIS — K56.7 ILEUS: ICD-10-CM

## 2023-12-07 LAB
ALBUMIN SERPL-MCNC: 4.4 G/DL (ref 3.5–5.2)
ALBUMIN/GLOB SERPL: 1.5 G/DL
ALP SERPL-CCNC: 75 U/L (ref 39–117)
ALT SERPL W P-5'-P-CCNC: 13 U/L (ref 1–41)
ANION GAP SERPL CALCULATED.3IONS-SCNC: 11.8 MMOL/L (ref 5–15)
AST SERPL-CCNC: 22 U/L (ref 1–40)
B PARAPERT DNA SPEC QL NAA+PROBE: NOT DETECTED
B PERT DNA SPEC QL NAA+PROBE: NOT DETECTED
BASOPHILS # BLD AUTO: 0.11 10*3/MM3 (ref 0–0.2)
BASOPHILS NFR BLD AUTO: 0.9 % (ref 0–1.5)
BILIRUB SERPL-MCNC: 0.3 MG/DL (ref 0–1.2)
BUN SERPL-MCNC: 21 MG/DL (ref 8–23)
BUN/CREAT SERPL: 20.2 (ref 7–25)
C PNEUM DNA NPH QL NAA+NON-PROBE: NOT DETECTED
CALCIUM SPEC-SCNC: 9.9 MG/DL (ref 8.6–10.5)
CHLORIDE SERPL-SCNC: 108 MMOL/L (ref 98–107)
CO2 SERPL-SCNC: 24.2 MMOL/L (ref 22–29)
CREAT SERPL-MCNC: 1.04 MG/DL (ref 0.76–1.27)
DEPRECATED RDW RBC AUTO: 42.3 FL (ref 37–54)
EGFRCR SERPLBLD CKD-EPI 2021: 81.7 ML/MIN/1.73
EOSINOPHIL # BLD AUTO: 0.17 10*3/MM3 (ref 0–0.4)
EOSINOPHIL NFR BLD AUTO: 1.3 % (ref 0.3–6.2)
ERYTHROCYTE [DISTWIDTH] IN BLOOD BY AUTOMATED COUNT: 12.9 % (ref 12.3–15.4)
FLUAV SUBTYP SPEC NAA+PROBE: NOT DETECTED
FLUBV RNA ISLT QL NAA+PROBE: NOT DETECTED
GEN 5 2HR TROPONIN T REFLEX: 12 NG/L
GLOBULIN UR ELPH-MCNC: 3 GM/DL
GLUCOSE SERPL-MCNC: 112 MG/DL (ref 65–99)
HADV DNA SPEC NAA+PROBE: NOT DETECTED
HCOV 229E RNA SPEC QL NAA+PROBE: NOT DETECTED
HCOV HKU1 RNA SPEC QL NAA+PROBE: NOT DETECTED
HCOV NL63 RNA SPEC QL NAA+PROBE: NOT DETECTED
HCOV OC43 RNA SPEC QL NAA+PROBE: NOT DETECTED
HCT VFR BLD AUTO: 46.7 % (ref 37.5–51)
HGB BLD-MCNC: 15.1 G/DL (ref 13–17.7)
HMPV RNA NPH QL NAA+NON-PROBE: NOT DETECTED
HPIV1 RNA ISLT QL NAA+PROBE: NOT DETECTED
HPIV2 RNA SPEC QL NAA+PROBE: NOT DETECTED
HPIV3 RNA NPH QL NAA+PROBE: NOT DETECTED
HPIV4 P GENE NPH QL NAA+PROBE: NOT DETECTED
IMM GRANULOCYTES # BLD AUTO: 0.06 10*3/MM3 (ref 0–0.05)
IMM GRANULOCYTES NFR BLD AUTO: 0.5 % (ref 0–0.5)
LIPASE SERPL-CCNC: 43 U/L (ref 13–60)
LYMPHOCYTES # BLD AUTO: 1.63 10*3/MM3 (ref 0.7–3.1)
LYMPHOCYTES NFR BLD AUTO: 12.8 % (ref 19.6–45.3)
M PNEUMO IGG SER IA-ACNC: NOT DETECTED
MCH RBC QN AUTO: 29.1 PG (ref 26.6–33)
MCHC RBC AUTO-ENTMCNC: 32.3 G/DL (ref 31.5–35.7)
MCV RBC AUTO: 90 FL (ref 79–97)
MONOCYTES # BLD AUTO: 0.58 10*3/MM3 (ref 0.1–0.9)
MONOCYTES NFR BLD AUTO: 4.6 % (ref 5–12)
NEUTROPHILS NFR BLD AUTO: 10.14 10*3/MM3 (ref 1.7–7)
NEUTROPHILS NFR BLD AUTO: 79.9 % (ref 42.7–76)
NRBC BLD AUTO-RTO: 0 /100 WBC (ref 0–0.2)
PLATELET # BLD AUTO: 335 10*3/MM3 (ref 140–450)
PMV BLD AUTO: 9.7 FL (ref 6–12)
POTASSIUM SERPL-SCNC: 4.8 MMOL/L (ref 3.5–5.2)
PROT SERPL-MCNC: 7.4 G/DL (ref 6–8.5)
QT INTERVAL: 343 MS
QTC INTERVAL: 431 MS
RBC # BLD AUTO: 5.19 10*6/MM3 (ref 4.14–5.8)
RHINOVIRUS RNA SPEC NAA+PROBE: NOT DETECTED
RSV RNA NPH QL NAA+NON-PROBE: NOT DETECTED
SARS-COV-2 RNA NPH QL NAA+NON-PROBE: NOT DETECTED
SODIUM SERPL-SCNC: 144 MMOL/L (ref 136–145)
TROPONIN T DELTA: 1 NG/L
TROPONIN T SERPL HS-MCNC: 11 NG/L
WBC NRBC COR # BLD AUTO: 12.69 10*3/MM3 (ref 3.4–10.8)

## 2023-12-07 PROCEDURE — 94761 N-INVAS EAR/PLS OXIMETRY MLT: CPT

## 2023-12-07 PROCEDURE — 0202U NFCT DS 22 TRGT SARS-COV-2: CPT | Performed by: EMERGENCY MEDICINE

## 2023-12-07 PROCEDURE — 80053 COMPREHEN METABOLIC PANEL: CPT | Performed by: EMERGENCY MEDICINE

## 2023-12-07 PROCEDURE — 99285 EMERGENCY DEPT VISIT HI MDM: CPT

## 2023-12-07 PROCEDURE — 25010000002 MORPHINE PER 10 MG: Performed by: NURSE PRACTITIONER

## 2023-12-07 PROCEDURE — 94640 AIRWAY INHALATION TREATMENT: CPT

## 2023-12-07 PROCEDURE — 94799 UNLISTED PULMONARY SVC/PX: CPT

## 2023-12-07 PROCEDURE — 93010 ELECTROCARDIOGRAM REPORT: CPT | Performed by: INTERNAL MEDICINE

## 2023-12-07 PROCEDURE — 25010000002 MORPHINE PER 10 MG: Performed by: EMERGENCY MEDICINE

## 2023-12-07 PROCEDURE — 85025 COMPLETE CBC W/AUTO DIFF WBC: CPT | Performed by: EMERGENCY MEDICINE

## 2023-12-07 PROCEDURE — G0378 HOSPITAL OBSERVATION PER HR: HCPCS

## 2023-12-07 PROCEDURE — 74176 CT ABD & PELVIS W/O CONTRAST: CPT

## 2023-12-07 PROCEDURE — 83690 ASSAY OF LIPASE: CPT | Performed by: EMERGENCY MEDICINE

## 2023-12-07 PROCEDURE — 63710000001 ONDANSETRON PER 8 MG: Performed by: INTERNAL MEDICINE

## 2023-12-07 PROCEDURE — 84484 ASSAY OF TROPONIN QUANT: CPT | Performed by: EMERGENCY MEDICINE

## 2023-12-07 PROCEDURE — 71045 X-RAY EXAM CHEST 1 VIEW: CPT

## 2023-12-07 PROCEDURE — 93005 ELECTROCARDIOGRAM TRACING: CPT | Performed by: EMERGENCY MEDICINE

## 2023-12-07 PROCEDURE — 36415 COLL VENOUS BLD VENIPUNCTURE: CPT

## 2023-12-07 PROCEDURE — 25010000002 ONDANSETRON PER 1 MG: Performed by: EMERGENCY MEDICINE

## 2023-12-07 RX ORDER — BISACODYL 5 MG/1
5 TABLET, DELAYED RELEASE ORAL DAILY PRN
Status: DISCONTINUED | OUTPATIENT
Start: 2023-12-07 | End: 2023-12-12 | Stop reason: HOSPADM

## 2023-12-07 RX ORDER — SODIUM CHLORIDE 0.9 % (FLUSH) 0.9 %
10 SYRINGE (ML) INJECTION AS NEEDED
Status: DISCONTINUED | OUTPATIENT
Start: 2023-12-07 | End: 2023-12-12 | Stop reason: HOSPADM

## 2023-12-07 RX ORDER — LISINOPRIL 20 MG/1
20 TABLET ORAL DAILY
Status: DISCONTINUED | OUTPATIENT
Start: 2023-12-07 | End: 2023-12-12 | Stop reason: HOSPADM

## 2023-12-07 RX ORDER — BUDESONIDE AND FORMOTEROL FUMARATE DIHYDRATE 160; 4.5 UG/1; UG/1
2 AEROSOL RESPIRATORY (INHALATION)
Status: DISCONTINUED | OUTPATIENT
Start: 2023-12-07 | End: 2023-12-12 | Stop reason: HOSPADM

## 2023-12-07 RX ORDER — MORPHINE SULFATE 2 MG/ML
2 INJECTION, SOLUTION INTRAMUSCULAR; INTRAVENOUS
Status: DISCONTINUED | OUTPATIENT
Start: 2023-12-07 | End: 2023-12-11

## 2023-12-07 RX ORDER — ONDANSETRON 2 MG/ML
4 INJECTION INTRAMUSCULAR; INTRAVENOUS ONCE
Status: COMPLETED | OUTPATIENT
Start: 2023-12-07 | End: 2023-12-07

## 2023-12-07 RX ORDER — SODIUM CHLORIDE 9 MG/ML
75 INJECTION, SOLUTION INTRAVENOUS CONTINUOUS
Status: DISCONTINUED | OUTPATIENT
Start: 2023-12-07 | End: 2023-12-12 | Stop reason: HOSPADM

## 2023-12-07 RX ORDER — POLYETHYLENE GLYCOL 3350 17 G/17G
17 POWDER, FOR SOLUTION ORAL DAILY PRN
Status: DISCONTINUED | OUTPATIENT
Start: 2023-12-07 | End: 2023-12-09

## 2023-12-07 RX ORDER — IPRATROPIUM BROMIDE AND ALBUTEROL SULFATE 2.5; .5 MG/3ML; MG/3ML
3 SOLUTION RESPIRATORY (INHALATION) EVERY 4 HOURS PRN
Status: DISCONTINUED | OUTPATIENT
Start: 2023-12-07 | End: 2023-12-12 | Stop reason: HOSPADM

## 2023-12-07 RX ORDER — MORPHINE SULFATE 2 MG/ML
2 INJECTION, SOLUTION INTRAMUSCULAR; INTRAVENOUS ONCE
Status: COMPLETED | OUTPATIENT
Start: 2023-12-07 | End: 2023-12-07

## 2023-12-07 RX ORDER — PANTOPRAZOLE SODIUM 40 MG/1
40 TABLET, DELAYED RELEASE ORAL
Status: DISCONTINUED | OUTPATIENT
Start: 2023-12-08 | End: 2023-12-12 | Stop reason: HOSPADM

## 2023-12-07 RX ORDER — BISACODYL 10 MG
10 SUPPOSITORY, RECTAL RECTAL DAILY PRN
Status: DISCONTINUED | OUTPATIENT
Start: 2023-12-07 | End: 2023-12-12 | Stop reason: HOSPADM

## 2023-12-07 RX ORDER — ACETAMINOPHEN 325 MG/1
650 TABLET ORAL EVERY 4 HOURS PRN
Status: DISCONTINUED | OUTPATIENT
Start: 2023-12-07 | End: 2023-12-12 | Stop reason: HOSPADM

## 2023-12-07 RX ORDER — UREA 10 %
3 LOTION (ML) TOPICAL NIGHTLY PRN
Status: DISCONTINUED | OUTPATIENT
Start: 2023-12-07 | End: 2023-12-12 | Stop reason: HOSPADM

## 2023-12-07 RX ORDER — MINERAL OIL 100 G/100G
1 OIL RECTAL DAILY PRN
Status: DISCONTINUED | OUTPATIENT
Start: 2023-12-07 | End: 2023-12-12 | Stop reason: HOSPADM

## 2023-12-07 RX ORDER — ONDANSETRON 4 MG/1
4 TABLET, FILM COATED ORAL EVERY 6 HOURS PRN
Status: DISCONTINUED | OUTPATIENT
Start: 2023-12-07 | End: 2023-12-12 | Stop reason: HOSPADM

## 2023-12-07 RX ORDER — AMOXICILLIN 250 MG
2 CAPSULE ORAL 2 TIMES DAILY
Status: DISCONTINUED | OUTPATIENT
Start: 2023-12-07 | End: 2023-12-07 | Stop reason: SDUPTHER

## 2023-12-07 RX ORDER — ONDANSETRON 2 MG/ML
4 INJECTION INTRAMUSCULAR; INTRAVENOUS EVERY 6 HOURS PRN
Status: DISCONTINUED | OUTPATIENT
Start: 2023-12-07 | End: 2023-12-12 | Stop reason: HOSPADM

## 2023-12-07 RX ORDER — AMOXICILLIN 250 MG
2 CAPSULE ORAL 2 TIMES DAILY
Status: DISCONTINUED | OUTPATIENT
Start: 2023-12-07 | End: 2023-12-12 | Stop reason: HOSPADM

## 2023-12-07 RX ORDER — LUBIPROSTONE 24 UG/1
24 CAPSULE ORAL 2 TIMES DAILY WITH MEALS
Status: DISCONTINUED | OUTPATIENT
Start: 2023-12-07 | End: 2023-12-12 | Stop reason: HOSPADM

## 2023-12-07 RX ADMIN — MORPHINE SULFATE 2 MG: 2 INJECTION, SOLUTION INTRAMUSCULAR; INTRAVENOUS at 22:40

## 2023-12-07 RX ADMIN — SODIUM CHLORIDE 75 ML/HR: 9 INJECTION, SOLUTION INTRAVENOUS at 19:27

## 2023-12-07 RX ADMIN — MORPHINE SULFATE 2 MG: 2 INJECTION, SOLUTION INTRAMUSCULAR; INTRAVENOUS at 14:41

## 2023-12-07 RX ADMIN — BUDESONIDE AND FORMOTEROL FUMARATE DIHYDRATE 2 PUFF: 160; 4.5 AEROSOL RESPIRATORY (INHALATION) at 21:33

## 2023-12-07 RX ADMIN — ACETAMINOPHEN 650 MG: 325 TABLET ORAL at 18:26

## 2023-12-07 RX ADMIN — ONDANSETRON HYDROCHLORIDE 4 MG: 4 TABLET, FILM COATED ORAL at 21:10

## 2023-12-07 RX ADMIN — Medication 10 ML: at 22:42

## 2023-12-07 RX ADMIN — DOCUSATE SODIUM 50MG AND SENNOSIDES 8.6MG 2 TABLET: 8.6; 5 TABLET, FILM COATED ORAL at 22:40

## 2023-12-07 RX ADMIN — ONDANSETRON 4 MG: 2 INJECTION INTRAMUSCULAR; INTRAVENOUS at 12:38

## 2023-12-07 RX ADMIN — LUBIPROSTONE 24 MCG: 24 CAPSULE, GELATIN COATED ORAL at 22:39

## 2023-12-07 RX ADMIN — ONDANSETRON 4 MG: 2 INJECTION INTRAMUSCULAR; INTRAVENOUS at 16:37

## 2023-12-07 RX ADMIN — MORPHINE SULFATE 2 MG: 2 INJECTION, SOLUTION INTRAMUSCULAR; INTRAVENOUS at 16:37

## 2023-12-07 NOTE — ED PROVIDER NOTES
EMERGENCY DEPARTMENT ENCOUNTER    Room Number:  27/27  PCP: Janelle Lara MD  Patient Care Team:  Janelle Lara MD as PCP - General (Family Medicine)   Independent Historians: Patient    HPI:  Chief Complaint: Vomiting, nasal discharge    A complete HPI/ROS/PMH/PSH/SH/FH are unobtainable due to: Nothing    Chronic or social conditions impacting patient care (Social Determinants of Health): None  (Financial Resource Strain / Food Insecurity / Transportation Needs / Physical Activity / Stress / Social Connections / Intimate Partner Violence / Housing Stability)    Context: Donny Mallory is a 63 y.o. male who presents to the ED c/o acute vomiting and nasal discharge.  The patient reports that he has had a productive cough.  He reports nasal discharge.  He reports abdominal pain and nausea.  He reports that he is having a hard time swallowing his nasal secretions.  He reports when he does swallow it he gets nauseated.  He reports he has been feeling like this for a few weeks.  He reports he has been to the hospital recently for the same.  He states that he was at this hospital as well as [facility].    Review of prior external notes (non-ED) -and- Review of prior external test results outside of this encounter: CT of the neck dated 11/30/2023 with no acute abnormality.  CT of the abdomen pelvis dated the same date shows common bile duct stent, distended loops of bowel suspicious for enteritis    Prescription drug monitoring program review:         PAST MEDICAL HISTORY  Active Ambulatory Problems     Diagnosis Date Noted    Psychosis 05/16/2017    Acute pancreatitis 04/21/2022    COPD (chronic obstructive pulmonary disease)     Hypertension     Dysphagia     Constipation     Cholelithiasis 04/25/2022    Severe malnutrition 04/28/2022    Ileus 10/29/2023    GERD without esophagitis 10/29/2023    Hypernatremia 10/29/2023     Resolved Ambulatory Problems     Diagnosis Date Noted    No Resolved Ambulatory  Problems     Past Medical History:   Diagnosis Date    Bell's palsy          PAST SURGICAL HISTORY  Past Surgical History:   Procedure Laterality Date    CHOLECYSTECTOMY      CHOLECYSTECTOMY WITH INTRAOPERATIVE CHOLANGIOGRAM N/A 04/25/2022    Procedure: Laparoscopic cholecystectomy with intraoperative cholangiogram, possible open;  Surgeon: Khari Schofield MD;  Location: [facility] OR;  Service: General;  Laterality: N/A;    HERNIA REPAIR           FAMILY HISTORY  Family History   Family history unknown: Yes         SOCIAL HISTORY  Social History     Socioeconomic History    Marital status: Single   Tobacco Use    Smoking status: Every Day     Packs/day: 0.25     Years: 15.00     Additional pack years: 0.00     Total pack years: 3.75     Types: Cigarettes    Smokeless tobacco: Never   Vaping Use    Vaping Use: Former   Substance and Sexual Activity    Alcohol use: No    Drug use: No    Sexual activity: Defer         ALLERGIES  Prochlorperazine        REVIEW OF SYSTEMS  Review of Systems  Included in HPI  All systems reviewed and negative except for those discussed in HPI.      PHYSICAL EXAM    I have reviewed the triage vital signs and nursing notes.    ED Triage Vitals   Temp Heart Rate Resp BP SpO2   12/07/23 1142 12/07/23 1142 12/07/23 1142 12/07/23 1148 12/07/23 1142   97.3 °F (36.3 °C) (!) 124 18 (!) 155/110 98 %      Temp src Heart Rate Source Patient Position BP Location FiO2 (%)   12/07/23 1142 12/07/23 1142 12/07/23 1148 -- --   Tympanic Monitor Sitting         Physical Exam  GENERAL: Awake, alert, ill-appearing, acutely and chronically ill-appearing the patient repeatedly swallows and then has nausea and near vomiting and this is a repetitive cycle, appears uncomfortable  SKIN: Warm, dry  HENT: Normocephalic, atraumatic  EYES: no scleral icterus  CV: regular rhythm, regular rate  RESPIRATORY: normal effort, lungs diminished  ABDOMEN: soft, nontender, nondistended  MUSCULOSKELETAL: no deformity  NEURO:  alert, moves all extremities, follows commands                                                               LAB RESULTS  Recent Results (from the past 24 hour(s))   Respiratory Panel PCR w/COVID-19(SARS-CoV-2) [facility]/TAMY/HORTENCIA/PAD/COR/GRETEL In-House, NP Swab in UTM/VTM, 2 HR TAT - Swab, Nasopharynx    Collection Time: 12/07/23 12:04 PM    Specimen: Nasopharynx; Swab   Result Value Ref Range    ADENOVIRUS, PCR Not Detected Not Detected    Coronavirus 229E Not Detected Not Detected    Coronavirus HKU1 Not Detected Not Detected    Coronavirus NL63 Not Detected Not Detected    Coronavirus OC43 Not Detected Not Detected    COVID19 Not Detected Not Detected - Ref. Range    Human Metapneumovirus Not Detected Not Detected    Human Rhinovirus/Enterovirus Not Detected Not Detected    Influenza A PCR Not Detected Not Detected    Influenza B PCR Not Detected Not Detected    Parainfluenza Virus 1 Not Detected Not Detected    Parainfluenza Virus 2 Not Detected Not Detected    Parainfluenza Virus 3 Not Detected Not Detected    Parainfluenza Virus 4 Not Detected Not Detected    RSV, PCR Not Detected Not Detected    Bordetella pertussis pcr Not Detected Not Detected    Bordetella parapertussis PCR Not Detected Not Detected    Chlamydophila pneumoniae PCR Not Detected Not Detected    Mycoplasma pneumo by PCR Not Detected Not Detected   Comprehensive Metabolic Panel    Collection Time: 12/07/23 12:32 PM    Specimen: Blood   Result Value Ref Range    Glucose 112 (H) 65 - 99 mg/dL    BUN 21 8 - 23 mg/dL    Creatinine 1.04 0.76 - 1.27 mg/dL    Sodium 144 136 - 145 mmol/L    Potassium 4.8 3.5 - 5.2 mmol/L    Chloride 108 (H) 98 - 107 mmol/L    CO2 24.2 22.0 - 29.0 mmol/L    Calcium 9.9 8.6 - 10.5 mg/dL    Total Protein 7.4 6.0 - 8.5 g/dL    Albumin 4.4 3.5 - 5.2 g/dL    ALT (SGPT) 13 1 - 41 U/L    AST (SGOT) 22 1 - 40 U/L    Alkaline Phosphatase 75 39 - 117 U/L    Total Bilirubin 0.3 0.0 - 1.2 mg/dL    Globulin 3.0 gm/dL    A/G Ratio 1.5 g/dL     BUN/Creatinine Ratio 20.2 7.0 - 25.0    Anion Gap 11.8 5.0 - 15.0 mmol/L    eGFR 81.7 >60.0 mL/min/1.73   High Sensitivity Troponin T    Collection Time: 12/07/23 12:32 PM    Specimen: Blood   Result Value Ref Range    HS Troponin T 11 <22 ng/L   CBC Auto Differential    Collection Time: 12/07/23 12:32 PM    Specimen: Blood   Result Value Ref Range    WBC 12.69 (H) 3.40 - 10.80 10*3/mm3    RBC 5.19 4.14 - 5.80 10*6/mm3    Hemoglobin 15.1 13.0 - 17.7 g/dL    Hematocrit 46.7 37.5 - 51.0 %    MCV 90.0 79.0 - 97.0 fL    MCH 29.1 26.6 - 33.0 pg    MCHC 32.3 31.5 - 35.7 g/dL    RDW 12.9 12.3 - 15.4 %    RDW-SD 42.3 37.0 - 54.0 fl    MPV 9.7 6.0 - 12.0 fL    Platelets 335 140 - 450 10*3/mm3    Neutrophil % 79.9 (H) 42.7 - 76.0 %    Lymphocyte % 12.8 (L) 19.6 - 45.3 %    Monocyte % 4.6 (L) 5.0 - 12.0 %    Eosinophil % 1.3 0.3 - 6.2 %    Basophil % 0.9 0.0 - 1.5 %    Immature Grans % 0.5 0.0 - 0.5 %    Neutrophils, Absolute 10.14 (H) 1.70 - 7.00 10*3/mm3    Lymphocytes, Absolute 1.63 0.70 - 3.10 10*3/mm3    Monocytes, Absolute 0.58 0.10 - 0.90 10*3/mm3    Eosinophils, Absolute 0.17 0.00 - 0.40 10*3/mm3    Basophils, Absolute 0.11 0.00 - 0.20 10*3/mm3    Immature Grans, Absolute 0.06 (H) 0.00 - 0.05 10*3/mm3    nRBC 0.0 0.0 - 0.2 /100 WBC   Lipase    Collection Time: 12/07/23 12:32 PM    Specimen: Blood   Result Value Ref Range    Lipase 43 13 - 60 U/L   ECG 12 Lead Dyspnea    Collection Time: 12/07/23 12:39 PM   Result Value Ref Range    QT Interval 343 ms    QTC Interval 431 ms   High Sensitivity Troponin T 2Hr    Collection Time: 12/07/23  2:31 PM    Specimen: Blood   Result Value Ref Range    HS Troponin T 12 <22 ng/L    Troponin T Delta 1 >=-4 - <+4 ng/L       I ordered the above labs and independently reviewed the results.        RADIOLOGY  CT Abdomen Pelvis Without Contrast    Result Date: 12/7/2023  CT ABDOMEN PELVIS WO CONTRAST-  HISTORY: Vomiting, recent ileus.  TECHNIQUE:  CT of the abdomen and pelvis was  performed without intravenous contrast. Evaluation of solid organs and vasculature is limited without intravenous contrast. Radiation dose reduction techniques were utilized, including automated exposure control and exposure modulation based on body size.  COMPARISON: CT abdomen and pelvis 11/26/2023  FINDINGS:  Evaluation of the lower thorax and upper abdomen is limited by motion artifact. Heart size is normal. There is no pericardial effusion. Small nodules abutting the pleural surface in the lateral left lower lobe are not significantly changed dating back to 2/21/2022. Pleural spaces are clear. The liver is normal in size. The gallbladder surgically absent. There is intrahepatic and extrahepatic pneumobilia, also present on prior, likely due to the presence of a lower common bile duct biliary stent. Spleen size is normal. There are no pancreatic calcifications. The adrenal glands are poorly visualized. Bilateral hypodense and fluid density renal lesions are better assessed on recent dedicated multiphase CT. There are punctate nonobstructing inferior right renal stones. There is no hydronephrosis. No pathologically enlarged abdominal or pelvic lymph nodes are identified within the limitations of this noncontrast enhanced examination. There is advanced calcific atherosclerosis. There is no bowel obstruction. There is colonic diverticulosis. There is air throughout multiple prominent and nondilated loops of small bowel. The bladder is poorly distended and cannot be assessed. The prostate is enlarged. There is degenerative disc disease. There is an old L1 compression fracture. There is osseous demineralization.        1.  Prominent and nondilated air-filled loops of small bowel, nonspecific. Findings could potentially reflect ileus. There is no convincing evidence for bowel obstruction. 2.  Punctate nonobstructing right renal stones.   This report was finalized on 12/7/2023 4:16 PM by Dr. Telma Fernandez M.D on  Workstation: [id number]      XR Chest 1 View    Result Date: 12/7/2023  XR CHEST 1 VW-  HISTORY: Male who is 63 years-old, short of breath  TECHNIQUE: Frontal view of the chest  COMPARISON: 10/29/2023  FINDINGS: Heart, mediastinum and pulmonary vasculature are unremarkable. No focal pulmonary consolidation, pleural effusion, or pneumothorax. No acute osseous process.      No evidence for acute pulmonary process. Follow-up as clinical indications persist.  This report was finalized on 12/7/2023 1:06 PM by Dr. Abdiaziz Kumar M.D on Workstation: [id number]       I ordered the above noted radiological studies. Reviewed by me and discussed with radiologist.  See dictation for official radiology interpretation.      PROCEDURES    Procedures      MEDICATIONS GIVEN IN ER    Medications   sodium chloride 0.9 % flush 10 mL (has no administration in time range)   ondansetron (ZOFRAN) injection 4 mg (has no administration in time range)   morphine injection 2 mg (has no administration in time range)   ondansetron (ZOFRAN) injection 4 mg (4 mg Intravenous Given 12/7/23 1238)   morphine injection 2 mg (2 mg Intravenous Given 12/7/23 1441)         ORDERS PLACED DURING THIS VISIT:  Orders Placed This Encounter   Procedures    Respiratory Panel PCR w/COVID-19(SARS-CoV-2) [facility]/TAMY/HORTENCIA/PAD/COR/GRETEL In-House, NP Swab in UTM/VTM, 2 HR TAT - Swab, Nasopharynx    XR Chest 1 View    CT Abdomen Pelvis Without Contrast    Comprehensive Metabolic Panel    High Sensitivity Troponin T    CBC Auto Differential    Lipase    High Sensitivity Troponin T 2Hr    Monitor Blood Pressure    Pulse Oximetry, Continuous    IP Palliative Care Nurse Consult    ECG 12 Lead Dyspnea    Insert Peripheral IV    Initiate Observation Status    CBC & Differential         PROGRESS, DATA ANALYSIS, CONSULTS, AND MEDICAL DECISION MAKING    All labs have been independently interpreted by me.  All radiology studies have been reviewed by me and discussed with radiologist  dictating the report.   EKG's independently viewed and interpreted by me.  Discussion below represents my analysis of pertinent findings related to patient's condition, differential diagnosis, treatment plan and final disposition.    Differential diagnosis includes but is not limited to dysphagia, upper respiratory infection, acute coronary syndrome, acute aortic syndrome, PE, dehydration,.    Clinical Scores:              ED Course as of 12/07/23 1624   Thu Dec 07, 2023   1310 XR Chest 1 View  My independent interpretation of the chest x-ray is no dense consolidation [TR]   1556 The patient has persistent nausea and vomiting.  He has normal chemistries and troponin.  He has a negative respiratory viral panel and a clear chest x-ray.  His white blood cell count is elevated.  He has multiple dilated loops of bowel on CT.  Plan admission for further management.  The patient is agreeable.  I discussed with Dr. Jama with A.  She agrees to admit. [TR]   1600 EKG          EKG time: 1239  Rhythm/Rate: Normal sinus, rate 95  P waves and SD: Normal P, normal SD  QRS, axis: Narrow QRS, normal axis  ST and T waves: No acute    Independently Interpreted by me  Resolved bradycardia changed compared to prior 10/31/2023   [TR]      ED Course User Index  [TR] Jay Franco MD                 PPE: The patient wore a mask and I wore an N95 mask throughout the entire patient encounter.       AS OF 16:24 EST VITALS:    BP - 129/87  HR - 71  TEMP - 97.3 °F (36.3 °C) (Tympanic)  O2 SATS - 96%        DIAGNOSIS  Final diagnoses:   Nausea and vomiting, unspecified vomiting type   Acute abdominal pain   Ileus         DISPOSITION  ED Disposition       ED Disposition   Decision to Admit    Condition   --    Comment   Level of Care: Telemetry [5]   Diagnosis: Intractable nausea and vomiting [358776]   Admitting Physician: CAPRI JAMA [[id number]]   Attending Physician: CAPRI JAMA [[id number]]                    Note Disclaimer:  At [facility], we believe that sharing information builds trust and better relationships. You are receiving this note because you recently visited [facility]. It is possible you will see health information before a provider has talked with you about it. This kind of information can be easy to misunderstand. To help you fully understand what it means for your health, we urge you to discuss this note with your provider.         Jay Franco MD  12/07/23 6584       Jay Franco MD  12/07/23 2188

## 2023-12-07 NOTE — OUTREACH NOTE
"Medical Week 2 Survey      Flowsheet Row Responses   [facility] patient discharged from? [place]   Does the patient have one of the following disease processes/diagnoses(primary or secondary)? Other   Week 2 attempt successful? Yes   Call start time 1114   Discharge diagnosis small bowel ileus   Call end time 1114   Week 2 Call Completed? Yes   Wrap up additional comments Patient states, \"I'm not doing well at all. I am coughing stuff up, and having severe stomach pain. I am coming back int there now.\"   Call end time 1114            Renae INGRAM - Licensed Nurse  "

## 2023-12-07 NOTE — ED NOTES
Nursing report ED to floor  Donny Mallory  63 y.o.  male    HPI :   Chief Complaint   Patient presents with    Vomiting       Admitting doctor:   Mary Jama MD    Admitting diagnosis:   The primary encounter diagnosis was Nausea and vomiting, unspecified vomiting type. A diagnosis of Acute abdominal pain was also pertinent to this visit.    Code status:   Current Code Status       Date Active Code Status Order ID Comments User Context       Prior            Allergies:   Prochlorperazine    Isolation:   No active isolations    Intake and Output  No intake or output data in the 24 hours ending 12/07/23 1609    Weight:       12/07/23  1142   Weight: 53.5 kg (118 lb)       Most recent vitals:   Vitals:    12/07/23 1201 12/07/23 1331 12/07/23 1339 12/07/23 1501   BP: (!) 149/126 (!) 144/101  129/87   Patient Position:       Pulse: 106  87 71   Resp:   18 16   Temp:       TempSrc:       SpO2: 100%  97% 96%   Weight:       Height:           Active LDAs/IV Access:   Lines, Drains & Airways       Active LDAs       Name Placement date Placement time Site Days    Peripheral IV 12/07/23 1234 Anterior;Right;Upper Arm 12/07/23  1234  Arm  less than 1                    Labs (abnormal labs have a star):   Labs Reviewed   COMPREHENSIVE METABOLIC PANEL - Abnormal; Notable for the following components:       Result Value    Glucose 112 (*)     Chloride 108 (*)     All other components within normal limits    Narrative:     GFR Normal >60  Chronic Kidney Disease <60  Kidney Failure <15     CBC WITH AUTO DIFFERENTIAL - Abnormal; Notable for the following components:    WBC 12.69 (*)     Neutrophil % 79.9 (*)     Lymphocyte % 12.8 (*)     Monocyte % 4.6 (*)     Neutrophils, Absolute 10.14 (*)     Immature Grans, Absolute 0.06 (*)     All other components within normal limits   RESPIRATORY PANEL PCR W/ COVID-19 (SARS-COV-2), NP SWAB IN UTM/VTP, 2 HR TAT - Normal    Narrative:     In the setting of a positive respiratory panel  with a viral infection PLUS a negative procalcitonin without other underlying concern for bacterial infection, consider observing off antibiotics or discontinuation of antibiotics and continue supportive care. If the respiratory panel is positive for atypical bacterial infection (Bordetella pertussis, Chlamydophila pneumoniae, or Mycoplasma pneumoniae), consider antibiotic de-escalation to target atypical bacterial infection.   TROPONIN - Normal    Narrative:     High Sensitive Troponin T Reference Range:  <14.0 ng/L- Negative Female for AMI  <22.0 ng/L- Negative Male for AMI  >=14 - Abnormal Female indicating possible myocardial injury.  >=22 - Abnormal Male indicating possible myocardial injury.   Clinicians would have to utilize clinical acumen, EKG, Troponin, and serial changes to determine if it is an Acute Myocardial Infarction or myocardial injury due to an underlying chronic condition.        LIPASE - Normal   HIGH SENSITIVITIY TROPONIN T 2HR - Normal    Narrative:     High Sensitive Troponin T Reference Range:  <14.0 ng/L- Negative Female for AMI  <22.0 ng/L- Negative Male for AMI  >=14 - Abnormal Female indicating possible myocardial injury.  >=22 - Abnormal Male indicating possible myocardial injury.   Clinicians would have to utilize clinical acumen, EKG, Troponin, and serial changes to determine if it is an Acute Myocardial Infarction or myocardial injury due to an underlying chronic condition.        CBC AND DIFFERENTIAL    Narrative:     The following orders were created for panel order CBC & Differential.  Procedure                               Abnormality         Status                     ---------                               -----------         ------                     CBC Auto Differential[[id number]]        Abnormal            Final result                 Please view results for these tests on the individual orders.       EKG:   ECG 12 Lead Dyspnea   Preliminary Result   HEART RATE= 95  bpm    RR Interval= 632  ms   NE Interval= 120  ms   P Horizontal Axis= 19  deg   P Front Axis= 84  deg   QRSD Interval= 83  ms   QT Interval= 343  ms   QTcB= 431  ms   QRS Axis= 79  deg   T Wave Axis= 73  deg   - ABNORMAL ECG -   Sinus rhythm   Right atrial enlargement   Consider left ventricular hypertrophy   Electronically Signed By:    Date and Time of Study: 2023-12-07 12:39:11          Meds given in ED:   Medications   sodium chloride 0.9 % flush 10 mL (has no administration in time range)   ondansetron (ZOFRAN) injection 4 mg (has no administration in time range)   morphine injection 2 mg (has no administration in time range)   ondansetron (ZOFRAN) injection 4 mg (4 mg Intravenous Given 12/7/23 1238)   morphine injection 2 mg (2 mg Intravenous Given 12/7/23 1441)       Imaging results:  XR Chest 1 View    Result Date: 12/7/2023  No evidence for acute pulmonary process. Follow-up as clinical indications persist.  This report was finalized on 12/7/2023 1:06 PM by Dr. Abdiaziz Kumar M.D on Workstation: [email or web address]       Ambulatory status:   - independent      Social issues:   Social History     Socioeconomic History    Marital status: Single   Tobacco Use    Smoking status: Every Day     Packs/day: 0.25     Years: 15.00     Additional pack years: 0.00     Total pack years: 3.75     Types: Cigarettes    Smokeless tobacco: Never   Vaping Use    Vaping Use: Former   Substance and Sexual Activity    Alcohol use: No    Drug use: No    Sexual activity: Defer       NIH Stroke Scale:       Oliver Roe RN  12/07/23 16:09 EST

## 2023-12-08 LAB
ANION GAP SERPL CALCULATED.3IONS-SCNC: 9.5 MMOL/L (ref 5–15)
BUN SERPL-MCNC: 19 MG/DL (ref 8–23)
BUN/CREAT SERPL: 23.2 (ref 7–25)
CALCIUM SPEC-SCNC: 8.6 MG/DL (ref 8.6–10.5)
CHLORIDE SERPL-SCNC: 109 MMOL/L (ref 98–107)
CO2 SERPL-SCNC: 23.5 MMOL/L (ref 22–29)
CREAT SERPL-MCNC: 0.82 MG/DL (ref 0.76–1.27)
DEPRECATED RDW RBC AUTO: 42.4 FL (ref 37–54)
EGFRCR SERPLBLD CKD-EPI 2021: 99.9 ML/MIN/1.73
ERYTHROCYTE [DISTWIDTH] IN BLOOD BY AUTOMATED COUNT: 13 % (ref 12.3–15.4)
GLUCOSE SERPL-MCNC: 89 MG/DL (ref 65–99)
HCT VFR BLD AUTO: 34.5 % (ref 37.5–51)
HGB BLD-MCNC: 11.6 G/DL (ref 13–17.7)
MCH RBC QN AUTO: 30.5 PG (ref 26.6–33)
MCHC RBC AUTO-ENTMCNC: 33.6 G/DL (ref 31.5–35.7)
MCV RBC AUTO: 90.8 FL (ref 79–97)
PLATELET # BLD AUTO: 231 10*3/MM3 (ref 140–450)
PMV BLD AUTO: 9.6 FL (ref 6–12)
POTASSIUM SERPL-SCNC: 3.8 MMOL/L (ref 3.5–5.2)
RBC # BLD AUTO: 3.8 10*6/MM3 (ref 4.14–5.8)
SODIUM SERPL-SCNC: 142 MMOL/L (ref 136–145)
WBC NRBC COR # BLD AUTO: 8.41 10*3/MM3 (ref 3.4–10.8)

## 2023-12-08 PROCEDURE — G0378 HOSPITAL OBSERVATION PER HR: HCPCS

## 2023-12-08 PROCEDURE — 99243 OFF/OP CNSLTJ NEW/EST LOW 30: CPT | Performed by: NURSE PRACTITIONER

## 2023-12-08 PROCEDURE — 94761 N-INVAS EAR/PLS OXIMETRY MLT: CPT

## 2023-12-08 PROCEDURE — 25010000002 MORPHINE PER 10 MG: Performed by: NURSE PRACTITIONER

## 2023-12-08 PROCEDURE — 90791 PSYCH DIAGNOSTIC EVALUATION: CPT

## 2023-12-08 PROCEDURE — 80048 BASIC METABOLIC PNL TOTAL CA: CPT | Performed by: INTERNAL MEDICINE

## 2023-12-08 PROCEDURE — 25010000002 ONDANSETRON PER 1 MG: Performed by: INTERNAL MEDICINE

## 2023-12-08 PROCEDURE — 63710000001 PROMETHAZINE PER 12.5 MG: Performed by: NURSE PRACTITIONER

## 2023-12-08 PROCEDURE — 25010000002 ENOXAPARIN PER 10 MG: Performed by: INTERNAL MEDICINE

## 2023-12-08 PROCEDURE — 94799 UNLISTED PULMONARY SVC/PX: CPT

## 2023-12-08 PROCEDURE — 85027 COMPLETE CBC AUTOMATED: CPT | Performed by: INTERNAL MEDICINE

## 2023-12-08 PROCEDURE — 92610 EVALUATE SWALLOWING FUNCTION: CPT

## 2023-12-08 PROCEDURE — 25810000003 SODIUM CHLORIDE 0.9 % SOLUTION: Performed by: INTERNAL MEDICINE

## 2023-12-08 PROCEDURE — 94664 DEMO&/EVAL PT USE INHALER: CPT

## 2023-12-08 RX ORDER — ENOXAPARIN SODIUM 100 MG/ML
40 INJECTION SUBCUTANEOUS NIGHTLY
Status: DISCONTINUED | OUTPATIENT
Start: 2023-12-08 | End: 2023-12-12 | Stop reason: HOSPADM

## 2023-12-08 RX ORDER — PROMETHAZINE HYDROCHLORIDE 12.5 MG/1
12.5 TABLET ORAL ONCE
Status: COMPLETED | OUTPATIENT
Start: 2023-12-08 | End: 2023-12-08

## 2023-12-08 RX ORDER — PROMETHAZINE HYDROCHLORIDE 25 MG/1
25 TABLET ORAL ONCE
Status: DISCONTINUED | OUTPATIENT
Start: 2023-12-08 | End: 2023-12-08

## 2023-12-08 RX ORDER — BUDESONIDE AND FORMOTEROL FUMARATE DIHYDRATE 160; 4.5 UG/1; UG/1
2 AEROSOL RESPIRATORY (INHALATION)
Status: CANCELLED | OUTPATIENT
Start: 2023-12-08

## 2023-12-08 RX ADMIN — DOCUSATE SODIUM 50MG AND SENNOSIDES 8.6MG 2 TABLET: 8.6; 5 TABLET, FILM COATED ORAL at 20:35

## 2023-12-08 RX ADMIN — PANTOPRAZOLE SODIUM 40 MG: 40 TABLET, DELAYED RELEASE ORAL at 07:30

## 2023-12-08 RX ADMIN — ACETAMINOPHEN 650 MG: 325 TABLET ORAL at 00:52

## 2023-12-08 RX ADMIN — ENOXAPARIN SODIUM 40 MG: 100 INJECTION SUBCUTANEOUS at 20:35

## 2023-12-08 RX ADMIN — DOCUSATE SODIUM 50MG AND SENNOSIDES 8.6MG 2 TABLET: 8.6; 5 TABLET, FILM COATED ORAL at 08:49

## 2023-12-08 RX ADMIN — MORPHINE SULFATE 2 MG: 2 INJECTION, SOLUTION INTRAMUSCULAR; INTRAVENOUS at 19:51

## 2023-12-08 RX ADMIN — SODIUM CHLORIDE 75 ML/HR: 9 INJECTION, SOLUTION INTRAVENOUS at 17:27

## 2023-12-08 RX ADMIN — ONDANSETRON 4 MG: 2 INJECTION INTRAMUSCULAR; INTRAVENOUS at 14:24

## 2023-12-08 RX ADMIN — MORPHINE SULFATE 2 MG: 2 INJECTION, SOLUTION INTRAMUSCULAR; INTRAVENOUS at 07:36

## 2023-12-08 RX ADMIN — ONDANSETRON 4 MG: 2 INJECTION INTRAMUSCULAR; INTRAVENOUS at 20:58

## 2023-12-08 RX ADMIN — MORPHINE SULFATE 2 MG: 2 INJECTION, SOLUTION INTRAMUSCULAR; INTRAVENOUS at 04:42

## 2023-12-08 RX ADMIN — Medication 3 MG: at 00:52

## 2023-12-08 RX ADMIN — LUBIPROSTONE 24 MCG: 24 CAPSULE, GELATIN COATED ORAL at 08:48

## 2023-12-08 RX ADMIN — MORPHINE SULFATE 2 MG: 2 INJECTION, SOLUTION INTRAMUSCULAR; INTRAVENOUS at 10:15

## 2023-12-08 RX ADMIN — MORPHINE SULFATE 2 MG: 2 INJECTION, SOLUTION INTRAMUSCULAR; INTRAVENOUS at 00:52

## 2023-12-08 RX ADMIN — BUDESONIDE AND FORMOTEROL FUMARATE DIHYDRATE 2 PUFF: 160; 4.5 AEROSOL RESPIRATORY (INHALATION) at 20:04

## 2023-12-08 RX ADMIN — ONDANSETRON 4 MG: 2 INJECTION INTRAMUSCULAR; INTRAVENOUS at 07:36

## 2023-12-08 RX ADMIN — LISINOPRIL 20 MG: 20 TABLET ORAL at 08:48

## 2023-12-08 RX ADMIN — MORPHINE SULFATE 2 MG: 2 INJECTION, SOLUTION INTRAMUSCULAR; INTRAVENOUS at 13:13

## 2023-12-08 RX ADMIN — TIOTROPIUM BROMIDE INHALATION SPRAY 2 PUFF: 3.12 SPRAY, METERED RESPIRATORY (INHALATION) at 08:19

## 2023-12-08 RX ADMIN — Medication 10 ML: at 19:52

## 2023-12-08 RX ADMIN — PROMETHAZINE HYDROCHLORIDE 12.5 MG: 12.5 TABLET ORAL at 04:43

## 2023-12-08 RX ADMIN — BUDESONIDE AND FORMOTEROL FUMARATE DIHYDRATE 2 PUFF: 160; 4.5 AEROSOL RESPIRATORY (INHALATION) at 08:21

## 2023-12-08 RX ADMIN — SODIUM CHLORIDE 75 ML/HR: 9 INJECTION, SOLUTION INTRAVENOUS at 07:36

## 2023-12-08 RX ADMIN — LUBIPROSTONE 24 MCG: 24 CAPSULE, GELATIN COATED ORAL at 17:25

## 2023-12-08 RX ADMIN — NALOXEGOL OXALATE 25 MG: 12.5 TABLET, FILM COATED ORAL at 07:30

## 2023-12-08 RX ADMIN — MORPHINE SULFATE 2 MG: 2 INJECTION, SOLUTION INTRAMUSCULAR; INTRAVENOUS at 17:25

## 2023-12-08 NOTE — CONSULTS
"[facility]  Initial Inpatient Consult Note    Referring Provider: A     Reason for Consultation: Abdominal pain    Subjective     History of present illness:      Thank you for allowing us to participate in the care of this patient.    63 y.o. male PMHx of COPD, HTN, hx of chronic constipation, hx of depression w/ prior suicide attempt, hx of biliary and PD dilation s/p stent placement (follows Dr. Juarez of [facility]),     He was followed by our service during admission in Nov and Oct of this year (ileus and constipation) and April of 2022 (acute pancreatitis and constipation).    Patient reports his last bowel movement was 2 to 4 days ago.  Prior to this he states he has not had a bowel movement since his hospital discharge November 25.  His outpatient GI attempted to put him on Motegrity and Linzess but it was not covered by his insurance.  He reports right-sided abdominal pain that radiates to the umbilical area.  Frequently feels stool or \"food\" become trapped in this area.  With prolonged constipation he will have nausea and vomiting.  Denies rectal pain or rectal bleeding.  He has been started on stool softeners, Movantik and Amitiza this admission.  He is receiving IV morphine for pain relief along with Zofran.    He has a longstanding history of GERD.  He is currently on daily PPI therapy.  Denies dysphagia, odynophagia but does report poor appetite in correlation with constipation.  He reports 40 pound weight loss over the past 1 year.  He is currently NPO.    Hemoglobin this morning 11.6.  Negative respiratory panel and chest x-ray.  Apparently he reported cough and sinus drainage.  Denies fever but does endorse intermittent ills.    CT A/P wo 12/7 with prominent and nondilated air-filled loops of small bowel, nonspecific. Findings could potentially reflect ileus. There is no convincing evidence for bowel obstruction. Punctate nonobstructing right renal stones.    He had a " colonoscopy ([facility]) 10/5/2023 reviewed in care everywhere with polyps, ulceration and altered mucosal texture in the rectum along with external hemorrhoids.    Patient reports having anal manometry 2 weeks ago and he is still waiting on results.    Past Medical History:  Past Medical History:   Diagnosis Date    Bell's palsy     Chronic sinusitis     COPD (chronic obstructive pulmonary disease)     Depression     Dysphagia     previous workup at [facility]    History of biliary stent insertion     CBD stent    Hypertension     Intractable nausea and vomiting 2023    Presence of pancreatic duct stent     Suicide attempt      Past Surgical History:  Past Surgical History:   Procedure Laterality Date    CHOLECYSTECTOMY      CHOLECYSTECTOMY WITH INTRAOPERATIVE CHOLANGIOGRAM N/A 2022    Procedure: Laparoscopic cholecystectomy with intraoperative cholangiogram, possible open;  Surgeon: Khari Schofield MD;  Location: [facility];  Service: General;  Laterality: N/A;    HERNIA REPAIR        Social History:   Social History     Tobacco Use    Smoking status: Every Day     Packs/day: 0.25     Years: 15.00     Additional pack years: 0.00     Total pack years: 3.75     Types: Cigarettes    Smokeless tobacco: Never   Substance Use Topics    Alcohol use: No      Family History:  Family History   Family history unknown: Yes       Home Meds:  Medications Prior to Admission   Medication Sig Dispense Refill Last Dose    Fluticasone-Umeclidin-Vilant (Trelegy Ellipta) 100-62.5-25 MCG/INH inhaler Inhale 1 puff Daily.       ibuprofen (IBU) 400 MG tablet Take 0.5 tablets by mouth Every 6 (Six) Hours As Needed for Mild Pain. 30 tablet 0     ipratropium-albuterol (DUO-NEB) 0.5-2.5 mg/3 ml nebulizer Take 3 mL by nebulization Every 4 (Four) Hours As Needed for Wheezing.       [] linaclotide (Linzess) 145 MCG capsule capsule Take 1 capsule by mouth Every Morning Before Breakfast for 30 days. 30 capsule 0      lisinopril (PRINIVIL,ZESTRIL) 20 MG tablet Take 1 tablet by mouth Daily.       lubiprostone (AMITIZA) 24 MCG capsule Take 1 capsule by mouth 2 (Two) Times a Day With Meals. 60 capsule 0     mineral oil enema Insert 1 each into the rectum Daily As Needed for Constipation (until constipation resolves). 21 each 0     Naloxegol Oxalate (MOVANTIK) 25 MG tablet Take 1 tablet by mouth Every Morning. 30 tablet 0     omeprazole (priLOSEC) 20 MG capsule Take 1 capsule by mouth Daily. 30 capsule 0     ondansetron ODT (ZOFRAN-ODT) 4 MG disintegrating tablet Place 1 tablet on the tongue 4 (Four) Times a Day As Needed for Nausea or Vomiting. 15 tablet 0     [] sennosides-docusate (senna-docusate sodium) 8.6-50 MG per tablet Take 2 tablets by mouth 2 (Two) Times a Day for 30 days. 120 tablet 0     sucralfate (CARAFATE) 1 g tablet Take 1 tablet by mouth 4 (Four) Times a Day. 40 tablet 0     promethazine (PHENERGAN) 25 MG tablet Take 1 tablet by mouth Every 6 (Six) Hours As Needed for Nausea or Vomiting. 10 tablet 0      Current Meds:   budesonide-formoterol, 2 puff, Inhalation, BID - RT   And  tiotropium bromide monohydrate, 2 puff, Inhalation, Daily - RT  lisinopril, 20 mg, Oral, Daily  lubiprostone, 24 mcg, Oral, BID With Meals  Naloxegol Oxalate, 25 mg, Oral, QAM  pantoprazole, 40 mg, Oral, Q AM  senna-docusate sodium, 2 tablet, Oral, BID      Allergies:  Allergies   Allergen Reactions    Prochlorperazine Anxiety     Review of Systems  History obtained from chart review and the patient  Gastrointestinal ROS: positive for - abdominal pain, constipation, and nausea/vomiting    Objective     Vital Signs  Temp:  [97.3 °F (36.3 °C)-98.6 °F (37 °C)] 97.7 °F (36.5 °C)  Heart Rate:  [] 52  Resp:  [16-18] 18  BP: ()/() 109/66  Physical Exam:   Constitutional:    Alert, cooperative, in no acute distress    Abdomen:     Soft, umbilical tenderness noted, normal bowel sounds    Results Review:   I reviewed the  patient's new clinical results.    Results from last 7 days   Lab Units 12/08/23  0747 12/07/23  1232   WBC 10*3/mm3 8.41 12.69*   HEMOGLOBIN g/dL 11.6* 15.1   HEMATOCRIT % 34.5* 46.7   PLATELETS 10*3/mm3 231 335     Results from last 7 days   Lab Units 12/08/23  0747 12/07/23  1232   SODIUM mmol/L 142 144   POTASSIUM mmol/L 3.8 4.8   CHLORIDE mmol/L 109* 108*   CO2 mmol/L 23.5 24.2   BUN mg/dL 19 21   CREATININE mg/dL 0.82 1.04   CALCIUM mg/dL 8.6 9.9   BILIRUBIN mg/dL  --  0.3   ALK PHOS U/L  --  75   ALT (SGPT) U/L  --  13   AST (SGOT) U/L  --  22   GLUCOSE mg/dL 89 112*         Lab Results   Lab Value Date/Time    LIPASE 43 12/07/2023 1232    LIPASE 34 11/21/2023 1312    LIPASE 30 11/11/2023 1029    LIPASE 31 10/29/2023 1629    LIPASE 20 10/28/2023 1232    LIPASE 43 10/22/2023 1526    LIPASE 18 10/16/2023 1509    LIPASE 36 10/09/2023 1435    LIPASE 20 10/07/2023 1303    LIPASE 44 09/26/2023 1716    LIPASE 49 09/21/2023 0031    LIPASE 23 09/11/2023 1507    LIPASE 40 08/15/2023 1537    LIPASE 40 07/26/2023 1437    LIPASE 22 07/22/2023 1843    LIPASE 20 07/14/2023 1223    LIPASE 25 07/01/2023 1451    LIPASE 25 06/26/2023 1902    LIPASE 26 06/23/2023 1500    LIPASE 17 06/19/2023 1222    LIPASE 21 06/15/2023 2359    LIPASE 27 05/16/2023 1512    LIPASE 67 (H) 07/28/2022 1556    LIPASE 20 06/27/2022 2034    LIPASE 33 06/23/2022 1837    LIPASE 22 06/18/2022 1127    LIPASE 31 06/11/2022 1213    LIPASE 95 (H) 06/08/2022 1506    LIPASE 26 06/03/2022 1004    LIPASE 25 05/16/2022 1136    LIPASE 23 05/02/2022 1706    LIPASE 80 (H) 04/22/2022 0548    LIPASE 425 (H) 04/21/2022 1012    LIPASE 20 04/19/2022 1118    LIPASE 25 04/03/2022 1123    LIPASE 23 03/12/2022 2156    LIPASE 31 02/21/2022 1614    LIPASE 31 02/18/2022 1400    LIPASE 29 02/06/2022 1552    LIPASE 30 01/24/2022 1221    LIPASE 30 12/22/2021 1517    LIPASE 24 11/12/2021 1253    LIPASE 18 10/23/2021 1707    LIPASE 22 08/31/2021 1206    LIPASE 24 08/20/2021 1347     LIPASE 23 08/03/2021 0024    LIPASE 25 07/22/2021 0218    LIPASE 129 (H) 07/02/2021 1554    LIPASE 36 06/25/2021 1615    LIPASE 26 06/15/2021 1725    LIPASE 66 04/12/2021 1630    LIPASE 103 08/12/2019 1105    LIPASE 55 02/12/2019 2052       Radiology:  CT Abdomen Pelvis Without Contrast   Final Result       1.  Prominent and nondilated air-filled loops of small bowel,   nonspecific. Findings could potentially reflect ileus. There is no   convincing evidence for bowel obstruction.   2.  Punctate nonobstructing right renal stones.           This report was finalized on 12/7/2023 4:16 PM by Dr. Telma Fernandez M.D   on Workstation: [id number]          XR Chest 1 View   Final Result   No evidence for acute pulmonary process. Follow-up as   clinical indications persist.       This report was finalized on 12/7/2023 1:06 PM by Dr. Abdiaziz Kumar M.D on Workstation: [id number]              Assessment & Plan   Active Hospital Problems    Diagnosis     **Intractable nausea and vomiting        Assessment:  Abdominal pain  Chronic constipation  Abnormal CT suggestive of ileus  Nausea and vomiting  GERD  Personal history of colon polyps  History of pancreatic stent placement follows with Dr. Juarez at [facility]    Plan:  GI soft diet as tolerated   Give tapwater enema x 1 can repeat if inadequate results  Continue Amitiza and Movantik  Continue PPI therapy  CBC and CMP in the morning    I discussed the patients findings and my recommendations with patient, nursing staff, and Dr. Melissa .    Rian dictation used throughout this note.            TOBIAS Fallon  [facility]  [address] [place] [address]  Office: [phone]

## 2023-12-08 NOTE — H&P
HISTORY AND PHYSICAL   [facility]        Date of Admission: 2023  Patient Identification:  Name: Donny Mallory  Age: 63 y.o.  Sex: male  :  1960  MRN: [id number]                     Primary Care Physician: Janelle Lara MD    Chief Complaint:  63 year old gentleman who presented to the emergency room with abdominal pain, nausea and vomiting; he has also had nasal drainage; he denies fever or chills; denies sick contacts; he was admitted last month for an ileus; he says he was able to force himself to have a small bowel movement earlier while he was in the ED    History of Present Illness:   As above    Past Medical History:  Past Medical History:   Diagnosis Date    Bell's palsy     COPD (chronic obstructive pulmonary disease)     Dysphagia     previous workup at [facility]    Hypertension     Intractable nausea and vomiting 2023     Past Surgical History:  Past Surgical History:   Procedure Laterality Date    CHOLECYSTECTOMY      CHOLECYSTECTOMY WITH INTRAOPERATIVE CHOLANGIOGRAM N/A 2022    Procedure: Laparoscopic cholecystectomy with intraoperative cholangiogram, possible open;  Surgeon: Khari Schofield MD;  Location: [facility];  Service: General;  Laterality: N/A;    HERNIA REPAIR        Home Meds:  Medications Prior to Admission   Medication Sig Dispense Refill Last Dose    linaclotide (Linzess) 145 MCG capsule capsule Take 1 capsule by mouth Every Morning Before Breakfast for 30 days. 30 capsule 0     Fluticasone-Umeclidin-Vilant (Trelegy Ellipta) 100-62.5-25 MCG/INH inhaler Inhale 1 puff Daily.       ibuprofen (IBU) 400 MG tablet Take 0.5 tablets by mouth Every 6 (Six) Hours As Needed for Mild Pain. 30 tablet 0     ipratropium-albuterol (DUO-NEB) 0.5-2.5 mg/3 ml nebulizer Take 3 mL by nebulization Every 4 (Four) Hours As Needed for Wheezing.       lisinopril (PRINIVIL,ZESTRIL) 20 MG tablet Take 1 tablet by mouth Daily.       lubiprostone (AMITIZA) 24  MCG capsule Take 1 capsule by mouth 2 (Two) Times a Day With Meals. 60 capsule 0     mineral oil enema Insert 1 each into the rectum Daily As Needed for Constipation (until constipation resolves). 21 each 0     Naloxegol Oxalate (MOVANTIK) 25 MG tablet Take 1 tablet by mouth Every Morning. 30 tablet 0     omeprazole (priLOSEC) 20 MG capsule Take 1 capsule by mouth Daily. 30 capsule 0     ondansetron ODT (ZOFRAN-ODT) 4 MG disintegrating tablet Place 1 tablet on the tongue 4 (Four) Times a Day As Needed for Nausea or Vomiting. 15 tablet 0     promethazine (PHENERGAN) 25 MG tablet Take 1 tablet by mouth Every 6 (Six) Hours As Needed for Nausea or Vomiting. 10 tablet 0     sennosides-docusate (senna-docusate sodium) 8.6-50 MG per tablet Take 2 tablets by mouth 2 (Two) Times a Day for 30 days. 120 tablet 0     sucralfate (CARAFATE) 1 g tablet Take 1 tablet by mouth 4 (Four) Times a Day. 40 tablet 0        Allergies:  Allergies   Allergen Reactions    Prochlorperazine Anxiety     Immunizations:  Immunization History   Administered Date(s) Administered    COVID-19 (MODERNA) 1st,2nd,3rd Dose Monovalent 09/15/2021, 10/13/2021    Fluzone Quad >6mos (Multi-dose) 03/14/2017    Pneumococcal Polysaccharide (PPSV23) 08/14/2018     Social History:   Social History     Social History Narrative    Not on file     Social History     Socioeconomic History    Marital status: Single   Tobacco Use    Smoking status: Every Day     Packs/day: 0.25     Years: 15.00     Additional pack years: 0.00     Total pack years: 3.75     Types: Cigarettes    Smokeless tobacco: Never   Vaping Use    Vaping Use: Former   Substance and Sexual Activity    Alcohol use: No    Drug use: No    Sexual activity: Defer       Family History:  Family History   Family history unknown: Yes        Review of Systems  See history of present illness and past medical history.  Patient denies headache, dizziness, syncope, falls, trauma, change in vision, change in hearing,  "change in taste, changes in weight, changes in appetite, focal weakness, numbness, or paresthesia.  Patient denies chest pain, palpitations, dyspnea, orthopnea, PND, cough, sinus pressure, rhinorrhea, epistaxis, hemoptysis, nausea, vomiting,hematemesis, diarrhea, constipation or hematochezia.  Denies cold or heat intolerance, polydipsia, polyuria, polyphagia. Denies hematuria, pyuria, dysuria, hesitancy, frequency or urgency. Denies consumption of raw and under cooked meats foods or change in water source.  Denies fever, chills, sweats, night sweats.  Denies missing any routine medications. Remainder of ROS is negative.    Objective:  T Max 24 hrs: Temp (24hrs), Av °F (36.7 °C), Min:97.3 °F (36.3 °C), Max:98.6 °F (37 °C)    Vitals Ranges:   Temp:  [97.3 °F (36.3 °C)-98.6 °F (37 °C)] 98 °F (36.7 °C)  Heart Rate:  [] 56  Resp:  [16-18] 16  BP: (129-155)/() 134/83      Exam:  /83 (BP Location: Left arm, Patient Position: Lying)   Pulse 56   Temp 98 °F (36.7 °C) (Oral)   Resp 16   Ht 177.8 cm (70\")   Wt 53.5 kg (118 lb)   SpO2 97%   BMI 16.93 kg/m²     General Appearance:    Alert, cooperative, no distress, appears stated age   Head:    Normocephalic, without obvious abnormality, atraumatic   Eyes:    PERRL, conjunctivae/corneas clear, EOM's intact, both eyes   Ears:    Normal external ear canals, both ears   Nose:   Nares normal, septum midline, mucosa normal, no drainage    or sinus tenderness   Throat:   Lips, mucosa, and tongue normal   Neck:   Supple, symmetrical, trachea midline, no adenopathy;     thyroid:  no enlargement/tenderness/nodules; no carotid    bruit or JVD   Back:     Symmetric, no curvature, ROM normal, no CVA tenderness   Lungs:     Decreased breath sounds bilaterally, respirations unlabored   Chest Wall:    No tenderness or deformity    Heart:    Regular rate and rhythm, S1 and S2 normal, no murmur, rub   or gallop   Abdomen:     Soft, nontender, bowel sounds active all " four quadrants,     no masses, no hepatomegaly, no splenomegaly   Extremities:   Extremities normal, atraumatic, no cyanosis or edema                       .    Data Review:  Labs in chart were reviewed.  WBC   Date Value Ref Range Status   12/07/2023 12.69 (H) 3.40 - 10.80 10*3/mm3 Final     Hemoglobin   Date Value Ref Range Status   12/07/2023 15.1 13.0 - 17.7 g/dL Final     Hematocrit   Date Value Ref Range Status   12/07/2023 46.7 37.5 - 51.0 % Final     Platelets   Date Value Ref Range Status   12/07/2023 335 140 - 450 10*3/mm3 Final     Sodium   Date Value Ref Range Status   12/07/2023 144 136 - 145 mmol/L Final     Potassium   Date Value Ref Range Status   12/07/2023 4.8 3.5 - 5.2 mmol/L Final     Comment:     Specimen hemolyzed.  Result may be falsely elevated.     Chloride   Date Value Ref Range Status   12/07/2023 108 (H) 98 - 107 mmol/L Final     CO2   Date Value Ref Range Status   12/07/2023 24.2 22.0 - 29.0 mmol/L Final     BUN   Date Value Ref Range Status   12/07/2023 21 8 - 23 mg/dL Final     Creatinine   Date Value Ref Range Status   12/07/2023 1.04 0.76 - 1.27 mg/dL Final     Glucose   Date Value Ref Range Status   12/07/2023 112 (H) 65 - 99 mg/dL Final     Calcium   Date Value Ref Range Status   12/07/2023 9.9 8.6 - 10.5 mg/dL Final                Imaging Results (All)       Procedure Component Value Units Date/Time    CT Abdomen Pelvis Without Contrast [[id number]] Collected: 12/07/23 1607     Updated: 12/07/23 1619    Narrative:      CT ABDOMEN PELVIS WO CONTRAST-     HISTORY: Vomiting, recent ileus.     TECHNIQUE:  CT of the abdomen and pelvis was performed without  intravenous contrast. Evaluation of solid organs and vasculature is  limited without intravenous contrast. Radiation dose reduction  techniques were utilized, including automated exposure control and  exposure modulation based on body size.     COMPARISON: CT abdomen and pelvis 11/26/2023     FINDINGS:     Evaluation of the lower  thorax and upper abdomen is limited by motion  artifact.   Heart size is normal. There is no pericardial effusion. Small nodules  abutting the pleural surface in the lateral left lower lobe are not  significantly changed dating back to 2/21/2022. Pleural spaces are  clear.  The liver is normal in size. The gallbladder surgically absent. There is  intrahepatic and extrahepatic pneumobilia, also present on prior, likely  due to the presence of a lower common bile duct biliary stent. Spleen  size is normal. There are no pancreatic calcifications. The adrenal  glands are poorly visualized. Bilateral hypodense and fluid density  renal lesions are better assessed on recent dedicated multiphase CT.  There are punctate nonobstructing inferior right renal stones. There is  no hydronephrosis.  No pathologically enlarged abdominal or pelvic lymph nodes are  identified within the limitations of this noncontrast enhanced  examination. There is advanced calcific atherosclerosis.  There is no bowel obstruction. There is colonic diverticulosis. There is  air throughout multiple prominent and nondilated loops of small bowel.  The bladder is poorly distended and cannot be assessed. The prostate is  enlarged.  There is degenerative disc disease. There is an old L1 compression  fracture. There is osseous demineralization.          Impression:         1.  Prominent and nondilated air-filled loops of small bowel,  nonspecific. Findings could potentially reflect ileus. There is no  convincing evidence for bowel obstruction.  2.  Punctate nonobstructing right renal stones.        This report was finalized on 12/7/2023 4:16 PM by Dr. Telma Fernandez M.D  on Workstation: [id number]       XR Chest 1 View [[id number]] Collected: 12/07/23 1305     Updated: 12/07/23 1309    Narrative:      XR CHEST 1 VW-     HISTORY: Male who is 63 years-old, short of breath     TECHNIQUE: Frontal view of the chest     COMPARISON: 10/29/2023     FINDINGS: Heart,  mediastinum and pulmonary vasculature are unremarkable.  No focal pulmonary consolidation, pleural effusion, or pneumothorax. No  acute osseous process.       Impression:      No evidence for acute pulmonary process. Follow-up as  clinical indications persist.     This report was finalized on 12/7/2023 1:06 PM by Dr. Abdiaziz Kumar M.D on Workstation: [id number]                 Assessment:  Active Hospital Problems    Diagnosis  POA    **Intractable nausea and vomiting [R11.2]  Yes      Resolved Hospital Problems   No resolved problems to display.   Ileus  Copd  Hypertension  Hyperglycemia  Severe malnutrition    Plan:  Will ask gi to see him  Continue bowel regimen, iv fluids  Trend labs  Dw patient and ed provide    Mary Jama MD  12/7/2023  19:27 EST

## 2023-12-08 NOTE — PLAN OF CARE
Goal Outcome Evaluation:  Plan of Care Reviewed With: patient           Outcome Evaluation: Swallow eval completed. Pt was restless, walking around the room, but agreeable for eval. Pt tolerated cup sips of thin, pureed, soft, and mixed consistencies. Mastication was slow due to dentition with pt having only a few teeth and many loose. Pt reported he often swallows chunks of food. Pureed was offered but pt refused. Pt agreeable to mashing soft food with a fork. At end of eval, pt coughed after a straw sip of water and coughed up clear mucous. Pt stated he does not use straws. Recommend continue GI soft diet, ground meat w/ thin; meds as tolerated; upright for meals and 30 min after; slow rate; small bites/sips; no straws. ST to follow.      Anticipated Discharge Disposition (SLP): unknown

## 2023-12-08 NOTE — OUTREACH NOTE
Prep Survey      Flowsheet Row Responses   [facility] patient discharged from? [facility]   Does the patient have one of the following disease processes/diagnoses(primary or secondary)? Higinio DUFF - Registered Nurse

## 2023-12-08 NOTE — PLAN OF CARE
"Goal Outcome Evaluation:      Pt c/o pain as \"constant\" in RUQ throughout shift. Pt verbalized that ever since stents placed in pancreas and liver, abdominal pain has severely worsened. Pt still has active bowel sounds in all 4 quads but no BM yet. Pt verbalized feelings of hopelessness and depression due to severe pain and limited ability to function in life( including eating). GI, speech, Access consults.                  "

## 2023-12-08 NOTE — PROGRESS NOTES
"    Name: Donny Mallory ADMIT: 2023   : 1960  PCP: Janelle Lara MD    MRN: [id number] LOS: 0 days   AGE/SEX: 63 y.o. male  ROOM: [facility]     Subjective   Subjective   Patient is lying in the bed and does not appear to be any distress but however appears withdrawn.  Nausea and vomiting appears to be improving.  States he has been passing gas.       Objective   Objective   Vital Signs  Temp:  [97.5 °F (36.4 °C)-98.6 °F (37 °C)] 97.7 °F (36.5 °C)  Heart Rate:  [49-87] 52  Resp:  [16-18] 18  BP: ()/() 109/66  SpO2:  [95 %-99 %] 95 %  on   ;   Device (Oxygen Therapy): room air  Body mass index is 18.19 kg/m².  Physical Exam  HEENT: PERRLA, extraocular movements intact, Scleras no icterus  Neck: Supple, no JVD  Cardiovascular: Regular rate and rhythm with normal S1-S2  Respiratory: Fairly clear to auscultation bilaterally with no wheezes  GI: Soft, nontender, bowel sounds are present but diminished  Extremities: No edema, palpable pedal pulses  Neurologic: Grossly nonfocal, no facial asymmetry    Results Review     I reviewed the patient's new clinical results.  Results from last 7 days   Lab Units 23  0747 23  1232   WBC 10*3/mm3 8.41 12.69*   HEMOGLOBIN g/dL 11.6* 15.1   PLATELETS 10*3/mm3 231 335     Results from last 7 days   Lab Units 23  0747 23  1232   SODIUM mmol/L 142 144   POTASSIUM mmol/L 3.8 4.8   CHLORIDE mmol/L 109* 108*   CO2 mmol/L 23.5 24.2   BUN mg/dL 19 21   CREATININE mg/dL 0.82 1.04   GLUCOSE mg/dL 89 112*   EGFR mL/min/1.73 99.9 81.7     Results from last 7 days   Lab Units 23  1232   ALBUMIN g/dL 4.4   BILIRUBIN mg/dL 0.3   ALK PHOS U/L 75   AST (SGOT) U/L 22   ALT (SGPT) U/L 13     Results from last 7 days   Lab Units 23  0747 23  1232   CALCIUM mg/dL 8.6 9.9   ALBUMIN g/dL  --  4.4       No results found for: \"HGBA1C\", \"POCGLU\"    CT Abdomen Pelvis Without Contrast    Result Date: 2023   1.  Prominent and nondilated " air-filled loops of small bowel, nonspecific. Findings could potentially reflect ileus. There is no convincing evidence for bowel obstruction. 2.  Punctate nonobstructing right renal stones.   This report was finalized on 12/7/2023 4:16 PM by Dr. Telma Fernandez M.D on Workstation: [id number]      XR Chest 1 View    Result Date: 12/7/2023  No evidence for acute pulmonary process. Follow-up as clinical indications persist.  This report was finalized on 12/7/2023 1:06 PM by Dr. Abdiaziz Kumar M.D on Workstation: [id number]       I have personally reviewed all medications:  Scheduled Medications  budesonide-formoterol, 2 puff, Inhalation, BID - RT   And  tiotropium bromide monohydrate, 2 puff, Inhalation, Daily - RT  lisinopril, 20 mg, Oral, Daily  lubiprostone, 24 mcg, Oral, BID With Meals  Naloxegol Oxalate, 25 mg, Oral, QAM  pantoprazole, 40 mg, Oral, Q AM  senna-docusate sodium, 2 tablet, Oral, BID    Infusions  sodium chloride, 75 mL/hr, Last Rate: 75 mL/hr (12/08/23 1134)    Diet  NPO Diet NPO Type: Sips with Meds, Ice Chips    I have personally reviewed:  [x]  Laboratory   [x]  Microbiology   [x]  Radiology   [x]  EKG/Telemetry  [x]  Cardiology/Vascular   []  Pathology    []  Records       Assessment/Plan     Active Hospital Problems    Diagnosis  POA    **Intractable nausea and vomiting [R11.2]  Yes      Resolved Hospital Problems   No resolved problems to display.       63 y.o. male admitted with Intractable nausea and vomiting.    1.Nausea/vomiting/ileus, continue with symptomatic treatment and GI consult has been obtained.  Encourage patient to ambulate.  2.  Hypertension, on lisinopril which will be continued.  3.  GERD, on acid suppressive therapy.  4.  Depression, [facility] following along.  5.  History of COPD, continue with Symbicort and currently not in any exacerbation.  6.  On Lovenox for DVT prophylaxis.  7.  CODE STATUS is full code.      Abhijeet Dela Cruz MD  [facility]  12/08/23  13:06 EST

## 2023-12-08 NOTE — THERAPY EVALUATION
Acute Care - Speech Language Pathology   Swallow Initial Evaluation [facility]     Patient Name: Donny Mallory  : 1960  MRN: [id number]  Today's Date: 2023               Admit Date: 2023    Visit Dx:     ICD-10-CM ICD-9-CM   1. Nausea and vomiting, unspecified vomiting type  R11.2 787.01   2. Acute abdominal pain  R10.9 789.00     338.19   3. Ileus  K56.7 560.1     Patient Active Problem List   Diagnosis    Psychosis    Acute pancreatitis    COPD (chronic obstructive pulmonary disease)    HTN (hypertension)    Dysphagia    Constipation    Cholelithiasis    Severe malnutrition    Ileus    GERD without esophagitis    Hypernatremia    Intractable nausea and vomiting     Past Medical History:   Diagnosis Date    Bell's palsy     Chronic sinusitis     COPD (chronic obstructive pulmonary disease)     Depression     Dysphagia     previous workup at [facility]    History of biliary stent insertion     CBD stent    Hypertension     Intractable nausea and vomiting 2023    Presence of pancreatic duct stent     Suicide attempt      Past Surgical History:   Procedure Laterality Date    CHOLECYSTECTOMY      CHOLECYSTECTOMY WITH INTRAOPERATIVE CHOLANGIOGRAM N/A 2022    Procedure: Laparoscopic cholecystectomy with intraoperative cholangiogram, possible open;  Surgeon: Khari Schofield MD;  Location: [facility] MAIN OR;  Service: General;  Laterality: N/A;    HERNIA REPAIR         SLP Recommendation and Plan  SLP Swallowing Diagnosis: R/O pharyngeal dysphagia (23)  SLP Diet Recommendation: soft to chew textures, ground, thin liquids (23)  Recommended Precautions and Strategies: upright posture during/after eating, small bites of food and sips of liquid, no straw (23)  SLP Rec. for Method of Medication Administration: as tolerated (23)     Monitor for Signs of Aspiration: yes (23)     Swallow Criteria for Skilled Therapeutic Interventions Met:  demonstrates skilled criteria (12/08/23 1500)  Anticipated Discharge Disposition (SLP): unknown (12/08/23 1500)  Rehab Potential/Prognosis, Swallowing: good, to achieve stated therapy goals (12/08/23 1500)  Therapy Frequency (Swallow): PRN (12/08/23 1500)  Predicted Duration Therapy Intervention (Days): until discharge (12/08/23 1500)  Oral Care Recommendations: Oral Care BID/PRN (12/08/23 1500)                                      Oral Care Recommendations: Oral Care BID/PRN (12/08/23 1500)    Plan of Care Reviewed With: patient  Outcome Evaluation: Swallow eval completed. Pt was restless, walking around the room, but agreeable for eval. Pt tolerated cup sips of thin, pureed, soft, and mixed consistencies. Mastication was slow due to dentition with pt having only a few teeth and many loose. Pt reported he often swallows chunks of food. Pureed was offered but pt refused. Pt agreeable to mashing soft food with a fork. At end of eval, pt coughed after a straw sip of water and coughed up clear mucous. Pt stated he does not use straws. Recommend continue GI soft diet, ground meat w/ thin; meds as tolerated; upright for meals and 30 min after; slow rate; small bites/sips; no straws. ST to follow.      SWALLOW EVALUATION (last 72 hours)       SLP Adult Swallow Evaluation       Row Name 12/08/23 1500                   Rehab Evaluation    Document Type evaluation  -AW        Subjective Information no complaints  -AW        Patient Observations alert;cooperative;agree to therapy  -AW        Patient Effort good  -AW        Symptoms Noted During/After Treatment none  -AW           General Information    Patient Profile Reviewed yes  -AW        Pertinent History Of Current Problem Pt admitted with nausea, vomiting, and possible ileus. GI following  -AW        Current Method of Nutrition soft to chew textures;ground;thin liquids;other (see comments)  Diet ordered after GI saw pt. VFSS in 4/26/22 showed a functional swallow.  -AW         Precautions/Limitations, Vision WFL;for purposes of eval  -AW        Precautions/Limitations, Hearing WFL;for purposes of eval  -AW        Prior Level of Function-Communication WFL  -AW        Prior Level of Function-Swallowing esophageal concerns;soft to chew;thin liquids  -AW        Plans/Goals Discussed with patient;agreed upon  -AW        Barriers to Rehab none identified  -AW        Patient's Goals for Discharge return home  -AW           Pain    Additional Documentation Pain Scale: Numbers Pre/Post-Treatment (Group)  -AW           Pain Scale: Numbers Pre/Post-Treatment    Pretreatment Pain Rating 9/10  -AW        Posttreatment Pain Rating 9/10  -AW        Pain Location - abdomen  -AW        Pain Intervention(s) Repositioned  -AW           Oral Motor Structure and Function    Dentition Assessment missing teeth;teeth are in poor condition  -AW        Secretion Management WNL/WFL  -AW        Mucosal Quality moist, healthy  -AW           Oral Musculature and Cranial Nerve Assessment    Oral Motor General Assessment WFL  -AW           General Eating/Swallowing Observations    Respiratory Support Currently in Use room air  -AW        Eating/Swallowing Skills self-fed  -AW        Positioning During Eating upright in chair  -AW        Utensils Used spoon;cup;straw  -AW        Consistencies Trialed thin liquids;pureed;soft to chew textures;mixed consistency  -AW           Clinical Swallow Eval    Clinical Swallow Evaluation Summary Swallow eval completed. Pt was restless, walking around the room, but agreeable for eval. Pt tolerated cup sips of thin, pureed, soft, and mixed consistencies. Mastication was slow due to dentition with pt having only a few teeth and many loose. Pt reported he often swallows chunks of food. Pureed was offered but pt refused. Pt agreeable to mashing soft food with a fork. At end of eval, pt coughed after a straw sip of water and coughed up clear mucous. Pt stated he does not use  straws. Recommend continue GI soft diet, ground meat w/ thin; meds as tolerated; upright for meals and 30 min after; slow rate; small bites/sips; no straws. ST to follow.  -AW           SLP Evaluation Clinical Impression    SLP Swallowing Diagnosis R/O pharyngeal dysphagia  -AW        Functional Impact risk of aspiration/pneumonia  -AW        Rehab Potential/Prognosis, Swallowing good, to achieve stated therapy goals  -AW        Swallow Criteria for Skilled Therapeutic Interventions Met demonstrates skilled criteria  -AW           Recommendations    Therapy Frequency (Swallow) PRN  -AW        Predicted Duration Therapy Intervention (Days) until discharge  -AW        SLP Diet Recommendation soft to chew textures;ground;thin liquids  -AW        Recommended Precautions and Strategies upright posture during/after eating;small bites of food and sips of liquid;no straw  -AW        Oral Care Recommendations Oral Care BID/PRN  -AW        SLP Rec. for Method of Medication Administration as tolerated  -AW        Monitor for Signs of Aspiration yes  -AW        Anticipated Discharge Disposition (SLP) unknown  -AW           (STG) Patient will tolerate trials of    Consistencies Trialed (Tolerate trials) soft to chew (ground) textures;thin liquids  -AW        Desired Outcome (Tolerate trials) without signs/symptoms of aspiration  -AW        [place] (Tolerate trials) independently (over 90% accuracy)  -AW        Time Frame (Tolerate trials) by discharge  -AW                  User Key  (r) = Recorded By, (t) = Taken By, (c) = Cosigned By      Initials Name Effective Dates    Malathi Flowers, SLP 08/28/23 -                     EDUCATION  The patient has been educated in the following areas:   Dysphagia (Swallowing Impairment) Oral Care/Hydration Modified Diet Instruction.        SLP GOALS       Row Name 12/08/23 1500             (STG) Patient will tolerate trials of    Consistencies Trialed (Tolerate trials) soft to chew  (ground) textures;thin liquids  -AW      Desired Outcome (Tolerate trials) without signs/symptoms of aspiration  -AW      [place] (Tolerate trials) independently (over 90% accuracy)  -AW      Time Frame (Tolerate trials) by discharge  -AW                User Key  (r) = Recorded By, (t) = Taken By, (c) = Cosigned By      Initials Name Provider Type    Malathi Flowers SLP Speech and Language Pathologist                       Time Calculation:    Time Calculation- SLP       Row Name 12/08/23 1624             Time Calculation- SLP    SLP Start Time 1500  -AW      SLP Received On 12/08/23  -AW                User Key  (r) = Recorded By, (t) = Taken By, (c) = Cosigned By      Initials Name Provider Type    Malathi Flowers SLP Speech and Language Pathologist                    Therapy Charges for Today       Code Description Service Date Service Provider Modifiers Qty    [id number]  ST EVAL ORAL PHARYNG SWALLOW 3 12/8/2023 Malathi Lopez SLP GN 1                 GOGO Peña  12/8/2023

## 2023-12-08 NOTE — PLAN OF CARE
Goal Outcome Evaluation:              Outcome Evaluation: medicated x1 for nausea.  No emesis.  C/O intermittent, frequent abdominal pain more on right than left.  Tap water enema x1 given with moderate results.

## 2023-12-08 NOTE — CONSULTS
"[facility] consulted regarding depression.  He was seen by [facility] during an admission in November for same.  See chart for that note for further information.  He declined resources and follow-up at that time.  Today he is evaluated alone in room 636.  He is alert and oriented x 4.  He is cooperative though does not provide eye contact nor engage in conversation.  Affect is flat/blunted.  Speech is normal rate and rhythm.    He is a 62 yo S/W/M, lives with his sister and one grand daughter.  He has one adult daughter and two grand children.  He is disabled.      He reports feeling \"ok I guess\" but also reports that he has been dealing with stomach pain and constipation for several months.  He feels he is in a \"viscous cycle\" of stomach pain and constipation.  He is hopeless/apathetic; feels symptoms will never improve-stated \"It is what it is.\"  He reports being \"always sad\" and that he has nothing to be happy about.        He denies current SI, plan and intention.  He denies wish to be dead.  He has history of suicide attempt in August of 2022; he jumped off the [address] bridge.  He was admitted to .  [facility] has [facility]; during which he was discharged on a MIW.  He has also been admitted to [facility] in the past.  He denies HI and A/V hallucinations.  He reports decreased appetite related to stomach pain and constipation.  He reports a 30# weight loss.  He reports increased sleep as this is how he narda with pain; he reports sleeping all day as he doesn't feel pain when he sleeps.     He has been on Remeron, Celexa, and Effexor in the past but feels these were of no benefit.  However, wonder if he gave any a significant trial as he reports he was on Remeron only for a few days during a hospital admission and did not continue it after discharge.  He reports Xanax was beneficial in the past for anxiety.  He does not have a mental health provider.  He smokes 2 cigarettes " daily-reports trying to quit.  He denies alcohol and illicit drug use.    Discussed benefits of antidepressant medications but he does not want to pursue any at this time and declined to see the psychiatrist for same.  He declined outpatient mental health resources but was open to [facility] follow-up.    [facility] will follow for support and provide resources as needed.

## 2023-12-09 LAB
ALBUMIN SERPL-MCNC: 3.3 G/DL (ref 3.5–5.2)
ALBUMIN/GLOB SERPL: 1.7 G/DL
ALP SERPL-CCNC: 56 U/L (ref 39–117)
ALT SERPL W P-5'-P-CCNC: 8 U/L (ref 1–41)
ANION GAP SERPL CALCULATED.3IONS-SCNC: 7.4 MMOL/L (ref 5–15)
AST SERPL-CCNC: 15 U/L (ref 1–40)
BASOPHILS # BLD AUTO: 0.11 10*3/MM3 (ref 0–0.2)
BASOPHILS NFR BLD AUTO: 1.4 % (ref 0–1.5)
BILIRUB SERPL-MCNC: 0.3 MG/DL (ref 0–1.2)
BUN SERPL-MCNC: 15 MG/DL (ref 8–23)
BUN/CREAT SERPL: 21.1 (ref 7–25)
CALCIUM SPEC-SCNC: 8.8 MG/DL (ref 8.6–10.5)
CHLORIDE SERPL-SCNC: 110 MMOL/L (ref 98–107)
CO2 SERPL-SCNC: 26.6 MMOL/L (ref 22–29)
CREAT SERPL-MCNC: 0.71 MG/DL (ref 0.76–1.27)
DEPRECATED RDW RBC AUTO: 40.3 FL (ref 37–54)
EGFRCR SERPLBLD CKD-EPI 2021: 104.4 ML/MIN/1.73
EOSINOPHIL # BLD AUTO: 0.45 10*3/MM3 (ref 0–0.4)
EOSINOPHIL NFR BLD AUTO: 5.9 % (ref 0.3–6.2)
ERYTHROCYTE [DISTWIDTH] IN BLOOD BY AUTOMATED COUNT: 12.6 % (ref 12.3–15.4)
GLOBULIN UR ELPH-MCNC: 2 GM/DL
GLUCOSE SERPL-MCNC: 90 MG/DL (ref 65–99)
HCT VFR BLD AUTO: 35 % (ref 37.5–51)
HGB BLD-MCNC: 11.5 G/DL (ref 13–17.7)
IMM GRANULOCYTES # BLD AUTO: 0.02 10*3/MM3 (ref 0–0.05)
IMM GRANULOCYTES NFR BLD AUTO: 0.3 % (ref 0–0.5)
LYMPHOCYTES # BLD AUTO: 2.21 10*3/MM3 (ref 0.7–3.1)
LYMPHOCYTES NFR BLD AUTO: 29 % (ref 19.6–45.3)
MCH RBC QN AUTO: 29 PG (ref 26.6–33)
MCHC RBC AUTO-ENTMCNC: 32.9 G/DL (ref 31.5–35.7)
MCV RBC AUTO: 88.4 FL (ref 79–97)
MONOCYTES # BLD AUTO: 0.5 10*3/MM3 (ref 0.1–0.9)
MONOCYTES NFR BLD AUTO: 6.6 % (ref 5–12)
NEUTROPHILS NFR BLD AUTO: 4.33 10*3/MM3 (ref 1.7–7)
NEUTROPHILS NFR BLD AUTO: 56.8 % (ref 42.7–76)
NRBC BLD AUTO-RTO: 0 /100 WBC (ref 0–0.2)
PLATELET # BLD AUTO: 245 10*3/MM3 (ref 140–450)
PMV BLD AUTO: 9.9 FL (ref 6–12)
POTASSIUM SERPL-SCNC: 3.8 MMOL/L (ref 3.5–5.2)
PROT SERPL-MCNC: 5.3 G/DL (ref 6–8.5)
RBC # BLD AUTO: 3.96 10*6/MM3 (ref 4.14–5.8)
SODIUM SERPL-SCNC: 144 MMOL/L (ref 136–145)
WBC NRBC COR # BLD AUTO: 7.62 10*3/MM3 (ref 3.4–10.8)

## 2023-12-09 PROCEDURE — 25810000003 SODIUM CHLORIDE 0.9 % SOLUTION: Performed by: INTERNAL MEDICINE

## 2023-12-09 PROCEDURE — 97162 PT EVAL MOD COMPLEX 30 MIN: CPT | Performed by: PHYSICAL THERAPIST

## 2023-12-09 PROCEDURE — 85025 COMPLETE CBC W/AUTO DIFF WBC: CPT | Performed by: INTERNAL MEDICINE

## 2023-12-09 PROCEDURE — 94761 N-INVAS EAR/PLS OXIMETRY MLT: CPT

## 2023-12-09 PROCEDURE — 94799 UNLISTED PULMONARY SVC/PX: CPT

## 2023-12-09 PROCEDURE — 97530 THERAPEUTIC ACTIVITIES: CPT | Performed by: PHYSICAL THERAPIST

## 2023-12-09 PROCEDURE — 25010000002 MORPHINE PER 10 MG: Performed by: NURSE PRACTITIONER

## 2023-12-09 PROCEDURE — 80053 COMPREHEN METABOLIC PANEL: CPT | Performed by: NURSE PRACTITIONER

## 2023-12-09 PROCEDURE — 25010000002 ENOXAPARIN PER 10 MG: Performed by: INTERNAL MEDICINE

## 2023-12-09 PROCEDURE — 25010000002 ONDANSETRON PER 1 MG: Performed by: INTERNAL MEDICINE

## 2023-12-09 PROCEDURE — 94664 DEMO&/EVAL PT USE INHALER: CPT

## 2023-12-09 PROCEDURE — 99232 SBSQ HOSP IP/OBS MODERATE 35: CPT | Performed by: INTERNAL MEDICINE

## 2023-12-09 PROCEDURE — 25810000003 SODIUM CHLORIDE 0.9 % SOLUTION: Performed by: NURSE PRACTITIONER

## 2023-12-09 RX ORDER — POLYETHYLENE GLYCOL 3350 17 G/17G
17 POWDER, FOR SOLUTION ORAL 3 TIMES DAILY
Status: DISCONTINUED | OUTPATIENT
Start: 2023-12-09 | End: 2023-12-10

## 2023-12-09 RX ADMIN — NALOXEGOL OXALATE 25 MG: 12.5 TABLET, FILM COATED ORAL at 06:34

## 2023-12-09 RX ADMIN — MORPHINE SULFATE 2 MG: 2 INJECTION, SOLUTION INTRAMUSCULAR; INTRAVENOUS at 20:28

## 2023-12-09 RX ADMIN — ENOXAPARIN SODIUM 40 MG: 100 INJECTION SUBCUTANEOUS at 20:28

## 2023-12-09 RX ADMIN — MORPHINE SULFATE 2 MG: 2 INJECTION, SOLUTION INTRAMUSCULAR; INTRAVENOUS at 11:30

## 2023-12-09 RX ADMIN — MORPHINE SULFATE 2 MG: 2 INJECTION, SOLUTION INTRAMUSCULAR; INTRAVENOUS at 16:15

## 2023-12-09 RX ADMIN — BUDESONIDE AND FORMOTEROL FUMARATE DIHYDRATE 2 PUFF: 160; 4.5 AEROSOL RESPIRATORY (INHALATION) at 20:38

## 2023-12-09 RX ADMIN — ACETAMINOPHEN 650 MG: 325 TABLET ORAL at 06:18

## 2023-12-09 RX ADMIN — POLYETHYLENE GLYCOL 3350 17 G: 17 POWDER, FOR SOLUTION ORAL at 16:06

## 2023-12-09 RX ADMIN — TIOTROPIUM BROMIDE INHALATION SPRAY 2 PUFF: 3.12 SPRAY, METERED RESPIRATORY (INHALATION) at 08:10

## 2023-12-09 RX ADMIN — LUBIPROSTONE 24 MCG: 24 CAPSULE, GELATIN COATED ORAL at 16:06

## 2023-12-09 RX ADMIN — LUBIPROSTONE 24 MCG: 24 CAPSULE, GELATIN COATED ORAL at 09:26

## 2023-12-09 RX ADMIN — ONDANSETRON 4 MG: 2 INJECTION INTRAMUSCULAR; INTRAVENOUS at 13:57

## 2023-12-09 RX ADMIN — MORPHINE SULFATE 2 MG: 2 INJECTION, SOLUTION INTRAMUSCULAR; INTRAVENOUS at 06:34

## 2023-12-09 RX ADMIN — POLYETHYLENE GLYCOL 3350 17 G: 17 POWDER, FOR SOLUTION ORAL at 20:28

## 2023-12-09 RX ADMIN — SODIUM CHLORIDE 75 ML/HR: 9 INJECTION, SOLUTION INTRAVENOUS at 16:06

## 2023-12-09 RX ADMIN — DOCUSATE SODIUM 50MG AND SENNOSIDES 8.6MG 2 TABLET: 8.6; 5 TABLET, FILM COATED ORAL at 20:28

## 2023-12-09 RX ADMIN — BUDESONIDE AND FORMOTEROL FUMARATE DIHYDRATE 2 PUFF: 160; 4.5 AEROSOL RESPIRATORY (INHALATION) at 08:12

## 2023-12-09 RX ADMIN — PANTOPRAZOLE SODIUM 40 MG: 40 TABLET, DELAYED RELEASE ORAL at 06:18

## 2023-12-09 RX ADMIN — SODIUM CHLORIDE 500 ML: 9 INJECTION, SOLUTION INTRAVENOUS at 00:29

## 2023-12-09 RX ADMIN — DOCUSATE SODIUM 50MG AND SENNOSIDES 8.6MG 2 TABLET: 8.6; 5 TABLET, FILM COATED ORAL at 09:26

## 2023-12-09 RX ADMIN — ONDANSETRON 4 MG: 2 INJECTION INTRAMUSCULAR; INTRAVENOUS at 06:18

## 2023-12-09 NOTE — CONSULTS
"Nutrition Services    Patient Name:  Donny Mallory  YOB: 1960  MRN: [id number]  Admit Date:  12/7/2023  Assessment Date:  12/09/23    Summary: Nutrition screen completed per RN admission screen for poor po intake  This is a 64 yo male admitted with N/V, and abd pain with recent hospital admission for ileus.    BM+  Pt is missing some teeth and requiring need for grd meat per speech eval  He ate 100% dinner last night and 100% for breakfast this am of eggs and pancakes.  Pt reports no major wt loss  He is interested in some boost supplements-chocolate    Plan  Good po intake encouraged with all meals  Will send Boost BID to help maintain/promote wt gain    RD to follow.     CLINICAL NUTRITION ASSESSMENT      Reason for Assessment Reduced Oral Intake Over The Last Month     Diagnosis/Problem    N/V, and abd pain    Medical/Surgical History Past Medical History:   Diagnosis Date    Bell's palsy     Chronic sinusitis     COPD (chronic obstructive pulmonary disease)     Depression     Dysphagia     previous workup at [facility]    History of biliary stent insertion     CBD stent    Hypertension     Intractable nausea and vomiting 12/07/2023    Presence of pancreatic duct stent     Suicide attempt        Past Surgical History:   Procedure Laterality Date    CHOLECYSTECTOMY      CHOLECYSTECTOMY WITH INTRAOPERATIVE CHOLANGIOGRAM N/A 04/25/2022    Procedure: Laparoscopic cholecystectomy with intraoperative cholangiogram, possible open;  Surgeon: Khari Schofield MD;  Location: [facility];  Service: General;  Laterality: N/A;    HERNIA REPAIR          Anthropometrics        Current Height  Current Weight  BMI kg/m2 Height: 177.8 cm (70\")  Weight: 56.2 kg (123 lb 12.8 oz) (12/09/23 0500)  Body mass index is 17.76 kg/m².   Adjusted BMI (if applicable)    BMI Category Underweight (18.4 or below)   Ideal Body Weight (IBW) 160   Usual Body Weight (UBW) 125     Weight Trend Loss, Gain   Weight History Wt " Readings from Last 30 Encounters:   12/09/23 0500 56.2 kg (123 lb 12.8 oz)   12/08/23 0500 57.5 kg (126 lb 12.2 oz)   12/07/23 1142 53.5 kg (118 lb)   11/21/23 2227 53.8 kg (118 lb 9.7 oz)   11/21/23 1254 54.4 kg (120 lb)   11/01/23 0500 55.7 kg (122 lb 14.4 oz)   10/30/23 0600 53.4 kg (117 lb 11.6 oz)   10/30/23 0500 53.4 kg (117 lb 11.6 oz)   10/30/23 0200 53.4 kg (117 lb 11.6 oz)   10/29/23 2158 53.4 kg (117 lb 11.6 oz)   10/29/23 1636 54.4 kg (120 lb)   06/26/23 1658 56.7 kg (125 lb)   06/03/22 1003 54.4 kg (120 lb)   05/02/22 1624 55.8 kg (123 lb)   04/30/22 0500 55.8 kg (123 lb)   04/29/22 0709 55.9 kg (123 lb 3.2 oz)   04/29/22 0600 57.5 kg (126 lb 12.2 oz)   04/28/22 0422 57.9 kg (127 lb 9.6 oz)   04/27/22 0646 58.7 kg (129 lb 8 oz)   04/26/22 0630 60.9 kg (134 lb 4.2 oz)   04/24/22 0500 59.5 kg (131 lb 2.8 oz)   04/23/22 1251 57.2 kg (126 lb)   04/23/22 0500 57.2 kg (126 lb 1.7 oz)   04/22/22 0922 57.1 kg (125 lb 14.1 oz)   04/21/22 1433 57.1 kg (125 lb 14.1 oz)   04/21/22 1000 54.4 kg (120 lb)   02/21/22 1615 54.9 kg (121 lb)   05/16/17 1824 55.2 kg (121 lb 9.6 oz)   05/15/17 1846 56.7 kg (125 lb)      --  Labs       Pertinent Labs    Results from last 7 days   Lab Units 12/09/23  0553 12/08/23  0747 12/07/23  1232   SODIUM mmol/L 144 142 144   POTASSIUM mmol/L 3.8 3.8 4.8   CHLORIDE mmol/L 110* 109* 108*   CO2 mmol/L 26.6 23.5 24.2   BUN mg/dL 15 19 21   CREATININE mg/dL 0.71* 0.82 1.04   CALCIUM mg/dL 8.8 8.6 9.9   BILIRUBIN mg/dL 0.3  --  0.3   ALK PHOS U/L 56  --  75   ALT (SGPT) U/L 8  --  13   AST (SGOT) U/L 15  --  22   GLUCOSE mg/dL 90 89 112*     Results from last 7 days   Lab Units 12/09/23  0553   HEMOGLOBIN g/dL 11.5*   HEMATOCRIT % 35.0*   WBC 10*3/mm3 7.62   ALBUMIN g/dL 3.3*     Results from last 7 days   Lab Units 12/09/23  0553 12/08/23  0747 12/07/23  1232   PLATELETS 10*3/mm3 245 231 335     COVID19   Date Value Ref Range Status   12/07/2023 Not Detected Not Detected - Ref. Range Final  "    No results found for: \"HGBA1C\"       Medications           Scheduled Medications budesonide-formoterol, 2 puff, Inhalation, BID - RT   And  tiotropium bromide monohydrate, 2 puff, Inhalation, Daily - RT  enoxaparin, 40 mg, Subcutaneous, Nightly  lisinopril, 20 mg, Oral, Daily  lubiprostone, 24 mcg, Oral, BID With Meals  Naloxegol Oxalate, 25 mg, Oral, QAM  pantoprazole, 40 mg, Oral, Q AM  senna-docusate sodium, 2 tablet, Oral, BID       Infusions sodium chloride, 75 mL/hr, Last Rate: 75 mL/hr (12/08/23 1727)       PRN Medications   acetaminophen    senna-docusate sodium **AND** polyethylene glycol **AND** bisacodyl **AND** bisacodyl    ipratropium-albuterol    melatonin    mineral oil    Morphine    ondansetron **OR** ondansetron    [COMPLETED] Insert Peripheral IV **AND** sodium chloride     Physical Findings          General Findings alert, underweight   Oral/Mouth Cavity tooth or teeth missing   Edema  no edema   Gastrointestinal last bowel movement: 12/8   Skin  skin intact   Tubes/Drains/Lines none   NFPE Patient/family declined exam   --  Current Nutrition Orders & Evaluation of Intake       Oral Nutrition     Food Allergies NKFA   Current PO Diet Diet: Gastrointestinal Diets; Fiber-Restricted, Low Irritant; No Straw; Texture: Soft to Chew (NDD 3); Soft to Chew: Ground Meat; Fluid Consistency: Thin (IDDSI 0)   Supplement n/a   PO Evaluation     % PO Intake 100%    Factors Affecting Intake: abdominal pain, nausea, vomiting   --  PES STATEMENT / NUTRITION DIAGNOSIS      Nutrition Dx Problem  Problem: Altered GI Function  Etiology: Factors Affecting Nutrition - N/V abd pain    Signs/Symptoms: Report/Observation     NUTRITION INTERVENTION / PLAN OF CARE      Intervention Goal(s) Reduce/improve symptoms, Disease management/therapy, Tolerate PO , Maintain weight, Appropriate weight gain, and PO intake goal %: 75+         RD Intervention/Action Interview for preferences, Encourage intake, Continue to monitor, " Care plan reviewed, and Recommend/order: ONS   --      Prescription/Orders:       PO Diet       Supplements Boost plus jenny      Enteral Nutrition       Parenteral Nutrition    New Prescription Ordered? Yes   --      Monitor/Evaluation Per protocol   Discharge Plan/Needs Pending clinical course   --    RD to follow per protocol.      Electronically signed by:  Holli Avila RD  12/09/23 11:22 EST

## 2023-12-09 NOTE — PROGRESS NOTES
[facility] follow up d/t depression; this writer reviewed chart and spoke with RN Luciana. Per RN, patient slept some overnight; affect remains flat with complaints of pain, he denies SI.  Patient reports no interest in an inpatient psychiatrist consult and/or psychiatric medications at present; he also declines need for behavioral health resources. No further needs/concerns noted at this time per RN and/or medical team; [facility] to continue following.

## 2023-12-09 NOTE — PROGRESS NOTES
Name: Donny Mallory ADMIT: 2023   : 1960  PCP: Janelle Lara MD    MRN: [id number] LOS: 0 days   AGE/SEX: 63 y.o. male  ROOM: [facility]     Subjective   Subjective   Patient is lying in the bed and is complaining of nausea but no vomiting.  Denies any abdominal pain./Constipation states he has been passing gas.       Objective   Objective   Vital Signs  Temp:  [97.9 °F (36.6 °C)-98.2 °F (36.8 °C)] 98.1 °F (36.7 °C)  Heart Rate:  [46-55] 55  Resp:  [18-20] 20  BP: ()/(47-77) 118/77  SpO2:  [96 %-100 %] 98 %  on   ;   Device (Oxygen Therapy): room air  Body mass index is 17.76 kg/m².  Physical Exam  HEENT: PERRLA, extraocular movements intact, Scleras no icterus  Neck: Supple, no JVD  Cardiovascular: Regular rate and rhythm with normal S1-S2  Respiratory: Fairly clear to auscultation bilaterally with no wheezes  GI: Soft, nontender, bowel sounds are present but diminished  Extremities: No edema, palpable pedal pulses  Neurologic: Grossly nonfocal, no facial asymmetry    Results Review     I reviewed the patient's new clinical results.  Results from last 7 days   Lab Units 23  0553 23  0747 23  1232   WBC 10*3/mm3 7.62 8.41 12.69*   HEMOGLOBIN g/dL 11.5* 11.6* 15.1   PLATELETS 10*3/mm3 245 231 335     Results from last 7 days   Lab Units 23  0553 23  0747 23  1232   SODIUM mmol/L 144 142 144   POTASSIUM mmol/L 3.8 3.8 4.8   CHLORIDE mmol/L 110* 109* 108*   CO2 mmol/L 26.6 23.5 24.2   BUN mg/dL 15 19 21   CREATININE mg/dL 0.71* 0.82 1.04   GLUCOSE mg/dL 90 89 112*   EGFR mL/min/1.73 104.4 99.9 81.7     Results from last 7 days   Lab Units 23  0553 23  1232   ALBUMIN g/dL 3.3* 4.4   BILIRUBIN mg/dL 0.3 0.3   ALK PHOS U/L 56 75   AST (SGOT) U/L 15 22   ALT (SGPT) U/L 8 13     Results from last 7 days   Lab Units 23  0553 23  0747 23  1232   CALCIUM mg/dL 8.8 8.6 9.9   ALBUMIN g/dL 3.3*  --  4.4       No results found for:  "\"HGBA1C\", \"POCGLU\"    CT Abdomen Pelvis Without Contrast    Result Date: 12/7/2023   1.  Prominent and nondilated air-filled loops of small bowel, nonspecific. Findings could potentially reflect ileus. There is no convincing evidence for bowel obstruction. 2.  Punctate nonobstructing right renal stones.   This report was finalized on 12/7/2023 4:16 PM by Dr. Telma Fernandez M.D on Workstation: [id number]       I have personally reviewed all medications:  Scheduled Medications  budesonide-formoterol, 2 puff, Inhalation, BID - RT   And  tiotropium bromide monohydrate, 2 puff, Inhalation, Daily - RT  enoxaparin, 40 mg, Subcutaneous, Nightly  lisinopril, 20 mg, Oral, Daily  lubiprostone, 24 mcg, Oral, BID With Meals  Naloxegol Oxalate, 25 mg, Oral, QAM  pantoprazole, 40 mg, Oral, Q AM  polyethylene glycol, 17 g, Oral, TID  senna-docusate sodium, 2 tablet, Oral, BID    Infusions  sodium chloride, 75 mL/hr, Last Rate: 75 mL/hr (12/08/23 1727)    Diet  Diet: Gastrointestinal Diets; Fiber-Restricted, Low Irritant; No Straw; Texture: Soft to Chew (NDD 3); Soft to Chew: Ground Meat; Fluid Consistency: Thin (IDDSI 0)    I have personally reviewed:  [x]  Laboratory   [x]  Microbiology   [x]  Radiology   [x]  EKG/Telemetry  [x]  Cardiology/Vascular   []  Pathology    []  Records       Assessment/Plan     Active Hospital Problems    Diagnosis  POA    **Intractable nausea and vomiting [R11.2]  Yes    Ileus [K56.7]  Yes    GERD without esophagitis [K21.9]  Yes    COPD (chronic obstructive pulmonary disease) [J44.9]  Yes    HTN (hypertension) [I10]  Yes      Resolved Hospital Problems   No resolved problems to display.       63 y.o. male admitted with Intractable nausea and vomiting.    1.Nausea/vomiting/ileus/constipation c, continue with symptomatic treatment and patient is also on Amitiza and Movantik.  GI is following along and diet is being advanced.    2.  Hypertension, on lisinopril which will be continued.    3.  GERD, on acid " suppressive therapy.    4.  Depression, Access following along.    5.  History of COPD, continue with Symbicort and currently not in any exacerbation.    6.  On Lovenox for DVT prophylaxis.    7.  CODE STATUS is full code.    8.  Estimated discharge date, hopefully next 1 to 2 days.    Copied text on this note has been reviewed by me on 12/9/2023      Abhijeet Dela Cruz MD  [facility]  12/09/23  13:22 EST

## 2023-12-09 NOTE — NURSING NOTE
"During hourly rounds, this RN asked pt if he needed anything, pt response was \"Heroin, crack, cocaine, or fentanyl\". Denies SI thoughts. Will CTM.  "

## 2023-12-09 NOTE — PROGRESS NOTES
[facility]  Inpatient Progress Note    Reason for Follow Up: Ileus constipation    Subjective     Interval History:   No bowel movement overnight    Current Facility-Administered Medications:     acetaminophen (TYLENOL) tablet 650 mg, 650 mg, Oral, Q4H PRN, Mary Jama MD, 650 mg at 12/09/23 0618    sennosides-docusate (PERICOLACE) 8.6-50 MG per tablet 2 tablet, 2 tablet, Oral, BID, 2 tablet at 12/09/23 0926 **AND** polyethylene glycol (MIRALAX) packet 17 g, 17 g, Oral, Daily PRN **AND** bisacodyl (DULCOLAX) EC tablet 5 mg, 5 mg, Oral, Daily PRN **AND** bisacodyl (DULCOLAX) suppository 10 mg, 10 mg, Rectal, Daily PRN, Mary Jama MD    budesonide-formoterol (SYMBICORT) 160-4.5 MCG/ACT inhaler 2 puff, 2 puff, Inhalation, BID - RT, 2 puff at 12/09/23 0812 **AND** tiotropium (SPIRIVA RESPIMAT) 2.5 mcg/act aerosol solution inhaler, 2 puff, Inhalation, Daily - RT, Mary Jama MD, 2 puff at 12/09/23 0810    Enoxaparin Sodium (LOVENOX) syringe 40 mg, 40 mg, Subcutaneous, Nightly, Abhijeet Dela Cruz MD, 40 mg at 12/08/23 2035    ipratropium-albuterol (DUO-NEB) nebulizer solution 3 mL, 3 mL, Nebulization, Q4H PRN, Mary Jama MD    lisinopril (PRINIVIL,ZESTRIL) tablet 20 mg, 20 mg, Oral, Daily, Mary Jama MD, 20 mg at 12/08/23 0848    lubiprostone (AMITIZA) capsule 24 mcg, 24 mcg, Oral, BID With Meals, Mary Jama MD, 24 mcg at 12/09/23 0926    melatonin tablet 3 mg, 3 mg, Oral, Nightly PRN, Mary Jama MD, 3 mg at 12/08/23 0052    mineral oil enema 1 enema, 1 enema, Rectal, Daily PRN, Mary Jama MD    morphine injection 2 mg, 2 mg, Intravenous, Q2H PRN, Hayder Lee, APRN, 2 mg at 12/09/23 1130    Naloxegol Oxalate (MOVANTIK) tablet 25 mg, 25 mg, Oral, QAM, Mary Jama MD, 25 mg at 12/09/23 0634    ondansetron (ZOFRAN) tablet 4 mg, 4 mg, Oral, Q6H PRN, 4 mg at 12/07/23 2110 **OR**  ondansetron (ZOFRAN) injection 4 mg, 4 mg, Intravenous, Q6H PRN, Mary Jama MD, 4 mg at 12/09/23 0618    pantoprazole (PROTONIX) EC tablet 40 mg, 40 mg, Oral, Q AM, Mary Jama MD, 40 mg at 12/09/23 0618    [COMPLETED] Insert Peripheral IV, , , Once **AND** sodium chloride 0.9 % flush 10 mL, 10 mL, Intravenous, PRN, Jay Franco MD, 10 mL at 12/08/23 1952    sodium chloride 0.9 % infusion, 75 mL/hr, Intravenous, Continuous, Mary Jama MD, Last Rate: 75 mL/hr at 12/08/23 1727, 75 mL/hr at 12/08/23 1727  Review of Systems:    There is weakness and fatigue all other systems reviewed and negative    Objective     Vital Signs  Temp:  [97.9 °F (36.6 °C)-98.2 °F (36.8 °C)] 98.1 °F (36.7 °C)  Heart Rate:  [46-55] 55  Resp:  [18-20] 20  BP: ()/(47-77) 118/77  Body mass index is 17.76 kg/m².    Intake/Output Summary (Last 24 hours) at 12/9/2023 1300  Last data filed at 12/9/2023 0915  Gross per 24 hour   Intake 300 ml   Output 1530 ml   Net -1230 ml     I/O this shift:  In: -   Out: 250 [Urine:250]     Physical Exam:   General: patient awake, alert and cooperative   Eyes: Normal lids and lashes, no scleral icterus   Neck: supple, normal ROM   Skin: warm and dry, not jaundiced   Cardiovascular: regular rhythm and rate, no murmurs auscultated   Pulm: clear to auscultation bilaterally, regular and unlabored   Abdomen: soft, nontender, nondistended; normal bowel sounds   Extremities: no rash or edema   Psychiatric: Normal mood and behavior; memory intact     Results Review:     I reviewed the patient's new clinical results.    Results from last 7 days   Lab Units 12/09/23  0553 12/08/23  0747 12/07/23  1232   WBC 10*3/mm3 7.62 8.41 12.69*   HEMOGLOBIN g/dL 11.5* 11.6* 15.1   HEMATOCRIT % 35.0* 34.5* 46.7   PLATELETS 10*3/mm3 245 231 335     Results from last 7 days   Lab Units 12/09/23  0553 12/08/23  0747 12/07/23  1232   SODIUM mmol/L 144 142 144   POTASSIUM mmol/L 3.8 3.8 4.8    CHLORIDE mmol/L 110* 109* 108*   CO2 mmol/L 26.6 23.5 24.2   BUN mg/dL 15 19 21   CREATININE mg/dL 0.71* 0.82 1.04   CALCIUM mg/dL 8.8 8.6 9.9   BILIRUBIN mg/dL 0.3  --  0.3   ALK PHOS U/L 56  --  75   ALT (SGPT) U/L 8  --  13   AST (SGOT) U/L 15  --  22   GLUCOSE mg/dL 90 89 112*         Lab Results   Lab Value Date/Time    LIPASE 43 12/07/2023 1232    LIPASE 34 11/21/2023 1312    LIPASE 30 11/11/2023 1029    LIPASE 31 10/29/2023 1629    LIPASE 20 10/28/2023 1232    LIPASE 43 10/22/2023 1526    LIPASE 18 10/16/2023 1509    LIPASE 36 10/09/2023 1435    LIPASE 20 10/07/2023 1303    LIPASE 44 09/26/2023 1716    LIPASE 49 09/21/2023 0031    LIPASE 23 09/11/2023 1507    LIPASE 40 08/15/2023 1537    LIPASE 40 07/26/2023 1437    LIPASE 22 07/22/2023 1843    LIPASE 20 07/14/2023 1223    LIPASE 25 07/01/2023 1451    LIPASE 25 06/26/2023 1902    LIPASE 26 06/23/2023 1500    LIPASE 17 06/19/2023 1222    LIPASE 21 06/15/2023 2359    LIPASE 27 05/16/2023 1512    LIPASE 67 (H) 07/28/2022 1556    LIPASE 20 06/27/2022 2034    LIPASE 33 06/23/2022 1837    LIPASE 22 06/18/2022 1127    LIPASE 31 06/11/2022 1213    LIPASE 95 (H) 06/08/2022 1506    LIPASE 26 06/03/2022 1004    LIPASE 25 05/16/2022 1136    LIPASE 23 05/02/2022 1706    LIPASE 80 (H) 04/22/2022 0548    LIPASE 425 (H) 04/21/2022 1012    LIPASE 20 04/19/2022 1118    LIPASE 25 04/03/2022 1123    LIPASE 23 03/12/2022 2156    LIPASE 31 02/21/2022 1614    LIPASE 31 02/18/2022 1400    LIPASE 29 02/06/2022 1552    LIPASE 30 01/24/2022 1221    LIPASE 30 12/22/2021 1517    LIPASE 24 11/12/2021 1253    LIPASE 18 10/23/2021 1707    LIPASE 22 08/31/2021 1206    LIPASE 24 08/20/2021 1347    LIPASE 23 08/03/2021 0024    LIPASE 25 07/22/2021 0218    LIPASE 129 (H) 07/02/2021 1554    LIPASE 36 06/25/2021 1615    LIPASE 26 06/15/2021 1725    LIPASE 66 04/12/2021 1630    LIPASE 103 08/12/2019 1105    LIPASE 55 02/12/2019 2052       Radiology:  CT Abdomen Pelvis Without Contrast   Final  Result       1.  Prominent and nondilated air-filled loops of small bowel,   nonspecific. Findings could potentially reflect ileus. There is no   convincing evidence for bowel obstruction.   2.  Punctate nonobstructing right renal stones.           This report was finalized on 12/7/2023 4:16 PM by Dr. Telma Fernandez M.D   on Workstation: [id number]          XR Chest 1 View   Final Result   No evidence for acute pulmonary process. Follow-up as   clinical indications persist.       This report was finalized on 12/7/2023 1:06 PM by Dr. Abdiaziz Kumar M.D on Workstation: [id number]              Assessment & Plan     Active Hospital Problems    Diagnosis     **Intractable nausea and vomiting     Ileus     GERD without esophagitis     COPD (chronic obstructive pulmonary disease)     HTN (hypertension)        Assessment:  Abdominal pain  Chronic constipation  Abnormal CT suggestive of ileus  Nausea and vomiting  GERD  Personal history of colon polyps  History of pancreatic stent placement follows with Dr. Juarez at [facility]     Plan:  GI soft diet as tolerated   Continue Amitiza and Movantik  Adjust bowel regimen  Await results  Continue PPI therapy  CBC and CMP in the morning     I discussed the patients findings and my recommendations with patient and nursing staff.    Clovis Tolentino MD

## 2023-12-09 NOTE — THERAPY DISCHARGE NOTE
Patient Name: Donny Mallory  : 1960    MRN: [id number]                              Today's Date: 2023       Admit Date: 2023    Visit Dx:     ICD-10-CM ICD-9-CM   1. Nausea and vomiting, unspecified vomiting type  R11.2 787.01   2. Acute abdominal pain  R10.9 789.00     338.19   3. Ileus  K56.7 560.1     Patient Active Problem List   Diagnosis    Psychosis    Acute pancreatitis    COPD (chronic obstructive pulmonary disease)    HTN (hypertension)    Dysphagia    Constipation    Cholelithiasis    Severe malnutrition    Ileus    GERD without esophagitis    Hypernatremia    Intractable nausea and vomiting     Past Medical History:   Diagnosis Date    Bell's palsy     Chronic sinusitis     COPD (chronic obstructive pulmonary disease)     Depression     Dysphagia     previous workup at [facility]    History of biliary stent insertion     CBD stent    Hypertension     Intractable nausea and vomiting 2023    Presence of pancreatic duct stent     Suicide attempt      Past Surgical History:   Procedure Laterality Date    CHOLECYSTECTOMY      CHOLECYSTECTOMY WITH INTRAOPERATIVE CHOLANGIOGRAM N/A 2022    Procedure: Laparoscopic cholecystectomy with intraoperative cholangiogram, possible open;  Surgeon: Khari Schofield MD;  Location: [facility];  Service: General;  Laterality: N/A;    HERNIA REPAIR        General Information       Row Name 23 1604          Physical Therapy Time and Intention    Document Type evaluation  -     Mode of Treatment individual therapy;physical therapy  -       Row Name 23 1604          General Information    Patient Profile Reviewed yes  -     Prior Level of Function independent:  -     Existing Precautions/Restrictions no known precautions/restrictions  -     Barriers to Rehab none identified  -       Row Name 23 1604          Living Environment    People in Home child(valentina), dependent;other relative(s)  -       Row Name  12/09/23 1604          Cognition    Orientation Status (Cognition) oriented x 4  -               User Key  (r) = Recorded By, (t) = Taken By, (c) = Cosigned By      Initials Name Provider Type    Alisha Cruz, PT Physical Therapist                   Mobility       Row Name 12/09/23 1605          Bed Mobility    Bed Mobility bed mobility (all) activities  -     All Activities, [place] (Bed Mobility) independent  -     Assistive Device (Bed Mobility) bed rails  -       Row Name 12/09/23 1605          Sit-Stand Transfer    Sit-Stand [place] (Transfers) standby assist  -       Row Name 12/09/23 1605          Gait/Stairs (Locomotion)    [place] Level (Gait) standby assist  -     Distance in Feet (Gait) 500 ft  -               User Key  (r) = Recorded By, (t) = Taken By, (c) = Cosigned By      Initials Name Provider Type    Alisha Cruz, MEG Physical Therapist                   Obj/Interventions       Row Name 12/09/23 1605          Range of Motion Comprehensive    Comment, General Range of Motion WFL  -       Row Name 12/09/23 1605          Strength Comprehensive (MMT)    Comment, General Manual Muscle Testing (MMT) Assessment WFL  -       Row Name 12/09/23 1605          Balance    Balance Assessment sitting dynamic balance;standing static balance;standing dynamic balance;sitting static balance  -     Static Sitting Balance independent  -     Dynamic Sitting Balance independent  -     Position, Sitting Balance unsupported;sitting edge of bed  -     Static Standing Balance standby assist  -     Dynamic Standing Balance standby assist  -               User Key  (r) = Recorded By, (t) = Taken By, (c) = Cosigned By      Initials Name Provider Type    Alisha Cruz, PT Physical Therapist                   Goals/Plan    No documentation.                  Clinical Impression       Row Name 12/09/23 1605          Pain    Pretreatment Pain Rating  5/10  -     Posttreatment Pain Rating 5/10  -     Pain Location - abdomen  -     Pain Intervention(s) Repositioned  -       Row Name 12/09/23 1605          Plan of Care Review    Plan of Care Reviewed With patient  -     Outcome Evaluation pt admitted from home with abdominal pain, N/V. Pt reports he is independently mobile at baseline and plans to return home. Pt demonstrated independent mobility. He ambulated 500ft with SBA. Appreas to be at baseline and safe to d/c home. PT will sign off. Encouraged pt to ambulate throughout the nimesh while admitted. PT will sign off.  -       Row Name 12/09/23 1605          Therapy Assessment/Plan (PT)    Patient/Family Therapy Goals Statement (PT) return home  -     Criteria for Skilled Interventions Met (PT) no problems identified which require skilled intervention  -     Therapy Frequency (PT) evaluation only  -       Row Name 12/09/23 1605          Positioning and Restraints    Pre-Treatment Position in bed  -     Post Treatment Position bed  -     In Bed sitting EOB;call light within reach;exit alarm on;notified nsg;encouraged to call for assist  -               User Key  (r) = Recorded By, (t) = Taken By, (c) = Cosigned By      Initials Name Provider Type     Alisha Mejia, PT Physical Therapist                   Outcome Measures       Row Name 12/09/23 1608 12/09/23 0930       How much help from another person do you currently need...    Turning from your back to your side while in flat bed without using bedrails? 4  - 4  -TH    Moving from lying on back to sitting on the side of a flat bed without bedrails? 4  - 4  -TH    Moving to and from a bed to a chair (including a wheelchair)? 4  - 4  -TH    Standing up from a chair using your arms (e.g., wheelchair, bedside chair)? 4  - 4  -TH    Climbing 3-5 steps with a railing? 4  - 3  -TH    To walk in hospital room? 4  - 4  -TH    AM-PAC 6 Clicks Score (PT) 24  -KH 23 -TH     Highest Level of Mobility Goal 8 --> Walked 250 feet or more  - 7 --> Walk 25 feet or more  -              User Key  (r) = Recorded By, (t) = Taken By, (c) = Cosigned By      Initials Name Provider Type    Alisha Cruz, PT Physical Therapist    Allie Buchanan RN Registered Nurse                    PT Recommendation and Plan     Plan of Care Reviewed With: patient  Outcome Evaluation: pt admitted from home with abdominal pain, N/V. Pt reports he is independently mobile at baseline and plans to return home. Pt demonstrated independent mobility. He ambulated 500ft with SBA. Appreas to be at baseline and safe to d/c home. PT will sign off. Encouraged pt to ambulate throughout the nimesh while admitted. PT will sign off.     Time Calculation:         PT Charges       Row Name 12/09/23 1608             Time Calculation    Start Time 1406  -KH      Stop Time 1422  -KH      Time Calculation (min) 16 min  -KH      PT Received On 12/09/23  -         Time Calculation- PT    Total Timed Code Minutes- PT 8 minute(s)  -KH         Timed Charges    84490 - PT Therapeutic Activity Minutes 8  -KH         Untimed Charges    PT Eval/Re-eval Minutes 8  -KH         Total Minutes    Timed Charges Total Minutes 8  -KH      Untimed Charges Total Minutes 8  -KH       Total Minutes 16  -KH                User Key  (r) = Recorded By, (t) = Taken By, (c) = Cosigned By      Initials Name Provider Type    Alisha Cruz PT Physical Therapist                  Therapy Charges for Today       Code Description Service Date Service Provider Modifiers Qty    08162203588  PT THERAPEUTIC ACT EA 15 MIN 12/9/2023 Alisha Mejia, PT GP 1    [id number]  PT EVAL MOD COMPLEXITY 2 12/9/2023 Alisha Mejia, PT GP 1            PT G-Codes  AM-PAC 6 Clicks Score (PT): 24    PT Discharge Summary  Anticipated Discharge Disposition (PT): home    Alisha Mejia PT  12/9/2023

## 2023-12-09 NOTE — PLAN OF CARE
Goal Outcome Evaluation:  Plan of Care Reviewed With: patient           Outcome Evaluation: pt admitted from home with abdominal pain, N/V. Pt reports he is independently mobile at baseline and plans to return home. Pt demonstrated independent mobility. He ambulated 500ft with SBA. Appreas to be at baseline and safe to d/c home. PT will sign off. Encouraged pt to ambulate throughout the nimesh while admitted. PT will sign off.      Anticipated Discharge Disposition (PT): home

## 2023-12-09 NOTE — PLAN OF CARE
Problem: Adult Inpatient Plan of Care  Goal: Plan of Care Review  Outcome: Ongoing, Progressing  Goal: Patient-Specific Goal (Individualized)  Outcome: Ongoing, Progressing  Goal: Absence of Hospital-Acquired Illness or Injury  Outcome: Ongoing, Progressing  Intervention: Identify and Manage Fall Risk  Recent Flowsheet Documentation  Taken 12/9/2023 1800 by Tracy Reardon RN  Safety Promotion/Fall Prevention: safety round/check completed  Taken 12/9/2023 1615 by Tracy Reardon RN  Safety Promotion/Fall Prevention: safety round/check completed  Taken 12/9/2023 1400 by Tracy Reardon RN  Safety Promotion/Fall Prevention:   safety round/check completed   activity supervised  Intervention: Prevent Skin Injury  Recent Flowsheet Documentation  Taken 12/9/2023 1800 by Tracy Reardon RN  Body Position: position changed independently  Taken 12/9/2023 1615 by Tracy Reardon RN  Body Position: position changed independently  Taken 12/9/2023 1400 by Tracy Reardon RN  Body Position:   position changed independently   right  Skin Protection: adhesive use limited  Intervention: Prevent and Manage VTE (Venous Thromboembolism) Risk  Recent Flowsheet Documentation  Taken 12/9/2023 1400 by Trcay Reardon RN  Activity Management: activity encouraged  VTE Prevention/Management: (lovenox) other (see comments)  Range of Motion: active ROM (range of motion) encouraged  Intervention: Prevent Infection  Recent Flowsheet Documentation  Taken 12/9/2023 1800 by Tracy Reardon RN  Infection Prevention: environmental surveillance performed  Taken 12/9/2023 1615 by Tracy Reardon RN  Infection Prevention: environmental surveillance performed  Taken 12/9/2023 1400 by Tracy Reardon RN  Infection Prevention: environmental surveillance performed  Goal: Optimal Comfort and Wellbeing  Outcome: Ongoing, Progressing  Intervention: Monitor Pain and Promote Comfort  Recent Flowsheet Documentation  Taken 12/9/2023 1615 by Alexys  Tracy JOHNSON RN  Pain Management Interventions: see MAR  Taken 12/9/2023 1400 by Tracy Reardon RN  Pain Management Interventions:   quiet environment facilitated   pillow support provided  Intervention: Provide Person-Centered Care  Recent Flowsheet Documentation  Taken 12/9/2023 1400 by Tracy Reardon RN  Trust Relationship/Rapport:   care explained   choices provided   emotional support provided   thoughts/feelings acknowledged  Goal: Readiness for Transition of Care  Outcome: Ongoing, Progressing   Goal Outcome Evaluation:

## 2023-12-09 NOTE — PLAN OF CARE
Goal Outcome Evaluation:      Pt sustained low BP for part of shift. LHA notified and ordered to hold AM lisinopril and to give fluid bolus. BP improved after fluid bolus. Pt denies any dizziness, lightheadedness. No pallor noted.

## 2023-12-10 LAB
ANION GAP SERPL CALCULATED.3IONS-SCNC: 9 MMOL/L (ref 5–15)
BASOPHILS # BLD AUTO: 0.1 10*3/MM3 (ref 0–0.2)
BASOPHILS NFR BLD AUTO: 1.4 % (ref 0–1.5)
BUN SERPL-MCNC: 13 MG/DL (ref 8–23)
BUN/CREAT SERPL: 16 (ref 7–25)
CALCIUM SPEC-SCNC: 9 MG/DL (ref 8.6–10.5)
CHLORIDE SERPL-SCNC: 108 MMOL/L (ref 98–107)
CO2 SERPL-SCNC: 24 MMOL/L (ref 22–29)
CREAT SERPL-MCNC: 0.81 MG/DL (ref 0.76–1.27)
DEPRECATED RDW RBC AUTO: 41.7 FL (ref 37–54)
EGFRCR SERPLBLD CKD-EPI 2021: 100.3 ML/MIN/1.73
EOSINOPHIL # BLD AUTO: 0.5 10*3/MM3 (ref 0–0.4)
EOSINOPHIL NFR BLD AUTO: 7.1 % (ref 0.3–6.2)
ERYTHROCYTE [DISTWIDTH] IN BLOOD BY AUTOMATED COUNT: 12.7 % (ref 12.3–15.4)
GLUCOSE SERPL-MCNC: 87 MG/DL (ref 65–99)
HCT VFR BLD AUTO: 37.1 % (ref 37.5–51)
HGB BLD-MCNC: 11.8 G/DL (ref 13–17.7)
IMM GRANULOCYTES # BLD AUTO: 0.01 10*3/MM3 (ref 0–0.05)
IMM GRANULOCYTES NFR BLD AUTO: 0.1 % (ref 0–0.5)
LYMPHOCYTES # BLD AUTO: 2.24 10*3/MM3 (ref 0.7–3.1)
LYMPHOCYTES NFR BLD AUTO: 31.6 % (ref 19.6–45.3)
MCH RBC QN AUTO: 28.6 PG (ref 26.6–33)
MCHC RBC AUTO-ENTMCNC: 31.8 G/DL (ref 31.5–35.7)
MCV RBC AUTO: 90 FL (ref 79–97)
MONOCYTES # BLD AUTO: 0.47 10*3/MM3 (ref 0.1–0.9)
MONOCYTES NFR BLD AUTO: 6.6 % (ref 5–12)
NEUTROPHILS NFR BLD AUTO: 3.77 10*3/MM3 (ref 1.7–7)
NEUTROPHILS NFR BLD AUTO: 53.2 % (ref 42.7–76)
NRBC BLD AUTO-RTO: 0 /100 WBC (ref 0–0.2)
PLATELET # BLD AUTO: 243 10*3/MM3 (ref 140–450)
PMV BLD AUTO: 9.5 FL (ref 6–12)
POTASSIUM SERPL-SCNC: 4 MMOL/L (ref 3.5–5.2)
RBC # BLD AUTO: 4.12 10*6/MM3 (ref 4.14–5.8)
SODIUM SERPL-SCNC: 141 MMOL/L (ref 136–145)
WBC NRBC COR # BLD AUTO: 7.09 10*3/MM3 (ref 3.4–10.8)

## 2023-12-10 PROCEDURE — 63710000001 ONDANSETRON PER 8 MG: Performed by: INTERNAL MEDICINE

## 2023-12-10 PROCEDURE — 85025 COMPLETE CBC W/AUTO DIFF WBC: CPT | Performed by: INTERNAL MEDICINE

## 2023-12-10 PROCEDURE — 99232 SBSQ HOSP IP/OBS MODERATE 35: CPT | Performed by: INTERNAL MEDICINE

## 2023-12-10 PROCEDURE — 94799 UNLISTED PULMONARY SVC/PX: CPT

## 2023-12-10 PROCEDURE — 80048 BASIC METABOLIC PNL TOTAL CA: CPT | Performed by: INTERNAL MEDICINE

## 2023-12-10 PROCEDURE — 94664 DEMO&/EVAL PT USE INHALER: CPT

## 2023-12-10 PROCEDURE — 25010000002 DIPHENHYDRAMINE PER 50 MG: Performed by: NURSE PRACTITIONER

## 2023-12-10 PROCEDURE — 94761 N-INVAS EAR/PLS OXIMETRY MLT: CPT

## 2023-12-10 PROCEDURE — 25010000002 ENOXAPARIN PER 10 MG: Performed by: INTERNAL MEDICINE

## 2023-12-10 PROCEDURE — 25010000002 MORPHINE PER 10 MG: Performed by: NURSE PRACTITIONER

## 2023-12-10 RX ORDER — POLYETHYLENE GLYCOL 3350 17 G/17G
17 POWDER, FOR SOLUTION ORAL 4 TIMES DAILY
Status: DISCONTINUED | OUTPATIENT
Start: 2023-12-10 | End: 2023-12-12 | Stop reason: HOSPADM

## 2023-12-10 RX ORDER — DIPHENHYDRAMINE HYDROCHLORIDE 50 MG/ML
25 INJECTION INTRAMUSCULAR; INTRAVENOUS ONCE
Status: COMPLETED | OUTPATIENT
Start: 2023-12-10 | End: 2023-12-10

## 2023-12-10 RX ADMIN — ONDANSETRON HYDROCHLORIDE 4 MG: 4 TABLET, FILM COATED ORAL at 09:09

## 2023-12-10 RX ADMIN — LISINOPRIL 20 MG: 20 TABLET ORAL at 08:50

## 2023-12-10 RX ADMIN — MINERAL OIL 1 ENEMA: 100 ENEMA RECTAL at 13:26

## 2023-12-10 RX ADMIN — PANTOPRAZOLE SODIUM 40 MG: 40 TABLET, DELAYED RELEASE ORAL at 05:34

## 2023-12-10 RX ADMIN — MORPHINE SULFATE 2 MG: 2 INJECTION, SOLUTION INTRAMUSCULAR; INTRAVENOUS at 13:35

## 2023-12-10 RX ADMIN — LUBIPROSTONE 24 MCG: 24 CAPSULE, GELATIN COATED ORAL at 08:51

## 2023-12-10 RX ADMIN — LUBIPROSTONE 24 MCG: 24 CAPSULE, GELATIN COATED ORAL at 17:43

## 2023-12-10 RX ADMIN — MORPHINE SULFATE 2 MG: 2 INJECTION, SOLUTION INTRAMUSCULAR; INTRAVENOUS at 17:43

## 2023-12-10 RX ADMIN — POLYETHYLENE GLYCOL 3350 17 G: 17 POWDER, FOR SOLUTION ORAL at 17:43

## 2023-12-10 RX ADMIN — BISACODYL 5 MG: 5 TABLET, COATED ORAL at 01:22

## 2023-12-10 RX ADMIN — DIPHENHYDRAMINE HYDROCHLORIDE 25 MG: 50 INJECTION, SOLUTION INTRAMUSCULAR; INTRAVENOUS at 23:44

## 2023-12-10 RX ADMIN — MORPHINE SULFATE 2 MG: 2 INJECTION, SOLUTION INTRAMUSCULAR; INTRAVENOUS at 01:10

## 2023-12-10 RX ADMIN — MORPHINE SULFATE 2 MG: 2 INJECTION, SOLUTION INTRAMUSCULAR; INTRAVENOUS at 21:21

## 2023-12-10 RX ADMIN — TIOTROPIUM BROMIDE INHALATION SPRAY 2 PUFF: 3.12 SPRAY, METERED RESPIRATORY (INHALATION) at 08:05

## 2023-12-10 RX ADMIN — ENOXAPARIN SODIUM 40 MG: 100 INJECTION SUBCUTANEOUS at 21:20

## 2023-12-10 RX ADMIN — POLYETHYLENE GLYCOL 3350 17 G: 17 POWDER, FOR SOLUTION ORAL at 21:20

## 2023-12-10 RX ADMIN — NALOXEGOL OXALATE 25 MG: 12.5 TABLET, FILM COATED ORAL at 08:51

## 2023-12-10 RX ADMIN — MORPHINE SULFATE 2 MG: 2 INJECTION, SOLUTION INTRAMUSCULAR; INTRAVENOUS at 09:09

## 2023-12-10 RX ADMIN — MORPHINE SULFATE 2 MG: 2 INJECTION, SOLUTION INTRAMUSCULAR; INTRAVENOUS at 05:34

## 2023-12-10 RX ADMIN — DOCUSATE SODIUM 50MG AND SENNOSIDES 8.6MG 2 TABLET: 8.6; 5 TABLET, FILM COATED ORAL at 21:20

## 2023-12-10 RX ADMIN — BUDESONIDE AND FORMOTEROL FUMARATE DIHYDRATE 2 PUFF: 160; 4.5 AEROSOL RESPIRATORY (INHALATION) at 08:06

## 2023-12-10 RX ADMIN — POLYETHYLENE GLYCOL 3350 17 G: 17 POWDER, FOR SOLUTION ORAL at 08:51

## 2023-12-10 RX ADMIN — DOCUSATE SODIUM 50MG AND SENNOSIDES 8.6MG 2 TABLET: 8.6; 5 TABLET, FILM COATED ORAL at 08:50

## 2023-12-10 NOTE — PLAN OF CARE
Goal Outcome Evaluation:              Outcome Evaluation: ambulating frequently in tsai around nurse's station.  C/o constant sharp pain.  Occasional nausea.  No emesis..  Tolerating diet.  Medicated prn for pain.  Mineral oil enema with minimal results- a few pieces of small formed stool.

## 2023-12-10 NOTE — NURSING NOTE
"  Access center follow up.    Patient sitting on side of bed. He reports couldn't sleep much overnight due to patient care and pain. Appetite: \"okay.\" Patient denies depression stating \"I feel fine.\" Throughout encounter patient with head down, flat affect. Patient declined outpatient psychiatry resources. Per overnight RN patient verbalized frustration r/t chronic pain. Nursing reports patient non-compliant with IV therapy, fall prevention, infection control, plan of care at times.Patient participated in PT yesterday. Access following.   "

## 2023-12-10 NOTE — PAYOR COMM NOTE
"Meron Mallory (63 y.o. Male)        PLEASE SEE ATTACHED FOR INPT AUTH.    DX:  K56.7  R11.2     PLEASE CALL   OR  [phone] WITH INPT AUTH.      THANK YOU    SAJAN SPRAGUE LPN CCP   Date of Birth   1960    Social Security Number       Address   [address] [place] [address]    Home Phone   [phone]    MRN   [id number]       Spiritism   None    Marital Status   Single                            Admission Date   23    Admission Type   Emergency    Admitting Provider   Mary Jama MD    Attending Provider   Abhijeet Dela Cruz MD    Department, Room/Bed   [address], [address]/1       Discharge Date       Discharge Disposition       Discharge Destination                                 Attending Provider: Abhijeet Dela Cruz MD    Allergies: Prochlorperazine    Isolation: None   Infection: None   Code Status: CPR    Ht: 177.8 cm (70\")   Wt: 56.2 kg (123 lb 12.8 oz)    Admission Cmt: None   Principal Problem: Intractable nausea and vomiting [R11.2]                   Active Insurance as of 2023       Primary Coverage       Payor Plan Insurance Group Employer/Plan Group    [facility] [id number]       Payor Plan Address Payor Plan Phone Number Payor Plan Fax Number Effective Dates    [address] [address]   2021 - None Entered    [facility] [id number]         Subscriber Name Subscriber Birth Date Member ID       MERON MALLORY 1960 [id number]                     Emergency Contacts        (Rel.) Home Phone Work Phone Mobile Phone    Tali Mallory (Sister) [phone] -- [phone]              [place]: [facility] [id number]  Tax ID [id number]     History & Physical        Mary Jama MD at 23          HISTORY AND PHYSICAL   [facility]        Date of Admission: 2023  Patient Identification:  Name: Meron Mallory  Age: 63 y.o.  Sex: male  :  " 1960  MRN: [id number]                     Primary Care Physician: Janelle Lara MD    Chief Complaint:  63 year old gentleman who presented to the emergency room with abdominal pain, nausea and vomiting; he has also had nasal drainage; he denies fever or chills; denies sick contacts; he was admitted last month for an ileus; he says he was able to force himself to have a small bowel movement earlier while he was in the ED    History of Present Illness:   As above    Past Medical History:  Past Medical History:   Diagnosis Date    Bell's palsy     COPD (chronic obstructive pulmonary disease)     Dysphagia     previous workup at [facility]    Hypertension     Intractable nausea and vomiting 12/07/2023     Past Surgical History:  Past Surgical History:   Procedure Laterality Date    CHOLECYSTECTOMY      CHOLECYSTECTOMY WITH INTRAOPERATIVE CHOLANGIOGRAM N/A 04/25/2022    Procedure: Laparoscopic cholecystectomy with intraoperative cholangiogram, possible open;  Surgeon: Khari Schofield MD;  Location: [facility];  Service: General;  Laterality: N/A;    HERNIA REPAIR        Home Meds:  Medications Prior to Admission   Medication Sig Dispense Refill Last Dose    linaclotide (Linzess) 145 MCG capsule capsule Take 1 capsule by mouth Every Morning Before Breakfast for 30 days. 30 capsule 0     Fluticasone-Umeclidin-Vilant (Trelegy Ellipta) 100-62.5-25 MCG/INH inhaler Inhale 1 puff Daily.       ibuprofen (IBU) 400 MG tablet Take 0.5 tablets by mouth Every 6 (Six) Hours As Needed for Mild Pain. 30 tablet 0     ipratropium-albuterol (DUO-NEB) 0.5-2.5 mg/3 ml nebulizer Take 3 mL by nebulization Every 4 (Four) Hours As Needed for Wheezing.       lisinopril (PRINIVIL,ZESTRIL) 20 MG tablet Take 1 tablet by mouth Daily.       lubiprostone (AMITIZA) 24 MCG capsule Take 1 capsule by mouth 2 (Two) Times a Day With Meals. 60 capsule 0     mineral oil enema Insert 1 each into the rectum Daily As Needed for  Constipation (until constipation resolves). 21 each 0     Naloxegol Oxalate (MOVANTIK) 25 MG tablet Take 1 tablet by mouth Every Morning. 30 tablet 0     omeprazole (priLOSEC) 20 MG capsule Take 1 capsule by mouth Daily. 30 capsule 0     ondansetron ODT (ZOFRAN-ODT) 4 MG disintegrating tablet Place 1 tablet on the tongue 4 (Four) Times a Day As Needed for Nausea or Vomiting. 15 tablet 0     promethazine (PHENERGAN) 25 MG tablet Take 1 tablet by mouth Every 6 (Six) Hours As Needed for Nausea or Vomiting. 10 tablet 0     sennosides-docusate (senna-docusate sodium) 8.6-50 MG per tablet Take 2 tablets by mouth 2 (Two) Times a Day for 30 days. 120 tablet 0     sucralfate (CARAFATE) 1 g tablet Take 1 tablet by mouth 4 (Four) Times a Day. 40 tablet 0        Allergies:  Allergies   Allergen Reactions    Prochlorperazine Anxiety     Immunizations:  Immunization History   Administered Date(s) Administered    COVID-19 (MODERNA) 1st,2nd,3rd Dose Monovalent 09/15/2021, 10/13/2021    Fluzone Quad >6mos (Multi-dose) 03/14/2017    Pneumococcal Polysaccharide (PPSV23) 08/14/2018     Social History:   Social History     Social History Narrative    Not on file     Social History     Socioeconomic History    Marital status: Single   Tobacco Use    Smoking status: Every Day     Packs/day: 0.25     Years: 15.00     Additional pack years: 0.00     Total pack years: 3.75     Types: Cigarettes    Smokeless tobacco: Never   Vaping Use    Vaping Use: Former   Substance and Sexual Activity    Alcohol use: No    Drug use: No    Sexual activity: Defer       Family History:  Family History   Family history unknown: Yes        Review of Systems  See history of present illness and past medical history.  Patient denies headache, dizziness, syncope, falls, trauma, change in vision, change in hearing, change in taste, changes in weight, changes in appetite, focal weakness, numbness, or paresthesia.  Patient denies chest pain, palpitations, dyspnea,  "orthopnea, PND, cough, sinus pressure, rhinorrhea, epistaxis, hemoptysis, nausea, vomiting,hematemesis, diarrhea, constipation or hematochezia.  Denies cold or heat intolerance, polydipsia, polyuria, polyphagia. Denies hematuria, pyuria, dysuria, hesitancy, frequency or urgency. Denies consumption of raw and under cooked meats foods or change in water source.  Denies fever, chills, sweats, night sweats.  Denies missing any routine medications. Remainder of ROS is negative.    Objective:  T Max 24 hrs: Temp (24hrs), Av °F (36.7 °C), Min:97.3 °F (36.3 °C), Max:98.6 °F (37 °C)    Vitals Ranges:   Temp:  [97.3 °F (36.3 °C)-98.6 °F (37 °C)] 98 °F (36.7 °C)  Heart Rate:  [] 56  Resp:  [16-18] 16  BP: (129-155)/() 134/83      Exam:  /83 (BP Location: Left arm, Patient Position: Lying)   Pulse 56   Temp 98 °F (36.7 °C) (Oral)   Resp 16   Ht 177.8 cm (70\")   Wt 53.5 kg (118 lb)   SpO2 97%   BMI 16.93 kg/m²     General Appearance:    Alert, cooperative, no distress, appears stated age   Head:    Normocephalic, without obvious abnormality, atraumatic   Eyes:    PERRL, conjunctivae/corneas clear, EOM's intact, both eyes   Ears:    Normal external ear canals, both ears   Nose:   Nares normal, septum midline, mucosa normal, no drainage    or sinus tenderness   Throat:   Lips, mucosa, and tongue normal   Neck:   Supple, symmetrical, trachea midline, no adenopathy;     thyroid:  no enlargement/tenderness/nodules; no carotid    bruit or JVD   Back:     Symmetric, no curvature, ROM normal, no CVA tenderness   Lungs:     Decreased breath sounds bilaterally, respirations unlabored   Chest Wall:    No tenderness or deformity    Heart:    Regular rate and rhythm, S1 and S2 normal, no murmur, rub   or gallop   Abdomen:     Soft, nontender, bowel sounds active all four quadrants,     no masses, no hepatomegaly, no splenomegaly   Extremities:   Extremities normal, atraumatic, no cyanosis or edema                 "       .    Data Review:  Labs in chart were reviewed.  WBC   Date Value Ref Range Status   12/07/2023 12.69 (H) 3.40 - 10.80 10*3/mm3 Final     Hemoglobin   Date Value Ref Range Status   12/07/2023 15.1 13.0 - 17.7 g/dL Final     Hematocrit   Date Value Ref Range Status   12/07/2023 46.7 37.5 - 51.0 % Final     Platelets   Date Value Ref Range Status   12/07/2023 335 140 - 450 10*3/mm3 Final     Sodium   Date Value Ref Range Status   12/07/2023 144 136 - 145 mmol/L Final     Potassium   Date Value Ref Range Status   12/07/2023 4.8 3.5 - 5.2 mmol/L Final     Comment:     Specimen hemolyzed.  Result may be falsely elevated.     Chloride   Date Value Ref Range Status   12/07/2023 108 (H) 98 - 107 mmol/L Final     CO2   Date Value Ref Range Status   12/07/2023 24.2 22.0 - 29.0 mmol/L Final     BUN   Date Value Ref Range Status   12/07/2023 21 8 - 23 mg/dL Final     Creatinine   Date Value Ref Range Status   12/07/2023 1.04 0.76 - 1.27 mg/dL Final     Glucose   Date Value Ref Range Status   12/07/2023 112 (H) 65 - 99 mg/dL Final     Calcium   Date Value Ref Range Status   12/07/2023 9.9 8.6 - 10.5 mg/dL Final                Imaging Results (All)       Procedure Component Value Units Date/Time    CT Abdomen Pelvis Without Contrast [[id number]] Collected: 12/07/23 1607     Updated: 12/07/23 1619    Narrative:      CT ABDOMEN PELVIS WO CONTRAST-     HISTORY: Vomiting, recent ileus.     TECHNIQUE:  CT of the abdomen and pelvis was performed without  intravenous contrast. Evaluation of solid organs and vasculature is  limited without intravenous contrast. Radiation dose reduction  techniques were utilized, including automated exposure control and  exposure modulation based on body size.     COMPARISON: CT abdomen and pelvis 11/26/2023     FINDINGS:     Evaluation of the lower thorax and upper abdomen is limited by motion  artifact.   Heart size is normal. There is no pericardial effusion. Small nodules  abutting the pleural  surface in the lateral left lower lobe are not  significantly changed dating back to 2/21/2022. Pleural spaces are  clear.  The liver is normal in size. The gallbladder surgically absent. There is  intrahepatic and extrahepatic pneumobilia, also present on prior, likely  due to the presence of a lower common bile duct biliary stent. Spleen  size is normal. There are no pancreatic calcifications. The adrenal  glands are poorly visualized. Bilateral hypodense and fluid density  renal lesions are better assessed on recent dedicated multiphase CT.  There are punctate nonobstructing inferior right renal stones. There is  no hydronephrosis.  No pathologically enlarged abdominal or pelvic lymph nodes are  identified within the limitations of this noncontrast enhanced  examination. There is advanced calcific atherosclerosis.  There is no bowel obstruction. There is colonic diverticulosis. There is  air throughout multiple prominent and nondilated loops of small bowel.  The bladder is poorly distended and cannot be assessed. The prostate is  enlarged.  There is degenerative disc disease. There is an old L1 compression  fracture. There is osseous demineralization.          Impression:         1.  Prominent and nondilated air-filled loops of small bowel,  nonspecific. Findings could potentially reflect ileus. There is no  convincing evidence for bowel obstruction.  2.  Punctate nonobstructing right renal stones.        This report was finalized on 12/7/2023 4:16 PM by Dr. Telma Fernandez M.D  on Workstation: [id number]       XR Chest 1 View [[id number]] Collected: 12/07/23 1305     Updated: 12/07/23 1309    Narrative:      XR CHEST 1 VW-     HISTORY: Male who is 63 years-old, short of breath     TECHNIQUE: Frontal view of the chest     COMPARISON: 10/29/2023     FINDINGS: Heart, mediastinum and pulmonary vasculature are unremarkable.  No focal pulmonary consolidation, pleural effusion, or pneumothorax. No  acute osseous process.        Impression:      No evidence for acute pulmonary process. Follow-up as  clinical indications persist.     This report was finalized on 12/7/2023 1:06 PM by Dr. Abdiaziz Kumar M.D on Workstation: [id number]                 Assessment:  Active Hospital Problems    Diagnosis  POA    **Intractable nausea and vomiting [R11.2]  Yes      Resolved Hospital Problems   No resolved problems to display.   Ileus  Copd  Hypertension  Hyperglycemia  Severe malnutrition    Plan:  Will ask gi to see him  Continue bowel regimen, iv fluids  Trend labs  Dw patient and ed provide    Mary Jama MD  12/7/2023  19:27 EST      Electronically signed by Mary Jama MD at 12/07/23 1934          Emergency Department Notes        Oliver Roe RN at 12/07/23 1609            Jay Franco MD at 12/07/23 1207           EMERGENCY DEPARTMENT ENCOUNTER    Room Number:  27/27  PCP: Janelle Lara MD  Patient Care Team:  Janelle Lara MD as PCP - General (Family Medicine)   Independent Historians: Patient    HPI:  Chief Complaint: Vomiting, nasal discharge    A complete HPI/ROS/PMH/PSH/SH/FH are unobtainable due to: Nothing    Chronic or social conditions impacting patient care (Social Determinants of Health): None  (Financial Resource Strain / Food Insecurity / Transportation Needs / Physical Activity / Stress / Social Connections / Intimate Partner Violence / Housing Stability)    Context: Donny Mallory is a 63 y.o. male who presents to the ED c/o acute vomiting and nasal discharge.  The patient reports that he has had a productive cough.  He reports nasal discharge.  He reports abdominal pain and nausea.  He reports that he is having a hard time swallowing his nasal secretions.  He reports when he does swallow it he gets nauseated.  He reports he has been feeling like this for a few weeks.  He reports he has been to the hospital recently for the same.  He states that he was at this hospital as well as  [facility].    Review of prior external notes (non-ED) -and- Review of prior external test results outside of this encounter: CT of the neck dated 11/30/2023 with no acute abnormality.  CT of the abdomen pelvis dated the same date shows common bile duct stent, distended loops of bowel suspicious for enteritis    Prescription drug monitoring program review:         PAST MEDICAL HISTORY  Active Ambulatory Problems     Diagnosis Date Noted    Psychosis 05/16/2017    Acute pancreatitis 04/21/2022    COPD (chronic obstructive pulmonary disease)     Hypertension     Dysphagia     Constipation     Cholelithiasis 04/25/2022    Severe malnutrition 04/28/2022    Ileus 10/29/2023    GERD without esophagitis 10/29/2023    Hypernatremia 10/29/2023     Resolved Ambulatory Problems     Diagnosis Date Noted    No Resolved Ambulatory Problems     Past Medical History:   Diagnosis Date    Bell's palsy          PAST SURGICAL HISTORY  Past Surgical History:   Procedure Laterality Date    CHOLECYSTECTOMY      CHOLECYSTECTOMY WITH INTRAOPERATIVE CHOLANGIOGRAM N/A 04/25/2022    Procedure: Laparoscopic cholecystectomy with intraoperative cholangiogram, possible open;  Surgeon: Khari Schofield MD;  Location: [facility];  Service: General;  Laterality: N/A;    HERNIA REPAIR           FAMILY HISTORY  Family History   Family history unknown: Yes         SOCIAL HISTORY  Social History     Socioeconomic History    Marital status: Single   Tobacco Use    Smoking status: Every Day     Packs/day: 0.25     Years: 15.00     Additional pack years: 0.00     Total pack years: 3.75     Types: Cigarettes    Smokeless tobacco: Never   Vaping Use    Vaping Use: Former   Substance and Sexual Activity    Alcohol use: No    Drug use: No    Sexual activity: Defer         ALLERGIES  Prochlorperazine        REVIEW OF SYSTEMS  Review of Systems  Included in HPI  All systems reviewed and negative except for those discussed in HPI.      PHYSICAL EXAM    I have  reviewed the triage vital signs and nursing notes.    ED Triage Vitals   Temp Heart Rate Resp BP SpO2   12/07/23 1142 12/07/23 1142 12/07/23 1142 12/07/23 1148 12/07/23 1142   97.3 °F (36.3 °C) (!) 124 18 (!) 155/110 98 %      Temp src Heart Rate Source Patient Position BP Location FiO2 (%)   12/07/23 1142 12/07/23 1142 12/07/23 1148 -- --   Tympanic Monitor Sitting         Physical Exam  GENERAL: Awake, alert, ill-appearing, acutely and chronically ill-appearing the patient repeatedly swallows and then has nausea and near vomiting and this is a repetitive cycle, appears uncomfortable  SKIN: Warm, dry  HENT: Normocephalic, atraumatic  EYES: no scleral icterus  CV: regular rhythm, regular rate  RESPIRATORY: normal effort, lungs diminished  ABDOMEN: soft, nontender, nondistended  MUSCULOSKELETAL: no deformity  NEURO: alert, moves all extremities, follows commands                                                               LAB RESULTS  Recent Results (from the past 24 hour(s))   Respiratory Panel PCR w/COVID-19(SARS-CoV-2) PRINCESS/TAMY/HORTENCIA/PAD/COR/GRETEL In-House, NP Swab in UTM/VTM, 2 HR TAT - Swab, Nasopharynx    Collection Time: 12/07/23 12:04 PM    Specimen: Nasopharynx; Swab   Result Value Ref Range    ADENOVIRUS, PCR Not Detected Not Detected    Coronavirus 229E Not Detected Not Detected    Coronavirus HKU1 Not Detected Not Detected    Coronavirus NL63 Not Detected Not Detected    Coronavirus OC43 Not Detected Not Detected    COVID19 Not Detected Not Detected - Ref. Range    Human Metapneumovirus Not Detected Not Detected    Human Rhinovirus/Enterovirus Not Detected Not Detected    Influenza A PCR Not Detected Not Detected    Influenza B PCR Not Detected Not Detected    Parainfluenza Virus 1 Not Detected Not Detected    Parainfluenza Virus 2 Not Detected Not Detected    Parainfluenza Virus 3 Not Detected Not Detected    Parainfluenza Virus 4 Not Detected Not Detected    RSV, PCR Not Detected Not Detected    Bordetella  pertussis pcr Not Detected Not Detected    Bordetella parapertussis PCR Not Detected Not Detected    Chlamydophila pneumoniae PCR Not Detected Not Detected    Mycoplasma pneumo by PCR Not Detected Not Detected   Comprehensive Metabolic Panel    Collection Time: 12/07/23 12:32 PM    Specimen: Blood   Result Value Ref Range    Glucose 112 (H) 65 - 99 mg/dL    BUN 21 8 - 23 mg/dL    Creatinine 1.04 0.76 - 1.27 mg/dL    Sodium 144 136 - 145 mmol/L    Potassium 4.8 3.5 - 5.2 mmol/L    Chloride 108 (H) 98 - 107 mmol/L    CO2 24.2 22.0 - 29.0 mmol/L    Calcium 9.9 8.6 - 10.5 mg/dL    Total Protein 7.4 6.0 - 8.5 g/dL    Albumin 4.4 3.5 - 5.2 g/dL    ALT (SGPT) 13 1 - 41 U/L    AST (SGOT) 22 1 - 40 U/L    Alkaline Phosphatase 75 39 - 117 U/L    Total Bilirubin 0.3 0.0 - 1.2 mg/dL    Globulin 3.0 gm/dL    A/G Ratio 1.5 g/dL    BUN/Creatinine Ratio 20.2 7.0 - 25.0    Anion Gap 11.8 5.0 - 15.0 mmol/L    eGFR 81.7 >60.0 mL/min/1.73   High Sensitivity Troponin T    Collection Time: 12/07/23 12:32 PM    Specimen: Blood   Result Value Ref Range    HS Troponin T 11 <22 ng/L   CBC Auto Differential    Collection Time: 12/07/23 12:32 PM    Specimen: Blood   Result Value Ref Range    WBC 12.69 (H) 3.40 - 10.80 10*3/mm3    RBC 5.19 4.14 - 5.80 10*6/mm3    Hemoglobin 15.1 13.0 - 17.7 g/dL    Hematocrit 46.7 37.5 - 51.0 %    MCV 90.0 79.0 - 97.0 fL    MCH 29.1 26.6 - 33.0 pg    MCHC 32.3 31.5 - 35.7 g/dL    RDW 12.9 12.3 - 15.4 %    RDW-SD 42.3 37.0 - 54.0 fl    MPV 9.7 6.0 - 12.0 fL    Platelets 335 140 - 450 10*3/mm3    Neutrophil % 79.9 (H) 42.7 - 76.0 %    Lymphocyte % 12.8 (L) 19.6 - 45.3 %    Monocyte % 4.6 (L) 5.0 - 12.0 %    Eosinophil % 1.3 0.3 - 6.2 %    Basophil % 0.9 0.0 - 1.5 %    Immature Grans % 0.5 0.0 - 0.5 %    Neutrophils, Absolute 10.14 (H) 1.70 - 7.00 10*3/mm3    Lymphocytes, Absolute 1.63 0.70 - 3.10 10*3/mm3    Monocytes, Absolute 0.58 0.10 - 0.90 10*3/mm3    Eosinophils, Absolute 0.17 0.00 - 0.40 10*3/mm3     Basophils, Absolute 0.11 0.00 - 0.20 10*3/mm3    Immature Grans, Absolute 0.06 (H) 0.00 - 0.05 10*3/mm3    nRBC 0.0 0.0 - 0.2 /100 WBC   Lipase    Collection Time: 12/07/23 12:32 PM    Specimen: Blood   Result Value Ref Range    Lipase 43 13 - 60 U/L   ECG 12 Lead Dyspnea    Collection Time: 12/07/23 12:39 PM   Result Value Ref Range    QT Interval 343 ms    QTC Interval 431 ms   High Sensitivity Troponin T 2Hr    Collection Time: 12/07/23  2:31 PM    Specimen: Blood   Result Value Ref Range    HS Troponin T 12 <22 ng/L    Troponin T Delta 1 >=-4 - <+4 ng/L       I ordered the above labs and independently reviewed the results.        RADIOLOGY  CT Abdomen Pelvis Without Contrast    Result Date: 12/7/2023  CT ABDOMEN PELVIS WO CONTRAST-  HISTORY: Vomiting, recent ileus.  TECHNIQUE:  CT of the abdomen and pelvis was performed without intravenous contrast. Evaluation of solid organs and vasculature is limited without intravenous contrast. Radiation dose reduction techniques were utilized, including automated exposure control and exposure modulation based on body size.  COMPARISON: CT abdomen and pelvis 11/26/2023  FINDINGS:  Evaluation of the lower thorax and upper abdomen is limited by motion artifact. Heart size is normal. There is no pericardial effusion. Small nodules abutting the pleural surface in the lateral left lower lobe are not significantly changed dating back to 2/21/2022. Pleural spaces are clear. The liver is normal in size. The gallbladder surgically absent. There is intrahepatic and extrahepatic pneumobilia, also present on prior, likely due to the presence of a lower common bile duct biliary stent. Spleen size is normal. There are no pancreatic calcifications. The adrenal glands are poorly visualized. Bilateral hypodense and fluid density renal lesions are better assessed on recent dedicated multiphase CT. There are punctate nonobstructing inferior right renal stones. There is no hydronephrosis. No  pathologically enlarged abdominal or pelvic lymph nodes are identified within the limitations of this noncontrast enhanced examination. There is advanced calcific atherosclerosis. There is no bowel obstruction. There is colonic diverticulosis. There is air throughout multiple prominent and nondilated loops of small bowel. The bladder is poorly distended and cannot be assessed. The prostate is enlarged. There is degenerative disc disease. There is an old L1 compression fracture. There is osseous demineralization.        1.  Prominent and nondilated air-filled loops of small bowel, nonspecific. Findings could potentially reflect ileus. There is no convincing evidence for bowel obstruction. 2.  Punctate nonobstructing right renal stones.   This report was finalized on 12/7/2023 4:16 PM by Dr. Telma Fernandez M.D on Workstation: [id number]      XR Chest 1 View    Result Date: 12/7/2023  XR CHEST 1 VW-  HISTORY: Male who is 63 years-old, short of breath  TECHNIQUE: Frontal view of the chest  COMPARISON: 10/29/2023  FINDINGS: Heart, mediastinum and pulmonary vasculature are unremarkable. No focal pulmonary consolidation, pleural effusion, or pneumothorax. No acute osseous process.      No evidence for acute pulmonary process. Follow-up as clinical indications persist.  This report was finalized on 12/7/2023 1:06 PM by Dr. Abdiaziz Kumar M.D on Workstation: [id number]       I ordered the above noted radiological studies. Reviewed by me and discussed with radiologist.  See dictation for official radiology interpretation.      PROCEDURES    Procedures      MEDICATIONS GIVEN IN ER    Medications   sodium chloride 0.9 % flush 10 mL (has no administration in time range)   ondansetron (ZOFRAN) injection 4 mg (has no administration in time range)   morphine injection 2 mg (has no administration in time range)   ondansetron (ZOFRAN) injection 4 mg (4 mg Intravenous Given 12/7/23 1238)   morphine injection 2 mg (2 mg Intravenous  Given 12/7/23 1441)         ORDERS PLACED DURING THIS VISIT:  Orders Placed This Encounter   Procedures    Respiratory Panel PCR w/COVID-19(SARS-CoV-2) [facility]/TAMY/HORTENCIA/PAD/COR/GRETEL In-House, NP Swab in UTM/VTM, 2 HR TAT - Swab, Nasopharynx    XR Chest 1 View    CT Abdomen Pelvis Without Contrast    Comprehensive Metabolic Panel    High Sensitivity Troponin T    CBC Auto Differential    Lipase    High Sensitivity Troponin T 2Hr    Monitor Blood Pressure    Pulse Oximetry, Continuous    IP Palliative Care Nurse Consult    ECG 12 Lead Dyspnea    Insert Peripheral IV    Initiate Observation Status    CBC & Differential         PROGRESS, DATA ANALYSIS, CONSULTS, AND MEDICAL DECISION MAKING    All labs have been independently interpreted by me.  All radiology studies have been reviewed by me and discussed with radiologist dictating the report.   EKG's independently viewed and interpreted by me.  Discussion below represents my analysis of pertinent findings related to patient's condition, differential diagnosis, treatment plan and final disposition.    Differential diagnosis includes but is not limited to dysphagia, upper respiratory infection, acute coronary syndrome, acute aortic syndrome, PE, dehydration,.    Clinical Scores:              ED Course as of 12/07/23 1624   Thu Dec 07, 2023   1310 XR Chest 1 View  My independent interpretation of the chest x-ray is no dense consolidation [TR]   1556 The patient has persistent nausea and vomiting.  He has normal chemistries and troponin.  He has a negative respiratory viral panel and a clear chest x-ray.  His white blood cell count is elevated.  He has multiple dilated loops of bowel on CT.  Plan admission for further management.  The patient is agreeable.  I discussed with Dr. Jama with A.  She agrees to admit. [TR]   1600 EKG          EKG time: 1239  Rhythm/Rate: Normal sinus, rate 95  P waves and HI: Normal P, normal HI  QRS, axis: Narrow QRS, normal axis  ST and T waves:  No acute    Independently Interpreted by me  Resolved bradycardia changed compared to prior 10/31/2023   [TR]      ED Course User Index  [TR] Jay Franco MD                 PPE: The patient wore a mask and I wore an N95 mask throughout the entire patient encounter.       AS OF 16:24 EST VITALS:    BP - 129/87  HR - 71  TEMP - 97.3 °F (36.3 °C) (Tympanic)  O2 SATS - 96%        DIAGNOSIS  Final diagnoses:   Nausea and vomiting, unspecified vomiting type   Acute abdominal pain   Ileus         DISPOSITION  ED Disposition       ED Disposition   Decision to Admit    Condition   --    Comment   Level of Care: Telemetry [5]   Diagnosis: Intractable nausea and vomiting [206266]   Admitting Physician: CAPRI NAGY [[id number]]   Attending Physician: CAPRI NAGY [[id number]]                    Note Disclaimer: At [facility], we believe that sharing information builds trust and better relationships. You are receiving this note because you recently visited [facility]. It is possible you will see health information before a provider has talked with you about it. This kind of information can be easy to misunderstand. To help you fully understand what it means for your health, we urge you to discuss this note with your provider.         Jay Franco MD  12/07/23 1609       Jay Franco MD  12/07/23 1624      Electronically signed by Jay Franco MD at 12/07/23 1624       Degonda, Janet, RN at 12/07/23 1141          Pt c/o abd pain.  He has been coughing and is soa.  He has been weak.  He has been vomiting    Electronically signed by Degonda, Janet, RN at 12/07/23 1142       Oxygen Therapy (since admission)       Date/Time SpO2 Device (Oxygen Therapy) Flow (L/min) Oxygen Concentration (%) ETCO2 (mmHg)    12/10/23 0806 96 -- -- -- --    12/10/23 0805 99 room air -- -- --    12/10/23 0730 95 room air -- -- --    12/09/23 2344 95 room air -- -- --    12/09/23 2032 99 -- -- -- --    12/09/23 2030 97 room  air -- -- --    12/09/23 2028 -- room air -- -- --    12/09/23 2001 98 room air -- -- --    12/09/23 1617 98 -- -- -- --    12/09/23 1400 -- room air -- -- --    12/09/23 1340 100 room air -- -- --    12/09/23 0930 -- room air -- -- --    12/09/23 0810 98 room air -- -- --    12/09/23 0730 100 room air -- -- --    12/09/23 0336 97 room air -- -- --    12/09/23 0000 -- room air -- -- --    12/08/23 2352 96 -- -- -- --    12/08/23 2258 98 room air -- -- --    12/08/23 2006 100 -- -- -- --    12/08/23 2004 100 room air -- -- --    12/08/23 1922 98 -- -- -- --    12/08/23 1500 -- room air -- -- --    12/08/23 1335 98 room air -- -- --    12/08/23 0857 -- room air -- -- --    12/08/23 0821 95 room air -- -- --    12/08/23 0750 95 room air -- -- --    12/08/23 0324 97 room air -- -- --    12/08/23 0050 97 room air -- -- --    12/07/23 2240 -- room air -- -- --    12/07/23 2133 99 room air -- -- --    12/07/23 1917 97 room air -- -- --    12/07/23 1720 98 -- -- -- --    12/07/23 1715 -- room air -- -- --    12/07/23 1631 98 room air -- -- --    12/07/23 1501 96 room air -- -- --    12/07/23 1339 97 room air -- -- --    12/07/23 1201 100 -- -- -- --    12/07/23 1142 98 room air -- -- --          Intake & Output (last 7 days)         12/03 0701  12/04 0700 12/04 0701  12/05 0700 12/05 0701  12/06 0700 12/06 0701  12/07 0700 12/07 0701  12/08 0700 12/08 0701  12/09 0700 12/09 0701  12/10 0700 12/10 0701  12/11 0700    P.O.     60 300 780     Total Intake(mL/kg)     60 (1) 300 (5.3) 780 (13.9)     Urine (mL/kg/hr)      1280 (0.9) 2705 (2) 400 (1.4)    Stool      0      Total Output      1280 2705 400    Net     +60 [phone] -400                Stool Unmeasured Occurrence      1 x            Lines, Drains & Airways       Active LDAs       Name Placement date Placement time Site Days    Peripheral IV 12/07/23 1234 Anterior;Right;Upper Arm 12/07/23  1234  Arm  2              Inactive LDAs       None                  Medication  Administration Report for Donny Mallory as of 12/10/23 1203     Legend:    Given Hold Not Given Due Canceled Entry Other Actions    Time Time (Time) Time Time-Action         Discontinued     Completed     Future     MAR Hold     Linked             Medications 12/04/23 12/05/23 12/06/23 12/07/23 12/08/23 12/09/23 12/10/23      acetaminophen (TYLENOL) tablet 650 mg  Dose: 650 mg  Freq: Every 4 Hours PRN Route: PO  PRN Reason: Mild Pain  Start: 12/07/23 1755   Admin Instructions:   Do not exceed 4 grams of acetaminophen in a 24 hr period.    If given for pain, use the following pain scale:   Mild Pain = Pain Score of 1-3, CPOT 1-2  Moderate Pain = Pain Score of 4-6, CPOT 3-4  Severe Pain = Pain Score of 7-10, CPOT 5-8  Based on patient request - if ordered for moderate or severe pain, provider allows for administration of a medication prescribed for a lower pain scale.    Do not exceed 4 grams of acetaminophen in a 24 hr period. Max dose of 2gm for AST/ALT greater than 120 units/L.    If given for pain, use the following pain scale:   Mild Pain = Pain Score of 1-3, CPOT 1-2  Moderate Pain = Pain Score of 4-6, CPOT 3-4  Severe Pain = Pain Score of 7-10, CPOT 5-8       1826-Given        0052-Given        (0029)-Not Given [C]       0618-Given            sennosides-docusate (PERICOLACE) 8.6-50 MG per tablet 2 tablet  Dose: 2 tablet  Freq: 2 Times Daily Route: PO  Start: 12/07/23 2100   Admin Instructions:   HOLD MEDICATION IF PATIENT HAS HAD BOWEL MOVEMENT. Start bowel management regimen if patient has not had a bowel movement after 12 hours.       2240-Given        0849-Given       2035-Given        0926-Given       2028-Given        0850-Given       2100          And  polyethylene glycol (MIRALAX) packet 17 g  Dose: 17 g  Freq: Daily PRN Route: PO  PRN Reason: Constipation  PRN Comment: Use if senna-docusate is ineffective  Start: 12/07/23 1755   End: 12/09/23 1301   Admin Instructions:   Use if no bowel movement after  12 hours. Mix in 6-8 ounces of water.  Use 4-8 ounces of water, tea, or juice for each 17 gram dose.             And  bisacodyl (DULCOLAX) EC tablet 5 mg  Dose: 5 mg  Freq: Daily PRN Route: PO  PRN Reason: Constipation  PRN Comment: Use if polyethylene glycol is ineffective  Start: 12/07/23 1755   Admin Instructions:   Use if no bowel movement after 12 hours.  Swallow whole. Do not crush, split, or chew tablet.          0122-Given          And  bisacodyl (DULCOLAX) suppository 10 mg  Dose: 10 mg  Freq: Daily PRN Route: RE  PRN Reason: Constipation  PRN Comment: Use if bisacodyl oral is ineffective  Start: 12/07/23 1755   Admin Instructions:   Use if no bowel movement after 12 hours.  Hold for diarrhea              budesonide-formoterol (SYMBICORT) 160-4.5 MCG/ACT inhaler 2 puff  Dose: 2 puff  Freq: 2 Times Daily - RT Route: IN  Start: 12/07/23 2130   Admin Instructions:    Shake well.  Rinse mouth after use, do not swallow water.  Send aerosols to pharmacy in ziplock bag for proper disposal.       2133-Given        0821-Given       2004-Given        0812-Given       2038-Given [C]        0806-Given       2130          And  tiotropium (SPIRIVA RESPIMAT) 2.5 mcg/act aerosol solution inhaler  Dose: 2 puff  Freq: Daily - RT Route: IN  Start: 12/07/23 2030       (2116)-Not Given        0819-Given        0810-Given        0805-Given           Enoxaparin Sodium (LOVENOX) syringe 40 mg  Dose: 40 mg  Freq: Nightly Route: SC  Indications of Use: PROPHYLAXIS OF VENOUS THROMBOEMBOLISM  Start: 12/08/23 2100   Admin Instructions:   Give subcutaneous in abdomen only. Do not massage site after injection.        2035-Given        2028-Given 2100           ipratropium-albuterol (DUO-NEB) nebulizer solution 3 mL  Dose: 3 mL  Freq: Every 4 Hours PRN Route: NEBULIZATION  PRN Reason: Wheezing  Start: 12/07/23 1935   Admin Instructions:   Include Respiratory Treatment Education              lisinopril (PRINIVIL,ZESTRIL) tablet 20  mg  Dose: 20 mg  Freq: Daily Route: PO  Start: 12/07/23 2030   Admin Instructions:   Hold for SBP less than 100, DBP less than 60       (2223)-Not Given [C]        0848-Given        0900-Hold [C]        0850-Given           lubiprostone (AMITIZA) capsule 24 mcg  Dose: 24 mcg  Freq: 2 Times Daily With Meals Route: PO  Start: 12/07/23 2030   Admin Instructions:   Do not crush or chew the capsules or tablets. The drug may not work as designed if the capsule or tablet is crushed or chewed. Swallow whole.       2239-Given        0848-Given       1725-Given        0926-Given       1606-Given        0851-Given       1800           melatonin tablet 3 mg  Dose: 3 mg  Freq: Nightly PRN Route: PO  PRN Reason: Sleep  Start: 12/07/23 1755 0052-Given             mineral oil enema 1 enema  Dose: 1 enema  Freq: Daily PRN Route: RE  PRN Reason: Constipation  PRN Comment: until constipation resolves  Start: 12/07/23 1935              morphine injection 2 mg  Dose: 2 mg  Freq: Every 2 Hours PRN Route: IV  PRN Reason: Severe Pain  Start: 12/07/23 2210   End: 12/17/23 2209   Admin Instructions:   If given for pain, use the following pain scale:  Mild Pain = Pain Score of 1-3, CPOT 1-2  Moderate Pain = Pain Score of 4-6, CPOT 3-4  Severe Pain = Pain Score of 7-10, CPOT 5-8       2240-Given [C]        0052-Given [C]       0442-Given       0736-Given       1015-Given       1313-Given       1725-Given       1951-Given        0634-Given       1130-Given       1615-Given       2028-Given        0110-Given       0534-Given       0909-Given           Naloxegol Oxalate (MOVANTIK) tablet 25 mg  Dose: 25 mg  Freq: Every Morning Route: PO  Start: 12/08/23 0700        0730-Given        0634-Given        0851-Given           ondansetron (ZOFRAN) tablet 4 mg  Dose: 4 mg  Freq: Every 6 Hours PRN Route: PO  PRN Reasons: Nausea,Vomiting  Start: 12/07/23 1755 2110-Given        0736-Not Given:  See Alt       1424-Not Given:  See Alt        2058-Not Given:  See Alt        0618-Not Given:  See Alt       1357-Not Given:  See Alt        0909-Given          Or  ondansetron (ZOFRAN) injection 4 mg  Dose: 4 mg  Freq: Every 6 Hours PRN Route: IV  PRN Reasons: Nausea,Vomiting  Start: 12/07/23 1755       2110-Not Given:  See Alt        0736-Given       1424-Given       2058-Given        0618-Given       1357-Given        0909-Not Given:  See Alt           pantoprazole (PROTONIX) EC tablet 40 mg  Dose: 40 mg  Freq: Every Early Morning Route: PO  Start: 12/08/23 0600   Admin Instructions:   Swallow whole; do not crush, split, or chew.        0730-Given        0618-Given        0534-Given           polyethylene glycol (MIRALAX) packet 17 g  Dose: 17 g  Freq: 3 Times Daily Route: PO  Start: 12/09/23 1600   Admin Instructions:   Use 4-8 ounces of water, tea, or juice for each 17 gram dose.         1606-Given       2028-Given        0851-Given       1600       2100           sodium chloride 0.9 % flush 10 mL  Dose: 10 mL  Freq: As Needed Route: IV  PRN Reason: Line Care  Start: 12/07/23 1200       2242-Given        1952-Given             sodium chloride 0.9 % infusion  Rate: 75 mL/hr Dose: 75 mL/hr  Freq: Continuous Route: IV  Start: 12/07/23 1845       1927-New Bag        0736-New Bag       1134-Currently Infusing       1340-Currently Infusing       1722-Currently Infusing       1727-New Bag        1606-New Bag        (0613)-Not Given [C]          Completed Medications  Medications 12/04/23 12/05/23 12/06/23 12/07/23 12/08/23 12/09/23 12/10/23       morphine injection 2 mg  Dose: 2 mg  Freq: Once Route: IV  Start: 12/07/23 1615   End: 12/07/23 1637   Admin Instructions:   Based on patient request - if ordered for moderate or severe pain, provider allows for administration of a medication prescribed for a lower pain scale.  If given for pain, use the following pain scale:  Mild Pain = Pain Score of 1-3, CPOT 1-2  Moderate Pain = Pain Score of 4-6, CPOT 3-4  Severe  "Pain = Pain Score of 7-10, CPOT 5-8       1637-Given              morphine injection 2 mg  Dose: 2 mg  Freq: Once Route: IV  Start: 12/07/23 1430   End: 12/07/23 1441   Admin Instructions:   Based on patient request - if ordered for moderate or severe pain, provider allows for administration of a medication prescribed for a lower pain scale.  If given for pain, use the following pain scale:  Mild Pain = Pain Score of 1-3, CPOT 1-2  Moderate Pain = Pain Score of 4-6, CPOT 3-4  Severe Pain = Pain Score of 7-10, CPOT 5-8       1441-Given              ondansetron (ZOFRAN) injection 4 mg  Dose: 4 mg  Freq: Once Route: IV  Start: 12/07/23 1615   End: 12/07/23 1637   Admin Instructions:   \"If multiple N/V medications ordered, use in the following order: Ondansetron, Prochlorperazine, Promethazine. Use PO unless patient refuses or patient unable to swallow.\"       1637-Given              ondansetron (ZOFRAN) injection 4 mg  Dose: 4 mg  Freq: Once Route: IV  Start: 12/07/23 1217   End: 12/07/23 1238   Admin Instructions:   \"If multiple N/V medications ordered, use in the following order: Ondansetron, Prochlorperazine, Promethazine. Use PO unless patient refuses or patient unable to swallow.\"       1238-Given              promethazine (PHENERGAN) tablet 12.5 mg  Dose: 12.5 mg  Freq: Once Route: PO  Start: 12/08/23 0345   End: 12/08/23 0443   Admin Instructions:   \"If multiple N/V medications ordered, use in the following order: Ondansetron, Prochlorperazine, Promethazine. Use PO unless patient refuses or patient unable to swallow.\"          0443-Given [C]             sodium chloride 0.9 % bolus 500 mL  Dose: 500 mL  Freq: Once Route: IV  Start: 12/09/23 0100   End: 12/09/23 0059         0029-New Bag           Discontinued Medications  Medications 12/04/23 12/05/23 12/06/23 12/07/23 12/08/23 12/09/23 12/10/23       promethazine (PHENERGAN) tablet 25 mg  Dose: 25 mg  Freq: Once Route: PO  Start: 12/08/23 0345   End: 12/08/23 " "0251   Admin Instructions:   \"If multiple N/V medications ordered, use in the following order: Ondansetron, Prochlorperazine, Promethazine. Use PO unless patient refuses or patient unable to swallow.\"                sennosides-docusate (PERICOLACE) 8.6-50 MG per tablet 2 tablet  Dose: 2 tablet  Freq: 2 Times Daily Route: PO  Start: 23   End: 23                         Operative/Procedure Notes (all)    No notes of this type exist for this encounter.          Physician Progress Notes (all)        Abhijeet Dela rCuz MD at 23 1322              Name: Donny Mallory ADMIT: 2023   : 1960  PCP: Janelle Lara MD    MRN: [id number] LOS: 0 days   AGE/SEX: 63 y.o. male  ROOM: [facility]     Subjective   Subjective   Patient is lying in the bed and is complaining of nausea but no vomiting.  Denies any abdominal pain./Constipation states he has been passing gas.      Objective   Objective   Vital Signs  Temp:  [97.9 °F (36.6 °C)-98.2 °F (36.8 °C)] 98.1 °F (36.7 °C)  Heart Rate:  [46-55] 55  Resp:  [18-20] 20  BP: ()/(47-77) 118/77  SpO2:  [96 %-100 %] 98 %  on   ;   Device (Oxygen Therapy): room air  Body mass index is 17.76 kg/m².  Physical Exam  HEENT: PERRLA, extraocular movements intact, Scleras no icterus  Neck: Supple, no JVD  Cardiovascular: Regular rate and rhythm with normal S1-S2  Respiratory: Fairly clear to auscultation bilaterally with no wheezes  GI: Soft, nontender, bowel sounds are present but diminished  Extremities: No edema, palpable pedal pulses  Neurologic: Grossly nonfocal, no facial asymmetry    Results Review     I reviewed the patient's new clinical results.  Results from last 7 days   Lab Units 23  0553 23  0747 23  1232   WBC 10*3/mm3 7.62 8.41 12.69*   HEMOGLOBIN g/dL 11.5* 11.6* 15.1   PLATELETS 10*3/mm3 245 231 335     Results from last 7 days   Lab Units 23  0553 23  0747 23  1232   SODIUM mmol/L 144 142 144 " "  POTASSIUM mmol/L 3.8 3.8 4.8   CHLORIDE mmol/L 110* 109* 108*   CO2 mmol/L 26.6 23.5 24.2   BUN mg/dL 15 19 21   CREATININE mg/dL 0.71* 0.82 1.04   GLUCOSE mg/dL 90 89 112*   EGFR mL/min/1.73 104.4 99.9 81.7     Results from last 7 days   Lab Units 12/09/23  0553 12/07/23  1232   ALBUMIN g/dL 3.3* 4.4   BILIRUBIN mg/dL 0.3 0.3   ALK PHOS U/L 56 75   AST (SGOT) U/L 15 22   ALT (SGPT) U/L 8 13     Results from last 7 days   Lab Units 12/09/23  0553 12/08/23  0747 12/07/23  1232   CALCIUM mg/dL 8.8 8.6 9.9   ALBUMIN g/dL 3.3*  --  4.4       No results found for: \"HGBA1C\", \"POCGLU\"    CT Abdomen Pelvis Without Contrast    Result Date: 12/7/2023   1.  Prominent and nondilated air-filled loops of small bowel, nonspecific. Findings could potentially reflect ileus. There is no convincing evidence for bowel obstruction. 2.  Punctate nonobstructing right renal stones.   This report was finalized on 12/7/2023 4:16 PM by Dr. Telma Fernandez M.D on Workstation: [id number]       I have personally reviewed all medications:  Scheduled Medications  budesonide-formoterol, 2 puff, Inhalation, BID - RT   And  tiotropium bromide monohydrate, 2 puff, Inhalation, Daily - RT  enoxaparin, 40 mg, Subcutaneous, Nightly  lisinopril, 20 mg, Oral, Daily  lubiprostone, 24 mcg, Oral, BID With Meals  Naloxegol Oxalate, 25 mg, Oral, QAM  pantoprazole, 40 mg, Oral, Q AM  polyethylene glycol, 17 g, Oral, TID  senna-docusate sodium, 2 tablet, Oral, BID    Infusions  sodium chloride, 75 mL/hr, Last Rate: 75 mL/hr (12/08/23 1727)    Diet  Diet: Gastrointestinal Diets; Fiber-Restricted, Low Irritant; No Straw; Texture: Soft to Chew (NDD 3); Soft to Chew: Ground Meat; Fluid Consistency: Thin (IDDSI 0)    I have personally reviewed:  [x]  Laboratory   [x]  Microbiology   [x]  Radiology   [x]  EKG/Telemetry  [x]  Cardiology/Vascular   []  Pathology    []  Records      Assessment/Plan     Active Hospital Problems    Diagnosis  POA    **Intractable nausea and " vomiting [R11.2]  Yes    Ileus [K56.7]  Yes    GERD without esophagitis [K21.9]  Yes    COPD (chronic obstructive pulmonary disease) [J44.9]  Yes    HTN (hypertension) [I10]  Yes      Resolved Hospital Problems   No resolved problems to display.       63 y.o. male admitted with Intractable nausea and vomiting.    1.Nausea/vomiting/ileus/constipation c, continue with symptomatic treatment and patient is also on Amitiza and Movantik.  GI is following along and diet is being advanced.    2.  Hypertension, on lisinopril which will be continued.    3.  GERD, on acid suppressive therapy.    4.  Depression, Access following along.    5.  History of COPD, continue with Symbicort and currently not in any exacerbation.    6.  On Lovenox for DVT prophylaxis.    7.  CODE STATUS is full code.    8.  Estimated discharge date, hopefully next 1 to 2 days.    Copied text on this note has been reviewed by me on 12/9/2023      Abhijeet Dela Cruz MD  [facility]  12/09/23  13:22 EST        Electronically signed by Abhijeet Dela Cruz MD at 12/09/23 1323       Clovis Tolentino MD at 12/09/23 1259          [facility]  Inpatient Progress Note    Reason for Follow Up: Ileus constipation    Subjective     Interval History:   No bowel movement overnight    Current Facility-Administered Medications:     acetaminophen (TYLENOL) tablet 650 mg, 650 mg, Oral, Q4H PRN, Mary Jama MD, 650 mg at 12/09/23 0618    sennosides-docusate (PERICOLACE) 8.6-50 MG per tablet 2 tablet, 2 tablet, Oral, BID, 2 tablet at 12/09/23 0926 **AND** polyethylene glycol (MIRALAX) packet 17 g, 17 g, Oral, Daily PRN **AND** bisacodyl (DULCOLAX) EC tablet 5 mg, 5 mg, Oral, Daily PRN **AND** bisacodyl (DULCOLAX) suppository 10 mg, 10 mg, Rectal, Daily PRN, Mary Jama MD    budesonide-formoterol (SYMBICORT) 160-4.5 MCG/ACT inhaler 2 puff, 2 puff, Inhalation, BID - RT, 2 puff at 12/09/23 0812 **AND**  tiotropium (SPIRIVA RESPIMAT) 2.5 mcg/act aerosol solution inhaler, 2 puff, Inhalation, Daily - RT, Mary Jama MD, 2 puff at 12/09/23 0810    Enoxaparin Sodium (LOVENOX) syringe 40 mg, 40 mg, Subcutaneous, Nightly, Abhijeet Dela Cruz MD, 40 mg at 12/08/23 2035    ipratropium-albuterol (DUO-NEB) nebulizer solution 3 mL, 3 mL, Nebulization, Q4H PRN, Mary Jama MD    lisinopril (PRINIVIL,ZESTRIL) tablet 20 mg, 20 mg, Oral, Daily, Mary Jama MD, 20 mg at 12/08/23 0848    lubiprostone (AMITIZA) capsule 24 mcg, 24 mcg, Oral, BID With Meals, Mary Jama MD, 24 mcg at 12/09/23 0926    melatonin tablet 3 mg, 3 mg, Oral, Nightly PRN, Mary Jama MD, 3 mg at 12/08/23 0052    mineral oil enema 1 enema, 1 enema, Rectal, Daily PRN, Mary Jama MD    morphine injection 2 mg, 2 mg, Intravenous, Q2H PRN, Hayder Lee, APRN, 2 mg at 12/09/23 1130    Naloxegol Oxalate (MOVANTIK) tablet 25 mg, 25 mg, Oral, QAM, Mary Jama MD, 25 mg at 12/09/23 0634    ondansetron (ZOFRAN) tablet 4 mg, 4 mg, Oral, Q6H PRN, 4 mg at 12/07/23 2110 **OR** ondansetron (ZOFRAN) injection 4 mg, 4 mg, Intravenous, Q6H PRN, Mary Jama MD, 4 mg at 12/09/23 0618    pantoprazole (PROTONIX) EC tablet 40 mg, 40 mg, Oral, Q AM, Mary Jama MD, 40 mg at 12/09/23 0618    [COMPLETED] Insert Peripheral IV, , , Once **AND** sodium chloride 0.9 % flush 10 mL, 10 mL, Intravenous, PRN, Jay Franco MD, 10 mL at 12/08/23 1952    sodium chloride 0.9 % infusion, 75 mL/hr, Intravenous, Continuous, StinglMary MD, Last Rate: 75 mL/hr at 12/08/23 1727, 75 mL/hr at 12/08/23 1727  Review of Systems:    There is weakness and fatigue all other systems reviewed and negative    Objective     Vital Signs  Temp:  [97.9 °F (36.6 °C)-98.2 °F (36.8 °C)] 98.1 °F (36.7 °C)  Heart Rate:  [46-55] 55  Resp:  [18-20] 20  BP: ()/(47-77) 118/77  Body mass index is  17.76 kg/m².    Intake/Output Summary (Last 24 hours) at 12/9/2023 1300  Last data filed at 12/9/2023 0915  Gross per 24 hour   Intake 300 ml   Output 1530 ml   Net -1230 ml     I/O this shift:  In: -   Out: 250 [Urine:250]     Physical Exam:   General: patient awake, alert and cooperative   Eyes: Normal lids and lashes, no scleral icterus   Neck: supple, normal ROM   Skin: warm and dry, not jaundiced   Cardiovascular: regular rhythm and rate, no murmurs auscultated   Pulm: clear to auscultation bilaterally, regular and unlabored   Abdomen: soft, nontender, nondistended; normal bowel sounds   Extremities: no rash or edema   Psychiatric: Normal mood and behavior; memory intact     Results Review:     I reviewed the patient's new clinical results.    Results from last 7 days   Lab Units 12/09/23  0553 12/08/23  0747 12/07/23  1232   WBC 10*3/mm3 7.62 8.41 12.69*   HEMOGLOBIN g/dL 11.5* 11.6* 15.1   HEMATOCRIT % 35.0* 34.5* 46.7   PLATELETS 10*3/mm3 245 231 335     Results from last 7 days   Lab Units 12/09/23  0553 12/08/23  0747 12/07/23  1232   SODIUM mmol/L 144 142 144   POTASSIUM mmol/L 3.8 3.8 4.8   CHLORIDE mmol/L 110* 109* 108*   CO2 mmol/L 26.6 23.5 24.2   BUN mg/dL 15 19 21   CREATININE mg/dL 0.71* 0.82 1.04   CALCIUM mg/dL 8.8 8.6 9.9   BILIRUBIN mg/dL 0.3  --  0.3   ALK PHOS U/L 56  --  75   ALT (SGPT) U/L 8  --  13   AST (SGOT) U/L 15  --  22   GLUCOSE mg/dL 90 89 112*         Lab Results   Lab Value Date/Time    LIPASE 43 12/07/2023 1232    LIPASE 34 11/21/2023 1312    LIPASE 30 11/11/2023 1029    LIPASE 31 10/29/2023 1629    LIPASE 20 10/28/2023 1232    LIPASE 43 10/22/2023 1526    LIPASE 18 10/16/2023 1509    LIPASE 36 10/09/2023 1435    LIPASE 20 10/07/2023 1303    LIPASE 44 09/26/2023 1716    LIPASE 49 09/21/2023 0031    LIPASE 23 09/11/2023 1507    LIPASE 40 08/15/2023 1537    LIPASE 40 07/26/2023 1437    LIPASE 22 07/22/2023 1843    LIPASE 20 07/14/2023 1223    LIPASE 25 07/01/2023 1451    LIPASE 25  06/26/2023 1902    LIPASE 26 06/23/2023 1500    LIPASE 17 06/19/2023 1222    LIPASE 21 06/15/2023 2359    LIPASE 27 05/16/2023 1512    LIPASE 67 (H) 07/28/2022 1556    LIPASE 20 06/27/2022 2034    LIPASE 33 06/23/2022 1837    LIPASE 22 06/18/2022 1127    LIPASE 31 06/11/2022 1213    LIPASE 95 (H) 06/08/2022 1506    LIPASE 26 06/03/2022 1004    LIPASE 25 05/16/2022 1136    LIPASE 23 05/02/2022 1706    LIPASE 80 (H) 04/22/2022 0548    LIPASE 425 (H) 04/21/2022 1012    LIPASE 20 04/19/2022 1118    LIPASE 25 04/03/2022 1123    LIPASE 23 03/12/2022 2156    LIPASE 31 02/21/2022 1614    LIPASE 31 02/18/2022 1400    LIPASE 29 02/06/2022 1552    LIPASE 30 01/24/2022 1221    LIPASE 30 12/22/2021 1517    LIPASE 24 11/12/2021 1253    LIPASE 18 10/23/2021 1707    LIPASE 22 08/31/2021 1206    LIPASE 24 08/20/2021 1347    LIPASE 23 08/03/2021 0024    LIPASE 25 07/22/2021 0218    LIPASE 129 (H) 07/02/2021 1554    LIPASE 36 06/25/2021 1615    LIPASE 26 06/15/2021 1725    LIPASE 66 04/12/2021 1630    LIPASE 103 08/12/2019 1105    LIPASE 55 02/12/2019 2052       Radiology:  CT Abdomen Pelvis Without Contrast   Final Result       1.  Prominent and nondilated air-filled loops of small bowel,   nonspecific. Findings could potentially reflect ileus. There is no   convincing evidence for bowel obstruction.   2.  Punctate nonobstructing right renal stones.           This report was finalized on 12/7/2023 4:16 PM by Dr. Telma Fernandez M.D   on Workstation: [id number]          XR Chest 1 View   Final Result   No evidence for acute pulmonary process. Follow-up as   clinical indications persist.       This report was finalized on 12/7/2023 1:06 PM by Dr. Abdiaziz Kumar M.D on Workstation: [id number]              Assessment & Plan     Active Hospital Problems    Diagnosis     **Intractable nausea and vomiting     Ileus     GERD without esophagitis     COPD (chronic obstructive pulmonary disease)     HTN (hypertension)         Assessment:  Abdominal pain  Chronic constipation  Abnormal CT suggestive of ileus  Nausea and vomiting  GERD  Personal history of colon polyps  History of pancreatic stent placement follows with Dr. Juarez at [facility]     Plan:  GI soft diet as tolerated   Continue Amitiza and Movantik  Adjust bowel regimen  Await results  Continue PPI therapy  CBC and CMP in the morning     I discussed the patients findings and my recommendations with patient and nursing staff.    Clovis Tolentino MD                 Electronically signed by Clovis Tolentino MD at 23 1300       Abhijeet Dela Cruz MD at 23 1306              Name: Donny Mallory ADMIT: 2023   : 1960  PCP: Janelle Lara MD    MRN: [id number] LOS: 0 days   AGE/SEX: 63 y.o. male  ROOM: [facility]     Subjective   Subjective   Patient is lying in the bed and does not appear to be any distress but however appears withdrawn.  Nausea and vomiting appears to be improving.  States he has been passing gas.      Objective   Objective   Vital Signs  Temp:  [97.5 °F (36.4 °C)-98.6 °F (37 °C)] 97.7 °F (36.5 °C)  Heart Rate:  [49-87] 52  Resp:  [16-18] 18  BP: ()/() 109/66  SpO2:  [95 %-99 %] 95 %  on   ;   Device (Oxygen Therapy): room air  Body mass index is 18.19 kg/m².  Physical Exam  HEENT: PERRLA, extraocular movements intact, Scleras no icterus  Neck: Supple, no JVD  Cardiovascular: Regular rate and rhythm with normal S1-S2  Respiratory: Fairly clear to auscultation bilaterally with no wheezes  GI: Soft, nontender, bowel sounds are present but diminished  Extremities: No edema, palpable pedal pulses  Neurologic: Grossly nonfocal, no facial asymmetry    Results Review     I reviewed the patient's new clinical results.  Results from last 7 days   Lab Units 23  0747 23  1232   WBC 10*3/mm3 8.41 12.69*   HEMOGLOBIN g/dL 11.6* 15.1   PLATELETS 10*3/mm3 231 335     Results from last 7 days   Lab Units 23  0747  "12/07/23  1232   SODIUM mmol/L 142 144   POTASSIUM mmol/L 3.8 4.8   CHLORIDE mmol/L 109* 108*   CO2 mmol/L 23.5 24.2   BUN mg/dL 19 21   CREATININE mg/dL 0.82 1.04   GLUCOSE mg/dL 89 112*   EGFR mL/min/1.73 99.9 81.7     Results from last 7 days   Lab Units 12/07/23  1232   ALBUMIN g/dL 4.4   BILIRUBIN mg/dL 0.3   ALK PHOS U/L 75   AST (SGOT) U/L 22   ALT (SGPT) U/L 13     Results from last 7 days   Lab Units 12/08/23  0747 12/07/23  1232   CALCIUM mg/dL 8.6 9.9   ALBUMIN g/dL  --  4.4       No results found for: \"HGBA1C\", \"POCGLU\"    CT Abdomen Pelvis Without Contrast    Result Date: 12/7/2023   1.  Prominent and nondilated air-filled loops of small bowel, nonspecific. Findings could potentially reflect ileus. There is no convincing evidence for bowel obstruction. 2.  Punctate nonobstructing right renal stones.   This report was finalized on 12/7/2023 4:16 PM by Dr. Telma Fernandez M.D on Workstation: [id number]      XR Chest 1 View    Result Date: 12/7/2023  No evidence for acute pulmonary process. Follow-up as clinical indications persist.  This report was finalized on 12/7/2023 1:06 PM by Dr. Abdiaziz Kumar M.D on Workstation: [id number]       I have personally reviewed all medications:  Scheduled Medications  budesonide-formoterol, 2 puff, Inhalation, BID - RT   And  tiotropium bromide monohydrate, 2 puff, Inhalation, Daily - RT  lisinopril, 20 mg, Oral, Daily  lubiprostone, 24 mcg, Oral, BID With Meals  Naloxegol Oxalate, 25 mg, Oral, QAM  pantoprazole, 40 mg, Oral, Q AM  senna-docusate sodium, 2 tablet, Oral, BID    Infusions  sodium chloride, 75 mL/hr, Last Rate: 75 mL/hr (12/08/23 1134)    Diet  NPO Diet NPO Type: Sips with Meds, Ice Chips    I have personally reviewed:  [x]  Laboratory   [x]  Microbiology   [x]  Radiology   [x]  EKG/Telemetry  [x]  Cardiology/Vascular   []  Pathology    []  Records      Assessment/Plan     Active Hospital Problems    Diagnosis  POA    **Intractable nausea and vomiting " "[R11.2]  Yes      Resolved Hospital Problems   No resolved problems to display.       63 y.o. male admitted with Intractable nausea and vomiting.    1.Nausea/vomiting/ileus, continue with symptomatic treatment and GI consult has been obtained.  Encourage patient to ambulate.  2.  Hypertension, on lisinopril which will be continued.  3.  GERD, on acid suppressive therapy.  4.  Depression, Access following along.  5.  History of COPD, continue with Symbicort and currently not in any exacerbation.  6.  On Lovenox for DVT prophylaxis.  7.  CODE STATUS is full code.      Abhijeet Dela Cruz MD  [facility]  12/08/23  13:06 EST        Electronically signed by Abhijeet Dela Cruz MD at 12/08/23 1309          Consult Notes (all)        Sylvia Grady, TOBIAS at 12/08/23 0948        Consult Orders    1. Inpatient Gastroenterology Consult [[id number]] ordered by Mary Jama MD at 12/07/23 2207                 [facility]  Initial Inpatient Consult Note    Referring Provider: A     Reason for Consultation: Abdominal pain    Subjective     History of present illness:      Thank you for allowing us to participate in the care of this patient.    63 y.o. male PMHx of COPD, HTN, hx of chronic constipation, hx of depression w/ prior suicide attempt, hx of biliary and PD dilation s/p stent placement (follows Dr. Juarez of [facility]),     He was followed by our service during admission in Nov and Oct of this year (ileus and constipation) and April of 2022 (acute pancreatitis and constipation).    Patient reports his last bowel movement was 2 to 4 days ago.  Prior to this he states he has not had a bowel movement since his hospital discharge November 25.  His outpatient GI attempted to put him on Motegrity and Linzess but it was not covered by his insurance.  He reports right-sided abdominal pain that radiates to the umbilical area.  Frequently feels stool or \"food\" become trapped " in this area.  With prolonged constipation he will have nausea and vomiting.  Denies rectal pain or rectal bleeding.  He has been started on stool softeners, Movantik and Amitiza this admission.  He is receiving IV morphine for pain relief along with Zofran.    He has a longstanding history of GERD.  He is currently on daily PPI therapy.  Denies dysphagia, odynophagia but does report poor appetite in correlation with constipation.  He reports 40 pound weight loss over the past 1 year.  He is currently NPO.    Hemoglobin this morning 11.6.  Negative respiratory panel and chest x-ray.  Apparently he reported cough and sinus drainage.  Denies fever but does endorse intermittent ills.    CT A/P wo 12/7 with prominent and nondilated air-filled loops of small bowel, nonspecific. Findings could potentially reflect ileus. There is no convincing evidence for bowel obstruction. Punctate nonobstructing right renal stones.    He had a colonoscopy ([facility]) 10/5/2023 reviewed in care everywhere with polyps, ulceration and altered mucosal texture in the rectum along with external hemorrhoids.    Patient reports having anal manometry 2 weeks ago and he is still waiting on results.    Past Medical History:  Past Medical History:   Diagnosis Date    Bell's palsy     Chronic sinusitis     COPD (chronic obstructive pulmonary disease)     Depression     Dysphagia     previous workup at [facility]    History of biliary stent insertion     CBD stent    Hypertension     Intractable nausea and vomiting 12/07/2023    Presence of pancreatic duct stent     Suicide attempt      Past Surgical History:  Past Surgical History:   Procedure Laterality Date    CHOLECYSTECTOMY      CHOLECYSTECTOMY WITH INTRAOPERATIVE CHOLANGIOGRAM N/A 04/25/2022    Procedure: Laparoscopic cholecystectomy with intraoperative cholangiogram, possible open;  Surgeon: Khari Schofield MD;  Location: [facility] OR;  Service: General;  Laterality: N/A;    HERNIA  REPAIR        Social History:   Social History     Tobacco Use    Smoking status: Every Day     Packs/day: 0.25     Years: 15.00     Additional pack years: 0.00     Total pack years: 3.75     Types: Cigarettes    Smokeless tobacco: Never   Substance Use Topics    Alcohol use: No      Family History:  Family History   Family history unknown: Yes       Home Meds:  Medications Prior to Admission   Medication Sig Dispense Refill Last Dose    Fluticasone-Umeclidin-Vilant (Trelegy Ellipta) 100-62.5-25 MCG/INH inhaler Inhale 1 puff Daily.       ibuprofen (IBU) 400 MG tablet Take 0.5 tablets by mouth Every 6 (Six) Hours As Needed for Mild Pain. 30 tablet 0     ipratropium-albuterol (DUO-NEB) 0.5-2.5 mg/3 ml nebulizer Take 3 mL by nebulization Every 4 (Four) Hours As Needed for Wheezing.       [] linaclotide (Linzess) 145 MCG capsule capsule Take 1 capsule by mouth Every Morning Before Breakfast for 30 days. 30 capsule 0     lisinopril (PRINIVIL,ZESTRIL) 20 MG tablet Take 1 tablet by mouth Daily.       lubiprostone (AMITIZA) 24 MCG capsule Take 1 capsule by mouth 2 (Two) Times a Day With Meals. 60 capsule 0     mineral oil enema Insert 1 each into the rectum Daily As Needed for Constipation (until constipation resolves). 21 each 0     Naloxegol Oxalate (MOVANTIK) 25 MG tablet Take 1 tablet by mouth Every Morning. 30 tablet 0     omeprazole (priLOSEC) 20 MG capsule Take 1 capsule by mouth Daily. 30 capsule 0     ondansetron ODT (ZOFRAN-ODT) 4 MG disintegrating tablet Place 1 tablet on the tongue 4 (Four) Times a Day As Needed for Nausea or Vomiting. 15 tablet 0     [] sennosides-docusate (senna-docusate sodium) 8.6-50 MG per tablet Take 2 tablets by mouth 2 (Two) Times a Day for 30 days. 120 tablet 0     sucralfate (CARAFATE) 1 g tablet Take 1 tablet by mouth 4 (Four) Times a Day. 40 tablet 0     promethazine (PHENERGAN) 25 MG tablet Take 1 tablet by mouth Every 6 (Six) Hours As Needed for Nausea or Vomiting. 10  tablet 0      Current Meds:   budesonide-formoterol, 2 puff, Inhalation, BID - RT   And  tiotropium bromide monohydrate, 2 puff, Inhalation, Daily - RT  lisinopril, 20 mg, Oral, Daily  lubiprostone, 24 mcg, Oral, BID With Meals  Naloxegol Oxalate, 25 mg, Oral, QAM  pantoprazole, 40 mg, Oral, Q AM  senna-docusate sodium, 2 tablet, Oral, BID      Allergies:  Allergies   Allergen Reactions    Prochlorperazine Anxiety     Review of Systems  History obtained from chart review and the patient  Gastrointestinal ROS: positive for - abdominal pain, constipation, and nausea/vomiting    Objective     Vital Signs  Temp:  [97.3 °F (36.3 °C)-98.6 °F (37 °C)] 97.7 °F (36.5 °C)  Heart Rate:  [] 52  Resp:  [16-18] 18  BP: ()/() 109/66  Physical Exam:   Constitutional:    Alert, cooperative, in no acute distress    Abdomen:     Soft, umbilical tenderness noted, normal bowel sounds    Results Review:   I reviewed the patient's new clinical results.    Results from last 7 days   Lab Units 12/08/23  0747 12/07/23  1232   WBC 10*3/mm3 8.41 12.69*   HEMOGLOBIN g/dL 11.6* 15.1   HEMATOCRIT % 34.5* 46.7   PLATELETS 10*3/mm3 231 335     Results from last 7 days   Lab Units 12/08/23  0747 12/07/23  1232   SODIUM mmol/L 142 144   POTASSIUM mmol/L 3.8 4.8   CHLORIDE mmol/L 109* 108*   CO2 mmol/L 23.5 24.2   BUN mg/dL 19 21   CREATININE mg/dL 0.82 1.04   CALCIUM mg/dL 8.6 9.9   BILIRUBIN mg/dL  --  0.3   ALK PHOS U/L  --  75   ALT (SGPT) U/L  --  13   AST (SGOT) U/L  --  22   GLUCOSE mg/dL 89 112*         Lab Results   Lab Value Date/Time    LIPASE 43 12/07/2023 1232    LIPASE 34 11/21/2023 1312    LIPASE 30 11/11/2023 1029    LIPASE 31 10/29/2023 1629    LIPASE 20 10/28/2023 1232    LIPASE 43 10/22/2023 1526    LIPASE 18 10/16/2023 1509    LIPASE 36 10/09/2023 1435    LIPASE 20 10/07/2023 1303    LIPASE 44 09/26/2023 1716    LIPASE 49 09/21/2023 0031    LIPASE 23 09/11/2023 1507    LIPASE 40 08/15/2023 1537    LIPASE 40  07/26/2023 1437    LIPASE 22 07/22/2023 1843    LIPASE 20 07/14/2023 1223    LIPASE 25 07/01/2023 1451    LIPASE 25 06/26/2023 1902    LIPASE 26 06/23/2023 1500    LIPASE 17 06/19/2023 1222    LIPASE 21 06/15/2023 2359    LIPASE 27 05/16/2023 1512    LIPASE 67 (H) 07/28/2022 1556    LIPASE 20 06/27/2022 2034    LIPASE 33 06/23/2022 1837    LIPASE 22 06/18/2022 1127    LIPASE 31 06/11/2022 1213    LIPASE 95 (H) 06/08/2022 1506    LIPASE 26 06/03/2022 1004    LIPASE 25 05/16/2022 1136    LIPASE 23 05/02/2022 1706    LIPASE 80 (H) 04/22/2022 0548    LIPASE 425 (H) 04/21/2022 1012    LIPASE 20 04/19/2022 1118    LIPASE 25 04/03/2022 1123    LIPASE 23 03/12/2022 2156    LIPASE 31 02/21/2022 1614    LIPASE 31 02/18/2022 1400    LIPASE 29 02/06/2022 1552    LIPASE 30 01/24/2022 1221    LIPASE 30 12/22/2021 1517    LIPASE 24 11/12/2021 1253    LIPASE 18 10/23/2021 1707    LIPASE 22 08/31/2021 1206    LIPASE 24 08/20/2021 1347    LIPASE 23 08/03/2021 0024    LIPASE 25 07/22/2021 0218    LIPASE 129 (H) 07/02/2021 1554    LIPASE 36 06/25/2021 1615    LIPASE 26 06/15/2021 1725    LIPASE 66 04/12/2021 1630    LIPASE 103 08/12/2019 1105    LIPASE 55 02/12/2019 2052       Radiology:  CT Abdomen Pelvis Without Contrast   Final Result       1.  Prominent and nondilated air-filled loops of small bowel,   nonspecific. Findings could potentially reflect ileus. There is no   convincing evidence for bowel obstruction.   2.  Punctate nonobstructing right renal stones.           This report was finalized on 12/7/2023 4:16 PM by Dr. Telma Fernandez M.D   on Workstation: [id number]          XR Chest 1 View   Final Result   No evidence for acute pulmonary process. Follow-up as   clinical indications persist.       This report was finalized on 12/7/2023 1:06 PM by Dr. Abdiaziz Kumar M.D on Workstation: [id number]              Assessment & Plan   Active Hospital Problems    Diagnosis     **Intractable nausea and vomiting         Assessment:  Abdominal pain  Chronic constipation  Abnormal CT suggestive of ileus  Nausea and vomiting  GERD  Personal history of colon polyps  History of pancreatic stent placement follows with Dr. Juarez at [facility]    Plan:  GI soft diet as tolerated   Give tapwater enema x 1 can repeat if inadequate results  Continue Amitiza and Movantik  Continue PPI therapy  CBC and CMP in the morning    I discussed the patients findings and my recommendations with patient, nursing staff, and Dr. Melissa .    Dragon dictation used throughout this note.            TOBIAS Fallon  [facility]  [address] [place] [address]  Office: [phone]        Electronically signed by Sylvia Grady APRN at 12/08/23 8131

## 2023-12-10 NOTE — CASE MANAGEMENT/SOCIAL WORK
Continued Stay Note  [facility]     Patient Name: Donny Mallory  MRN: [id number]  Today's Date: 12/10/2023    Admit Date: 12/7/2023    Plan: dc home, no needs   Discharge Plan       Row Name 12/10/23 1706       Plan    Plan dc home, no needs    Plan Comments CCP spoke to pt introduced myself, role and discussed dc plans.  Pt lives in a single story home with 3 steps to enter the front door.  He is IADL and uses no DME.  His pharmacy is [facility] on [address] and he has no issues with copays.  he has never used HH or been to IP rehab.  His plan is to return home.  CCP will follow. Thanks, Sarah BLAND                   Discharge Codes    No documentation.                 Expected Discharge Date and Time       Expected Discharge Date Expected Discharge Time    Dec 9, 2023               Sarah Kelley

## 2023-12-10 NOTE — PLAN OF CARE
Goal Outcome Evaluation:   Pt verbalized frustration of being in chronic pain. Pt was noncompliant with tx of IV therapy  fall prevention, and infection control in hospital during shift. Pt declined assistance to toilet and suppository. However, pt expressed potential willingness to try suppository later and enema tx again; pt denies having BM this shift. Pt refused continuous IV NS during the rest of this shift after education of benefits.

## 2023-12-10 NOTE — PROGRESS NOTES
Name: Donny Mallory ADMIT: 2023   : 1960  PCP: Janelle Lara MD    MRN: [id number] LOS: 1 days   AGE/SEX: 63 y.o. male  ROOM: [facility]     Subjective   Subjective   Patient is sitting up on the bed and states vomiting has not resolved and tolerating p.o. diet.  Nausea still present but better. Denies abdominal pain and no bowel movement yet.       Objective   Objective   Vital Signs  Temp:  [97.7 °F (36.5 °C)-98.6 °F (37 °C)] 97.7 °F (36.5 °C)  Heart Rate:  [46-64] 47  Resp:  [16-20] 20  BP: ()/(55-79) 119/79  SpO2:  [95 %-99 %] 96 %  on   ;   Device (Oxygen Therapy): room air  Body mass index is 17.76 kg/m².  Physical Exam  HEENT: PERRLA, extraocular movements intact, Scleras no icterus  Neck: Supple, no JVD  Cardiovascular: Regular rate and rhythm with normal S1-S2  Respiratory: Fairly clear to auscultation bilaterally with no wheezes  GI: Soft, nontender, bowel sounds are present but diminished  Extremities: No edema, palpable pedal pulses  Neurologic: Grossly nonfocal, no facial asymmetry    Results Review     I reviewed the patient's new clinical results.  Results from last 7 days   Lab Units 12/10/23  0706 23  0553 23  0747 23  1232   WBC 10*3/mm3 7.09 7.62 8.41 12.69*   HEMOGLOBIN g/dL 11.8* 11.5* 11.6* 15.1   PLATELETS 10*3/mm3 243 245 231 335     Results from last 7 days   Lab Units 12/10/23  0706 23  0553 23  0747 23  1232   SODIUM mmol/L 141 144 142 144   POTASSIUM mmol/L 4.0 3.8 3.8 4.8   CHLORIDE mmol/L 108* 110* 109* 108*   CO2 mmol/L 24.0 26.6 23.5 24.2   BUN mg/dL 13 15 19 21   CREATININE mg/dL 0.81 0.71* 0.82 1.04   GLUCOSE mg/dL 87 90 89 112*   EGFR mL/min/1.73 100.3 104.4 99.9 81.7     Results from last 7 days   Lab Units 23  0553 23  1232   ALBUMIN g/dL 3.3* 4.4   BILIRUBIN mg/dL 0.3 0.3   ALK PHOS U/L 56 75   AST (SGOT) U/L 15 22   ALT (SGPT) U/L 8 13     Results from last 7 days   Lab Units 12/10/23  0706  "12/09/23  0553 12/08/23  0747 12/07/23  1232   CALCIUM mg/dL 9.0 8.8 8.6 9.9   ALBUMIN g/dL  --  3.3*  --  4.4       No results found for: \"HGBA1C\", \"POCGLU\"    No radiology results for the last day    I have personally reviewed all medications:  Scheduled Medications  budesonide-formoterol, 2 puff, Inhalation, BID - RT   And  tiotropium bromide monohydrate, 2 puff, Inhalation, Daily - RT  enoxaparin, 40 mg, Subcutaneous, Nightly  lisinopril, 20 mg, Oral, Daily  lubiprostone, 24 mcg, Oral, BID With Meals  Naloxegol Oxalate, 25 mg, Oral, QAM  pantoprazole, 40 mg, Oral, Q AM  polyethylene glycol, 17 g, Oral, 4x Daily  senna-docusate sodium, 2 tablet, Oral, BID    Infusions  sodium chloride, 75 mL/hr, Last Rate: 75 mL/hr (12/09/23 1606)    Diet  Diet: Gastrointestinal Diets; Fiber-Restricted, Low Irritant; No Straw; Texture: Soft to Chew (NDD 3); Soft to Chew: Ground Meat; Fluid Consistency: Thin (IDDSI 0)    I have personally reviewed:  [x]  Laboratory   [x]  Microbiology   [x]  Radiology   [x]  EKG/Telemetry  [x]  Cardiology/Vascular   []  Pathology    []  Records       Assessment/Plan     Active Hospital Problems    Diagnosis  POA    **Intractable nausea and vomiting [R11.2]  Yes    Ileus [K56.7]  Yes    GERD without esophagitis [K21.9]  Yes    COPD (chronic obstructive pulmonary disease) [J44.9]  Yes    HTN (hypertension) [I10]  Yes      Resolved Hospital Problems   No resolved problems to display.       63 y.o. male admitted with Intractable nausea and vomiting.    1.Nausea/vomiting/ileus/constipation c, continue with symptomatic treatment and patient is also on Amitiza and Movantik.  Diet has been advanced and is tolerating reasonably well and gastroenterology following along.  Enemas have been initiated today given that no bowel movement yet.    2.  Hypertension, on lisinopril which will be continued.    3.  GERD, on acid suppressive therapy.    4.  Depression, Access following along.    5.  History of COPD, " continue with Symbicort and currently not in any exacerbation.    6.  On Lovenox for DVT prophylaxis.    7.  CODE STATUS is full code.    8.  Estimated discharge date, hopefully next 1 to 2 days.    Copied text on this note has been reviewed by me on 12/10/2023      Abhijeet Dela Cruz MD  [facility]  12/10/23  13:55 EST

## 2023-12-10 NOTE — PROGRESS NOTES
[facility]  Inpatient Progress Note    Reason for Follow Up: Ileus and constipation    Subjective     Interval History:   Still no bowel movement he is very frustrated, I have tried to explain that he has chronic idiopathic constipation and we are trying new medications but again very frustrated    Current Facility-Administered Medications:     acetaminophen (TYLENOL) tablet 650 mg, 650 mg, Oral, Q4H PRN, Mary Jama MD, 650 mg at 12/09/23 0618    sennosides-docusate (PERICOLACE) 8.6-50 MG per tablet 2 tablet, 2 tablet, Oral, BID, 2 tablet at 12/10/23 0850 **AND** [DISCONTINUED] polyethylene glycol (MIRALAX) packet 17 g, 17 g, Oral, Daily PRN **AND** bisacodyl (DULCOLAX) EC tablet 5 mg, 5 mg, Oral, Daily PRN, 5 mg at 12/10/23 0122 **AND** bisacodyl (DULCOLAX) suppository 10 mg, 10 mg, Rectal, Daily PRN, Mayr Jama MD    budesonide-formoterol (SYMBICORT) 160-4.5 MCG/ACT inhaler 2 puff, 2 puff, Inhalation, BID - RT, 2 puff at 12/10/23 0806 **AND** tiotropium (SPIRIVA RESPIMAT) 2.5 mcg/act aerosol solution inhaler, 2 puff, Inhalation, Daily - RT, Mary Jama MD, 2 puff at 12/10/23 0805    Enoxaparin Sodium (LOVENOX) syringe 40 mg, 40 mg, Subcutaneous, Nightly, Abhijeet Dela Cruz MD, 40 mg at 12/09/23 2028    ipratropium-albuterol (DUO-NEB) nebulizer solution 3 mL, 3 mL, Nebulization, Q4H PRN, Mary Jama MD    lisinopril (PRINIVIL,ZESTRIL) tablet 20 mg, 20 mg, Oral, Daily, Mary Jama MD, 20 mg at 12/10/23 0850    lubiprostone (AMITIZA) capsule 24 mcg, 24 mcg, Oral, BID With Meals, Mary Jama MD, 24 mcg at 12/10/23 0851    melatonin tablet 3 mg, 3 mg, Oral, Nightly PRN, Mary Jama MD, 3 mg at 12/08/23 0052    mineral oil enema 1 enema, 1 enema, Rectal, Daily PRN, Mary Jama MD, 1 enema at 12/10/23 1326    morphine injection 2 mg, 2 mg, Intravenous, Q2H PRN, Hayder Lee, APRN, 2 mg at 12/10/23  1335    Naloxegol Oxalate (MOVANTIK) tablet 25 mg, 25 mg, Oral, QAM, Mary Jama MD, 25 mg at 12/10/23 0851    ondansetron (ZOFRAN) tablet 4 mg, 4 mg, Oral, Q6H PRN, 4 mg at 12/10/23 0909 **OR** ondansetron (ZOFRAN) injection 4 mg, 4 mg, Intravenous, Q6H PRN, Mary Jama MD, 4 mg at 12/09/23 1357    pantoprazole (PROTONIX) EC tablet 40 mg, 40 mg, Oral, Q AM, Mary Jama MD, 40 mg at 12/10/23 0534    polyethylene glycol (MIRALAX) packet 17 g, 17 g, Oral, TID, Clovis Tolentino MD, 17 g at 12/10/23 0851    [COMPLETED] Insert Peripheral IV, , , Once **AND** sodium chloride 0.9 % flush 10 mL, 10 mL, Intravenous, PRN, Jay Franco MD, 10 mL at 12/08/23 1952    sodium chloride 0.9 % infusion, 75 mL/hr, Intravenous, Continuous, Mary Jama MD, Last Rate: 75 mL/hr at 12/09/23 1606, 75 mL/hr at 12/09/23 1606  Review of Systems:    There is weakness and fatigue all other systems reviewed and negative    Objective     Vital Signs  Temp:  [97.7 °F (36.5 °C)-98.6 °F (37 °C)] 97.7 °F (36.5 °C)  Heart Rate:  [46-64] 47  Resp:  [16-20] 20  BP: ()/(55-79) 119/79  Body mass index is 17.76 kg/m².    Intake/Output Summary (Last 24 hours) at 12/10/2023 1337  Last data filed at 12/10/2023 1327  Gross per 24 hour   Intake 780 ml   Output 3255 ml   Net -2475 ml     I/O this shift:  In: -   Out: 800 [Urine:800]     Physical Exam:   General: patient awake, alert and cooperative   Eyes: Normal lids and lashes, no scleral icterus   Neck: supple, normal ROM   Skin: warm and dry, not jaundiced   Cardiovascular: regular rhythm and rate, no murmurs auscultated   Pulm: clear to auscultation bilaterally, regular and unlabored   Abdomen: soft, nontender, nondistended; normal bowel sounds   Extremities: no rash or edema   Psychiatric: Normal mood and behavior; memory intact     Results Review:     I reviewed the patient's new clinical results.    Results from last 7 days   Lab Units 12/10/23  0770  12/09/23  0553 12/08/23  0747   WBC 10*3/mm3 7.09 7.62 8.41   HEMOGLOBIN g/dL 11.8* 11.5* 11.6*   HEMATOCRIT % 37.1* 35.0* 34.5*   PLATELETS 10*3/mm3 243 245 231     Results from last 7 days   Lab Units 12/10/23  0706 12/09/23  0553 12/08/23  0747 12/07/23  1232   SODIUM mmol/L 141 144 142 144   POTASSIUM mmol/L 4.0 3.8 3.8 4.8   CHLORIDE mmol/L 108* 110* 109* 108*   CO2 mmol/L 24.0 26.6 23.5 24.2   BUN mg/dL 13 15 19 21   CREATININE mg/dL 0.81 0.71* 0.82 1.04   CALCIUM mg/dL 9.0 8.8 8.6 9.9   BILIRUBIN mg/dL  --  0.3  --  0.3   ALK PHOS U/L  --  56  --  75   ALT (SGPT) U/L  --  8  --  13   AST (SGOT) U/L  --  15  --  22   GLUCOSE mg/dL 87 90 89 112*         Lab Results   Lab Value Date/Time    LIPASE 43 12/07/2023 1232    LIPASE 34 11/21/2023 1312    LIPASE 30 11/11/2023 1029    LIPASE 31 10/29/2023 1629    LIPASE 20 10/28/2023 1232    LIPASE 43 10/22/2023 1526    LIPASE 18 10/16/2023 1509    LIPASE 36 10/09/2023 1435    LIPASE 20 10/07/2023 1303    LIPASE 44 09/26/2023 1716    LIPASE 49 09/21/2023 0031    LIPASE 23 09/11/2023 1507    LIPASE 40 08/15/2023 1537    LIPASE 40 07/26/2023 1437    LIPASE 22 07/22/2023 1843    LIPASE 20 07/14/2023 1223    LIPASE 25 07/01/2023 1451    LIPASE 25 06/26/2023 1902    LIPASE 26 06/23/2023 1500    LIPASE 17 06/19/2023 1222    LIPASE 21 06/15/2023 2359    LIPASE 27 05/16/2023 1512    LIPASE 67 (H) 07/28/2022 1556    LIPASE 20 06/27/2022 2034    LIPASE 33 06/23/2022 1837    LIPASE 22 06/18/2022 1127    LIPASE 31 06/11/2022 1213    LIPASE 95 (H) 06/08/2022 1506    LIPASE 26 06/03/2022 1004    LIPASE 25 05/16/2022 1136    LIPASE 23 05/02/2022 1706    LIPASE 80 (H) 04/22/2022 0548    LIPASE 425 (H) 04/21/2022 1012    LIPASE 20 04/19/2022 1118    LIPASE 25 04/03/2022 1123    LIPASE 23 03/12/2022 2156    LIPASE 31 02/21/2022 1614    LIPASE 31 02/18/2022 1400    LIPASE 29 02/06/2022 1552    LIPASE 30 01/24/2022 1221    LIPASE 30 12/22/2021 1517    LIPASE 24 11/12/2021 1253    LIPASE 18  10/23/2021 1707    LIPASE 22 08/31/2021 1206    LIPASE 24 08/20/2021 1347    LIPASE 23 08/03/2021 0024    LIPASE 25 07/22/2021 0218    LIPASE 129 (H) 07/02/2021 1554    LIPASE 36 06/25/2021 1615    LIPASE 26 06/15/2021 1725    LIPASE 66 04/12/2021 1630    LIPASE 103 08/12/2019 1105    LIPASE 55 02/12/2019 2052       Radiology:  CT Abdomen Pelvis Without Contrast   Final Result       1.  Prominent and nondilated air-filled loops of small bowel,   nonspecific. Findings could potentially reflect ileus. There is no   convincing evidence for bowel obstruction.   2.  Punctate nonobstructing right renal stones.           This report was finalized on 12/7/2023 4:16 PM by Dr. Telma Fernandez M.D   on Workstation: [id number]          XR Chest 1 View   Final Result   No evidence for acute pulmonary process. Follow-up as   clinical indications persist.       This report was finalized on 12/7/2023 1:06 PM by Dr. Abdiaziz Kumar M.D on Workstation: [id number]              Assessment & Plan     Active Hospital Problems    Diagnosis     **Intractable nausea and vomiting     Ileus     GERD without esophagitis     COPD (chronic obstructive pulmonary disease)     HTN (hypertension)        Assessment:  Abdominal pain  Chronic constipation  Abnormal CT suggestive of ileus  Nausea and vomiting  GERD  Personal history of colon polyps  History of pancreatic stent placement follows with Dr. Juarez at [facility]     Plan:  GI soft diet as tolerated   Continue Amitiza and Movantik  Adjust bowel regimen-MiraLAX 4 times daily   Await results  Continue PPI therapy  CBC and CMP in the morning     I discussed the patients findings and my recommendations with patient and nursing staff.    Clovis Tolentino MD

## 2023-12-11 LAB
ANION GAP SERPL CALCULATED.3IONS-SCNC: 9.9 MMOL/L (ref 5–15)
BASOPHILS # BLD AUTO: 0.1 10*3/MM3 (ref 0–0.2)
BASOPHILS NFR BLD AUTO: 1.4 % (ref 0–1.5)
BUN SERPL-MCNC: 17 MG/DL (ref 8–23)
BUN/CREAT SERPL: 22.1 (ref 7–25)
CALCIUM SPEC-SCNC: 9.5 MG/DL (ref 8.6–10.5)
CHLORIDE SERPL-SCNC: 104 MMOL/L (ref 98–107)
CO2 SERPL-SCNC: 25.1 MMOL/L (ref 22–29)
CREAT SERPL-MCNC: 0.77 MG/DL (ref 0.76–1.27)
DEPRECATED RDW RBC AUTO: 45.8 FL (ref 37–54)
EGFRCR SERPLBLD CKD-EPI 2021: 101.9 ML/MIN/1.73
EOSINOPHIL # BLD AUTO: 0.46 10*3/MM3 (ref 0–0.4)
EOSINOPHIL NFR BLD AUTO: 6.5 % (ref 0.3–6.2)
ERYTHROCYTE [DISTWIDTH] IN BLOOD BY AUTOMATED COUNT: 13.2 % (ref 12.3–15.4)
GLUCOSE SERPL-MCNC: 77 MG/DL (ref 65–99)
HCT VFR BLD AUTO: 38.6 % (ref 37.5–51)
HGB BLD-MCNC: 12.6 G/DL (ref 13–17.7)
IMM GRANULOCYTES # BLD AUTO: 0.01 10*3/MM3 (ref 0–0.05)
IMM GRANULOCYTES NFR BLD AUTO: 0.1 % (ref 0–0.5)
LYMPHOCYTES # BLD AUTO: 2.16 10*3/MM3 (ref 0.7–3.1)
LYMPHOCYTES NFR BLD AUTO: 30.6 % (ref 19.6–45.3)
MCH RBC QN AUTO: 30.4 PG (ref 26.6–33)
MCHC RBC AUTO-ENTMCNC: 32.6 G/DL (ref 31.5–35.7)
MCV RBC AUTO: 93.2 FL (ref 79–97)
MONOCYTES # BLD AUTO: 0.6 10*3/MM3 (ref 0.1–0.9)
MONOCYTES NFR BLD AUTO: 8.5 % (ref 5–12)
NEUTROPHILS NFR BLD AUTO: 3.72 10*3/MM3 (ref 1.7–7)
NEUTROPHILS NFR BLD AUTO: 52.9 % (ref 42.7–76)
NRBC BLD AUTO-RTO: 0.1 /100 WBC (ref 0–0.2)
PLATELET # BLD AUTO: 231 10*3/MM3 (ref 140–450)
PMV BLD AUTO: 9.6 FL (ref 6–12)
POTASSIUM SERPL-SCNC: 3.9 MMOL/L (ref 3.5–5.2)
RBC # BLD AUTO: 4.14 10*6/MM3 (ref 4.14–5.8)
SODIUM SERPL-SCNC: 139 MMOL/L (ref 136–145)
WBC NRBC COR # BLD AUTO: 7.05 10*3/MM3 (ref 3.4–10.8)

## 2023-12-11 PROCEDURE — 25010000002 MORPHINE PER 10 MG: Performed by: NURSE PRACTITIONER

## 2023-12-11 PROCEDURE — 25010000002 ONDANSETRON PER 1 MG: Performed by: INTERNAL MEDICINE

## 2023-12-11 PROCEDURE — 94664 DEMO&/EVAL PT USE INHALER: CPT

## 2023-12-11 PROCEDURE — 94799 UNLISTED PULMONARY SVC/PX: CPT

## 2023-12-11 PROCEDURE — 25010000002 ENOXAPARIN PER 10 MG: Performed by: INTERNAL MEDICINE

## 2023-12-11 PROCEDURE — 94761 N-INVAS EAR/PLS OXIMETRY MLT: CPT

## 2023-12-11 PROCEDURE — 63710000001 ONDANSETRON PER 8 MG: Performed by: INTERNAL MEDICINE

## 2023-12-11 PROCEDURE — 85025 COMPLETE CBC W/AUTO DIFF WBC: CPT | Performed by: INTERNAL MEDICINE

## 2023-12-11 PROCEDURE — 99232 SBSQ HOSP IP/OBS MODERATE 35: CPT | Performed by: INTERNAL MEDICINE

## 2023-12-11 PROCEDURE — 80048 BASIC METABOLIC PNL TOTAL CA: CPT | Performed by: INTERNAL MEDICINE

## 2023-12-11 RX ORDER — NICOTINE 21 MG/24HR
1 PATCH, TRANSDERMAL 24 HOURS TRANSDERMAL
Status: DISCONTINUED | OUTPATIENT
Start: 2023-12-11 | End: 2023-12-12 | Stop reason: HOSPADM

## 2023-12-11 RX ADMIN — ACETAMINOPHEN 650 MG: 325 TABLET ORAL at 13:06

## 2023-12-11 RX ADMIN — POLYETHYLENE GLYCOL 3350 17 G: 17 POWDER, FOR SOLUTION ORAL at 09:31

## 2023-12-11 RX ADMIN — LUBIPROSTONE 24 MCG: 24 CAPSULE, GELATIN COATED ORAL at 09:32

## 2023-12-11 RX ADMIN — DOCUSATE SODIUM 50MG AND SENNOSIDES 8.6MG 2 TABLET: 8.6; 5 TABLET, FILM COATED ORAL at 21:43

## 2023-12-11 RX ADMIN — LUBIPROSTONE 24 MCG: 24 CAPSULE, GELATIN COATED ORAL at 17:53

## 2023-12-11 RX ADMIN — POLYETHYLENE GLYCOL 3350 17 G: 17 POWDER, FOR SOLUTION ORAL at 17:54

## 2023-12-11 RX ADMIN — BISACODYL 5 MG: 5 TABLET, COATED ORAL at 13:06

## 2023-12-11 RX ADMIN — MORPHINE SULFATE 2 MG: 2 INJECTION, SOLUTION INTRAMUSCULAR; INTRAVENOUS at 06:13

## 2023-12-11 RX ADMIN — NICOTINE 1 PATCH: 21 PATCH, EXTENDED RELEASE TRANSDERMAL at 13:06

## 2023-12-11 RX ADMIN — ONDANSETRON HYDROCHLORIDE 4 MG: 4 TABLET, FILM COATED ORAL at 17:53

## 2023-12-11 RX ADMIN — NALOXEGOL OXALATE 25 MG: 12.5 TABLET, FILM COATED ORAL at 09:32

## 2023-12-11 RX ADMIN — MORPHINE SULFATE 2 MG: 2 INJECTION, SOLUTION INTRAMUSCULAR; INTRAVENOUS at 02:19

## 2023-12-11 RX ADMIN — ACETAMINOPHEN 650 MG: 325 TABLET ORAL at 21:43

## 2023-12-11 RX ADMIN — BUDESONIDE AND FORMOTEROL FUMARATE DIHYDRATE 2 PUFF: 160; 4.5 AEROSOL RESPIRATORY (INHALATION) at 07:30

## 2023-12-11 RX ADMIN — POLYETHYLENE GLYCOL 3350 17 G: 17 POWDER, FOR SOLUTION ORAL at 13:06

## 2023-12-11 RX ADMIN — PANTOPRAZOLE SODIUM 40 MG: 40 TABLET, DELAYED RELEASE ORAL at 06:13

## 2023-12-11 RX ADMIN — MORPHINE SULFATE 2 MG: 2 INJECTION, SOLUTION INTRAMUSCULAR; INTRAVENOUS at 09:33

## 2023-12-11 RX ADMIN — Medication 10 ML: at 09:32

## 2023-12-11 RX ADMIN — ONDANSETRON 4 MG: 2 INJECTION INTRAMUSCULAR; INTRAVENOUS at 02:19

## 2023-12-11 RX ADMIN — BUDESONIDE AND FORMOTEROL FUMARATE DIHYDRATE 2 PUFF: 160; 4.5 AEROSOL RESPIRATORY (INHALATION) at 20:10

## 2023-12-11 RX ADMIN — TIOTROPIUM BROMIDE INHALATION SPRAY 2 PUFF: 3.12 SPRAY, METERED RESPIRATORY (INHALATION) at 07:30

## 2023-12-11 RX ADMIN — DOCUSATE SODIUM 50MG AND SENNOSIDES 8.6MG 2 TABLET: 8.6; 5 TABLET, FILM COATED ORAL at 09:32

## 2023-12-11 RX ADMIN — MINERAL OIL 1 ENEMA: 100 ENEMA RECTAL at 16:56

## 2023-12-11 NOTE — PLAN OF CARE
Problem: Adult Inpatient Plan of Care  Goal: Plan of Care Review  Outcome: Ongoing, Progressing     Problem: Adult Inpatient Plan of Care  Goal: Plan of Care Review  Outcome: Ongoing, Progressing  Goal: Patient-Specific Goal (Individualized)  Outcome: Ongoing, Progressing  Goal: Absence of Hospital-Acquired Illness or Injury  Outcome: Ongoing, Progressing  Intervention: Identify and Manage Fall Risk  Recent Flowsheet Documentation  Taken 12/11/2023 0400 by Richie Tamayo RN  Safety Promotion/Fall Prevention:   safety round/check completed   room organization consistent   nonskid shoes/slippers when out of bed   fall prevention program maintained   elopement precautions   clutter free environment maintained   assistive device/personal items within reach   activity supervised  Taken 12/11/2023 0200 by Richie Tamayo RN  Safety Promotion/Fall Prevention:   safety round/check completed   room organization consistent   nonskid shoes/slippers when out of bed   elopement precautions   fall prevention program maintained   assistive device/personal items within reach   clutter free environment maintained   activity supervised  Taken 12/11/2023 0025 by Richie Tamayo RN  Safety Promotion/Fall Prevention:   safety round/check completed   room organization consistent   nonskid shoes/slippers when out of bed   elopement precautions   fall prevention program maintained   clutter free environment maintained   assistive device/personal items within reach   activity supervised  Taken 12/10/2023 2225 by Richie Tamayo RN  Safety Promotion/Fall Prevention:   safety round/check completed   room organization consistent   nonskid shoes/slippers when out of bed   fall prevention program maintained   elopement precautions   assistive device/personal items within reach   activity supervised   clutter free environment maintained  Taken 12/10/2023 2012 by Richie Tamayo RN  Safety Promotion/Fall Prevention:   safety  round/check completed   room organization consistent   nonskid shoes/slippers when out of bed   fall prevention program maintained   elopement precautions   clutter free environment maintained   assistive device/personal items within reach   activity supervised  Intervention: Prevent Skin Injury  Recent Flowsheet Documentation  Taken 12/11/2023 0400 by Richie Tamayo RN  Body Position: position changed independently  Taken 12/11/2023 0200 by Richie Tamayo RN  Body Position: position changed independently  Taken 12/11/2023 0025 by Richie Tamayo RN  Body Position:   weight shifting   position changed independently  Taken 12/10/2023 2225 by Richie Tamayo RN  Body Position: position changed independently  Taken 12/10/2023 2012 by Richie Tamayo RN  Body Position: position changed independently  Intervention: Prevent and Manage VTE (Venous Thromboembolism) Risk  Recent Flowsheet Documentation  Taken 12/11/2023 0400 by Richie Tamayo RN  Activity Management: up ad sivakumar  Taken 12/11/2023 0200 by Richie Tamayo RN  Activity Management: up ad sivakumar  Taken 12/11/2023 0025 by Richie Tamayo RN  Activity Management: up ad sivakumar  Taken 12/10/2023 2225 by Richie Tamayo RN  Activity Management: up ad sivakumar  Taken 12/10/2023 2012 by Richie Tamayo RN  Activity Management: up ad sivakumar  Goal: Optimal Comfort and Wellbeing  Outcome: Ongoing, Progressing  Goal: Readiness for Transition of Care  Outcome: Ongoing, Progressing   Goal Outcome Evaluation:

## 2023-12-11 NOTE — PROGRESS NOTES
"Nutrition Services    Patient Name:  Donny Mallory  YOB: 1960  MRN: [id number]  Admit Date:  12/7/2023  Assessment Date:  12/11/23    Summary: Follow up     Nutrition follow up completed. Visited pt at bedside. Appetite has improved, ate 100% x 2 meals. Tolerating GI fiber-restricted low irritant soft to chew diet with ground meats. Still having some nausea and constipation.   Meds: amitiza, nicotine patch, protonix, pericolace, miralax     REC:  Will continue to encourage and monitor PO/ONS intake   Continue same bowel regimen     RD to follow per protocol.    CLINICAL NUTRITION ASSESSMENT      Reason for Assessment Follow up protocol      Diagnosis/Problem    N/V, and abd pain    Medical/Surgical History Past Medical History:   Diagnosis Date    Bell's palsy     Chronic sinusitis     COPD (chronic obstructive pulmonary disease)     Depression     Dysphagia     previous workup at [facility]    History of biliary stent insertion     CBD stent    Hypertension     Intractable nausea and vomiting 12/07/2023    Presence of pancreatic duct stent     Suicide attempt        Past Surgical History:   Procedure Laterality Date    CHOLECYSTECTOMY      CHOLECYSTECTOMY WITH INTRAOPERATIVE CHOLANGIOGRAM N/A 04/25/2022    Procedure: Laparoscopic cholecystectomy with intraoperative cholangiogram, possible open;  Surgeon: Khari Schofield MD;  Location: [facility];  Service: General;  Laterality: N/A;    HERNIA REPAIR          Anthropometrics        Current Height  Current Weight  BMI kg/m2 Height: 177.8 cm (70\")  Weight: 54.9 kg (121 lb 1.6 oz) (12/11/23 0623)  Body mass index is 17.38 kg/m².   Adjusted BMI (if applicable)    BMI Category Underweight (18.4 or below)   Ideal Body Weight (IBW) 160   Usual Body Weight (UBW) 125     Weight Trend Loss, Gain   Weight History Wt Readings from Last 30 Encounters:   12/11/23 0623 54.9 kg (121 lb 1.6 oz)   12/10/23 0637 56.2 kg (123 lb 12.8 oz)   12/09/23 0500 56.2 " kg (123 lb 12.8 oz)   12/08/23 0500 57.5 kg (126 lb 12.2 oz)   12/07/23 1142 53.5 kg (118 lb)   11/21/23 2227 53.8 kg (118 lb 9.7 oz)   11/21/23 1254 54.4 kg (120 lb)   11/01/23 0500 55.7 kg (122 lb 14.4 oz)   10/30/23 0600 53.4 kg (117 lb 11.6 oz)   10/30/23 0500 53.4 kg (117 lb 11.6 oz)   10/30/23 0200 53.4 kg (117 lb 11.6 oz)   10/29/23 2158 53.4 kg (117 lb 11.6 oz)   10/29/23 1636 54.4 kg (120 lb)   06/26/23 1658 56.7 kg (125 lb)   06/03/22 1003 54.4 kg (120 lb)   05/02/22 1624 55.8 kg (123 lb)   04/30/22 0500 55.8 kg (123 lb)   04/29/22 0709 55.9 kg (123 lb 3.2 oz)   04/29/22 0600 57.5 kg (126 lb 12.2 oz)   04/28/22 0422 57.9 kg (127 lb 9.6 oz)   04/27/22 0646 58.7 kg (129 lb 8 oz)   04/26/22 0630 60.9 kg (134 lb 4.2 oz)   04/24/22 0500 59.5 kg (131 lb 2.8 oz)   04/23/22 1251 57.2 kg (126 lb)   04/23/22 0500 57.2 kg (126 lb 1.7 oz)   04/22/22 0922 57.1 kg (125 lb 14.1 oz)   04/21/22 1433 57.1 kg (125 lb 14.1 oz)   04/21/22 1000 54.4 kg (120 lb)   02/21/22 1615 54.9 kg (121 lb)   05/15/17 1846 56.7 kg (125 lb)      --  Labs       Pertinent Labs    Results from last 7 days   Lab Units 12/11/23  0550 12/10/23  0706 12/09/23  0553 12/08/23  0747 12/07/23  1232   SODIUM mmol/L 139 141 144   < > 144   POTASSIUM mmol/L 3.9 4.0 3.8   < > 4.8   CHLORIDE mmol/L 104 108* 110*   < > 108*   CO2 mmol/L 25.1 24.0 26.6   < > 24.2   BUN mg/dL 17 13 15   < > 21   CREATININE mg/dL 0.77 0.81 0.71*   < > 1.04   CALCIUM mg/dL 9.5 9.0 8.8   < > 9.9   BILIRUBIN mg/dL  --   --  0.3  --  0.3   ALK PHOS U/L  --   --  56  --  75   ALT (SGPT) U/L  --   --  8  --  13   AST (SGOT) U/L  --   --  15  --  22   GLUCOSE mg/dL 77 87 90   < > 112*    < > = values in this interval not displayed.     Results from last 7 days   Lab Units 12/11/23  0550 12/10/23  0706 12/09/23  0553   HEMOGLOBIN g/dL 12.6*   < > 11.5*   HEMATOCRIT % 38.6   < > 35.0*   WBC 10*3/mm3 7.05   < > 7.62   ALBUMIN g/dL  --   --  3.3*    < > = values in this interval not  "displayed.     Results from last 7 days   Lab Units 12/11/23  0550 12/10/23  0706 12/09/23  0553 12/08/23  0747 12/07/23  1232   PLATELETS 10*3/mm3 231 243 245 231 335     COVID19   Date Value Ref Range Status   12/07/2023 Not Detected Not Detected - Ref. Range Final     No results found for: \"HGBA1C\"       Medications           Scheduled Medications budesonide-formoterol, 2 puff, Inhalation, BID - RT   And  tiotropium bromide monohydrate, 2 puff, Inhalation, Daily - RT  enoxaparin, 40 mg, Subcutaneous, Nightly  lisinopril, 20 mg, Oral, Daily  lubiprostone, 24 mcg, Oral, BID With Meals  Naloxegol Oxalate, 25 mg, Oral, QAM  nicotine, 1 patch, Transdermal, Q24H  pantoprazole, 40 mg, Oral, Q AM  polyethylene glycol, 17 g, Oral, 4x Daily  senna-docusate sodium, 2 tablet, Oral, BID       Infusions sodium chloride, 75 mL/hr, Last Rate: 75 mL/hr (12/09/23 1606)       PRN Medications   acetaminophen    senna-docusate sodium **AND** [DISCONTINUED] polyethylene glycol **AND** bisacodyl **AND** bisacodyl    ipratropium-albuterol    melatonin    mineral oil    ondansetron **OR** ondansetron    [COMPLETED] Insert Peripheral IV **AND** sodium chloride     Physical Findings          General Findings alert, flat affect, underweight   Oral/Mouth Cavity tooth or teeth missing   Edema  no edema   Gastrointestinal abdominal pain, constipation, hyperactive bowel sounds, nausea, last bowel movement: 12/8   Skin  Scars on back and neck    Tubes/Drains/Lines none   NFPE Patient/family declined exam   --  Current Nutrition Orders & Evaluation of Intake       Oral Nutrition     Food Allergies NKFA   Current PO Diet Diet: Gastrointestinal Diets; Fiber-Restricted, Low Irritant; No Straw; Texture: Soft to Chew (NDD 3); Soft to Chew: Ground Meat; Fluid Consistency: Thin (IDDSI 0)   Supplement Boost Plus chocolate BID B/D   PO Evaluation     % PO Intake 100% x 2 meals    Factors Affecting Intake: abdominal pain, constipation, nausea   --  PES " STATEMENT / NUTRITION DIAGNOSIS      Nutrition Dx Problem  Problem: Altered GI Function  Etiology: Factors Affecting Nutrition - N/V abd pain and constipation     Signs/Symptoms: Report/Observation     NUTRITION INTERVENTION / PLAN OF CARE      Intervention Goal(s) Maintain nutrition status, Reduce/improve symptoms, Disease management/therapy, Maintain intake, Maintain weight, Appropriate weight gain, and PO intake goal %: 75+         RD Intervention/Action Encourage intake, Continue to monitor, and Care plan reviewed   --      Prescription/Orders:       PO Diet       Supplements Boost plus jenny BID B/D      Enteral Nutrition       Parenteral Nutrition    New Prescription Ordered? No changes at this time, continue same per protocol    --      Monitor/Evaluation Per protocol   Discharge Plan/Needs Pending clinical course   --    RD to follow per protocol.      Electronically signed by:  Alethea Liang Dietitian Intern   12/11/23 14:45 EST

## 2023-12-11 NOTE — PROGRESS NOTES
Name: Donny Mallory ADMIT: 2023   : 1960  PCP: Janelle Lara MD    MRN: [id number] LOS: 2 days   AGE/SEX: 63 y.o. male  ROOM: [facility]     Subjective   Subjective   Patient is sitting up on the bed and states vomiting has not resolved and tolerating p.o. diet.  Nausea still present but better. Denies abdominal pain and no bowel movement yet.       Objective   Objective   Vital Signs  Temp:  [97.6 °F (36.4 °C)-98.1 °F (36.7 °C)] 97.9 °F (36.6 °C)  Heart Rate:  [48-57] 57  Resp:  [18-20] 18  BP: ()/(60-81) 91/63  SpO2:  [96 %-99 %] 97 %  on   ;   Device (Oxygen Therapy): room air  Body mass index is 17.38 kg/m².  Physical Exam  HEENT: PERRLA, extraocular movements intact, Scleras no icterus  Neck: Supple, no JVD  Cardiovascular: Regular rate and rhythm with normal S1-S2  Respiratory: Fairly clear to auscultation bilaterally with no wheezes  GI: Soft, nontender, bowel sounds are present but diminished  Extremities: No edema, palpable pedal pulses  Neurologic: Grossly nonfocal, no facial asymmetry    Results Review     I reviewed the patient's new clinical results.  Results from last 7 days   Lab Units 23  0550 12/10/23  0706 23  0553 23  0747   WBC 10*3/mm3 7.05 7.09 7.62 8.41   HEMOGLOBIN g/dL 12.6* 11.8* 11.5* 11.6*   PLATELETS 10*3/mm3 231 243 245 231     Results from last 7 days   Lab Units 23  0550 12/10/23  0706 23  0553 23  0747   SODIUM mmol/L 139 141 144 142   POTASSIUM mmol/L 3.9 4.0 3.8 3.8   CHLORIDE mmol/L 104 108* 110* 109*   CO2 mmol/L 25.1 24.0 26.6 23.5   BUN mg/dL 17 13 15 19   CREATININE mg/dL 0.77 0.81 0.71* 0.82   GLUCOSE mg/dL 77 87 90 89   EGFR mL/min/1.73 101.9 100.3 104.4 99.9     Results from last 7 days   Lab Units 23  0553 23  1232   ALBUMIN g/dL 3.3* 4.4   BILIRUBIN mg/dL 0.3 0.3   ALK PHOS U/L 56 75   AST (SGOT) U/L 15 22   ALT (SGPT) U/L 8 13     Results from last 7 days   Lab Units 23  0550 12/10/23  0706  "12/09/23  0553 12/08/23  0747 12/07/23  1232   CALCIUM mg/dL 9.5 9.0 8.8 8.6 9.9   ALBUMIN g/dL  --   --  3.3*  --  4.4       No results found for: \"HGBA1C\", \"POCGLU\"    No radiology results for the last day    I have personally reviewed all medications:  Scheduled Medications  budesonide-formoterol, 2 puff, Inhalation, BID - RT   And  tiotropium bromide monohydrate, 2 puff, Inhalation, Daily - RT  enoxaparin, 40 mg, Subcutaneous, Nightly  lisinopril, 20 mg, Oral, Daily  lubiprostone, 24 mcg, Oral, BID With Meals  Naloxegol Oxalate, 25 mg, Oral, QAM  nicotine, 1 patch, Transdermal, Q24H  pantoprazole, 40 mg, Oral, Q AM  polyethylene glycol, 17 g, Oral, 4x Daily  senna-docusate sodium, 2 tablet, Oral, BID    Infusions  sodium chloride, 75 mL/hr, Last Rate: 75 mL/hr (12/09/23 1606)    Diet  Diet: Gastrointestinal Diets; Fiber-Restricted, Low Irritant; No Straw; Texture: Soft to Chew (NDD 3); Soft to Chew: Ground Meat; Fluid Consistency: Thin (IDDSI 0)    I have personally reviewed:  [x]  Laboratory   [x]  Microbiology   [x]  Radiology   [x]  EKG/Telemetry  [x]  Cardiology/Vascular   []  Pathology    []  Records       Assessment/Plan     Active Hospital Problems    Diagnosis  POA    **Intractable nausea and vomiting [R11.2]  Yes    Ileus [K56.7]  Yes    GERD without esophagitis [K21.9]  Yes    COPD (chronic obstructive pulmonary disease) [J44.9]  Yes    HTN (hypertension) [I10]  Yes      Resolved Hospital Problems   No resolved problems to display.       63 y.o. male admitted with Intractable nausea and vomiting.    1.Nausea/vomiting/ileus/constipation c, continue with symptomatic treatment and patient is also on Amitiza and Movantik.  Diet has been advanced and is tolerating reasonably well and gastroenterology following along.  Antiemetics have also been initiated with not much of success.  IV morphine will be discontinued which could be complicating his current situation.    2.  Hypertension, on lisinopril which " will be continued.    3.  GERD, on acid suppressive therapy.    4.  Depression, Access following along.    5.  History of COPD, continue with Symbicort and currently not in any exacerbation.    6.  Tobacco abuse, counseled to quit smoking and nicotine patch is being initiated.    7.  On Lovenox for DVT prophylaxis.    8.  CODE STATUS is full code.    9.  Estimated discharge date, hopefully next 1 to 2 days.    Copied text on this note has been reviewed by me on 12/11/2023      Abhijeet Dela Cruz MD  [facility]  12/11/23  11:58 EST

## 2023-12-11 NOTE — NURSING NOTE
"[facility] follow-up d/t depression.     Patient RIB. Patient with flat affect. The patient reported he was \"about the same.\" The patient reported difficulty sleeping r/t to pain. The patient complained of constipation. The patient stated he has discussed all his issues with his nurse. The patient rated depression 3/10. The patient denied SI. The patient denied need for outpatient mental health resources. Access following.   "

## 2023-12-11 NOTE — PROGRESS NOTES
[facility]  Inpatient Progress Note    Reason for Follow Up: Ileus and constipation    Subjective     Interval History:   1 bowel movement yesterday-he reports it was several small pieces of stool that occurred after an enema.  No n/v.  He c/o abd pain - taking morphine consistently for this - 3 times today since [place]    Current Facility-Administered Medications:     acetaminophen (TYLENOL) tablet 650 mg, 650 mg, Oral, Q4H PRN, Mary Jama MD, 650 mg at 12/09/23 0618    sennosides-docusate (PERICOLACE) 8.6-50 MG per tablet 2 tablet, 2 tablet, Oral, BID, 2 tablet at 12/11/23 0932 **AND** [DISCONTINUED] polyethylene glycol (MIRALAX) packet 17 g, 17 g, Oral, Daily PRN **AND** bisacodyl (DULCOLAX) EC tablet 5 mg, 5 mg, Oral, Daily PRN, 5 mg at 12/10/23 0122 **AND** bisacodyl (DULCOLAX) suppository 10 mg, 10 mg, Rectal, Daily PRN, Mary Jama MD    budesonide-formoterol (SYMBICORT) 160-4.5 MCG/ACT inhaler 2 puff, 2 puff, Inhalation, BID - RT, 2 puff at 12/11/23 0730 **AND** tiotropium (SPIRIVA RESPIMAT) 2.5 mcg/act aerosol solution inhaler, 2 puff, Inhalation, Daily - RT, StingMary jovel MD, 2 puff at 12/11/23 0730    Enoxaparin Sodium (LOVENOX) syringe 40 mg, 40 mg, Subcutaneous, Nightly, Abhijeet Dela Cruz MD, 40 mg at 12/10/23 2120    ipratropium-albuterol (DUO-NEB) nebulizer solution 3 mL, 3 mL, Nebulization, Q4H PRN, Mary Jama MD    lisinopril (PRINIVIL,ZESTRIL) tablet 20 mg, 20 mg, Oral, Daily, Mary Jama MD, 20 mg at 12/10/23 0850    lubiprostone (AMITIZA) capsule 24 mcg, 24 mcg, Oral, BID With Meals, Mary Jama MD, 24 mcg at 12/11/23 0932    melatonin tablet 3 mg, 3 mg, Oral, Nightly PRN, Mary Jama MD, 3 mg at 12/08/23 0052    mineral oil enema 1 enema, 1 enema, Rectal, Daily PRN, Mary Jama MD, 1 enema at 12/10/23 1326    morphine injection 2 mg, 2 mg, Intravenous, Q2H PRN, Hayder Lee  APRN, 2 mg at 12/11/23 0933    Naloxegol Oxalate (MOVANTIK) tablet 25 mg, 25 mg, Oral, QAM, Mary Jama MD, 25 mg at 12/11/23 0932    ondansetron (ZOFRAN) tablet 4 mg, 4 mg, Oral, Q6H PRN, 4 mg at 12/10/23 0909 **OR** ondansetron (ZOFRAN) injection 4 mg, 4 mg, Intravenous, Q6H PRN, Mary Jama MD, 4 mg at 12/11/23 0219    pantoprazole (PROTONIX) EC tablet 40 mg, 40 mg, Oral, Q AM, Mary Jama MD, 40 mg at 12/11/23 0613    polyethylene glycol (MIRALAX) packet 17 g, 17 g, Oral, 4x Daily, Clovis Tolentino MD, 17 g at 12/11/23 0931    [COMPLETED] Insert Peripheral IV, , , Once **AND** sodium chloride 0.9 % flush 10 mL, 10 mL, Intravenous, PRN, Jay Franco MD, 10 mL at 12/11/23 0932    sodium chloride 0.9 % infusion, 75 mL/hr, Intravenous, Continuous, Mary Jama MD, Last Rate: 75 mL/hr at 12/09/23 1606, 75 mL/hr at 12/09/23 1606  Review of Systems:    + abd pain and constipation, no f/c    Objective     Vital Signs  Temp:  [97.6 °F (36.4 °C)-98.1 °F (36.7 °C)] 97.9 °F (36.6 °C)  Heart Rate:  [48-57] 57  Resp:  [18-20] 18  BP: ()/(60-81) 91/63  Body mass index is 17.38 kg/m².    Intake/Output Summary (Last 24 hours) at 12/11/2023 1008  Last data filed at 12/10/2023 1740  Gross per 24 hour   Intake 480 ml   Output 600 ml   Net -120 ml     No intake/output data recorded.     Physical Exam:   General: patient awake, alert and cooperative   Eyes: Normal lids and lashes, no scleral icterus   Neck: supple, normal ROM   Skin: warm and dry, not jaundiced   Pulm:  regular and unlabored   Abdomen: soft, nontender, nondistended; normal bowel sounds   Psychiatric: Normal mood and behavior; memory intact     Results Review:     I reviewed the patient's new clinical results.    Results from last 7 days   Lab Units 12/11/23  0550 12/10/23  0706 12/09/23  0553   WBC 10*3/mm3 7.05 7.09 7.62   HEMOGLOBIN g/dL 12.6* 11.8* 11.5*   HEMATOCRIT % 38.6 37.1* 35.0*   PLATELETS 10*3/mm3 231  243 245     Results from last 7 days   Lab Units 12/11/23  0550 12/10/23  0706 12/09/23  0553 12/08/23  0747 12/07/23  1232   SODIUM mmol/L 139 141 144   < > 144   POTASSIUM mmol/L 3.9 4.0 3.8   < > 4.8   CHLORIDE mmol/L 104 108* 110*   < > 108*   CO2 mmol/L 25.1 24.0 26.6   < > 24.2   BUN mg/dL 17 13 15   < > 21   CREATININE mg/dL 0.77 0.81 0.71*   < > 1.04   CALCIUM mg/dL 9.5 9.0 8.8   < > 9.9   BILIRUBIN mg/dL  --   --  0.3  --  0.3   ALK PHOS U/L  --   --  56  --  75   ALT (SGPT) U/L  --   --  8  --  13   AST (SGOT) U/L  --   --  15  --  22   GLUCOSE mg/dL 77 87 90   < > 112*    < > = values in this interval not displayed.         Lab Results   Lab Value Date/Time    LIPASE 43 12/07/2023 1232    LIPASE 34 11/21/2023 1312    LIPASE 30 11/11/2023 1029    LIPASE 31 10/29/2023 1629    LIPASE 20 10/28/2023 1232    LIPASE 43 10/22/2023 1526    LIPASE 18 10/16/2023 1509    LIPASE 36 10/09/2023 1435    LIPASE 20 10/07/2023 1303    LIPASE 44 09/26/2023 1716    LIPASE 49 09/21/2023 0031    LIPASE 23 09/11/2023 1507    LIPASE 40 08/15/2023 1537    LIPASE 40 07/26/2023 1437    LIPASE 22 07/22/2023 1843    LIPASE 20 07/14/2023 1223    LIPASE 25 07/01/2023 1451    LIPASE 25 06/26/2023 1902    LIPASE 26 06/23/2023 1500    LIPASE 17 06/19/2023 1222    LIPASE 21 06/15/2023 2359    LIPASE 27 05/16/2023 1512    LIPASE 67 (H) 07/28/2022 1556    LIPASE 20 06/27/2022 2034    LIPASE 33 06/23/2022 1837    LIPASE 22 06/18/2022 1127    LIPASE 31 06/11/2022 1213    LIPASE 95 (H) 06/08/2022 1506    LIPASE 26 06/03/2022 1004    LIPASE 25 05/16/2022 1136    LIPASE 23 05/02/2022 1706    LIPASE 80 (H) 04/22/2022 0548    LIPASE 425 (H) 04/21/2022 1012    LIPASE 20 04/19/2022 1118    LIPASE 25 04/03/2022 1123    LIPASE 23 03/12/2022 2156    LIPASE 31 02/21/2022 1614    LIPASE 31 02/18/2022 1400    LIPASE 29 02/06/2022 1552    LIPASE 30 01/24/2022 1221    LIPASE 30 12/22/2021 1517    LIPASE 24 11/12/2021 1253    LIPASE 18 10/23/2021 1707     LIPASE 22 08/31/2021 1206    LIPASE 24 08/20/2021 1347    LIPASE 23 08/03/2021 0024    LIPASE 25 07/22/2021 0218    LIPASE 129 (H) 07/02/2021 1554    LIPASE 36 06/25/2021 1615    LIPASE 26 06/15/2021 1725    LIPASE 66 04/12/2021 1630    LIPASE 103 08/12/2019 1105    LIPASE 55 02/12/2019 2052       Radiology:  CT Abdomen Pelvis Without Contrast   Final Result       1.  Prominent and nondilated air-filled loops of small bowel,   nonspecific. Findings could potentially reflect ileus. There is no   convincing evidence for bowel obstruction.   2.  Punctate nonobstructing right renal stones.           This report was finalized on 12/7/2023 4:16 PM by Dr. Telma Fernandez M.D   on Workstation: [id number]          XR Chest 1 View   Final Result   No evidence for acute pulmonary process. Follow-up as   clinical indications persist.       This report was finalized on 12/7/2023 1:06 PM by Dr. Abdiaziz Kumar M.D on Workstation: [id number]              Assessment & Plan     Active Hospital Problems    Diagnosis     **Intractable nausea and vomiting     Ileus     GERD without esophagitis     COPD (chronic obstructive pulmonary disease)     HTN (hypertension)      All problems are new to me today  Assessment:  Abdominal pain  Chronic constipation  Abnormal CT suggestive of ileus  Nausea and vomiting  GERD  Personal history of colon polyps  History of pancreatic stent placement follows with Dr. Juarez at [facility]      Plan:  Currently on Movantik and amitiza-highest dose as well as MiraLAX 4 times a day  Check KUB to assess stool volume, f/u ileus  Advised pt to stop taking narcotics as this is worsening his underlying issue significantly - has been regularly using morphine since admit    I discussed the patients findings and my recommendations with patient.    Lizzy Melissa MD

## 2023-12-11 NOTE — PLAN OF CARE
Goal Outcome Evaluation:              Outcome Evaluation: Tolerating diet well.  Ambulating frequently.  No emesis noted.  Asking for IV Morphine.  Tylenol offered and given prn.  Morphine dc'd this am after administration caused redness and welts at site within 5 minutes.  No stools today.  Mineral oil enema given with no results.  KUB ordered for am.

## 2023-12-12 VITALS
RESPIRATION RATE: 16 BRPM | BODY MASS INDEX: 17.49 KG/M2 | HEIGHT: 70 IN | SYSTOLIC BLOOD PRESSURE: 131 MMHG | WEIGHT: 122.14 LBS | DIASTOLIC BLOOD PRESSURE: 76 MMHG | OXYGEN SATURATION: 97 % | TEMPERATURE: 97.9 F | HEART RATE: 62 BPM

## 2023-12-12 LAB
ANION GAP SERPL CALCULATED.3IONS-SCNC: 9 MMOL/L (ref 5–15)
BASOPHILS # BLD AUTO: 0.09 10*3/MM3 (ref 0–0.2)
BASOPHILS NFR BLD AUTO: 1.2 % (ref 0–1.5)
BUN SERPL-MCNC: 18 MG/DL (ref 8–23)
BUN/CREAT SERPL: 20 (ref 7–25)
CALCIUM SPEC-SCNC: 9.4 MG/DL (ref 8.6–10.5)
CHLORIDE SERPL-SCNC: 105 MMOL/L (ref 98–107)
CO2 SERPL-SCNC: 27 MMOL/L (ref 22–29)
CREAT SERPL-MCNC: 0.9 MG/DL (ref 0.76–1.27)
DEPRECATED RDW RBC AUTO: 42.6 FL (ref 37–54)
EGFRCR SERPLBLD CKD-EPI 2021: 97.2 ML/MIN/1.73
EOSINOPHIL # BLD AUTO: 0.47 10*3/MM3 (ref 0–0.4)
EOSINOPHIL NFR BLD AUTO: 6.3 % (ref 0.3–6.2)
ERYTHROCYTE [DISTWIDTH] IN BLOOD BY AUTOMATED COUNT: 13 % (ref 12.3–15.4)
GLUCOSE SERPL-MCNC: 94 MG/DL (ref 65–99)
HCT VFR BLD AUTO: 37.2 % (ref 37.5–51)
HGB BLD-MCNC: 12.5 G/DL (ref 13–17.7)
IMM GRANULOCYTES # BLD AUTO: 0.03 10*3/MM3 (ref 0–0.05)
IMM GRANULOCYTES NFR BLD AUTO: 0.4 % (ref 0–0.5)
LYMPHOCYTES # BLD AUTO: 2 10*3/MM3 (ref 0.7–3.1)
LYMPHOCYTES NFR BLD AUTO: 26.7 % (ref 19.6–45.3)
MCH RBC QN AUTO: 30.4 PG (ref 26.6–33)
MCHC RBC AUTO-ENTMCNC: 33.6 G/DL (ref 31.5–35.7)
MCV RBC AUTO: 90.5 FL (ref 79–97)
MONOCYTES # BLD AUTO: 0.66 10*3/MM3 (ref 0.1–0.9)
MONOCYTES NFR BLD AUTO: 8.8 % (ref 5–12)
NEUTROPHILS NFR BLD AUTO: 4.25 10*3/MM3 (ref 1.7–7)
NEUTROPHILS NFR BLD AUTO: 56.6 % (ref 42.7–76)
NRBC BLD AUTO-RTO: 0 /100 WBC (ref 0–0.2)
PLATELET # BLD AUTO: 239 10*3/MM3 (ref 140–450)
PMV BLD AUTO: 9.4 FL (ref 6–12)
POTASSIUM SERPL-SCNC: 4.2 MMOL/L (ref 3.5–5.2)
RBC # BLD AUTO: 4.11 10*6/MM3 (ref 4.14–5.8)
SODIUM SERPL-SCNC: 141 MMOL/L (ref 136–145)
WBC NRBC COR # BLD AUTO: 7.5 10*3/MM3 (ref 3.4–10.8)

## 2023-12-12 PROCEDURE — 80048 BASIC METABOLIC PNL TOTAL CA: CPT | Performed by: INTERNAL MEDICINE

## 2023-12-12 PROCEDURE — 85025 COMPLETE CBC W/AUTO DIFF WBC: CPT | Performed by: INTERNAL MEDICINE

## 2023-12-12 NOTE — CASE MANAGEMENT/SOCIAL WORK
Case Management Discharge Note      Final Note: ama         Selected Continued Care - Discharged on 12/12/2023 Admission date: 12/7/2023 - Discharge disposition: Left Against Medical Advice      Destination    No services have been selected for the patient.                Durable Medical Equipment    No services have been selected for the patient.                Dialysis/Infusion    No services have been selected for the patient.                Home Medical Care    No services have been selected for the patient.                Therapy    No services have been selected for the patient.                Community Resources    No services have been selected for the patient.                Community & DME    No services have been selected for the patient.                         Final Discharge Disposition Code: 07 - left AMA

## 2023-12-12 NOTE — PLAN OF CARE
Problem: Adult Inpatient Plan of Care  Goal: Absence of Hospital-Acquired Illness or Injury  Intervention: Identify and Manage Fall Risk  Recent Flowsheet Documentation  Taken 12/12/2023 0607 by Richie Tamayo RN  Safety Promotion/Fall Prevention:   safety round/check completed   room organization consistent   nonskid shoes/slippers when out of bed   fall prevention program maintained   elopement precautions   clutter free environment maintained   assistive device/personal items within reach   activity supervised  Taken 12/12/2023 0444 by Richie Tamayo RN  Safety Promotion/Fall Prevention:   safety round/check completed   room organization consistent   nonskid shoes/slippers when out of bed   fall prevention program maintained   elopement precautions   clutter free environment maintained   assistive device/personal items within reach   activity supervised  Taken 12/12/2023 0241 by Richie Tamayo RN  Safety Promotion/Fall Prevention:   safety round/check completed   room organization consistent   nonskid shoes/slippers when out of bed   fall prevention program maintained   elopement precautions   activity supervised   assistive device/personal items within reach   clutter free environment maintained  Taken 12/12/2023 0051 by Richie Tamayo RN  Safety Promotion/Fall Prevention:   safety round/check completed   room organization consistent   nonskid shoes/slippers when out of bed   fall prevention program maintained   elopement precautions   clutter free environment maintained   assistive device/personal items within reach   activity supervised  Taken 12/11/2023 2240 by Richie Tamayo RN  Safety Promotion/Fall Prevention:   safety round/check completed   room organization consistent   nonskid shoes/slippers when out of bed   fall prevention program maintained   elopement precautions   clutter free environment maintained   activity supervised   assistive device/personal items within reach  Taken  12/11/2023 2041 by Richie Tamayo RN  Safety Promotion/Fall Prevention:   safety round/check completed   room organization consistent   nonskid shoes/slippers when out of bed   fall prevention program maintained   elopement precautions   clutter free environment maintained   assistive device/personal items within reach   activity supervised  Intervention: Prevent Skin Injury  Recent Flowsheet Documentation  Taken 12/12/2023 0607 by Richie Tamayo RN  Body Position: position changed independently  Taken 12/12/2023 0444 by Richie Tamayo RN  Body Position: position changed independently  Taken 12/12/2023 0241 by Richie Tamayo RN  Body Position: position changed independently  Taken 12/12/2023 0051 by Richie Tamayo RN  Body Position: position changed independently  Taken 12/11/2023 2240 by Richie Tamayo RN  Body Position: position changed independently  Taken 12/11/2023 2041 by Richie Tamayo RN  Body Position: position changed independently  Intervention: Prevent and Manage VTE (Venous Thromboembolism) Risk  Recent Flowsheet Documentation  Taken 12/12/2023 0607 by Richie Tamayo RN  Activity Management: up ad sivakumar  Taken 12/12/2023 0444 by Richie Tamayo RN  Activity Management: up ad sivakumar  Taken 12/12/2023 0241 by Richie Tamayo RN  Activity Management: up ad sivakumar  Taken 12/12/2023 0051 by Richie Tamayo RN  Activity Management: up ad sivakumar  Taken 12/11/2023 2240 by Richie Tamayo RN  Activity Management: up ad sivakumar  Taken 12/11/2023 2041 by Richie Tamayo RN  Activity Management: up ad sivakumar   Goal Outcome Evaluation:

## 2023-12-12 NOTE — NURSING NOTE
"[facility] follow-up d/t depression.    Patient RIB. Patient with short, blunted answers. The patient reported he is the \"same.\" The patient reported poor sleep. Asked patient if he would like to talk to the doctor about a sleep aid and the patient denied need. The patient stated he thinks he is going to be discharged today. The patient stated he does not feel ready for discharge but he is fine with it if they want to discharge him. The patient denied any need for outpatient mental health resources. Access following.   "

## 2023-12-18 ENCOUNTER — APPOINTMENT (OUTPATIENT)
Dept: GENERAL RADIOLOGY | Facility: HOSPITAL | Age: 63
End: 2023-12-18
Payer: COMMERCIAL

## 2023-12-18 ENCOUNTER — APPOINTMENT (OUTPATIENT)
Dept: CT IMAGING | Facility: HOSPITAL | Age: 63
End: 2023-12-18
Payer: COMMERCIAL

## 2023-12-18 ENCOUNTER — HOSPITAL ENCOUNTER (EMERGENCY)
Facility: HOSPITAL | Age: 63
Discharge: HOME | End: 2023-12-18
Attending: EMERGENCY MEDICINE | Admitting: EMERGENCY MEDICINE
Payer: COMMERCIAL

## 2023-12-18 VITALS
TEMPERATURE: 98.1 F | OXYGEN SATURATION: 97 % | HEART RATE: 67 BPM | DIASTOLIC BLOOD PRESSURE: 83 MMHG | SYSTOLIC BLOOD PRESSURE: 136 MMHG | RESPIRATION RATE: 18 BRPM

## 2023-12-18 DIAGNOSIS — R10.13 EPIGASTRIC ABDOMINAL PAIN: ICD-10-CM

## 2023-12-18 DIAGNOSIS — R06.02 SHORTNESS OF BREATH: Primary | ICD-10-CM

## 2023-12-18 LAB
ALBUMIN SERPL-MCNC: 4.6 G/DL (ref 3.5–5.2)
ALBUMIN/GLOB SERPL: 1.9 G/DL
ALP SERPL-CCNC: 76 U/L (ref 39–117)
ALT SERPL W P-5'-P-CCNC: 15 U/L (ref 1–41)
ANION GAP SERPL CALCULATED.3IONS-SCNC: 11.3 MMOL/L (ref 5–15)
AST SERPL-CCNC: 17 U/L (ref 1–40)
B PARAPERT DNA SPEC QL NAA+PROBE: NOT DETECTED
B PERT DNA SPEC QL NAA+PROBE: NOT DETECTED
BASOPHILS # BLD AUTO: 0.11 10*3/MM3 (ref 0–0.2)
BASOPHILS NFR BLD AUTO: 1.1 % (ref 0–1.5)
BILIRUB SERPL-MCNC: 0.4 MG/DL (ref 0–1.2)
BUN SERPL-MCNC: 24 MG/DL (ref 8–23)
BUN/CREAT SERPL: 24.5 (ref 7–25)
C PNEUM DNA NPH QL NAA+NON-PROBE: NOT DETECTED
CALCIUM SPEC-SCNC: 9.6 MG/DL (ref 8.6–10.5)
CHLORIDE SERPL-SCNC: 109 MMOL/L (ref 98–107)
CO2 SERPL-SCNC: 23.7 MMOL/L (ref 22–29)
CREAT SERPL-MCNC: 0.98 MG/DL (ref 0.76–1.27)
D DIMER PPP FEU-MCNC: 0.53 MCGFEU/ML (ref 0–0.61)
DEPRECATED RDW RBC AUTO: 41.9 FL (ref 37–54)
EGFRCR SERPLBLD CKD-EPI 2021: 87.7 ML/MIN/1.73
EOSINOPHIL # BLD AUTO: 0.2 10*3/MM3 (ref 0–0.4)
EOSINOPHIL NFR BLD AUTO: 2 % (ref 0.3–6.2)
ERYTHROCYTE [DISTWIDTH] IN BLOOD BY AUTOMATED COUNT: 12.9 % (ref 12.3–15.4)
FLUAV SUBTYP SPEC NAA+PROBE: NOT DETECTED
FLUBV RNA ISLT QL NAA+PROBE: NOT DETECTED
GLOBULIN UR ELPH-MCNC: 2.4 GM/DL
GLUCOSE SERPL-MCNC: 98 MG/DL (ref 65–99)
HADV DNA SPEC NAA+PROBE: NOT DETECTED
HCOV 229E RNA SPEC QL NAA+PROBE: NOT DETECTED
HCOV HKU1 RNA SPEC QL NAA+PROBE: NOT DETECTED
HCOV NL63 RNA SPEC QL NAA+PROBE: NOT DETECTED
HCOV OC43 RNA SPEC QL NAA+PROBE: NOT DETECTED
HCT VFR BLD AUTO: 42.8 % (ref 37.5–51)
HGB BLD-MCNC: 13.6 G/DL (ref 13–17.7)
HMPV RNA NPH QL NAA+NON-PROBE: NOT DETECTED
HOLD SPECIMEN: NORMAL
HOLD SPECIMEN: NORMAL
HPIV1 RNA ISLT QL NAA+PROBE: NOT DETECTED
HPIV2 RNA SPEC QL NAA+PROBE: NOT DETECTED
HPIV3 RNA NPH QL NAA+PROBE: NOT DETECTED
HPIV4 P GENE NPH QL NAA+PROBE: NOT DETECTED
IMM GRANULOCYTES # BLD AUTO: 0.02 10*3/MM3 (ref 0–0.05)
IMM GRANULOCYTES NFR BLD AUTO: 0.2 % (ref 0–0.5)
LIPASE SERPL-CCNC: 44 U/L (ref 13–60)
LYMPHOCYTES # BLD AUTO: 2 10*3/MM3 (ref 0.7–3.1)
LYMPHOCYTES NFR BLD AUTO: 20 % (ref 19.6–45.3)
M PNEUMO IGG SER IA-ACNC: NOT DETECTED
MCH RBC QN AUTO: 28.7 PG (ref 26.6–33)
MCHC RBC AUTO-ENTMCNC: 31.8 G/DL (ref 31.5–35.7)
MCV RBC AUTO: 90.3 FL (ref 79–97)
MONOCYTES # BLD AUTO: 0.62 10*3/MM3 (ref 0.1–0.9)
MONOCYTES NFR BLD AUTO: 6.2 % (ref 5–12)
NEUTROPHILS NFR BLD AUTO: 7.07 10*3/MM3 (ref 1.7–7)
NEUTROPHILS NFR BLD AUTO: 70.5 % (ref 42.7–76)
NRBC BLD AUTO-RTO: 0 /100 WBC (ref 0–0.2)
NT-PROBNP SERPL-MCNC: 92.5 PG/ML (ref 0–900)
PLATELET # BLD AUTO: 271 10*3/MM3 (ref 140–450)
PMV BLD AUTO: 9.6 FL (ref 6–12)
POTASSIUM SERPL-SCNC: 4.1 MMOL/L (ref 3.5–5.2)
PROT SERPL-MCNC: 7 G/DL (ref 6–8.5)
QT INTERVAL: 373 MS
QTC INTERVAL: 425 MS
RBC # BLD AUTO: 4.74 10*6/MM3 (ref 4.14–5.8)
RHINOVIRUS RNA SPEC NAA+PROBE: NOT DETECTED
RSV RNA NPH QL NAA+NON-PROBE: NOT DETECTED
SARS-COV-2 RNA NPH QL NAA+NON-PROBE: NOT DETECTED
SODIUM SERPL-SCNC: 144 MMOL/L (ref 136–145)
TROPONIN T SERPL HS-MCNC: 10 NG/L
TROPONIN T SERPL HS-MCNC: 11 NG/L
WBC NRBC COR # BLD AUTO: 10.02 10*3/MM3 (ref 3.4–10.8)
WHOLE BLOOD HOLD COAG: NORMAL
WHOLE BLOOD HOLD SPECIMEN: NORMAL

## 2023-12-18 PROCEDURE — 85025 COMPLETE CBC W/AUTO DIFF WBC: CPT | Performed by: EMERGENCY MEDICINE

## 2023-12-18 PROCEDURE — 96375 TX/PRO/DX INJ NEW DRUG ADDON: CPT

## 2023-12-18 PROCEDURE — 85379 FIBRIN DEGRADATION QUANT: CPT | Performed by: EMERGENCY MEDICINE

## 2023-12-18 PROCEDURE — 94760 N-INVAS EAR/PLS OXIMETRY 1: CPT

## 2023-12-18 PROCEDURE — 25810000003 SODIUM CHLORIDE 0.9 % SOLUTION: Performed by: EMERGENCY MEDICINE

## 2023-12-18 PROCEDURE — 80053 COMPREHEN METABOLIC PANEL: CPT | Performed by: EMERGENCY MEDICINE

## 2023-12-18 PROCEDURE — 83690 ASSAY OF LIPASE: CPT | Performed by: EMERGENCY MEDICINE

## 2023-12-18 PROCEDURE — 83880 ASSAY OF NATRIURETIC PEPTIDE: CPT | Performed by: EMERGENCY MEDICINE

## 2023-12-18 PROCEDURE — 36415 COLL VENOUS BLD VENIPUNCTURE: CPT

## 2023-12-18 PROCEDURE — 71045 X-RAY EXAM CHEST 1 VIEW: CPT

## 2023-12-18 PROCEDURE — 0202U NFCT DS 22 TRGT SARS-COV-2: CPT | Performed by: EMERGENCY MEDICINE

## 2023-12-18 PROCEDURE — 93005 ELECTROCARDIOGRAM TRACING: CPT | Performed by: EMERGENCY MEDICINE

## 2023-12-18 PROCEDURE — 96374 THER/PROPH/DIAG INJ IV PUSH: CPT

## 2023-12-18 PROCEDURE — 94799 UNLISTED PULMONARY SVC/PX: CPT

## 2023-12-18 PROCEDURE — 74176 CT ABD & PELVIS W/O CONTRAST: CPT

## 2023-12-18 PROCEDURE — 25510000001 IOPAMIDOL PER 1 ML: Performed by: EMERGENCY MEDICINE

## 2023-12-18 PROCEDURE — 99285 EMERGENCY DEPT VISIT HI MDM: CPT

## 2023-12-18 PROCEDURE — 25010000002 DEXAMETHASONE PER 1 MG: Performed by: EMERGENCY MEDICINE

## 2023-12-18 PROCEDURE — 94664 DEMO&/EVAL PT USE INHALER: CPT

## 2023-12-18 PROCEDURE — 94761 N-INVAS EAR/PLS OXIMETRY MLT: CPT

## 2023-12-18 PROCEDURE — 25010000002 HYDROMORPHONE PER 4 MG: Performed by: EMERGENCY MEDICINE

## 2023-12-18 PROCEDURE — 94640 AIRWAY INHALATION TREATMENT: CPT

## 2023-12-18 PROCEDURE — 84484 ASSAY OF TROPONIN QUANT: CPT | Performed by: EMERGENCY MEDICINE

## 2023-12-18 PROCEDURE — 71275 CT ANGIOGRAPHY CHEST: CPT

## 2023-12-18 RX ORDER — HYDROMORPHONE HYDROCHLORIDE 1 MG/ML
0.5 INJECTION, SOLUTION INTRAMUSCULAR; INTRAVENOUS; SUBCUTANEOUS ONCE
Status: COMPLETED | OUTPATIENT
Start: 2023-12-18 | End: 2023-12-18

## 2023-12-18 RX ORDER — DEXAMETHASONE SODIUM PHOSPHATE 10 MG/ML
6 INJECTION INTRAMUSCULAR; INTRAVENOUS ONCE
Status: COMPLETED | OUTPATIENT
Start: 2023-12-18 | End: 2023-12-18

## 2023-12-18 RX ORDER — SODIUM CHLORIDE 0.9 % (FLUSH) 0.9 %
10 SYRINGE (ML) INJECTION AS NEEDED
Status: DISCONTINUED | OUTPATIENT
Start: 2023-12-18 | End: 2023-12-18 | Stop reason: HOSPADM

## 2023-12-18 RX ORDER — ONDANSETRON 4 MG/1
4 TABLET, ORALLY DISINTEGRATING ORAL EVERY 8 HOURS PRN
Qty: 15 TABLET | Refills: 0 | Status: SHIPPED | OUTPATIENT
Start: 2023-12-18 | End: 2023-12-23

## 2023-12-18 RX ORDER — IPRATROPIUM BROMIDE AND ALBUTEROL SULFATE 2.5; .5 MG/3ML; MG/3ML
3 SOLUTION RESPIRATORY (INHALATION) ONCE
Status: COMPLETED | OUTPATIENT
Start: 2023-12-18 | End: 2023-12-18

## 2023-12-18 RX ADMIN — DEXAMETHASONE SODIUM PHOSPHATE 6 MG: 10 INJECTION INTRAMUSCULAR; INTRAVENOUS at 15:52

## 2023-12-18 RX ADMIN — HYDROMORPHONE HYDROCHLORIDE 0.5 MG: 1 INJECTION, SOLUTION INTRAMUSCULAR; INTRAVENOUS; SUBCUTANEOUS at 20:15

## 2023-12-18 RX ADMIN — IOPAMIDOL 95 ML: 755 INJECTION, SOLUTION INTRAVENOUS at 17:46

## 2023-12-18 RX ADMIN — SODIUM CHLORIDE 1000 ML: 9 INJECTION, SOLUTION INTRAVENOUS at 15:52

## 2023-12-18 RX ADMIN — IPRATROPIUM BROMIDE AND ALBUTEROL SULFATE 3 ML: 2.5; .5 SOLUTION RESPIRATORY (INHALATION) at 15:48

## 2023-12-18 NOTE — ED PROVIDER NOTES
EMERGENCY DEPARTMENT ENCOUNTER    Room Number:  16/16  PCP: Janelle Lara MD      HPI:  Chief Complaint: Shortness of breath and cough  A complete HPI/ROS/PMH/PSH/SH/FH are unobtainable due to: None  Context: Donny Mallory is a 63 y.o. male who presents to the ED c/o this of breath and cough.  Cough is productive yellow sputum.  Onset of symptoms about 2 days ago.  He feels cold.  Exposed to sick contacts with COVID-19.          PAST MEDICAL HISTORY  Active Ambulatory Problems     Diagnosis Date Noted    Psychosis 05/16/2017    Acute pancreatitis 04/21/2022    COPD (chronic obstructive pulmonary disease)     HTN (hypertension)     Dysphagia     Constipation     Cholelithiasis 04/25/2022    Severe malnutrition 04/28/2022    Ileus 10/29/2023    GERD without esophagitis 10/29/2023    Hypernatremia 10/29/2023    Intractable nausea and vomiting 12/07/2023     Resolved Ambulatory Problems     Diagnosis Date Noted    No Resolved Ambulatory Problems     Past Medical History:   Diagnosis Date    Bell's palsy     Chronic sinusitis     Depression     History of biliary stent insertion     Hypertension     Presence of pancreatic duct stent     Suicide attempt          PAST SURGICAL HISTORY  Past Surgical History:   Procedure Laterality Date    CHOLECYSTECTOMY      CHOLECYSTECTOMY WITH INTRAOPERATIVE CHOLANGIOGRAM N/A 04/25/2022    Procedure: Laparoscopic cholecystectomy with intraoperative cholangiogram, possible open;  Surgeon: Khari Schofield MD;  Location: [facility];  Service: General;  Laterality: N/A;    HERNIA REPAIR           FAMILY HISTORY  Family History   Family history unknown: Yes         SOCIAL HISTORY  Social History     Socioeconomic History    Marital status: Single   Tobacco Use    Smoking status: Every Day     Packs/day: 0.25     Years: 15.00     Additional pack years: 0.00     Total pack years: 3.75     Types: Cigarettes    Smokeless tobacco: Never   Vaping Use    Vaping Use: Former    Substance and Sexual Activity    Alcohol use: No    Drug use: No    Sexual activity: Defer         ALLERGIES  Prochlorperazine        REVIEW OF SYSTEMS  Review of Systems     All systems reviewed and negative except for those discussed in HPI.       PHYSICAL EXAM  ED Triage Vitals   Temp Heart Rate Resp BP SpO2   12/18/23 1314 12/18/23 1314 12/18/23 1314 12/18/23 1319 12/18/23 1314   98.1 °F (36.7 °C) (!) 129 18 131/99 94 %      Temp src Heart Rate Source Patient Position BP Location FiO2 (%)   12/18/23 1314 12/18/23 1314 -- -- --   Tympanic Monitor          Physical Exam      GENERAL: no acute distress  HENT: nares patent  EYES: no scleral icterus  CV: regular rhythm, tachycardic  RESPIRATORY: delayed expiratory phase, expiratory wheezing that is faint, speaks in phrases  ABDOMEN: soft, nontender  MUSCULOSKELETAL: no deformity, no lower extremity edema or tenderness  NEURO: alert, moves all extremities, follows commands  PSYCH:  calm, cooperative  SKIN: warm, dry    Vital signs and nursing notes reviewed.          LAB RESULTS  Recent Results (from the past 24 hour(s))   Comprehensive Metabolic Panel    Collection Time: 12/18/23  3:37 PM    Specimen: Blood   Result Value Ref Range    Glucose 98 65 - 99 mg/dL    BUN 24 (H) 8 - 23 mg/dL    Creatinine 0.98 0.76 - 1.27 mg/dL    Sodium 144 136 - 145 mmol/L    Potassium 4.1 3.5 - 5.2 mmol/L    Chloride 109 (H) 98 - 107 mmol/L    CO2 23.7 22.0 - 29.0 mmol/L    Calcium 9.6 8.6 - 10.5 mg/dL    Total Protein 7.0 6.0 - 8.5 g/dL    Albumin 4.6 3.5 - 5.2 g/dL    ALT (SGPT) 15 1 - 41 U/L    AST (SGOT) 17 1 - 40 U/L    Alkaline Phosphatase 76 39 - 117 U/L    Total Bilirubin 0.4 0.0 - 1.2 mg/dL    Globulin 2.4 gm/dL    A/G Ratio 1.9 g/dL    BUN/Creatinine Ratio 24.5 7.0 - 25.0    Anion Gap 11.3 5.0 - 15.0 mmol/L    eGFR 87.7 >60.0 mL/min/1.73   BNP    Collection Time: 12/18/23  3:37 PM    Specimen: Blood   Result Value Ref Range    proBNP 92.5 0.0 - 900.0 pg/mL   Single High  Sensitivity Troponin T    Collection Time: 12/18/23  3:37 PM    Specimen: Blood   Result Value Ref Range    HS Troponin T 11 <22 ng/L   Green Top (Gel)    Collection Time: 12/18/23  3:37 PM   Result Value Ref Range    Extra Tube Hold for add-ons.    Lavender Top    Collection Time: 12/18/23  3:37 PM   Result Value Ref Range    Extra Tube hold for add-on    Gold Top - SST    Collection Time: 12/18/23  3:37 PM   Result Value Ref Range    Extra Tube Hold for add-ons.    Light Blue Top    Collection Time: 12/18/23  3:37 PM   Result Value Ref Range    Extra Tube Hold for add-ons.    CBC Auto Differential    Collection Time: 12/18/23  3:37 PM    Specimen: Blood   Result Value Ref Range    WBC 10.02 3.40 - 10.80 10*3/mm3    RBC 4.74 4.14 - 5.80 10*6/mm3    Hemoglobin 13.6 13.0 - 17.7 g/dL    Hematocrit 42.8 37.5 - 51.0 %    MCV 90.3 79.0 - 97.0 fL    MCH 28.7 26.6 - 33.0 pg    MCHC 31.8 31.5 - 35.7 g/dL    RDW 12.9 12.3 - 15.4 %    RDW-SD 41.9 37.0 - 54.0 fl    MPV 9.6 6.0 - 12.0 fL    Platelets 271 140 - 450 10*3/mm3    Neutrophil % 70.5 42.7 - 76.0 %    Lymphocyte % 20.0 19.6 - 45.3 %    Monocyte % 6.2 5.0 - 12.0 %    Eosinophil % 2.0 0.3 - 6.2 %    Basophil % 1.1 0.0 - 1.5 %    Immature Grans % 0.2 0.0 - 0.5 %    Neutrophils, Absolute 7.07 (H) 1.70 - 7.00 10*3/mm3    Lymphocytes, Absolute 2.00 0.70 - 3.10 10*3/mm3    Monocytes, Absolute 0.62 0.10 - 0.90 10*3/mm3    Eosinophils, Absolute 0.20 0.00 - 0.40 10*3/mm3    Basophils, Absolute 0.11 0.00 - 0.20 10*3/mm3    Immature Grans, Absolute 0.02 0.00 - 0.05 10*3/mm3    nRBC 0.0 0.0 - 0.2 /100 WBC   D-dimer, Quantitative    Collection Time: 12/18/23  3:37 PM    Specimen: Blood   Result Value Ref Range    D-Dimer, Quantitative 0.53 0.00 - 0.61 MCGFEU/mL   Lipase    Collection Time: 12/18/23  3:37 PM    Specimen: Blood   Result Value Ref Range    Lipase 44 13 - 60 U/L   Respiratory Panel PCR w/COVID-19(SARS-CoV-2) PRINCESS/TAMY/HORTENCIA/PAD/COR/GRETEL In-House, NP Swab in UTM/VTM, 2 HR TAT  - Swab, Nasopharynx    Collection Time: 12/18/23  3:38 PM    Specimen: Nasopharynx; Swab   Result Value Ref Range    ADENOVIRUS, PCR Not Detected Not Detected    Coronavirus 229E Not Detected Not Detected    Coronavirus HKU1 Not Detected Not Detected    Coronavirus NL63 Not Detected Not Detected    Coronavirus OC43 Not Detected Not Detected    COVID19 Not Detected Not Detected - Ref. Range    Human Metapneumovirus Not Detected Not Detected    Human Rhinovirus/Enterovirus Not Detected Not Detected    Influenza A PCR Not Detected Not Detected    Influenza B PCR Not Detected Not Detected    Parainfluenza Virus 1 Not Detected Not Detected    Parainfluenza Virus 2 Not Detected Not Detected    Parainfluenza Virus 3 Not Detected Not Detected    Parainfluenza Virus 4 Not Detected Not Detected    RSV, PCR Not Detected Not Detected    Bordetella pertussis pcr Not Detected Not Detected    Bordetella parapertussis PCR Not Detected Not Detected    Chlamydophila pneumoniae PCR Not Detected Not Detected    Mycoplasma pneumo by PCR Not Detected Not Detected   ECG 12 Lead ED Triage Standing Order; SOA    Collection Time: 12/18/23  5:01 PM   Result Value Ref Range    QT Interval 373 ms    QTC Interval 425 ms   Single High Sensitivity Troponin T    Collection Time: 12/18/23  7:12 PM    Specimen: Blood   Result Value Ref Range    HS Troponin T 10 <22 ng/L       Ordered the above labs and reviewed the results.        RADIOLOGY  CT Abdomen Pelvis Without Contrast    Result Date: 12/18/2023  CT ABDOMEN AND PELVIS WITHOUT IV CONTRAST  HISTORY: Epigastric pain  TECHNIQUE: Radiation dose reduction techniques were utilized, including automated exposure control and exposure modulation based on body size. 3 mm images were obtained through the abdomen and pelvis without the administration of IV contrast.  COMPARISON: 12/7/2023 and 11/26/2023.   FINDINGS: Contrast within the collecting system limits evaluation. No hydronephrosis. Bilateral renal  cysts.  Post cholecystectomy. A metallic biliary stent in the distal CBD with distal portion at the level of the ampulla. Unchanged nondilated biliary tree with small volume pneumobilia. Noncirrhotic liver morphology. Few unchanged subcentimeter hypodense hepatic lesions. Preserved pancreas parenchymal volume. Main pancreatic duct is nondilated.  Remaining solid organs and bowel including the appendix are normal in limited noncontrast CT appearance. No abdominal pelvic adenopathy. No focal osseous abnormality.       1. No acute abdominal pelvic abnormality on limited noncontrast CT. 2. Contrast within the collecting system partially limits evaluation. No hydronephrosis. 3. Stable positioning of the metallic biliary stent. Unchanged nondilated biliary tree with small volume pneumobilia.  This report was finalized on 12/18/2023 8:42 PM by Dr. Law Daley M.D on Workstation: [id number]      CT Angiogram Chest    Result Date: 12/18/2023  CT ANGIOGRAM CHEST PULMONARY EMBOLISM  TECHNIQUE: Radiation dose reduction techniques were utilized, including automated exposure control and exposure modulation based on body size. 2 mm images were obtained through the chest after the administration of IV contrast.  HISTORY: Clinical concern for pulm embolism. Productive cough.  COMPARISON: CT chest 5/2/2022. CT abdomen pelvis 12/7/2023.   FINDINGS: Exam is partially due to by motion.  Nondilated main pulmonary artery. Pulmonary arteries are patent.  Heart is normal in size with severe coronary artery calcifications. No pericardial fluid. Nondilated thoracic aorta. No mediastinal/hilar adenopathy.  Trachea and main bronchi are patent. No bronchiectasis or bronchial wall thickening. Background advanced centrilobular and paraseptal emphysema. Respiratory motion partially limits evaluation for pulmonary nodules. Within that limitation, no new pulmonary nodule. No segmental or lobar consolidation. No pleural effusion.  A T10 compression  fracture is new from 2022 and outside the recent CT abdomen pelvis field-of-view. Unchanged T4, T5 and L1 compression fractures. Interval healing of the prior sternal fracture. Partially visualized metallic biliary stent with pneumobilia.        1. Exam is partially Limited by motion. 2. No evidence of pulmonary embolus. 3. No segmental or lobar consolidation. 4. Background advanced emphysema. 5. A T10 compression fracture is new from 2022.   This report was finalized on 12/18/2023 6:16 PM by Dr. Law Daley M.D on Workstation: [id number]      XR Chest 1 View    Result Date: 12/18/2023  XR CHEST 1 VW-12/18/2023  HISTORY: Shortness of breath.  Heart size is within normal limits. There is mild interstitial prominence of the lungs similar to the 12/7/2023 study. No acute infiltrates are seen. No pneumothorax is seen.      1. No acute process.   This report was finalized on 12/18/2023 2:39 PM by Dr. Kaiser Hughes M.D on Workstation: [id number]       Ordered the above noted radiological studies. Reviewed by me in PACS.          PROCEDURES  Procedures        MEDICATIONS GIVEN IN ER  Medications   sodium chloride 0.9 % flush 10 mL (has no administration in time range)   ipratropium-albuterol (DUO-NEB) nebulizer solution 3 mL (3 mL Nebulization Given 12/18/23 1548)   dexAMETHasone (DECADRON) injection 6 mg (6 mg Intravenous Given 12/18/23 1552)   sodium chloride 0.9 % bolus 1,000 mL (0 mL Intravenous Stopped 12/18/23 1719)   iopamidol (ISOVUE-370) 76 % injection 100 mL (95 mL Intravenous Given by Other 12/18/23 1746)   HYDROmorphone (DILAUDID) injection 0.5 mg (0.5 mg Intravenous Given 12/18/23 2015)         MEDICAL DECISION MAKING, PROGRESS, and CONSULTS    Discussion below represents my analysis of pertinent findings related to patient's condition, differential diagnosis, treatment plan and final disposition.      Orders placed during this visit:  Orders Placed This Encounter   Procedures    Respiratory Panel PCR  w/COVID-19(SARS-CoV-2) PRINCESS/TAMY/HORTENCIA/PAD/COR/GRETEL In-House, NP Swab in UTM/VTM, 2 HR TAT - Swab, Nasopharynx    XR Chest 1 View    CT Angiogram Chest    CT Abdomen Pelvis Without Contrast    [place] Draw    Comprehensive Metabolic Panel    BNP    Single High Sensitivity Troponin T    CBC Auto Differential    D-dimer, Quantitative    Lipase    Single High Sensitivity Troponin T    NPO Diet NPO Type: Strict NPO    Undress & Gown    Continuous Pulse Oximetry    Vital Signs    Discontinue Patient Isolation    Oxygen Therapy- Nasal Cannula; Titrate 1-6 LPM Per SpO2; 90 - 95%    ECG 12 Lead ED Triage Standing Order; SOA    SCANNED - TELEMETRY      SCANNED - TELEMETRY      Insert Peripheral IV    CBC & Differential    Green Top (Gel)    Lavender Top    Gold Top - SST    Light Blue Top               Differential diagnosis:    Differential diagnosis includes but not limited to CHF, acute coronary syndrome, pulmonary embolism, pneumothorax, pneumonia, asthma/COPD, pulmonary hypertension, deconditioning, anemia, other hematologic etiologies such as CO poisoning, anxiety.               Independent interpretation of labs, radiology studies, and discussions with consultants:  ED Course as of 12/18/23 2252   Mon Dec 18, 2023   1534 Heart Rate(!): 129 [TD]   1534 Chest x-ray independently interpreted myself.  Hyperexpanded lungs.  No evidence of pneumonia. [TD]   1614 proBNP: 92.5 [TD]   1704 EKG independently interpreted by myself.  Time 5:01 PM.  Sinus rhythm.  Heart rate 78.  Biatrial abnormality.  No acute ST abnormality. [TD]   1849 CT angiogram is limited by motion.  No evidence of PE.  No consolidation.  Background emphysema. [TD]   2056 D-Dimer, Quant: 0.53 [TD]   2057 CT abdomen and pelvis shows no acute pathology.  Stable positioning of the metallic biliary stent. [TD]      ED Course User Index  [TD] Keith Mallory II, MD         On repeat evaluation, patient began complaining of abdominal pain after discussing the CT  results of his chest.  Therefore, CT abdomen pelvis was ordered.  This was acutely negative.  Patient seems to have a very anxious affect.  His vital signs have normalized.  I see no indication for admitting him to the hospital.      DIAGNOSIS  Final diagnoses:   Shortness of breath   Epigastric abdominal pain         DISPOSITION  DISCHARGE    FOLLOW-UP  [facility] EMERGENCY DEPARTMENT  [address] [address] [phone]  Go to   If symptoms worsen    Janelle Lara MD  [address]  [facility] [address]  [facility] [address]  [phone]    Schedule an appointment as soon as possible for a visit in 1 day           Medication List        Changed      ondansetron ODT 4 MG disintegrating tablet  Commonly known as: ZOFRAN-ODT  Place 1 tablet on the tongue Every 8 (Eight) Hours As Needed for Nausea or Vomiting for up to 5 days.  What changed: when to take this               Where to Get Your Medications        These medications were sent to [facility] [id number] - [place], [place] - [address] AT [facility] - [phone]  - [phone]   [address] [facility] [address]      Phone: [phone]   ondansetron ODT 4 MG disintegrating tablet             Latest Documented Vital Signs:  As of 22:52 EST  BP- 136/83 HR- 67 Temp- 98.1 °F (36.7 °C) (Tympanic) O2 sat- 97%      --    Please note that portions of this were completed with a voice recognition program.       Note Disclaimer: At [facility], we believe that sharing information builds trust and better relationships. You are receiving this note because you are receiving care at [facility] or recently visited. It is possible you will see health information before a provider has talked with you about it. This kind of information can be easy to misunderstand. To help you fully understand what it means for your health, we urge you to discuss this note with your provider.         Mohamud  Keith Walker II, MD  12/18/23 5240

## 2023-12-21 NOTE — PAYOR COMM NOTE
"Meron Mallory (63 y.o. Male)      PATIENT DISCHARGED 12/12/2023: REF# [id number]      DEPT: [phone],  [phone]    [facility]     Date of Birth   1960    Social Security Number       Address   [address] [facility] [address]    Home Phone   [phone]    MRN   [id number]       Christian   None    Marital Status   Single                            Admission Date   12/7/23    Admission Type   Emergency    Admitting Provider   Mary Jama MD    Attending Provider       Department, Room/Bed   [address], [address]       Discharge Date   12/12/2023    Discharge Disposition   Left Against Medical Advice    Discharge Destination                                 Attending Provider: (none)   Allergies: Prochlorperazine    Isolation: None   Infection: COVID (rule out) (12/18/23)   Code Status: Prior    Ht: 177.8 cm (70\")   Wt: 55.4 kg (122 lb 2.2 oz)    Admission Cmt: None   Principal Problem: Intractable nausea and vomiting [R11.2]                   Active Insurance as of 12/7/2023       Primary Coverage       Payor Plan Insurance Group Employer/Plan Group    [facility] B[facility] [facility] BY BERNY [id number]       Payor Plan Address Payor Plan Phone Number Payor Plan Fax Number Effective Dates    [address] [address]   1/1/2021 - None Entered    [facility] [address]         Subscriber Name Subscriber Birth Date Member ID       MERON MALLORY 1960 [id number]                     Emergency Contacts        (Rel.) Home Phone Work Phone Mobile Phone    Tali Mallory (Sister) [phone] -- [phone]              [place]: NPI [id number]  Tax ID [id number]      Discharge Summary    No notes of this type exist for this encounter.       Discharge Order (From admission, onward)      None          "

## 2023-12-30 ENCOUNTER — APPOINTMENT (OUTPATIENT)
Dept: GENERAL RADIOLOGY | Facility: HOSPITAL | Age: 63
End: 2023-12-30
Payer: COMMERCIAL

## 2023-12-30 ENCOUNTER — APPOINTMENT (OUTPATIENT)
Dept: CT IMAGING | Facility: HOSPITAL | Age: 63
End: 2023-12-30
Payer: COMMERCIAL

## 2023-12-30 ENCOUNTER — HOSPITAL ENCOUNTER (EMERGENCY)
Facility: HOSPITAL | Age: 63
Discharge: HOME | End: 2023-12-30
Attending: EMERGENCY MEDICINE
Payer: COMMERCIAL

## 2023-12-30 VITALS
RESPIRATION RATE: 20 BRPM | SYSTOLIC BLOOD PRESSURE: 144 MMHG | HEART RATE: 60 BPM | HEIGHT: 70 IN | TEMPERATURE: 98.7 F | BODY MASS INDEX: 16.89 KG/M2 | DIASTOLIC BLOOD PRESSURE: 82 MMHG | WEIGHT: 118 LBS | OXYGEN SATURATION: 94 %

## 2023-12-30 DIAGNOSIS — R09.81 CONGESTION OF NASAL SINUS: Primary | ICD-10-CM

## 2023-12-30 DIAGNOSIS — R11.0 NAUSEA: ICD-10-CM

## 2023-12-30 DIAGNOSIS — K59.00 CONSTIPATION, UNSPECIFIED CONSTIPATION TYPE: ICD-10-CM

## 2023-12-30 LAB
ALBUMIN SERPL-MCNC: 4.3 G/DL (ref 3.5–5.2)
ALBUMIN/GLOB SERPL: 1.8 G/DL
ALP SERPL-CCNC: 61 U/L (ref 39–117)
ALT SERPL W P-5'-P-CCNC: 9 U/L (ref 1–41)
ANION GAP SERPL CALCULATED.3IONS-SCNC: 13.2 MMOL/L (ref 5–15)
AST SERPL-CCNC: 17 U/L (ref 1–40)
B PARAPERT DNA SPEC QL NAA+PROBE: NOT DETECTED
B PERT DNA SPEC QL NAA+PROBE: NOT DETECTED
BASOPHILS # BLD AUTO: 0.17 10*3/MM3 (ref 0–0.2)
BASOPHILS NFR BLD AUTO: 1.5 % (ref 0–1.5)
BILIRUB SERPL-MCNC: 0.5 MG/DL (ref 0–1.2)
BUN SERPL-MCNC: 14 MG/DL (ref 8–23)
BUN/CREAT SERPL: 14.4 (ref 7–25)
C PNEUM DNA NPH QL NAA+NON-PROBE: NOT DETECTED
CALCIUM SPEC-SCNC: 9.4 MG/DL (ref 8.6–10.5)
CHLORIDE SERPL-SCNC: 108 MMOL/L (ref 98–107)
CO2 SERPL-SCNC: 20.8 MMOL/L (ref 22–29)
CREAT SERPL-MCNC: 0.97 MG/DL (ref 0.76–1.27)
DEPRECATED RDW RBC AUTO: 44.5 FL (ref 37–54)
EGFRCR SERPLBLD CKD-EPI 2021: 88.8 ML/MIN/1.73
EOSINOPHIL # BLD AUTO: 0.43 10*3/MM3 (ref 0–0.4)
EOSINOPHIL NFR BLD AUTO: 3.9 % (ref 0.3–6.2)
ERYTHROCYTE [DISTWIDTH] IN BLOOD BY AUTOMATED COUNT: 12.9 % (ref 12.3–15.4)
FLUAV SUBTYP SPEC NAA+PROBE: NOT DETECTED
FLUBV RNA ISLT QL NAA+PROBE: NOT DETECTED
GLOBULIN UR ELPH-MCNC: 2.4 GM/DL
GLUCOSE SERPL-MCNC: 82 MG/DL (ref 65–99)
HADV DNA SPEC NAA+PROBE: NOT DETECTED
HCOV 229E RNA SPEC QL NAA+PROBE: NOT DETECTED
HCOV HKU1 RNA SPEC QL NAA+PROBE: NOT DETECTED
HCOV NL63 RNA SPEC QL NAA+PROBE: NOT DETECTED
HCOV OC43 RNA SPEC QL NAA+PROBE: NOT DETECTED
HCT VFR BLD AUTO: 42.2 % (ref 37.5–51)
HGB BLD-MCNC: 13.1 G/DL (ref 13–17.7)
HMPV RNA NPH QL NAA+NON-PROBE: NOT DETECTED
HPIV1 RNA ISLT QL NAA+PROBE: NOT DETECTED
HPIV2 RNA SPEC QL NAA+PROBE: NOT DETECTED
HPIV3 RNA NPH QL NAA+PROBE: NOT DETECTED
HPIV4 P GENE NPH QL NAA+PROBE: NOT DETECTED
IMM GRANULOCYTES # BLD AUTO: 0.02 10*3/MM3 (ref 0–0.05)
IMM GRANULOCYTES NFR BLD AUTO: 0.2 % (ref 0–0.5)
LIPASE SERPL-CCNC: 30 U/L (ref 13–60)
LYMPHOCYTES # BLD AUTO: 3.13 10*3/MM3 (ref 0.7–3.1)
LYMPHOCYTES NFR BLD AUTO: 28.3 % (ref 19.6–45.3)
M PNEUMO IGG SER IA-ACNC: NOT DETECTED
MCH RBC QN AUTO: 29.2 PG (ref 26.6–33)
MCHC RBC AUTO-ENTMCNC: 31 G/DL (ref 31.5–35.7)
MCV RBC AUTO: 94 FL (ref 79–97)
MONOCYTES # BLD AUTO: 0.69 10*3/MM3 (ref 0.1–0.9)
MONOCYTES NFR BLD AUTO: 6.2 % (ref 5–12)
NEUTROPHILS NFR BLD AUTO: 59.9 % (ref 42.7–76)
NEUTROPHILS NFR BLD AUTO: 6.61 10*3/MM3 (ref 1.7–7)
NRBC BLD AUTO-RTO: 0 /100 WBC (ref 0–0.2)
PLATELET # BLD AUTO: 254 10*3/MM3 (ref 140–450)
PMV BLD AUTO: 9.7 FL (ref 6–12)
POTASSIUM SERPL-SCNC: 4.1 MMOL/L (ref 3.5–5.2)
PROT SERPL-MCNC: 6.7 G/DL (ref 6–8.5)
RBC # BLD AUTO: 4.49 10*6/MM3 (ref 4.14–5.8)
RHINOVIRUS RNA SPEC NAA+PROBE: NOT DETECTED
RSV RNA NPH QL NAA+NON-PROBE: NOT DETECTED
SARS-COV-2 RNA NPH QL NAA+NON-PROBE: NOT DETECTED
SODIUM SERPL-SCNC: 142 MMOL/L (ref 136–145)
WBC NRBC COR # BLD AUTO: 11.05 10*3/MM3 (ref 3.4–10.8)

## 2023-12-30 PROCEDURE — 85025 COMPLETE CBC W/AUTO DIFF WBC: CPT | Performed by: EMERGENCY MEDICINE

## 2023-12-30 PROCEDURE — 74176 CT ABD & PELVIS W/O CONTRAST: CPT

## 2023-12-30 PROCEDURE — 71046 X-RAY EXAM CHEST 2 VIEWS: CPT

## 2023-12-30 PROCEDURE — 0202U NFCT DS 22 TRGT SARS-COV-2: CPT | Performed by: EMERGENCY MEDICINE

## 2023-12-30 PROCEDURE — 25010000002 MORPHINE PER 10 MG: Performed by: EMERGENCY MEDICINE

## 2023-12-30 PROCEDURE — 96374 THER/PROPH/DIAG INJ IV PUSH: CPT

## 2023-12-30 PROCEDURE — 83690 ASSAY OF LIPASE: CPT | Performed by: EMERGENCY MEDICINE

## 2023-12-30 PROCEDURE — 80053 COMPREHEN METABOLIC PANEL: CPT | Performed by: EMERGENCY MEDICINE

## 2023-12-30 PROCEDURE — 25010000002 ONDANSETRON PER 1 MG: Performed by: EMERGENCY MEDICINE

## 2023-12-30 PROCEDURE — 96375 TX/PRO/DX INJ NEW DRUG ADDON: CPT

## 2023-12-30 PROCEDURE — 99284 EMERGENCY DEPT VISIT MOD MDM: CPT

## 2023-12-30 RX ORDER — FAMOTIDINE 10 MG/ML
20 INJECTION, SOLUTION INTRAVENOUS ONCE
Status: COMPLETED | OUTPATIENT
Start: 2023-12-30 | End: 2023-12-30

## 2023-12-30 RX ORDER — OXYMETAZOLINE HYDROCHLORIDE 0.05 G/100ML
2 SPRAY NASAL 2 TIMES DAILY
Status: DISCONTINUED | OUTPATIENT
Start: 2023-12-30 | End: 2023-12-31 | Stop reason: HOSPADM

## 2023-12-30 RX ORDER — ALUMINA, MAGNESIA, AND SIMETHICONE 2400; 2400; 240 MG/30ML; MG/30ML; MG/30ML
15 SUSPENSION ORAL ONCE
Status: DISCONTINUED | OUTPATIENT
Start: 2023-12-30 | End: 2023-12-31 | Stop reason: HOSPADM

## 2023-12-30 RX ORDER — ONDANSETRON 2 MG/ML
4 INJECTION INTRAMUSCULAR; INTRAVENOUS ONCE
Status: COMPLETED | OUTPATIENT
Start: 2023-12-30 | End: 2023-12-30

## 2023-12-30 RX ORDER — MORPHINE SULFATE 2 MG/ML
4 INJECTION, SOLUTION INTRAMUSCULAR; INTRAVENOUS ONCE
Status: COMPLETED | OUTPATIENT
Start: 2023-12-30 | End: 2023-12-30

## 2023-12-30 RX ORDER — SODIUM CHLORIDE 0.9 % (FLUSH) 0.9 %
10 SYRINGE (ML) INJECTION AS NEEDED
Status: DISCONTINUED | OUTPATIENT
Start: 2023-12-30 | End: 2023-12-31 | Stop reason: HOSPADM

## 2023-12-30 RX ADMIN — FAMOTIDINE 20 MG: 10 INJECTION INTRAVENOUS at 20:39

## 2023-12-30 RX ADMIN — MORPHINE SULFATE 4 MG: 2 INJECTION, SOLUTION INTRAMUSCULAR; INTRAVENOUS at 21:15

## 2023-12-30 RX ADMIN — ONDANSETRON HYDROCHLORIDE 4 MG: 2 INJECTION, SOLUTION INTRAMUSCULAR; INTRAVENOUS at 21:15

## 2023-12-30 RX ADMIN — OXYMETAZOLINE HYDROCHLORIDE 2 SPRAY: 0.05 SPRAY NASAL at 20:27

## 2023-12-31 NOTE — DISCHARGE INSTRUCTIONS
Recommend follow-up with PCP and also with the ENT doctor as we discussed.  Recommend gentle diet with plenty of sips of fluids as tolerated.  Please return to the emergency room for any worsening symptoms or concerns.

## 2023-12-31 NOTE — ED PROVIDER NOTES
EMERGENCY DEPARTMENT ENCOUNTER    Room Number:  17/17  Date seen:  12/30/2023  PCP: Janelle Lara MD  Historian(s): Patient, paramedic      HPI:  Chief Complaint: Sinus congestion, gagging and vomiting, abdominal pain, body aches and cough  A complete HPI/ROS/PMH/PSH/SH/FH are unobtainable / limited due to: None  Context: Donny Mallory is a 63 y.o. male who presents to the ED via EMS from home for evaluation of multiple symptoms that have been progressively worsening for many weeks.  Patient has had recent ER visits and hospitalizations for similar symptoms in the previous month.  Today he was feeling chilled and says that he continues to gag on his sinus drainage and secretions which caused him to vomit and cough.  He also complains of pain throughout the abdomen but greatest in the upper abdomen.        PAST MEDICAL HISTORY  Active Ambulatory Problems     Diagnosis Date Noted    Psychosis 05/16/2017    Acute pancreatitis 04/21/2022    COPD (chronic obstructive pulmonary disease)     HTN (hypertension)     Dysphagia     Constipation     Cholelithiasis 04/25/2022    Severe malnutrition 04/28/2022    Ileus 10/29/2023    GERD without esophagitis 10/29/2023    Hypernatremia 10/29/2023    Intractable nausea and vomiting 12/07/2023     Resolved Ambulatory Problems     Diagnosis Date Noted    No Resolved Ambulatory Problems     Past Medical History:   Diagnosis Date    Bell's palsy     Chronic sinusitis     Depression     History of biliary stent insertion     Hypertension     Presence of pancreatic duct stent     Suicide attempt          PAST SURGICAL HISTORY  Past Surgical History:   Procedure Laterality Date    CHOLECYSTECTOMY      CHOLECYSTECTOMY WITH INTRAOPERATIVE CHOLANGIOGRAM N/A 04/25/2022    Procedure: Laparoscopic cholecystectomy with intraoperative cholangiogram, possible open;  Surgeon: Khari Schofield MD;  Location: [facility];  Service: General;  Laterality: N/A;    HERNIA REPAIR            FAMILY HISTORY  Family History   Family history unknown: Yes         SOCIAL HISTORY  Social History     Socioeconomic History    Marital status: Single   Tobacco Use    Smoking status: Every Day     Packs/day: 0.25     Years: 15.00     Additional pack years: 0.00     Total pack years: 3.75     Types: Cigarettes    Smokeless tobacco: Never   Vaping Use    Vaping Use: Former   Substance and Sexual Activity    Alcohol use: No    Drug use: No    Sexual activity: Defer         ALLERGIES  Prochlorperazine        REVIEW OF SYSTEMS  Review of Systems   Constitutional:  Positive for appetite change and chills. Negative for activity change and fever.   HENT:  Positive for congestion, sinus pressure and sinus pain.    Eyes:  Negative for pain and visual disturbance.   Respiratory:  Positive for cough and shortness of breath.    Cardiovascular:  Negative for chest pain.   Gastrointestinal:  Positive for abdominal pain, nausea and vomiting.   Genitourinary:  Negative for dysuria.   Skin:  Negative for color change.   Neurological:  Negative for syncope and headaches.   All other systems reviewed and are negative.           PHYSICAL EXAM  ED Triage Vitals [12/30/23 1739]   Temp Heart Rate Resp BP SpO2   98.7 °F (37.1 °C) 100 20 146/92 99 %      Temp src Heart Rate Source Patient Position BP Location FiO2 (%)   Tympanic -- -- -- --       Physical Exam      GENERAL: Appears anxious, frequently making snorting sounds with his nose and sinuses, no diaphoresis  HENT: nares patent, normocephalic and atraumatic  EYES: no scleral icterus, EOMI, normal conjunctivae  CV: regular rhythm, normal rate, normal distal pulses  RESPIRATORY: normal effort, no stridor, no wheezes or rales or rhonchi.  Occasional nonproductive cough noted  ABDOMEN: soft, mild diffuse tenderness in all quadrants.  Thin and flat.  No masses palpable.  MUSCULOSKELETAL: no deformity, no asymmetry  NEURO: alert, moves all extremities, follows commands  PSYCH:  Cooperative, anxious  SKIN: warm, dry    Vital signs and nursing notes reviewed.        LAB RESULTS  Recent Results (from the past 24 hour(s))   Respiratory Panel PCR w/COVID-19(SARS-CoV-2) [facility]/GRETEL In-House, NP Swab in UTM/VTM, 2 HR TAT - Swab, Nasopharynx    Collection Time: 12/30/23  8:18 PM    Specimen: Nasopharynx; Swab   Result Value Ref Range    ADENOVIRUS, PCR Not Detected Not Detected    Coronavirus 229E Not Detected Not Detected    Coronavirus HKU1 Not Detected Not Detected    Coronavirus NL63 Not Detected Not Detected    Coronavirus OC43 Not Detected Not Detected    COVID19 Not Detected Not Detected - Ref. Range    Human Metapneumovirus Not Detected Not Detected    Human Rhinovirus/Enterovirus Not Detected Not Detected    Influenza A PCR Not Detected Not Detected    Influenza B PCR Not Detected Not Detected    Parainfluenza Virus 1 Not Detected Not Detected    Parainfluenza Virus 2 Not Detected Not Detected    Parainfluenza Virus 3 Not Detected Not Detected    Parainfluenza Virus 4 Not Detected Not Detected    RSV, PCR Not Detected Not Detected    Bordetella pertussis pcr Not Detected Not Detected    Bordetella parapertussis PCR Not Detected Not Detected    Chlamydophila pneumoniae PCR Not Detected Not Detected    Mycoplasma pneumo by PCR Not Detected Not Detected   Comprehensive Metabolic Panel    Collection Time: 12/30/23  8:26 PM    Specimen: Blood   Result Value Ref Range    Glucose 82 65 - 99 mg/dL    BUN 14 8 - 23 mg/dL    Creatinine 0.97 0.76 - 1.27 mg/dL    Sodium 142 136 - 145 mmol/L    Potassium 4.1 3.5 - 5.2 mmol/L    Chloride 108 (H) 98 - 107 mmol/L    CO2 20.8 (L) 22.0 - 29.0 mmol/L    Calcium 9.4 8.6 - 10.5 mg/dL    Total Protein 6.7 6.0 - 8.5 g/dL    Albumin 4.3 3.5 - 5.2 g/dL    ALT (SGPT) 9 1 - 41 U/L    AST (SGOT) 17 1 - 40 U/L    Alkaline Phosphatase 61 39 - 117 U/L    Total Bilirubin 0.5 0.0 - 1.2 mg/dL    Globulin 2.4 gm/dL    A/G Ratio 1.8 g/dL    BUN/Creatinine Ratio  14.4 7.0 - 25.0    Anion Gap 13.2 5.0 - 15.0 mmol/L    eGFR 88.8 >60.0 mL/min/1.73   Lipase    Collection Time: 12/30/23  8:26 PM    Specimen: Blood   Result Value Ref Range    Lipase 30 13 - 60 U/L   CBC Auto Differential    Collection Time: 12/30/23  8:26 PM    Specimen: Blood   Result Value Ref Range    WBC 11.05 (H) 3.40 - 10.80 10*3/mm3    RBC 4.49 4.14 - 5.80 10*6/mm3    Hemoglobin 13.1 13.0 - 17.7 g/dL    Hematocrit 42.2 37.5 - 51.0 %    MCV 94.0 79.0 - 97.0 fL    MCH 29.2 26.6 - 33.0 pg    MCHC 31.0 (L) 31.5 - 35.7 g/dL    RDW 12.9 12.3 - 15.4 %    RDW-SD 44.5 37.0 - 54.0 fl    MPV 9.7 6.0 - 12.0 fL    Platelets 254 140 - 450 10*3/mm3    Neutrophil % 59.9 42.7 - 76.0 %    Lymphocyte % 28.3 19.6 - 45.3 %    Monocyte % 6.2 5.0 - 12.0 %    Eosinophil % 3.9 0.3 - 6.2 %    Basophil % 1.5 0.0 - 1.5 %    Immature Grans % 0.2 0.0 - 0.5 %    Neutrophils, Absolute 6.61 1.70 - 7.00 10*3/mm3    Lymphocytes, Absolute 3.13 (H) 0.70 - 3.10 10*3/mm3    Monocytes, Absolute 0.69 0.10 - 0.90 10*3/mm3    Eosinophils, Absolute 0.43 (H) 0.00 - 0.40 10*3/mm3    Basophils, Absolute 0.17 0.00 - 0.20 10*3/mm3    Immature Grans, Absolute 0.02 0.00 - 0.05 10*3/mm3    nRBC 0.0 0.0 - 0.2 /100 WBC       Ordered the above labs and reviewed the results.        RADIOLOGY  CT Abdomen Pelvis Without Contrast    Result Date: 12/30/2023  EXAMINATION: CT OF THE ABDOMEN AND PELVIS WITHOUT CONTRAST  HISTORY: 63-year-old male with a history of abdominal pain and vomiting.  TECHNIQUE: Contiguous axial images were obtained through the abdomen and pelvis without intravenous administration of nonionic contrast. Oral contrast was not administered. Radiation dose reduction techniques were utilized, including automated exposure control and exposure modulation based on body size.  COMPARISON: CT of the abdomen and pelvis without contrast, 12/18/2023.  FINDINGS: The visualized portion of the lung bases are clear. The visualized portion of the heart has a  normal noncontrasted appearance. The liver demonstrates intrahepatic biliary ductal gas associated with prior cholecystectomy. A stent within the common bile duct is noted and appears stable in position. A calcified granuloma in the liver is noted. The patient is status post cholecystectomy. The pancreas appears normal. The kidneys demonstrate stable bilateral benign cysts. The adrenal glands appear normal. The spleen has a normal appearance. The bowel demonstrates moderate stool retention throughout without evidence for a focal abnormality. A moderate degree of gas-filled small bowel loops is seen throughout the abdomen. Severe atheromatous calcification of the abdominal aorta and bilateral common iliac arteries is noted. L5-S1 degenerative disc disease is noted.      Moderate retention of stool throughout the colon with nonspecific gas-filled bowel loops throughout the abdomen. No overt features of bowel obstruction are visualized, however, an element of fecal impaction cannot be excluded.  This report was finalized on 12/30/2023 8:04 PM by Dr. Nain Arroyo M.D on Workstation: [id number]      XR Chest 2 View    Result Date: 12/30/2023  PA LATERAL RADIOGRAPHIC VIEWS OF THE CHEST  CLINICAL HISTORY: Cough and congestion. The patient has a history of COPD.  FINDINGS:  The lungs are hyperinflated and prominently lucent compatible with the stated history of COPD. Otherwise, the lungs are clear of acute infiltrates. The cardiomediastinal silhouette is within normal limits. The osseous structures are incidentally notable for compression deformities at the T1, T4, T5, T10, and L1 levels. The most prominent of these compression deformities are seen at T4 and T5. At T4, there is approximately 60% loss of vertebral body height within the maximally compressed portion of the vertebra, and at T5, there is approximately 70% loss of vertebral body height. Similar findings were seen on the prior chest CT dated 12/18/2023. A  chronic appearing deformity is incidentally noted within the sternum.       Diffuse emphysematous changes are again appreciated. However, there is no evidence for active disease in the chest.  Again noted are compression deformities within the thoracic and upper lumbar spine as discussed in detail above. Similar findings were seen on the prior chest CT dated 12/18/2023.    This report was finalized on 12/30/2023 6:41 PM by Dr. Quincy Ibarra M.D on Workstation: [id number]       Ordered the above noted radiological studies. Reviewed by me in PACS.          PROCEDURES  Procedures        MEDICATIONS GIVEN IN ER  Medications   sodium chloride 0.9 % flush 10 mL (has no administration in time range)   aluminum-magnesium hydroxide-simethicone (MAALOX MAX) 400-400-40 MG/5ML suspension 15 mL (15 mL Oral Not Given 12/30/23 2025)   oxymetazoline (AFRIN) nasal spray 2 spray (2 sprays Each Nare Given 12/30/23 2027)   famotidine (PEPCID) injection 20 mg (20 mg Intravenous Given 12/30/23 2039)   morphine injection 4 mg (4 mg Intravenous Given 12/30/23 2115)   ondansetron (ZOFRAN) injection 4 mg (4 mg Intravenous Given 12/30/23 2115)       MEDICAL DECISION MAKING, PROGRESS, and CONSULTS    All labs have been independently reviewed by me.  All radiology studies have been reviewed by me and I have also reviewed the radiology report.   EKG's independently viewed and interpreted by me.  Discussion below represents my analysis of pertinent findings related to patient's condition, differential diagnosis, treatment plan and final disposition.      Additional sources:  - Discussed/ obtained information from independent historians:  I discussed with paramedics to receive report of patient's condition on arrival and treatments initiated en route to the hospital.    - External (non-ED) record review: I reviewed the most recent discharge summary from November 26, 2023 when he was admitted for ileus and GERD symptoms.        Orders placed during  this visit:  Orders Placed This Encounter   Procedures    Respiratory Panel PCR w/COVID-19(SARS-CoV-2) [facility]RETEL In-House, NP Swab in UTM/VTM, 2 HR TAT - Swab, Nasopharynx    XR Chest 2 View    CT Abdomen Pelvis Without Contrast    Comprehensive Metabolic Panel    Lipase    CBC Auto Differential    Pulse Oximetry, Continuous    Insert Peripheral IV    CBC & Differential       Differential diagnosis includes but is not limited to:    Ileus, small bowel obstruction, URI, sinusitis, anxiety, pneumonia, viral illness      Independent interpretation of labs, radiology studies, and discussions with consultants:  ED Course as of 12/30/23 2221   Sat Dec 30, 2023   2053 I independently interpreted the chest x-ray and my findings are: Chronic changes of COPD, no focal infiltrate, no pneumothorax [AGNIESZKA]   2218 I independently interpreted the CT abdomen pelvis and my findings are: No free air, no bowel obstruction pattern, there is moderate constipation [AGNIESZKA]   2218 I reviewed all the labs from today's visit.  The CMP appears to be reassuring without any evidence of dehydration or significant electrolyte abnormality.  Renal function and liver function are normal as well.  RVP is unremarkable also. [AGNIESZKA]   2219 Patient continued to complain of abdominal pain.  I offered to give him a GI cocktail but he refused that.  I then ordered a dose of Pepcid.  He said that did not provide any relief.  After well, I did try a dose of morphine and Zofran.  He continued to make frequent gagging sounds and acted as though he was going to vomit but never really did produce any vomitus.  It did seem that his symptoms were escalating whenever one of the care providers was in his room.  At this time, I do not find any medical emergencies that require admission or further workup.  Careful review of the records indicate that he has been seen a few times in recent months for very similar presentations and has had gastroenterology  "consultation.  I think he has adequate resources for follow-up care regarding his concerns at this time.  I did recommend that an ENT consultant may be beneficial to ask some of his questions regarding sinus congestion and drainage also.  Gave him resource information to do so.  Patient then discharged home in good condition with the usual \"return to ER\" instructions. [AGNIESZKA]      ED Course User Index  [AGNIESZKA] Fred Jon MD         DIAGNOSIS  Final diagnoses:   Congestion of nasal sinus   Nausea   Constipation, unspecified constipation type         DISPOSITION  Discharge home        Latest Documented Vital Signs:  As of 22:21 EST  BP- 144/82 HR- 60 Temp- 98.7 °F (37.1 °C) (Tympanic) O2 sat- 94%              --    Please note that portions of this were completed with a voice recognition program.       Note Disclaimer: At [facility], we believe that sharing information builds trust and better relationships. You are receiving this note because you are receiving care at [facility] or recently visited. It is possible you will see health information before a provider has talked with you about it. This kind of information can be easy to misunderstand. To help you fully understand what it means for your health, we urge you to discuss this note with your provider.             Fred Jon MD  12/30/23 2227    "

## 2024-01-24 LAB
MAXIMAL PREDICTED HEART RATE: 161 BPM
STRESS TARGET HR: 137 BPM

## 2024-02-09 ENCOUNTER — APPOINTMENT (OUTPATIENT)
Dept: CT IMAGING | Facility: HOSPITAL | Age: 64
End: 2024-02-09
Payer: COMMERCIAL

## 2024-02-09 ENCOUNTER — HOSPITAL ENCOUNTER (EMERGENCY)
Facility: HOSPITAL | Age: 64
Discharge: HOME | End: 2024-02-09
Attending: EMERGENCY MEDICINE
Payer: COMMERCIAL

## 2024-02-09 VITALS
TEMPERATURE: 97 F | SYSTOLIC BLOOD PRESSURE: 130 MMHG | HEART RATE: 57 BPM | WEIGHT: 117 LBS | HEIGHT: 70 IN | DIASTOLIC BLOOD PRESSURE: 96 MMHG | BODY MASS INDEX: 16.75 KG/M2 | RESPIRATION RATE: 18 BRPM | OXYGEN SATURATION: 97 %

## 2024-02-09 DIAGNOSIS — I10 HYPERTENSION, UNSPECIFIED TYPE: ICD-10-CM

## 2024-02-09 DIAGNOSIS — R31.9 HEMATURIA, UNSPECIFIED TYPE: ICD-10-CM

## 2024-02-09 DIAGNOSIS — R11.0 NAUSEA: ICD-10-CM

## 2024-02-09 DIAGNOSIS — R10.84 GENERALIZED ABDOMINAL PAIN: Primary | ICD-10-CM

## 2024-02-09 LAB
ALBUMIN SERPL-MCNC: 4.5 G/DL (ref 3.5–5.2)
ALBUMIN/GLOB SERPL: 1.9 G/DL
ALP SERPL-CCNC: 73 U/L (ref 39–117)
ALT SERPL W P-5'-P-CCNC: 9 U/L (ref 1–41)
ANION GAP SERPL CALCULATED.3IONS-SCNC: 14 MMOL/L (ref 5–15)
AST SERPL-CCNC: 17 U/L (ref 1–40)
BACTERIA UR QL AUTO: ABNORMAL /HPF
BASOPHILS # BLD AUTO: 0.12 10*3/MM3 (ref 0–0.2)
BASOPHILS NFR BLD AUTO: 1.4 % (ref 0–1.5)
BILIRUB SERPL-MCNC: 0.7 MG/DL (ref 0–1.2)
BILIRUB UR QL STRIP: NEGATIVE
BUN SERPL-MCNC: 15 MG/DL (ref 8–23)
BUN/CREAT SERPL: 13.4 (ref 7–25)
CALCIUM SPEC-SCNC: 9.3 MG/DL (ref 8.6–10.5)
CHLORIDE SERPL-SCNC: 108 MMOL/L (ref 98–107)
CLARITY UR: CLEAR
CO2 SERPL-SCNC: 24 MMOL/L (ref 22–29)
COLOR UR: YELLOW
CREAT SERPL-MCNC: 1.12 MG/DL (ref 0.76–1.27)
DEPRECATED RDW RBC AUTO: 42.3 FL (ref 37–54)
EGFRCR SERPLBLD CKD-EPI 2021: 74.7 ML/MIN/1.73
EOSINOPHIL # BLD AUTO: 0.59 10*3/MM3 (ref 0–0.4)
EOSINOPHIL NFR BLD AUTO: 6.9 % (ref 0.3–6.2)
ERYTHROCYTE [DISTWIDTH] IN BLOOD BY AUTOMATED COUNT: 13.1 % (ref 12.3–15.4)
GLOBULIN UR ELPH-MCNC: 2.4 GM/DL
GLUCOSE SERPL-MCNC: 111 MG/DL (ref 65–99)
GLUCOSE UR STRIP-MCNC: NEGATIVE MG/DL
HCT VFR BLD AUTO: 41.6 % (ref 37.5–51)
HGB BLD-MCNC: 14 G/DL (ref 13–17.7)
HGB UR QL STRIP.AUTO: ABNORMAL
HYALINE CASTS UR QL AUTO: ABNORMAL /LPF
IMM GRANULOCYTES # BLD AUTO: 0.02 10*3/MM3 (ref 0–0.05)
IMM GRANULOCYTES NFR BLD AUTO: 0.2 % (ref 0–0.5)
KETONES UR QL STRIP: NEGATIVE
LEUKOCYTE ESTERASE UR QL STRIP.AUTO: NEGATIVE
LIPASE SERPL-CCNC: 28 U/L (ref 13–60)
LYMPHOCYTES # BLD AUTO: 2.12 10*3/MM3 (ref 0.7–3.1)
LYMPHOCYTES NFR BLD AUTO: 24.7 % (ref 19.6–45.3)
MCH RBC QN AUTO: 29.8 PG (ref 26.6–33)
MCHC RBC AUTO-ENTMCNC: 33.7 G/DL (ref 31.5–35.7)
MCV RBC AUTO: 88.5 FL (ref 79–97)
MONOCYTES # BLD AUTO: 0.54 10*3/MM3 (ref 0.1–0.9)
MONOCYTES NFR BLD AUTO: 6.3 % (ref 5–12)
NEUTROPHILS NFR BLD AUTO: 5.2 10*3/MM3 (ref 1.7–7)
NEUTROPHILS NFR BLD AUTO: 60.5 % (ref 42.7–76)
NITRITE UR QL STRIP: NEGATIVE
NRBC BLD AUTO-RTO: 0 /100 WBC (ref 0–0.2)
PH UR STRIP.AUTO: 6 [PH] (ref 5–8)
PLATELET # BLD AUTO: 264 10*3/MM3 (ref 140–450)
PMV BLD AUTO: 9.7 FL (ref 6–12)
POTASSIUM SERPL-SCNC: 4.3 MMOL/L (ref 3.5–5.2)
PROT SERPL-MCNC: 6.9 G/DL (ref 6–8.5)
PROT UR QL STRIP: ABNORMAL
QT INTERVAL: 362 MS
QTC INTERVAL: 428 MS
RBC # BLD AUTO: 4.7 10*6/MM3 (ref 4.14–5.8)
RBC # UR STRIP: ABNORMAL /HPF
REF LAB TEST METHOD: ABNORMAL
SODIUM SERPL-SCNC: 146 MMOL/L (ref 136–145)
SP GR UR STRIP: 1.02 (ref 1–1.03)
SQUAMOUS #/AREA URNS HPF: ABNORMAL /HPF
UROBILINOGEN UR QL STRIP: ABNORMAL
WBC # UR STRIP: ABNORMAL /HPF
WBC NRBC COR # BLD AUTO: 8.59 10*3/MM3 (ref 3.4–10.8)

## 2024-02-09 PROCEDURE — 25810000003 SODIUM CHLORIDE 0.9 % SOLUTION: Performed by: PHYSICIAN ASSISTANT

## 2024-02-09 PROCEDURE — 93005 ELECTROCARDIOGRAM TRACING: CPT | Performed by: PHYSICIAN ASSISTANT

## 2024-02-09 PROCEDURE — 83690 ASSAY OF LIPASE: CPT | Performed by: PHYSICIAN ASSISTANT

## 2024-02-09 PROCEDURE — 25010000002 DIPHENHYDRAMINE PER 50 MG: Performed by: PHYSICIAN ASSISTANT

## 2024-02-09 PROCEDURE — 85025 COMPLETE CBC W/AUTO DIFF WBC: CPT | Performed by: PHYSICIAN ASSISTANT

## 2024-02-09 PROCEDURE — 96375 TX/PRO/DX INJ NEW DRUG ADDON: CPT

## 2024-02-09 PROCEDURE — 93010 ELECTROCARDIOGRAM REPORT: CPT | Performed by: STUDENT IN AN ORGANIZED HEALTH CARE EDUCATION/TRAINING PROGRAM

## 2024-02-09 PROCEDURE — 74176 CT ABD & PELVIS W/O CONTRAST: CPT

## 2024-02-09 PROCEDURE — 96374 THER/PROPH/DIAG INJ IV PUSH: CPT

## 2024-02-09 PROCEDURE — 96376 TX/PRO/DX INJ SAME DRUG ADON: CPT

## 2024-02-09 PROCEDURE — 80053 COMPREHEN METABOLIC PANEL: CPT | Performed by: PHYSICIAN ASSISTANT

## 2024-02-09 PROCEDURE — 36415 COLL VENOUS BLD VENIPUNCTURE: CPT

## 2024-02-09 PROCEDURE — 81001 URINALYSIS AUTO W/SCOPE: CPT | Performed by: PHYSICIAN ASSISTANT

## 2024-02-09 PROCEDURE — 25010000002 DROPERIDOL PER 5 MG: Performed by: PHYSICIAN ASSISTANT

## 2024-02-09 PROCEDURE — 99284 EMERGENCY DEPT VISIT MOD MDM: CPT

## 2024-02-09 RX ORDER — DIPHENHYDRAMINE HYDROCHLORIDE 50 MG/ML
25 INJECTION INTRAMUSCULAR; INTRAVENOUS ONCE
Status: COMPLETED | OUTPATIENT
Start: 2024-02-09 | End: 2024-02-09

## 2024-02-09 RX ORDER — DROPERIDOL 2.5 MG/ML
1.25 INJECTION, SOLUTION INTRAMUSCULAR; INTRAVENOUS ONCE
Status: COMPLETED | OUTPATIENT
Start: 2024-02-09 | End: 2024-02-09

## 2024-02-09 RX ADMIN — DROPERIDOL 1.25 MG: 2.5 INJECTION, SOLUTION INTRAMUSCULAR; INTRAVENOUS at 12:21

## 2024-02-09 RX ADMIN — SODIUM CHLORIDE 500 ML: 9 INJECTION, SOLUTION INTRAVENOUS at 11:17

## 2024-02-09 RX ADMIN — DROPERIDOL 1.25 MG: 2.5 INJECTION, SOLUTION INTRAMUSCULAR; INTRAVENOUS at 11:17

## 2024-02-09 RX ADMIN — DIPHENHYDRAMINE HYDROCHLORIDE 25 MG: 50 INJECTION, SOLUTION INTRAMUSCULAR; INTRAVENOUS at 13:18

## 2024-02-09 RX ADMIN — DIPHENHYDRAMINE HYDROCHLORIDE 25 MG: 50 INJECTION, SOLUTION INTRAMUSCULAR; INTRAVENOUS at 11:18

## 2024-02-09 NOTE — DISCHARGE INSTRUCTIONS
You have blood in your urine every time it has been checked in the emergency department.  If you have never seen somebody for this you should follow-up with the urologist.  Call and schedule an appointment.    Call your primary care doctor and your GI doctor today to schedule follow-up for next week

## 2024-02-09 NOTE — ED PROVIDER NOTES
EMERGENCY DEPARTMENT ENCOUNTER  Room Number:  06/06  PCP: Janelle Lara MD  Independent Historians: Patient      HPI:  Chief Complaint: had concerns including Abdominal Pain.     A complete HPI/ROS/PMH/PSH/SH/FH are unobtainable due to: None    Chronic or social conditions impacting patient care (Social Determinants of Health): None      Context: Donny Mallory is a 63 y.o. male with a medical history of psychosis, dysphagia, severe malnutrition who presents to the ED c/o acute generalized abdominal pain and trouble swallowing.  He states he is able to swallow but it feels like what he is swallowing is going to come back up.  It does not come back up.  He has not had any vomiting.  Nothing makes his pain worse or better.  He states he has had these symptoms intermittently for a long time and sees a gastroenterologist at [facility].  He states that  he does not know what is causing his symptoms.  States his nose is congested and he cannot breathe through it.  He denies any diarrhea hematuria dysuria urinary frequency or any other complaints at this time.      Review of prior external notes (non-ED) -and- Review of prior external test results outside of this encounter: Patient was admitted 11/21/2023 to 11/26/2023 after presenting to the hospital with abdominal pain.  He had air-fluid levels and moderate distention of small bowel loops on CT scan which was present on every prior CT scan per hospitalist note.  This was concerning for small bowel ileus, he was able to tolerate p.o.  Repeat CT scan showed improvement in rectal distention.  He was discharged.  GI evaluated and treated with Movantik lubiprostone and Dulcolax and MiraLAX due to persistent constipation, also underwent enemas.  Narcotic minimization was recommended, electrolytes were corrected.    Prescription drug monitoring program review:         PAST MEDICAL HISTORY  Active Ambulatory Problems     Diagnosis Date Noted    Psychosis  05/16/2017    Acute pancreatitis 04/21/2022    COPD (chronic obstructive pulmonary disease)     HTN (hypertension)     Dysphagia     Constipation     Cholelithiasis 04/25/2022    Severe malnutrition 04/28/2022    Ileus 10/29/2023    GERD without esophagitis 10/29/2023    Hypernatremia 10/29/2023    Intractable nausea and vomiting 12/07/2023     Resolved Ambulatory Problems     Diagnosis Date Noted    No Resolved Ambulatory Problems     Past Medical History:   Diagnosis Date    Bell's palsy     Chronic sinusitis     Depression     History of biliary stent insertion     Hypertension     Presence of pancreatic duct stent     Suicide attempt          PAST SURGICAL HISTORY  Past Surgical History:   Procedure Laterality Date    CHOLECYSTECTOMY      CHOLECYSTECTOMY WITH INTRAOPERATIVE CHOLANGIOGRAM N/A 04/25/2022    Procedure: Laparoscopic cholecystectomy with intraoperative cholangiogram, possible open;  Surgeon: Khari Schofield MD;  Location: [facility];  Service: General;  Laterality: N/A;    HERNIA REPAIR           FAMILY HISTORY  Family History   Family history unknown: Yes         SOCIAL HISTORY  Social History     Socioeconomic History    Marital status: Single   Tobacco Use    Smoking status: Every Day     Packs/day: 0.25     Years: 15.00     Additional pack years: 0.00     Total pack years: 3.75     Types: Cigarettes    Smokeless tobacco: Never   Vaping Use    Vaping Use: Former   Substance and Sexual Activity    Alcohol use: No    Drug use: No    Sexual activity: Defer         ALLERGIES  Prochlorperazine      REVIEW OF SYSTEMS  Review of Systems  Included in HPI  All systems reviewed and negative except for those discussed in HPI.      PHYSICAL EXAM    I have reviewed the triage vital signs and nursing notes.    ED Triage Vitals   Temp Heart Rate Resp BP SpO2   02/09/24 1009 02/09/24 1009 02/09/24 1009 02/09/24 1018 02/09/24 1009   97 °F (36.1 °C) 119 18 (!) 180/109 97 %      Temp src Heart Rate Source  Patient Position BP Location FiO2 (%)   -- -- 02/09/24 1018 02/09/24 1018 --     Sitting Right arm        Physical Exam  GENERAL: Anxious appearing, alert, no acute distress  SKIN: Warm, dry  HENT: Normocephalic, atraumatic  EYES: no scleral icterus  CV: regular rhythm, regular rate  RESPIRATORY: normal effort, lungs clear  ABDOMEN: soft, nondistended, generally tender, normal bowel sounds, no guarding or rigidity  MUSCULOSKELETAL: no deformity  NEURO: alert, moves all extremities, follows commands      NIH:                                                             LAB RESULTS  Recent Results (from the past 24 hour(s))   Comprehensive Metabolic Panel    Collection Time: 02/09/24 10:32 AM    Specimen: Blood   Result Value Ref Range    Glucose 111 (H) 65 - 99 mg/dL    BUN 15 8 - 23 mg/dL    Creatinine 1.12 0.76 - 1.27 mg/dL    Sodium 146 (H) 136 - 145 mmol/L    Potassium 4.3 3.5 - 5.2 mmol/L    Chloride 108 (H) 98 - 107 mmol/L    CO2 24.0 22.0 - 29.0 mmol/L    Calcium 9.3 8.6 - 10.5 mg/dL    Total Protein 6.9 6.0 - 8.5 g/dL    Albumin 4.5 3.5 - 5.2 g/dL    ALT (SGPT) 9 1 - 41 U/L    AST (SGOT) 17 1 - 40 U/L    Alkaline Phosphatase 73 39 - 117 U/L    Total Bilirubin 0.7 0.0 - 1.2 mg/dL    Globulin 2.4 gm/dL    A/G Ratio 1.9 g/dL    BUN/Creatinine Ratio 13.4 7.0 - 25.0    Anion Gap 14.0 5.0 - 15.0 mmol/L    eGFR 74.7 >60.0 mL/min/1.73   Lipase    Collection Time: 02/09/24 10:32 AM    Specimen: Blood   Result Value Ref Range    Lipase 28 13 - 60 U/L   CBC Auto Differential    Collection Time: 02/09/24 10:32 AM    Specimen: Blood   Result Value Ref Range    WBC 8.59 3.40 - 10.80 10*3/mm3    RBC 4.70 4.14 - 5.80 10*6/mm3    Hemoglobin 14.0 13.0 - 17.7 g/dL    Hematocrit 41.6 37.5 - 51.0 %    MCV 88.5 79.0 - 97.0 fL    MCH 29.8 26.6 - 33.0 pg    MCHC 33.7 31.5 - 35.7 g/dL    RDW 13.1 12.3 - 15.4 %    RDW-SD 42.3 37.0 - 54.0 fl    MPV 9.7 6.0 - 12.0 fL    Platelets 264 140 - 450 10*3/mm3    Neutrophil % 60.5 42.7 - 76.0 %     Lymphocyte % 24.7 19.6 - 45.3 %    Monocyte % 6.3 5.0 - 12.0 %    Eosinophil % 6.9 (H) 0.3 - 6.2 %    Basophil % 1.4 0.0 - 1.5 %    Immature Grans % 0.2 0.0 - 0.5 %    Neutrophils, Absolute 5.20 1.70 - 7.00 10*3/mm3    Lymphocytes, Absolute 2.12 0.70 - 3.10 10*3/mm3    Monocytes, Absolute 0.54 0.10 - 0.90 10*3/mm3    Eosinophils, Absolute 0.59 (H) 0.00 - 0.40 10*3/mm3    Basophils, Absolute 0.12 0.00 - 0.20 10*3/mm3    Immature Grans, Absolute 0.02 0.00 - 0.05 10*3/mm3    nRBC 0.0 0.0 - 0.2 /100 WBC   ECG 12 Lead Drug Monitoring    Collection Time: 02/09/24 10:55 AM   Result Value Ref Range    QT Interval 362 ms    QTC Interval 428 ms   Urinalysis With Microscopic If Indicated (No Culture) - Urine, Clean Catch    Collection Time: 02/09/24 11:10 AM    Specimen: Urine, Clean Catch   Result Value Ref Range    Color, UA Yellow Yellow, Straw    Appearance, UA Clear Clear    pH, UA 6.0 5.0 - 8.0    Specific Gravity, UA 1.017 1.005 - 1.030    Glucose, UA Negative Negative    Ketones, UA Negative Negative    Bilirubin, UA Negative Negative    Blood, UA Small (1+) (A) Negative    Protein,  mg/dL (2+) (A) Negative    Leuk Esterase, UA Negative Negative    Nitrite, UA Negative Negative    Urobilinogen, UA 1.0 E.U./dL 0.2 - 1.0 E.U./dL   Urinalysis, Microscopic Only - Urine, Clean Catch    Collection Time: 02/09/24 11:10 AM    Specimen: Urine, Clean Catch   Result Value Ref Range    RBC, UA 21-50 (A) None Seen, 0-2 /HPF    WBC, UA 0-2 None Seen, 0-2 /HPF    Bacteria, UA None Seen None Seen /HPF    Squamous Epithelial Cells, UA 0-2 None Seen, 0-2 /HPF    Hyaline Casts, UA 7-12 None Seen /LPF    Methodology Automated Microscopy          RADIOLOGY  CT Abdomen Pelvis Without Contrast    Result Date: 2/9/2024  ABDOMEN AND PELVIS CT WITHOUT CONTRAST  HISTORY: Abdominal pain and distention.  TECHNIQUE: Abdomen and pelvis CT was performed without contrast and correlated with CT 12/30/2023.  FINDINGS: The visualized lung bases  are clear. There is a biliary stent and pneumobilia as were demonstrated previously. No genitourinary calculus or hydronephrosis. Multiple low-density renal lesions are again observed but not characterized on this exam. A renal protocol CT describes the renal lesions 11/26/2023.  The parenchyma of the pancreas, adrenals, and spleen appears normal. There are a few small low-density hepatic lesions which have been previously demonstrated. These are probably small cysts.  There is sigmoid colon diverticulosis. No abnormally dilated bowel is present. There are a few air-fluid levels in the small bowel but no dilated small bowel. This is a normal gas pattern. No bowel wall thickening, mesenteric inflammation, free fluid, or free air is present. The appendix lies lateral to the cecum along the right paracolic gutter and appears normal.  The urinary bladder is decompressed.  Prior right inguinal hernia repair.  Old L1 superior endplate burst fracture and advanced degenerative disc change at L5-S1.      Chronic changes as discussed. No acute abnormality is identified.    Radiation dose reduction techniques were utilized, including automated exposure control and exposure modulation based on body size.          MEDICATIONS GIVEN IN ER  Medications   sodium chloride 0.9 % bolus 500 mL (0 mL Intravenous Stopped 2/9/24 1147)   droperidol (INAPSINE) injection 1.25 mg (1.25 mg Intravenous Given 2/9/24 1117)   diphenhydrAMINE (BENADRYL) injection 25 mg (25 mg Intravenous Given 2/9/24 1118)   droperidol (INAPSINE) injection 1.25 mg (1.25 mg Intravenous Given 2/9/24 1221)   diphenhydrAMINE (BENADRYL) injection 25 mg (25 mg Intravenous Given 2/9/24 1318)         ORDERS PLACED DURING THIS VISIT:  Orders Placed This Encounter   Procedures    CT Abdomen Pelvis Without Contrast    Comprehensive Metabolic Panel    Lipase    Urinalysis With Microscopic If Indicated (No Culture) - Urine, Clean Catch    CBC Auto Differential    Urinalysis,  Microscopic Only - Urine, Clean Catch    ECG 12 Lead Drug Monitoring    CBC & Differential         OUTPATIENT MEDICATION MANAGEMENT:  No current Epic-ordered facility-administered medications on file.     Current Outpatient Medications Ordered in Epic   Medication Sig Dispense Refill    Fluticasone-Umeclidin-Vilant (Trelegy Ellipta) 100-62.5-25 MCG/INH inhaler Inhale 1 puff Daily.      ibuprofen (IBU) 400 MG tablet Take 0.5 tablets by mouth Every 6 (Six) Hours As Needed for Mild Pain. 30 tablet 0    ipratropium-albuterol (DUO-NEB) 0.5-2.5 mg/3 ml nebulizer Take 3 mL by nebulization Every 4 (Four) Hours As Needed for Wheezing.      lisinopril (PRINIVIL,ZESTRIL) 20 MG tablet Take 1 tablet by mouth Daily.      lubiprostone (AMITIZA) 24 MCG capsule Take 1 capsule by mouth 2 (Two) Times a Day With Meals. 60 capsule 0    mineral oil enema Insert 1 each into the rectum Daily As Needed for Constipation (until constipation resolves). 21 each 0    Naloxegol Oxalate (MOVANTIK) 25 MG tablet Take 1 tablet by mouth Every Morning. 30 tablet 0    omeprazole (priLOSEC) 20 MG capsule Take 1 capsule by mouth Daily. 30 capsule 0    promethazine (PHENERGAN) 25 MG tablet Take 1 tablet by mouth Every 6 (Six) Hours As Needed for Nausea or Vomiting. 10 tablet 0    sucralfate (CARAFATE) 1 g tablet Take 1 tablet by mouth 4 (Four) Times a Day. 40 tablet 0         PROCEDURES  Procedures            PROGRESS, DATA ANALYSIS, CONSULTS, AND MEDICAL DECISION MAKING  All labs have been independently interpreted by me.  All radiology studies have been reviewed by me. All EKG's have been independently viewed and interpreted by me.  Discussion below represents my analysis of pertinent findings related to patient's condition, differential diagnosis, treatment plan and final disposition.    Differential diagnosis includes but is not limited to:  - hepatobiliary pathology such as cholecystitis, cholangitis, and symptomatic cholelithiasis  - Pancreatitis  -  Dyspepsia  - Small bowel obstruction  - Appendicitis  - Diverticulitis  - UTI including pyelonephritis  - Ureteral stone  - Zoster  - Colitis, including infectious and ischemic  - Atypical ACS                   ED Course as of 02/09/24 1418   Fri Feb 09, 2024   1124 WBC: 8.59 [KA]   1124 Hemoglobin: 14.0 [KA]   1124 Glucose(!): 111 [KA]   1124 Creatinine: 1.12 [KA]   1124 Bacteria, UA: None Seen [KA]   1124 RBC, UA(!): 21-50 [KA]   1215 I reassessed the patient.  He appears improved, does not appears anxious.  Nausea improved.  States he is still having abdominal pain reexamined his abdomen which remains mildly generally tender.  I have ordered a CT due to persistent symptoms. [KA]   1412 I reassessed the patient.  He is resting comfortably.  Visibly and clinically he appears much less anxious and much improved.  Unfortunately the cause of his symptoms is not well-established on his emergency rheum workup today.  He has the symptoms chronically and intermittently, states he sees a gastroenterologist at [facility].  At this time his symptoms are better and his workup is unremarkable for any acute intra-abdominal findings.  He does have it hematuria but it appears he has chronic hematuria.  I have encouraged him to follow-up for further evaluation of this if he has not previously been evaluated.  He should call his PCP and gastroenterologist today to schedule outpatient follow-up for next week.  He verbalized understanding. [KA]      ED Course User Index  [KA] Irina Wallis PA-C             AS OF 14:18 EST VITALS:    BP - 130/96  HR - 57  TEMP - 97 °F (36.1 °C)  O2 SATS - 97%    COMPLEXITY OF CARE  Admission was considered but after careful review of the patient's presentation, physical examination, diagnostic results, and response to treatment the patient may be safely discharged with outpatient follow-up.      DIAGNOSIS  Final diagnoses:   Generalized abdominal pain   Nausea   Hematuria, unspecified type          DISPOSITION  ED Disposition       ED Disposition   Discharge    Condition   Good    Comment   --                Please note that portions of this document were completed with a voice recognition program.    Note Disclaimer: At [facility], we believe that sharing information builds trust and better relationships. You are receiving this note because you recently visited [facility]. It is possible you will see health information before a provider has talked with you about it. This kind of information can be easy to misunderstand. To help you fully understand what it means for your health, we urge you to discuss this note with your provider.         Irina Wallis PA-C  02/09/24 2799

## 2024-02-09 NOTE — ED NOTES
Patient states he is having some abdominal pain x a couple months. Says he is having nausea and vomiting, is only coughing things up but cannot fully get 'vomit up'. Patient says he has been seen for this before and was diagnosed with dysphagia. Has been taking zofran at home for nausea and has no relief.

## 2024-02-09 NOTE — ED PROVIDER NOTES
" EMERGENCY DEPARTMENT MD ATTESTATION NOTE    SHARED VISIT: This visit was performed by BOTH a physician and an APC. The substantive portion of the medical decision making was performed by this attesting physician who made or approved the management plan and takes responsibility for patient management. All studies documented in the APC note (if performed) were independently interpreted by me.    The CHRISTIAN and I have discussed this patient's history, physical exam, MDM, and treatment plan.  I have reviewed the documentation and personally had a face to face interaction with the patient. The attached note describes my personal findings.        Room Number:  06/06  PCP: Janelle Lara MD  Independent Historians: Patient    HPI:  A complete HPI/ROS/PMH/PSH/SH/FH are unobtainable due to: None    Chronic or social conditions impacting patient care (Social Determinants of Health): None      Context: Donny Mallory is a 63 y.o. male with a medical history of COPD, dysphagia depression and intractable nausea who presents to the ED c/o acute abdominal pain with nausea and vomiting and sinus drainage causing coughing and gagging.  Patient says that many of the symptoms have been ongoing for several months now.  He has been seen in this facility for similar presentations.  He tells me he has also been seen by ENT specialist for some of the symptoms but \"they tell him everything is normal.\"  He has been taking ondansetron at home but that has not been providing any relief for his nausea or pain.      Review of prior external notes (non-ED) -and- Review of prior external test results outside of this encounter: I reviewed the hospitalist progress note from December 11, 2023 when he was admitted here for intractable nausea and vomiting.        PHYSICAL EXAM    I have reviewed the triage vital signs and nursing notes.    ED Triage Vitals   Temp Heart Rate Resp BP SpO2   02/09/24 1009 02/09/24 1009 02/09/24 1009 02/09/24 1018 " "02/09/24 1009   97 °F (36.1 °C) 119 18 (!) 180/109 97 %      Temp src Heart Rate Source Patient Position BP Location FiO2 (%)   -- -- 02/09/24 1018 02/09/24 1018 --     Sitting Right arm        Physical Exam  GENERAL: alert, appears anxious provide no acute distress, occasional \"gagging noises noted.  SKIN: Warm, dry  HENT: Normocephalic, atraumatic, moist mucous membranes.  No drooling or trismus.  EYES: no scleral icterus  CV: regular rhythm, regular rate  RESPIRATORY: normal effort, lungs clear  ABDOMEN: soft, thin and flat and certainly not distended.  Patient exhibits basically broad tenderness in all 4 quadrants equally.  However there are no peritoneal features elicited.  No masses are palpable.  MUSCULOSKELETAL: no deformity  NEURO: alert, moves all extremities, follows commands      NIH:                                                             MEDICATIONS GIVEN IN ER  Medications   sodium chloride 0.9 % bolus 500 mL (0 mL Intravenous Stopped 2/9/24 1147)   droperidol (INAPSINE) injection 1.25 mg (1.25 mg Intravenous Given 2/9/24 1117)   diphenhydrAMINE (BENADRYL) injection 25 mg (25 mg Intravenous Given 2/9/24 1118)   droperidol (INAPSINE) injection 1.25 mg (1.25 mg Intravenous Given 2/9/24 1221)   diphenhydrAMINE (BENADRYL) injection 25 mg (25 mg Intravenous Given 2/9/24 1318)         ORDERS PLACED DURING THIS VISIT:  Orders Placed This Encounter   Procedures    CT Abdomen Pelvis Without Contrast    Comprehensive Metabolic Panel    Lipase    Urinalysis With Microscopic If Indicated (No Culture) - Urine, Clean Catch    CBC Auto Differential    Urinalysis, Microscopic Only - Urine, Clean Catch    ECG 12 Lead Drug Monitoring    CBC & Differential         PROCEDURES  Procedures            PROGRESS, DATA ANALYSIS, CONSULTS, AND MEDICAL DECISION MAKING  All labs have been independently interpreted by me.  All radiology studies have been reviewed by me. All EKG's have been independently viewed and interpreted " by me.  Discussion below represents my analysis of pertinent findings related to patient's condition, differential diagnosis, treatment plan and final disposition.    Differential diagnosis includes but is not limited to ileus, cyclical vomiting syndrome, pancreatitis, bowel obstruction, enteritis,.    Clinical Scores:                  ED Course as of 02/09/24 1743   Fri Feb 09, 2024   1124 WBC: 8.59 [KA]   1124 Hemoglobin: 14.0 [KA]   1124 Glucose(!): 111 [KA]   1124 Creatinine: 1.12 [KA]   1124 Bacteria, UA: None Seen [KA]   1124 RBC, UA(!): 21-50 [KA]   1215 I reassessed the patient.  He appears improved, does not appears anxious.  Nausea improved.  States he is still having abdominal pain reexamined his abdomen which remains mildly generally tender.  I have ordered a CT due to persistent symptoms. [KA]   1412 I reassessed the patient.  He is resting comfortably.  Visibly and clinically he appears much less anxious and much improved.  Unfortunately the cause of his symptoms is not well-established on his emergency rheum workup today.  He has the symptoms chronically and intermittently, states he sees a gastroenterologist at [facility].  At this time his symptoms are better and his workup is unremarkable for any acute intra-abdominal findings.  He does have it hematuria but it appears he has chronic hematuria.  I have encouraged him to follow-up for further evaluation of this if he has not previously been evaluated.  He should call his PCP and gastroenterologist today to schedule outpatient follow-up for next week.  He verbalized understanding. [KA]      ED Course User Index  [KA] Irina Wallis PA-C       MDM:   CT abdomen and pelvis:I independently interpreted the abdomen CT study and my findings are: No free air, no small bowel obstruction pattern    EKG         EKG time/Interp time: 1055/1057  Rhythm/Rate: Sinus rhythm, 84 bpm  P waves and SD: Present, 118 ms  QRS, axis: 83 ms, normal axis  ST and T waves: No ST  segment elevations are present.  Independently interpreted by me contemporaneously with treatment    I reviewed the radiology report and the labs from today's visit.  White blood cell count is normal.  Urinalysis does not seem to indicate any UTI findings.  CMP and lipase are both unremarkable as well.  I think we have ruled out any surgical emergencies at this time.  It seems to be consistent with an exacerbation of his otherwise chronic recurrent symptoms.  We administered several doses of droperidol and Benadryl.  Offered some IV fluids as well.  His vital signs improved with a normal heart rate.  His blood pressure also diminished to a more normal range.  At that point, he was safe to discharge home for continued symptomatic management in the outpatient setting.  Encourage prompt follow-up with PCP and current care providers.    COMPLEXITY OF CARE  Admission was considered but after careful review of the patient's presentation, physical examination, diagnostic results, and response to treatment the patient may be safely discharged with outpatient follow-up.    Please note that portions of this document were completed with a voice recognition program.    Note Disclaimer: At [facility], we believe that sharing information builds trust and better relationships. You are receiving this note because you recently visited [facility]. It is possible you will see health information before a provider has talked with you about it. This kind of information can be easy to misunderstand. To help you fully understand what it means for your health, we urge you to discuss this note with your provider.         Fred Jon MD  02/09/24 2562

## 2024-05-12 ENCOUNTER — HOSPITAL ENCOUNTER (EMERGENCY)
Facility: HOSPITAL | Age: 64
Discharge: HOME | End: 2024-05-12
Attending: EMERGENCY MEDICINE | Admitting: EMERGENCY MEDICINE
Payer: COMMERCIAL

## 2024-05-12 ENCOUNTER — APPOINTMENT (OUTPATIENT)
Dept: CT IMAGING | Facility: HOSPITAL | Age: 64
End: 2024-05-12
Payer: COMMERCIAL

## 2024-05-12 VITALS
RESPIRATION RATE: 18 BRPM | WEIGHT: 117 LBS | TEMPERATURE: 97.7 F | BODY MASS INDEX: 16.75 KG/M2 | SYSTOLIC BLOOD PRESSURE: 140 MMHG | DIASTOLIC BLOOD PRESSURE: 79 MMHG | OXYGEN SATURATION: 100 % | HEART RATE: 80 BPM | HEIGHT: 70 IN

## 2024-05-12 DIAGNOSIS — R10.84 GENERALIZED ABDOMINAL PAIN: Primary | ICD-10-CM

## 2024-05-12 DIAGNOSIS — I10 HYPERTENSION, UNSPECIFIED TYPE: ICD-10-CM

## 2024-05-12 DIAGNOSIS — R11.0 NAUSEA: ICD-10-CM

## 2024-05-12 LAB
ALBUMIN SERPL-MCNC: 4.3 G/DL (ref 3.5–5.2)
ALBUMIN/GLOB SERPL: 1.8 G/DL
ALP SERPL-CCNC: 69 U/L (ref 39–117)
ALT SERPL W P-5'-P-CCNC: 9 U/L (ref 1–41)
ANION GAP SERPL CALCULATED.3IONS-SCNC: 13 MMOL/L (ref 5–15)
AST SERPL-CCNC: 13 U/L (ref 1–40)
BASOPHILS # BLD AUTO: 0.16 10*3/MM3 (ref 0–0.2)
BASOPHILS NFR BLD AUTO: 1.2 % (ref 0–1.5)
BILIRUB SERPL-MCNC: 0.5 MG/DL (ref 0–1.2)
BUN SERPL-MCNC: 17 MG/DL (ref 8–23)
BUN/CREAT SERPL: 14.8 (ref 7–25)
CALCIUM SPEC-SCNC: 9.3 MG/DL (ref 8.6–10.5)
CHLORIDE SERPL-SCNC: 107 MMOL/L (ref 98–107)
CO2 SERPL-SCNC: 23 MMOL/L (ref 22–29)
CREAT SERPL-MCNC: 1.15 MG/DL (ref 0.76–1.27)
DEPRECATED RDW RBC AUTO: 45.3 FL (ref 37–54)
EGFRCR SERPLBLD CKD-EPI 2021: 72.4 ML/MIN/1.73
EOSINOPHIL # BLD AUTO: 0.1 10*3/MM3 (ref 0–0.4)
EOSINOPHIL NFR BLD AUTO: 0.8 % (ref 0.3–6.2)
ERYTHROCYTE [DISTWIDTH] IN BLOOD BY AUTOMATED COUNT: 13.2 % (ref 12.3–15.4)
GLOBULIN UR ELPH-MCNC: 2.4 GM/DL
GLUCOSE SERPL-MCNC: 98 MG/DL (ref 65–99)
HCT VFR BLD AUTO: 47.4 % (ref 37.5–51)
HGB BLD-MCNC: 15.1 G/DL (ref 13–17.7)
IMM GRANULOCYTES # BLD AUTO: 0.03 10*3/MM3 (ref 0–0.05)
IMM GRANULOCYTES NFR BLD AUTO: 0.2 % (ref 0–0.5)
LDH SERPL-CCNC: 216 U/L (ref 135–225)
LIPASE SERPL-CCNC: 27 U/L (ref 13–60)
LYMPHOCYTES # BLD AUTO: 1.73 10*3/MM3 (ref 0.7–3.1)
LYMPHOCYTES NFR BLD AUTO: 13.4 % (ref 19.6–45.3)
MCH RBC QN AUTO: 30 PG (ref 26.6–33)
MCHC RBC AUTO-ENTMCNC: 31.9 G/DL (ref 31.5–35.7)
MCV RBC AUTO: 94 FL (ref 79–97)
MONOCYTES # BLD AUTO: 0.79 10*3/MM3 (ref 0.1–0.9)
MONOCYTES NFR BLD AUTO: 6.1 % (ref 5–12)
NEUTROPHILS NFR BLD AUTO: 10.1 10*3/MM3 (ref 1.7–7)
NEUTROPHILS NFR BLD AUTO: 78.3 % (ref 42.7–76)
NRBC BLD AUTO-RTO: 0 /100 WBC (ref 0–0.2)
PLATELET # BLD AUTO: 314 10*3/MM3 (ref 140–450)
PMV BLD AUTO: 10.5 FL (ref 6–12)
POTASSIUM SERPL-SCNC: 4.1 MMOL/L (ref 3.5–5.2)
PROT SERPL-MCNC: 6.7 G/DL (ref 6–8.5)
RBC # BLD AUTO: 5.04 10*6/MM3 (ref 4.14–5.8)
SODIUM SERPL-SCNC: 143 MMOL/L (ref 136–145)
WBC NRBC COR # BLD AUTO: 12.91 10*3/MM3 (ref 3.4–10.8)

## 2024-05-12 PROCEDURE — 83615 LACTATE (LD) (LDH) ENZYME: CPT | Performed by: EMERGENCY MEDICINE

## 2024-05-12 PROCEDURE — 96374 THER/PROPH/DIAG INJ IV PUSH: CPT

## 2024-05-12 PROCEDURE — 25010000002 DROPERIDOL PER 5 MG: Performed by: EMERGENCY MEDICINE

## 2024-05-12 PROCEDURE — 96375 TX/PRO/DX INJ NEW DRUG ADDON: CPT

## 2024-05-12 PROCEDURE — 80053 COMPREHEN METABOLIC PANEL: CPT | Performed by: EMERGENCY MEDICINE

## 2024-05-12 PROCEDURE — 83690 ASSAY OF LIPASE: CPT | Performed by: EMERGENCY MEDICINE

## 2024-05-12 PROCEDURE — 99285 EMERGENCY DEPT VISIT HI MDM: CPT

## 2024-05-12 PROCEDURE — 36415 COLL VENOUS BLD VENIPUNCTURE: CPT

## 2024-05-12 PROCEDURE — 25510000001 IOPAMIDOL 61 % SOLUTION: Performed by: EMERGENCY MEDICINE

## 2024-05-12 PROCEDURE — 25810000003 SODIUM CHLORIDE 0.9 % SOLUTION: Performed by: EMERGENCY MEDICINE

## 2024-05-12 PROCEDURE — 85025 COMPLETE CBC W/AUTO DIFF WBC: CPT | Performed by: EMERGENCY MEDICINE

## 2024-05-12 PROCEDURE — 96361 HYDRATE IV INFUSION ADD-ON: CPT

## 2024-05-12 PROCEDURE — 74177 CT ABD & PELVIS W/CONTRAST: CPT

## 2024-05-12 PROCEDURE — 25010000002 HYDROMORPHONE PER 4 MG: Performed by: EMERGENCY MEDICINE

## 2024-05-12 RX ORDER — ONDANSETRON 8 MG/1
8 TABLET, ORALLY DISINTEGRATING ORAL EVERY 8 HOURS PRN
Qty: 15 TABLET | Refills: 0 | Status: SHIPPED | OUTPATIENT
Start: 2024-05-12

## 2024-05-12 RX ORDER — SUCRALFATE 1 G/1
1 TABLET ORAL 3 TIMES DAILY
Qty: 40 TABLET | Refills: 0 | Status: SHIPPED | OUTPATIENT
Start: 2024-05-12

## 2024-05-12 RX ORDER — OMEPRAZOLE 20 MG/1
20 CAPSULE, DELAYED RELEASE ORAL DAILY
Qty: 30 CAPSULE | Refills: 0 | Status: SHIPPED | OUTPATIENT
Start: 2024-05-12

## 2024-05-12 RX ORDER — HYDROMORPHONE HYDROCHLORIDE 1 MG/ML
0.5 INJECTION, SOLUTION INTRAMUSCULAR; INTRAVENOUS; SUBCUTANEOUS ONCE
Status: COMPLETED | OUTPATIENT
Start: 2024-05-12 | End: 2024-05-12

## 2024-05-12 RX ORDER — DROPERIDOL 2.5 MG/ML
2.5 INJECTION, SOLUTION INTRAMUSCULAR; INTRAVENOUS ONCE
Status: COMPLETED | OUTPATIENT
Start: 2024-05-12 | End: 2024-05-12

## 2024-05-12 RX ADMIN — IOPAMIDOL 85 ML: 612 INJECTION, SOLUTION INTRAVENOUS at 14:03

## 2024-05-12 RX ADMIN — SODIUM CHLORIDE 500 ML: 9 INJECTION, SOLUTION INTRAVENOUS at 12:38

## 2024-05-12 RX ADMIN — HYDROMORPHONE HYDROCHLORIDE 0.5 MG: 1 INJECTION, SOLUTION INTRAMUSCULAR; INTRAVENOUS; SUBCUTANEOUS at 12:37

## 2024-05-12 RX ADMIN — DROPERIDOL 2.5 MG: 2.5 INJECTION, SOLUTION INTRAMUSCULAR; INTRAVENOUS at 12:36

## 2024-05-12 NOTE — ED PROVIDER NOTES
EMERGENCY DEPARTMENT ENCOUNTER  Room Number:  16/16  Date of encounter:  5/12/2024  PCP: Janelle Lara MD  Patient Care Team:  Janelle Lara MD as PCP - General (Family Medicine)     HPI:  Context: Donny Mallory is a 63 y.o. male who presents to the ED c/o chief complaint of abdominal pain with nausea and gagging.  Patient reports that he has had generalized abdominal pain for the last 2 days, reports pain is diffuse, unable to characterize, radiates to his back.  Patient reports nausea with gagging, denies any emesis.  Patient reports that he did have a bowel movement today, small and hard, last bowel movement prior to this was 2 days ago, denies any recent diarrhea.  No urinary symptoms, no fevers.    MEDICAL HISTORY REVIEW  Reviewed in EPIC    PAST MEDICAL HISTORY  Active Ambulatory Problems     Diagnosis Date Noted    Psychosis 05/16/2017    Acute pancreatitis 04/21/2022    COPD (chronic obstructive pulmonary disease)     HTN (hypertension)     Dysphagia     Constipation     Cholelithiasis 04/25/2022    Severe malnutrition 04/28/2022    Ileus 10/29/2023    GERD without esophagitis 10/29/2023    Hypernatremia 10/29/2023    Intractable nausea and vomiting 12/07/2023     Resolved Ambulatory Problems     Diagnosis Date Noted    No Resolved Ambulatory Problems     Past Medical History:   Diagnosis Date    Bell's palsy     Chronic sinusitis     Depression     History of biliary stent insertion     Hypertension     Presence of pancreatic duct stent     Suicide attempt        PAST SURGICAL HISTORY  Past Surgical History:   Procedure Laterality Date    CHOLECYSTECTOMY      CHOLECYSTECTOMY WITH INTRAOPERATIVE CHOLANGIOGRAM N/A 04/25/2022    Procedure: Laparoscopic cholecystectomy with intraoperative cholangiogram, possible open;  Surgeon: Khari Schofield MD;  Location: [facility] OR;  Service: General;  Laterality: N/A;    HERNIA REPAIR         FAMILY HISTORY  Family History   Family history unknown:  Yes       SOCIAL HISTORY  Social History     Socioeconomic History    Marital status: Single   Tobacco Use    Smoking status: Every Day     Current packs/day: 0.25     Average packs/day: 0.3 packs/day for 15.0 years (3.8 ttl pk-yrs)     Types: Cigarettes    Smokeless tobacco: Never   Vaping Use    Vaping status: Former   Substance and Sexual Activity    Alcohol use: No    Drug use: No    Sexual activity: Defer       ALLERGIES  Morphine and Prochlorperazine    The patient's allergies have been reviewed    REVIEW OF SYSTEMS  All systems reviewed and negative except for those discussed in HPI.     PHYSICAL EXAM  I have reviewed the triage vital signs and nursing notes.  ED Triage Vitals   Temp Heart Rate Resp BP SpO2   05/12/24 1142 05/12/24 1142 05/12/24 1142 05/12/24 1155 05/12/24 1142   97.7 °F (36.5 °C) (!) 131 20 (!) 133/104 97 %      Temp src Heart Rate Source Patient Position BP Location FiO2 (%)   05/12/24 1142 05/12/24 1142 05/12/24 1155 05/12/24 1155 --   Tympanic Monitor Sitting Left arm        General: No acute distress.  HENT: NCAT, PERRL, Nares patent.  Eyes: no scleral icterus.  Neck: trachea midline, no ROM limitations.  CV: regular rhythm, tachycardic rate.  Respiratory: normal effort, CTAB.  Abdomen: soft, nondistended, mild generalized tenderness to palpation, no rebound tenderness, no guarding or rigidity.  Musculoskeletal: no deformity.  Neuro: alert, moves all extremities, follows commands.  Skin: warm, dry.    LAB RESULTS  Recent Results (from the past 24 hour(s))   Lipase    Collection Time: 05/12/24 11:54 AM    Specimen: Arm, Left; Blood   Result Value Ref Range    Lipase 27 13 - 60 U/L   CBC Auto Differential    Collection Time: 05/12/24 11:54 AM    Specimen: Arm, Left; Blood   Result Value Ref Range    WBC 12.91 (H) 3.40 - 10.80 10*3/mm3    RBC 5.04 4.14 - 5.80 10*6/mm3    Hemoglobin 15.1 13.0 - 17.7 g/dL    Hematocrit 47.4 37.5 - 51.0 %    MCV 94.0 79.0 - 97.0 fL    MCH 30.0 26.6 - 33.0 pg     MCHC 31.9 31.5 - 35.7 g/dL    RDW 13.2 12.3 - 15.4 %    RDW-SD 45.3 37.0 - 54.0 fl    MPV 10.5 6.0 - 12.0 fL    Platelets 314 140 - 450 10*3/mm3    Neutrophil % 78.3 (H) 42.7 - 76.0 %    Lymphocyte % 13.4 (L) 19.6 - 45.3 %    Monocyte % 6.1 5.0 - 12.0 %    Eosinophil % 0.8 0.3 - 6.2 %    Basophil % 1.2 0.0 - 1.5 %    Immature Grans % 0.2 0.0 - 0.5 %    Neutrophils, Absolute 10.10 (H) 1.70 - 7.00 10*3/mm3    Lymphocytes, Absolute 1.73 0.70 - 3.10 10*3/mm3    Monocytes, Absolute 0.79 0.10 - 0.90 10*3/mm3    Eosinophils, Absolute 0.10 0.00 - 0.40 10*3/mm3    Basophils, Absolute 0.16 0.00 - 0.20 10*3/mm3    Immature Grans, Absolute 0.03 0.00 - 0.05 10*3/mm3    nRBC 0.0 0.0 - 0.2 /100 WBC   Comprehensive Metabolic Panel    Collection Time: 05/12/24  1:12 PM    Specimen: Blood   Result Value Ref Range    Glucose 98 65 - 99 mg/dL    BUN 17 8 - 23 mg/dL    Creatinine 1.15 0.76 - 1.27 mg/dL    Sodium 143 136 - 145 mmol/L    Potassium 4.1 3.5 - 5.2 mmol/L    Chloride 107 98 - 107 mmol/L    CO2 23.0 22.0 - 29.0 mmol/L    Calcium 9.3 8.6 - 10.5 mg/dL    Total Protein 6.7 6.0 - 8.5 g/dL    Albumin 4.3 3.5 - 5.2 g/dL    ALT (SGPT) 9 1 - 41 U/L    AST (SGOT) 13 1 - 40 U/L    Alkaline Phosphatase 69 39 - 117 U/L    Total Bilirubin 0.5 0.0 - 1.2 mg/dL    Globulin 2.4 gm/dL    A/G Ratio 1.8 g/dL    BUN/Creatinine Ratio 14.8 7.0 - 25.0    Anion Gap 13.0 5.0 - 15.0 mmol/L    eGFR 72.4 >60.0 mL/min/1.73   Lactate Dehydrogenase    Collection Time: 05/12/24  1:12 PM    Specimen: Blood   Result Value Ref Range     135 - 225 U/L       I ordered the above labs and reviewed the results.    RADIOLOGY  CT Abdomen Pelvis With Contrast    Result Date: 5/12/2024  CT ABDOMEN PELVIS W CONTRAST-  INDICATION: Generalized abdominal pain with nausea  COMPARISON: CT abdomen pelvis February 9, 2024  TECHNIQUE: Routine CT abdomen/pelvis with IV contrast. Coronal and sagittal reformats. Radiation dose reduction techniques were utilized, including  automated exposure control and exposure modulation based on body size.  FINDINGS:  Lung bases: Subsegmental atelectasis at the bases.  ABDOMEN: Low attenuating liver lesions measuring less than 1 cm too small to characterize, unchanged. Calcification at the right hepatic dome, may be sequela of old granulomatous infection. Pneumobilia. Cholecystectomy. CBD stent seen. No biliary ductal dilatation. Spleen is normal in size. Mild to moderate pancreatic atrophy. Mild pancreatic ductal dilatation with the pancreatic duct measuring 5 to 6 mm, without a mass seen. No adrenal nodules. Bilateral fluid attenuating renal cysts. Lobular renal contours may be secondary to fetal lobulation or scarring. Punctate nonobstructing nephrolithiasis in the inferior pole right kidney, series 2, axial mage 49, measures 1 mm. No solid-appearing renal mass or hydronephrosis.  Pelvis: Prostatomegaly with the prostate measuring 5.9 cm in right to left dimension. Underdistended bladder. No bladder calculus. Small amount of free fluid in the rectovesicular pouch.  Bowel: Prominent gas and fluid in the proximal duodenum without gastric distention. Colonic diverticulosis. Normal appendix. Prominent gas-filled small bowel, without small bowel obstructive pattern.  Abdominal wall: Normal. Possible inguinal hernia repairs.  Retroperitoneum: No lymphadenopathy.  Vasculature: Patent. No abdominal aortic aneurysm. Severe aortoiliac atherosclerotic calcification.  Osseous structures: No destructive osseous lesions. Superior plate compression deformity at L1 with 30% collapse, unchanged. Mild retrolisthesis of L5 on S1. Moderate to severe disc height loss at L5/S1       1. Prominent gas filled small bowel loops, without an obstructive pattern. 2. Small amount of free fluid in the rectovesicular pouch. 3. Colonic diverticulosis. 4. Prostatomegaly. 5. Stable CBD stent. 6. Nonobstructing right nephrolithiasis  This report was finalized on 5/12/2024 2:29 PM  by Dr. Steve Bhandari M.D on Workstation: [id number]       I ordered the above noted radiological studies. I reviewed the images and results. I agree with the radiologist interpretation.    PROCEDURES  Procedures    MEDICATIONS GIVEN IN ER  Medications   sodium chloride 0.9 % bolus 500 mL (0 mL Intravenous Stopped 5/12/24 1321)   droperidol (INAPSINE) injection 2.5 mg (2.5 mg Intravenous Given 5/12/24 1236)   HYDROmorphone (DILAUDID) injection 0.5 mg (0.5 mg Intravenous Given 5/12/24 1237)   iopamidol (ISOVUE-300) 61 % injection 100 mL (85 mL Intravenous Given 5/12/24 1403)       PROGRESS, DATA ANALYSIS, CONSULTS, AND MEDICAL DECISION MAKING  A complete history and physical exam have been performed.  All available laboratory and imaging results have been reviewed by myself prior to disposition.    MDM    After the initial H&P, I discussed pertinent information from history and physical exam with patient/family.  Discussed differential diagnosis.  Discussed plan for ED evaluation/workup/treatment.  All questions answered.  Patient/family is agreeable with plan.  ED Course as of 05/12/24 1442   Sun May 12, 2024   1147 My differential diagnosis for abdominal pain for a male includes but is not limited to:  Gastritis, gastroenteritis, peptic ulcer disease, GERD, esophageal perforation, acute appendicitis, mesenteric adenitis, Meckel's diverticulum, epiploic appendagitis, diverticulitis, colon cancer, ulcerative colitis, Crohn's disease, intussusception, small bowel obstruction, adhesions, ischemic bowel, perforated viscus, ileus, obstipation, biliary colic, cholecystitis, cholelithiasis, hepatitis, pancreatitis, common bile duct obstruction, cholangitis, bile leak, splenic trauma, splenic rupture, splenic infarction, splenic abscess, abdominal abscess, ascites, spontaneous bacterial peritonitis, hernia, UTI, cystitis, prostatitis, ureterolithiasis, urinary obstruction, testicular torsion, AAA, myocardial infarction,  pneumonia, cancer, porphyria, DKA, medications, sickle cell, viral syndrome, zoster   [JG]   1404 Patient afebrile, initially tachycardic, tachycardia resolved, vital signs stable.  Patient does have trace leukocytosis just over normal, no left shift.  Liver enzymes bilirubin and lipase are normal.  ED workup to present has been reassuring, patient currently pending CT imaging. [JG]   1404 Patient reassessed, discussed ED workup and results to present.  Discussed pending CT imaging.  Patient has no questions or concerns at present. [JG]   1439 Reviewed CT abdomen pelvis in PACS, no bowel obstruction per my read. [JG]   1439 CT imaging negative for acute pathology, ED workup unremarkable and reassuring.  Patient is well-appearing appears appropriate for discharge with outpatient follow-up. [JG]      ED Course User Index  [JG] Abner Galindo MD       AS OF 14:42 EDT VITALS:    BP - 132/92  HR - 73  TEMP - 97.7 °F (36.5 °C) (Tympanic)  O2 SATS - 99%    DIAGNOSIS  Final diagnoses:   Generalized abdominal pain   Nausea   Hypertension, unspecified type         DISPOSITION  DISCHARGE    Patient discharged in stable condition.    Reviewed implications of results, diagnosis, meds, responsibility to follow up, warning signs and symptoms of possible worsening, potential complications and reasons to return to ER.    Patient/Family voiced understanding of above instructions.    Discussed plan for discharge, as there is no emergent indication for admission. Patient referred to primary care provider for BP management due to today's BP. Pt/family is agreeable and understands need for follow up and repeat testing.  Pt is aware that discharge does not mean that nothing is wrong but it indicates no emergency is present that requires admission and they must continue care with follow-up as given below or physician of their choice.     FOLLOW-UP  Janelle Lara MD  [address]  [id number] [phone]    Schedule an appointment as soon as possible for a visit in 2 days  even if well    your gastroenterologist    Schedule an appointment as soon as possible for a visit in 2 days      [facility]  [address]  [address]  [facility] [address] [phone]  Schedule an appointment as soon as possible for a visit   if you are unable to follow up with your GI         Medication List        New Prescriptions      ondansetron ODT 8 MG disintegrating tablet  Commonly known as: ZOFRAN-ODT  Place 1 tablet on the tongue Every 8 (Eight) Hours As Needed for Nausea or Vomiting.            Changed      sucralfate 1 g tablet  Commonly known as: CARAFATE  Take 1 tablet by mouth 3 times a day.  What changed: when to take this               Where to Get Your Medications        These medications were sent to [facility] [id number] - [place], [place] - [address] AT [address] - [phone]  - [phone]   [address], [facility] [address]      Phone: [phone]   omeprazole 20 MG capsule  ondansetron ODT 8 MG disintegrating tablet  sucralfate 1 g tablet            Abner Galindo MD  05/12/24 3411

## 2025-02-16 ENCOUNTER — APPOINTMENT (OUTPATIENT)
Dept: GENERAL RADIOLOGY | Facility: HOSPITAL | Age: 65
End: 2025-02-16
Payer: COMMERCIAL

## 2025-02-16 ENCOUNTER — APPOINTMENT (OUTPATIENT)
Dept: CT IMAGING | Facility: HOSPITAL | Age: 65
End: 2025-02-16
Payer: COMMERCIAL

## 2025-02-16 ENCOUNTER — HOSPITAL ENCOUNTER (EMERGENCY)
Facility: HOSPITAL | Age: 65
Discharge: HOME | End: 2025-02-16
Attending: EMERGENCY MEDICINE | Admitting: EMERGENCY MEDICINE
Payer: COMMERCIAL

## 2025-02-16 VITALS
WEIGHT: 118 LBS | BODY MASS INDEX: 16.89 KG/M2 | DIASTOLIC BLOOD PRESSURE: 84 MMHG | RESPIRATION RATE: 17 BRPM | OXYGEN SATURATION: 99 % | HEART RATE: 66 BPM | SYSTOLIC BLOOD PRESSURE: 132 MMHG | TEMPERATURE: 98.4 F | HEIGHT: 70 IN

## 2025-02-16 DIAGNOSIS — R05.3 CHRONIC COUGH: Primary | ICD-10-CM

## 2025-02-16 DIAGNOSIS — G89.29 CHRONIC ABDOMINAL PAIN: ICD-10-CM

## 2025-02-16 DIAGNOSIS — R10.9 CHRONIC ABDOMINAL PAIN: ICD-10-CM

## 2025-02-16 LAB
ALBUMIN SERPL-MCNC: 4.3 G/DL (ref 3.5–5.2)
ALBUMIN/GLOB SERPL: 1.6 G/DL
ALP SERPL-CCNC: 78 U/L (ref 39–117)
ALT SERPL W P-5'-P-CCNC: 6 U/L (ref 1–41)
ANION GAP SERPL CALCULATED.3IONS-SCNC: 13.7 MMOL/L (ref 5–15)
AST SERPL-CCNC: 17 U/L (ref 1–40)
B PARAPERT DNA SPEC QL NAA+PROBE: NOT DETECTED
B PERT DNA SPEC QL NAA+PROBE: NOT DETECTED
BASOPHILS # BLD AUTO: 0.12 10*3/MM3 (ref 0–0.2)
BASOPHILS NFR BLD AUTO: 1.4 % (ref 0–1.5)
BILIRUB SERPL-MCNC: 0.5 MG/DL (ref 0–1.2)
BUN SERPL-MCNC: 15 MG/DL (ref 8–23)
BUN/CREAT SERPL: 14.3 (ref 7–25)
C PNEUM DNA NPH QL NAA+NON-PROBE: NOT DETECTED
CALCIUM SPEC-SCNC: 9.4 MG/DL (ref 8.6–10.5)
CHLORIDE SERPL-SCNC: 105 MMOL/L (ref 98–107)
CO2 SERPL-SCNC: 23.3 MMOL/L (ref 22–29)
CREAT SERPL-MCNC: 1.05 MG/DL (ref 0.76–1.27)
DEPRECATED RDW RBC AUTO: 46.8 FL (ref 37–54)
EGFRCR SERPLBLD CKD-EPI 2021: 80.3 ML/MIN/1.73
EOSINOPHIL # BLD AUTO: 0.12 10*3/MM3 (ref 0–0.4)
EOSINOPHIL NFR BLD AUTO: 1.4 % (ref 0.3–6.2)
ERYTHROCYTE [DISTWIDTH] IN BLOOD BY AUTOMATED COUNT: 14.1 % (ref 12.3–15.4)
FLUAV SUBTYP SPEC NAA+PROBE: NOT DETECTED
FLUBV RNA ISLT QL NAA+PROBE: NOT DETECTED
GLOBULIN UR ELPH-MCNC: 2.7 GM/DL
GLUCOSE SERPL-MCNC: 100 MG/DL (ref 65–99)
HADV DNA SPEC NAA+PROBE: NOT DETECTED
HCOV 229E RNA SPEC QL NAA+PROBE: NOT DETECTED
HCOV HKU1 RNA SPEC QL NAA+PROBE: NOT DETECTED
HCOV NL63 RNA SPEC QL NAA+PROBE: NOT DETECTED
HCOV OC43 RNA SPEC QL NAA+PROBE: NOT DETECTED
HCT VFR BLD AUTO: 42.3 % (ref 37.5–51)
HGB BLD-MCNC: 14 G/DL (ref 13–17.7)
HMPV RNA NPH QL NAA+NON-PROBE: NOT DETECTED
HPIV1 RNA ISLT QL NAA+PROBE: NOT DETECTED
HPIV2 RNA SPEC QL NAA+PROBE: NOT DETECTED
HPIV3 RNA NPH QL NAA+PROBE: NOT DETECTED
HPIV4 P GENE NPH QL NAA+PROBE: NOT DETECTED
IMM GRANULOCYTES # BLD AUTO: 0.02 10*3/MM3 (ref 0–0.05)
IMM GRANULOCYTES NFR BLD AUTO: 0.2 % (ref 0–0.5)
LYMPHOCYTES # BLD AUTO: 1.71 10*3/MM3 (ref 0.7–3.1)
LYMPHOCYTES NFR BLD AUTO: 19.5 % (ref 19.6–45.3)
M PNEUMO IGG SER IA-ACNC: NOT DETECTED
MCH RBC QN AUTO: 30 PG (ref 26.6–33)
MCHC RBC AUTO-ENTMCNC: 33.1 G/DL (ref 31.5–35.7)
MCV RBC AUTO: 90.6 FL (ref 79–97)
MONOCYTES # BLD AUTO: 0.53 10*3/MM3 (ref 0.1–0.9)
MONOCYTES NFR BLD AUTO: 6 % (ref 5–12)
NEUTROPHILS NFR BLD AUTO: 6.27 10*3/MM3 (ref 1.7–7)
NEUTROPHILS NFR BLD AUTO: 71.5 % (ref 42.7–76)
NRBC BLD AUTO-RTO: 0 /100 WBC (ref 0–0.2)
NT-PROBNP SERPL-MCNC: 103 PG/ML (ref 0–900)
PLATELET # BLD AUTO: 224 10*3/MM3 (ref 140–450)
PMV BLD AUTO: 9.8 FL (ref 6–12)
POTASSIUM SERPL-SCNC: 3.9 MMOL/L (ref 3.5–5.2)
PROT SERPL-MCNC: 7 G/DL (ref 6–8.5)
RBC # BLD AUTO: 4.67 10*6/MM3 (ref 4.14–5.8)
RHINOVIRUS RNA SPEC NAA+PROBE: NOT DETECTED
RSV RNA NPH QL NAA+NON-PROBE: NOT DETECTED
SARS-COV-2 RNA NPH QL NAA+NON-PROBE: NOT DETECTED
SODIUM SERPL-SCNC: 142 MMOL/L (ref 136–145)
TROPONIN T SERPL HS-MCNC: 14 NG/L
WBC NRBC COR # BLD AUTO: 8.77 10*3/MM3 (ref 3.4–10.8)

## 2025-02-16 PROCEDURE — 25510000001 IOPAMIDOL PER 1 ML: Performed by: EMERGENCY MEDICINE

## 2025-02-16 PROCEDURE — 93010 ELECTROCARDIOGRAM REPORT: CPT | Performed by: INTERNAL MEDICINE

## 2025-02-16 PROCEDURE — 85025 COMPLETE CBC W/AUTO DIFF WBC: CPT | Performed by: EMERGENCY MEDICINE

## 2025-02-16 PROCEDURE — 0202U NFCT DS 22 TRGT SARS-COV-2: CPT | Performed by: EMERGENCY MEDICINE

## 2025-02-16 PROCEDURE — 83880 ASSAY OF NATRIURETIC PEPTIDE: CPT | Performed by: EMERGENCY MEDICINE

## 2025-02-16 PROCEDURE — 94799 UNLISTED PULMONARY SVC/PX: CPT

## 2025-02-16 PROCEDURE — 93005 ELECTROCARDIOGRAM TRACING: CPT | Performed by: EMERGENCY MEDICINE

## 2025-02-16 PROCEDURE — 84484 ASSAY OF TROPONIN QUANT: CPT | Performed by: EMERGENCY MEDICINE

## 2025-02-16 PROCEDURE — 74177 CT ABD & PELVIS W/CONTRAST: CPT

## 2025-02-16 PROCEDURE — 99285 EMERGENCY DEPT VISIT HI MDM: CPT

## 2025-02-16 PROCEDURE — 94664 DEMO&/EVAL PT USE INHALER: CPT

## 2025-02-16 PROCEDURE — 71045 X-RAY EXAM CHEST 1 VIEW: CPT

## 2025-02-16 PROCEDURE — 80053 COMPREHEN METABOLIC PANEL: CPT | Performed by: EMERGENCY MEDICINE

## 2025-02-16 PROCEDURE — 94640 AIRWAY INHALATION TREATMENT: CPT

## 2025-02-16 PROCEDURE — 71275 CT ANGIOGRAPHY CHEST: CPT

## 2025-02-16 RX ORDER — IOPAMIDOL 755 MG/ML
100 INJECTION, SOLUTION INTRAVASCULAR
Status: COMPLETED | OUTPATIENT
Start: 2025-02-16 | End: 2025-02-16

## 2025-02-16 RX ORDER — SODIUM CHLORIDE 0.9 % (FLUSH) 0.9 %
10 SYRINGE (ML) INJECTION AS NEEDED
Status: DISCONTINUED | OUTPATIENT
Start: 2025-02-16 | End: 2025-02-16 | Stop reason: HOSPADM

## 2025-02-16 RX ORDER — ALBUTEROL SULFATE 0.83 MG/ML
2.5 SOLUTION RESPIRATORY (INHALATION) ONCE
Status: COMPLETED | OUTPATIENT
Start: 2025-02-16 | End: 2025-02-16

## 2025-02-16 RX ADMIN — IOPAMIDOL 95 ML: 755 INJECTION, SOLUTION INTRAVENOUS at 13:36

## 2025-02-16 RX ADMIN — ALBUTEROL SULFATE 2.5 MG: 2.5 SOLUTION RESPIRATORY (INHALATION) at 12:52

## 2025-02-16 NOTE — ED NOTES
Attempted to obtain the 2 hour trop from the IV and it will not give blood.. Asked Bindurd to phlebotomy stick the patient..

## 2025-02-16 NOTE — ED PROVIDER NOTES
EMERGENCY DEPARTMENT ENCOUNTER    Room Number:  42/42  PCP: Janelle Lara MD  Historian: Patient      HPI:  Chief Complaint: Cough and abdominal pain  A complete HPI/ROS/PMH/PSH/SH/FH are unobtainable due to: None  Context: Donny Mallory is a 64 y.o. male who presents to the ED c/o cough and abdominal pain.  Patient with history of COPD.  Patient not wearing oxygen.  Patient states he has had cough for over a week.  Patient was apparently seen in outside hospital and had steroids and antibiotics without relief.  Patient also states he is having abdominal pain is upper abdominal pain.  Has had fevers to 101.  States he does not smoke anymore.  No leg swelling.            PAST MEDICAL HISTORY  Active Ambulatory Problems     Diagnosis Date Noted    Psychosis 05/16/2017    Acute pancreatitis 04/21/2022    COPD (chronic obstructive pulmonary disease)     HTN (hypertension)     Dysphagia     Constipation     Cholelithiasis 04/25/2022    Severe malnutrition 04/28/2022    Ileus 10/29/2023    GERD without esophagitis 10/29/2023    Hypernatremia 10/29/2023    Intractable nausea and vomiting 12/07/2023     Resolved Ambulatory Problems     Diagnosis Date Noted    No Resolved Ambulatory Problems     Past Medical History:   Diagnosis Date    Bell's palsy     Chronic sinusitis     Depression     History of biliary stent insertion     Hypertension     Presence of pancreatic duct stent     Suicide attempt          PAST SURGICAL HISTORY  Past Surgical History:   Procedure Laterality Date    CHOLECYSTECTOMY      CHOLECYSTECTOMY WITH INTRAOPERATIVE CHOLANGIOGRAM N/A 04/25/2022    Procedure: Laparoscopic cholecystectomy with intraoperative cholangiogram, possible open;  Surgeon: Khari Schofield MD;  Location: [facility];  Service: General;  Laterality: N/A;    HERNIA REPAIR           FAMILY HISTORY  Family History   Family history unknown: Yes         SOCIAL HISTORY  Social History     Socioeconomic History    Marital  status: Single   Tobacco Use    Smoking status: Every Day     Current packs/day: 0.25     Average packs/day: 0.3 packs/day for 15.0 years (3.8 ttl pk-yrs)     Types: Cigarettes    Smokeless tobacco: Never   Vaping Use    Vaping status: Former   Substance and Sexual Activity    Alcohol use: No    Drug use: No    Sexual activity: Defer         ALLERGIES  Morphine and Prochlorperazine        REVIEW OF SYSTEMS  Review of Systems   Cough abdominal pain      PHYSICAL EXAM  ED Triage Vitals   Temp Heart Rate Resp BP SpO2   02/16/25 1230 02/16/25 1230 02/16/25 1230 02/16/25 1231 02/16/25 1230   98.4 °F (36.9 °C) 105 16 (!) 149/104 96 %      Temp src Heart Rate Source Patient Position BP Location FiO2 (%)   02/16/25 1230 02/16/25 1230 -- -- --   Tympanic Monitor          Physical Exam      GENERAL: no acute distress  HENT: nares patent  EYES: no scleral icterus  CV: regular rhythm, normal rate  RESPIRATORY: normal effort.  Diminished breath sounds bilaterally  ABDOMEN: soft.  Upper abdominal tenderness  MUSCULOSKELETAL: no deformity  NEURO: alert, moves all extremities, follows commands  PSYCH:  calm, cooperative  SKIN: warm, dry    Vital signs and nursing notes reviewed.          LAB RESULTS  Recent Results (from the past 24 hours)   ECG 12 Lead Dyspnea    Collection Time: 02/16/25  1:03 PM   Result Value Ref Range    QT Interval 385 ms    QTC Interval 445 ms   Comprehensive Metabolic Panel    Collection Time: 02/16/25  1:13 PM    Specimen: Blood   Result Value Ref Range    Glucose 100 (H) 65 - 99 mg/dL    BUN 15 8 - 23 mg/dL    Creatinine 1.05 0.76 - 1.27 mg/dL    Sodium 142 136 - 145 mmol/L    Potassium 3.9 3.5 - 5.2 mmol/L    Chloride 105 98 - 107 mmol/L    CO2 23.3 22.0 - 29.0 mmol/L    Calcium 9.4 8.6 - 10.5 mg/dL    Total Protein 7.0 6.0 - 8.5 g/dL    Albumin 4.3 3.5 - 5.2 g/dL    ALT (SGPT) 6 1 - 41 U/L    AST (SGOT) 17 1 - 40 U/L    Alkaline Phosphatase 78 39 - 117 U/L    Total Bilirubin 0.5 0.0 - 1.2 mg/dL     Globulin 2.7 gm/dL    A/G Ratio 1.6 g/dL    BUN/Creatinine Ratio 14.3 7.0 - 25.0    Anion Gap 13.7 5.0 - 15.0 mmol/L    eGFR 80.3 >60.0 mL/min/1.73   BNP    Collection Time: 02/16/25  1:13 PM    Specimen: Blood   Result Value Ref Range    proBNP 103.0 0.0 - 900.0 pg/mL   High Sensitivity Troponin T    Collection Time: 02/16/25  1:13 PM    Specimen: Blood   Result Value Ref Range    HS Troponin T 14 <22 ng/L   CBC Auto Differential    Collection Time: 02/16/25  1:13 PM    Specimen: Blood   Result Value Ref Range    WBC 8.77 3.40 - 10.80 10*3/mm3    RBC 4.67 4.14 - 5.80 10*6/mm3    Hemoglobin 14.0 13.0 - 17.7 g/dL    Hematocrit 42.3 37.5 - 51.0 %    MCV 90.6 79.0 - 97.0 fL    MCH 30.0 26.6 - 33.0 pg    MCHC 33.1 31.5 - 35.7 g/dL    RDW 14.1 12.3 - 15.4 %    RDW-SD 46.8 37.0 - 54.0 fl    MPV 9.8 6.0 - 12.0 fL    Platelets 224 140 - 450 10*3/mm3    Neutrophil % 71.5 42.7 - 76.0 %    Lymphocyte % 19.5 (L) 19.6 - 45.3 %    Monocyte % 6.0 5.0 - 12.0 %    Eosinophil % 1.4 0.3 - 6.2 %    Basophil % 1.4 0.0 - 1.5 %    Immature Grans % 0.2 0.0 - 0.5 %    Neutrophils, Absolute 6.27 1.70 - 7.00 10*3/mm3    Lymphocytes, Absolute 1.71 0.70 - 3.10 10*3/mm3    Monocytes, Absolute 0.53 0.10 - 0.90 10*3/mm3    Eosinophils, Absolute 0.12 0.00 - 0.40 10*3/mm3    Basophils, Absolute 0.12 0.00 - 0.20 10*3/mm3    Immature Grans, Absolute 0.02 0.00 - 0.05 10*3/mm3    nRBC 0.0 0.0 - 0.2 /100 WBC   Respiratory Panel PCR w/COVID-19(SARS-CoV-2) [facility] In-House, NP Swab in UTM/VTM, 2 HR TAT - Swab, Nasopharynx    Collection Time: 02/16/25  1:13 PM    Specimen: Nasopharynx; Swab   Result Value Ref Range    ADENOVIRUS, PCR Not Detected Not Detected    Coronavirus 229E Not Detected Not Detected    Coronavirus HKU1 Not Detected Not Detected    Coronavirus NL63 Not Detected Not Detected    Coronavirus OC43 Not Detected Not Detected    COVID19 Not Detected Not Detected - Ref. Range    Human Metapneumovirus Not Detected Not Detected     Human Rhinovirus/Enterovirus Not Detected Not Detected    Influenza A PCR Not Detected Not Detected    Influenza B PCR Not Detected Not Detected    Parainfluenza Virus 1 Not Detected Not Detected    Parainfluenza Virus 2 Not Detected Not Detected    Parainfluenza Virus 3 Not Detected Not Detected    Parainfluenza Virus 4 Not Detected Not Detected    RSV, PCR Not Detected Not Detected    Bordetella pertussis pcr Not Detected Not Detected    Bordetella parapertussis PCR Not Detected Not Detected    Chlamydophila pneumoniae PCR Not Detected Not Detected    Mycoplasma pneumo by PCR Not Detected Not Detected       Ordered the above labs and reviewed the results.        RADIOLOGY  CT Angiogram Chest, CT Abdomen Pelvis With Contrast    Result Date: 2/16/2025  CT ANGIOGRAM CHEST-, CT ABDOMEN PELVIS W CONTRAST-  INDICATION: Shortness of breath. Abdominal pain.  COMPARISON: CTA chest December 18, 2023 and CT abdomen pelvis May 12, 2024  TECHNIQUE: CTA chest with IV contrast. CT abdomen pelvis with contrast. Coronal and sagittal reformats. Three dimensional reconstructions. Radiation dose reduction techniques were utilized, including automated exposure control and exposure modulation based on body size.  FINDINGS:  Pulmonary arteries: Streak artifact from contrast in the venous system. Respiratory motion artifact. Study is adequate. No pulmonary embolism seen.  Chest wall: Gynecomastia. No lymphadenopathy.  Mediastinum: Coronary artery atherosclerotic calcifications. Aortic valve calcification. Heart is normal in size. No pericardial effusion. No mediastinal or hilar lymphadenopathy.  Lungs/pleura: No effusions. Small amount of secretions in the trachea. Airways wall thickening. Respiratory motion artifact. Airways wall thickening. Mild to moderate centrilobular and paraseptal emphysema. Biapical pleural-parenchymal thickening. Blebs seen at the apices. Posterior dependent subsegmental atelectasis in the right lung.  ABDOMEN:  Small fluid attenuating hepatic cyst. Low attenuating liver lesions measuring less than 1 cm too small to characterize, stable. Stable calcification in segment 8 of the right hepatic lobe. Cholecystectomy. Pneumobilia. No biliary ductal dilatation. Spleen is normal in size. No pancreatic mass or pancreatic ductal dilatation seen. No adrenal nodules. Mild thickening of the left adrenal gland. Lobular renal contours with some cortical loss, suspect scarring. Bilateral fluid attenuating renal cysts. No solid-appearing renal mass or hydronephrosis.  Pelvis: Prostate is normal in size. Bladder is collapsed. No bladder calculus seen.  Bowel: Possible polypectomy clip in the gastric antrum adjacent to the pylorus. Prominent gas-filled small bowel loops. For example, gas-filled small bowel loop in the lower abdomen, series 5, axial mage 63, measures 2.5 cm. No collapsed distal bowel or transition point seen. Normal appendix.  Abdominal wall: Bilateral inguinal mesh..  Retroperitoneum: No lymphadenopathy.  Vasculature: Severe aortoiliac atherosclerotic calcification. Vessels appear patent. Suspect moderate stenosis at the right common iliac artery origin. No abdominal aortic aneurysm.  Osseous structures: Old sternal fracture. Old appearing T4 compression deformity with 80% collapse. Old appearing T5 compression deformity with 90% collapse. Old superior endplate compression deformity at T9 with 30% collapse and minimal retropulsion of the posterior superior corner of the vertebra. Old superior endplate compression deformity at T12, with 30% collapse, with small amount of retropulsion of the posterior superior corner of the vertebra. Small amount of bilateral hip osteoarthritic change. L4/L5 facet degenerative arthropathy with grade 1 anterolisthesis of L4 on L5. Severe disc height loss at L5/S1 with mild retrolisthesis of L5 on S1.       1. No pulmonary embolism. 2. Airways disease and mild to moderate emphysema. 3.  Prominent gas-filled small bowel, without a small bowel obstructive pattern  This report was finalized on 2/16/2025 2:49 PM by Dr. Steve Bhandari M.D on Workstation: [id number]      XR Chest 1 View    Result Date: 2/16/2025  XR CHEST 1 VW-  CLINICAL HISTORY:  cough  FINDINGS:  Emphysematous changes are again appreciated. Pleural and parenchymal scarring is seen within the visualized lung apices. However, the lungs are clear of acute infiltrates. The cardiomediastinal silhouette is within normal limits. Incidental note is made of minimal levoconvex scoliotic curvature of the thoracic spine in addition to a chronic appearing compression deformities within the lower thoracic spine.    This report was finalized on 2/16/2025 1:34 PM by Dr. Quincy Ibarra M.D on Workstation: [id number]       Ordered the above noted radiological studies.  Chest x-ray independent interpreted by me and shows COPD with no evidence of pneumonia          PROCEDURES  Procedures    EKG          EKG time: 1303  Rhythm/Rate: Normal sinus rhythm 80 PVC  P waves and PA: Normal P waves  QRS, axis: Normal QRS  ST and T waves: Normal ST-T wave    Interpreted Contemporaneously by me, independently viewed  Unchanged compared to prior 2/9/24      MEDICATIONS GIVEN IN ER  Medications   sodium chloride 0.9 % flush 10 mL (has no administration in time range)   albuterol (PROVENTIL) nebulizer solution 0.083% 2.5 mg/3mL (2.5 mg Nebulization Given 2/16/25 1252)   iopamidol (ISOVUE-370) 76 % injection 100 mL (95 mL Intravenous Given by Other 2/16/25 1336)                   MEDICAL DECISION MAKING, PROGRESS, and CONSULTS    All labs have been independently reviewed by me.  All radiology studies have been reviewed by me and I have also reviewed the radiology report.   EKG's independently viewed and interpreted by me.  Discussion below represents my analysis of pertinent findings related to patient's condition, differential diagnosis, treatment plan and final  disposition.      Additional sources:  - Discussed/ obtained information from independent historians: None    - External (non-ED) record review: Epic reviewed and patient seen 12-24 for bronchitis    - Chronic or social conditions impacting care: None    - Shared decision making: None      Orders placed during this visit:  Orders Placed This Encounter   Procedures    Respiratory Panel PCR w/COVID-19(SARS-CoV-2) [facility] In-House, NP Swab in UTM/VTM, 2 HR TAT - Swab, Nasopharynx    XR Chest 1 View    CT Angiogram Chest    CT Abdomen Pelvis With Contrast    Comprehensive Metabolic Panel    BNP    High Sensitivity Troponin T    CBC Auto Differential    High Sensitivity Troponin T 1Hr    ECG 12 Lead Dyspnea    Insert Peripheral IV    CBC & Differential         Additional orders considered but not ordered:  None        Differential diagnosis includes but is not limited to:    Viral syndrome versus pneumonia versus COPD      Independent interpretation of labs, radiology studies, and discussions with consultants:  ED Course as of 02/16/25 1547   Sun Feb 16, 2025   1525 15:26 EST  Patient presents for multiple chronic complaints.  Patient has chronic cough.  Patient CT scan shows no evidence of pneumonia pneumothorax pulmonary embolism.  Patient does have COPD and is not following up with pulmonary doctor so we will give him [facility] pulmonary care.  Patient's blood work all normal.  EKG normal.  Patient is abdominal pain sounds like is been going on for several months.  CT scan shows no evidence of obstruction.  Patient has seen gastroenterology and has been scoped from above.  Would encourage him to follow back up with them.  Instructed to return for worsening symptoms. [SL]      ED Course User Index  [SL] Jermaine Montaño MD                 DIAGNOSIS  Final diagnoses:   Chronic cough   Chronic abdominal pain         DISPOSITION  DISCHARGE    Patient discharged in stable condition.    Reviewed  implications of results, diagnosis, meds, responsibility to follow up, warning signs and symptoms of possible worsening, potential complications and reasons to return to ER, including worsening symptoms    Patient/Family voiced understanding of above instructions.    Discussed plan for discharge, as there is no emergent indication for admission. Patient referred to primary care provider for BP management due to today's BP. Pt/family is agreeable and understands need for follow up and repeat testing.  Pt is aware that discharge does not mean that nothing is wrong but it indicates no emergency is present that requires admission and they must continue care with follow-up as given below or physician of their choice.     FOLLOW-UP  [facility]  [address]  [facility] [id number] [phone]  Schedule an appointment as soon as possible for a visit            Medication List      No changes were made to your prescriptions during this visit.                  Latest Documented Vital Signs:  As of 15:47 EST  BP- 132/84 HR- 66 Temp- 98.4 °F (36.9 °C) (Tympanic) O2 sat- 99%              --    Please note that portions of this were completed with a voice recognition program.       Note Disclaimer: At [facility], we believe that sharing information builds trust and better relationships. You are receiving this note because you are receiving care at [facility] or recently visited. It is possible you will see health information before a provider has talked with you about it. This kind of information can be easy to misunderstand. To help you fully understand what it means for your health, we urge you to discuss this note with your provider.            Jermaine Montaño MD  02/16/25 4436

## 2025-02-16 NOTE — ED NOTES
"Chief Complaint   Patient presents with    Cough     Blood pressure (!) 149/104, pulse 87, temperature 98.4 °F (36.9 °C), temperature source Tympanic, resp. rate 18, height 177.8 cm (70\"), weight 53.5 kg (118 lb), SpO2 96%.    The patient presents to the ER with a persistent cough, fever, chills and abdominal pain.  He states he has been \"sick for a while\".  Lives at home with a daughter and a grandchild.  States he is unable to eat because nothing tastes right and his abdominal pain increases with intake. Placed on the monitor..   "

## 2025-02-18 LAB
QT INTERVAL: 385 MS
QTC INTERVAL: 445 MS

## (undated) DEVICE — 3M™ STERI-STRIP™ COMPOUND BENZOIN TINCTURE 40 BAGS/CARTON 4 CARTONS/CASE C1544: Brand: 3M™ STERI-STRIP™

## (undated) DEVICE — DISPOSABLE MONOPOLAR ENDOSCOPIC CORD 10 FT. (3M): Brand: KIRWAN

## (undated) DEVICE — TBG PENCL TELESCP MEGADYNE SMOKE EVAC 10FT

## (undated) DEVICE — DRP C/ARM 41X74IN

## (undated) DEVICE — ENDOPOUCH RETRIEVER SPECIMEN RETRIEVAL BAGS: Brand: ENDOPOUCH RETRIEVER

## (undated) DEVICE — VIOLET BRAIDED (POLYGLACTIN 910), SYNTHETIC ABSORBABLE SUTURE: Brand: COATED VICRYL

## (undated) DEVICE — ENDOCUT SCISSOR TIP, DISPOSABLE: Brand: RENEW

## (undated) DEVICE — SUT MNCRYL PLS ANTIB UD 4/0 PS2 18IN

## (undated) DEVICE — ENDOPATH XCEL UNIVERSAL TROCAR STABLILITY SLEEVES: Brand: ENDOPATH XCEL

## (undated) DEVICE — ENDOPATH XCEL BLADELESS TROCARS WITH STABILITY SLEEVES: Brand: ENDOPATH XCEL

## (undated) DEVICE — LOU LAP CHOLE: Brand: MEDLINE INDUSTRIES, INC.

## (undated) DEVICE — CONTAINER,SPECIMEN,OR STERILE,4OZ: Brand: MEDLINE

## (undated) DEVICE — SOL NACL 0.9PCT 1000ML

## (undated) DEVICE — DRAPE,REIN 53X77,STERILE: Brand: MEDLINE

## (undated) DEVICE — CATH IV INSYTE AUTOGARD 14G 1 1/2IN ORNG

## (undated) DEVICE — VISUALIZATION SYSTEM: Brand: CLEARIFY

## (undated) DEVICE — APPL CHLORAPREP HI/LITE 26ML ORNG

## (undated) DEVICE — CATH CHOLANG 4.5F18IN BRGNDY

## (undated) DEVICE — LAPAROSCOPIC DISSECTOR: Brand: DEROYAL

## (undated) DEVICE — ENDOPATH XCEL BLUNT TIP TROCARS WITH SMOOTH SLEEVES: Brand: ENDOPATH XCEL

## (undated) DEVICE — GLV SURG BIOGEL LTX PF 8 1/2

## (undated) DEVICE — STPCK 3WY D201 DISCOFIX

## (undated) DEVICE — TRAP FLD MINIVAC MEGADYNE 100ML

## (undated) DEVICE — LAPAROSCOPIC SMOKE FILTRATION SYSTEM: Brand: PALL LAPAROSHIELD® PLUS LAPAROSCOPIC SMOKE FILTRATION SYSTEM

## (undated) DEVICE — EXTENSION SET, MALE LUER LOCK ADAPTER WITH RETRACTABLE COLLAR